# Patient Record
Sex: MALE | Race: WHITE | NOT HISPANIC OR LATINO | Employment: OTHER | URBAN - METROPOLITAN AREA
[De-identification: names, ages, dates, MRNs, and addresses within clinical notes are randomized per-mention and may not be internally consistent; named-entity substitution may affect disease eponyms.]

---

## 2017-01-01 ENCOUNTER — OFFICE VISIT (OUTPATIENT)
Dept: CARDIOLOGY | Facility: MEDICAL CENTER | Age: 77
End: 2017-01-01
Payer: MEDICARE

## 2017-01-01 VITALS
WEIGHT: 212 LBS | BODY MASS INDEX: 31.4 KG/M2 | HEART RATE: 64 BPM | HEIGHT: 69 IN | OXYGEN SATURATION: 95 % | SYSTOLIC BLOOD PRESSURE: 138 MMHG | DIASTOLIC BLOOD PRESSURE: 70 MMHG

## 2017-01-01 DIAGNOSIS — Z82.49 FAMILY HISTORY OF CORONARY ARTERY DISEASE: ICD-10-CM

## 2017-01-01 DIAGNOSIS — D69.6 THROMBOCYTOPENIA (HCC): ICD-10-CM

## 2017-01-01 DIAGNOSIS — R73.01 ELEVATED FASTING BLOOD SUGAR: ICD-10-CM

## 2017-01-01 DIAGNOSIS — E78.2 MIXED HYPERLIPIDEMIA: ICD-10-CM

## 2017-01-01 PROCEDURE — 99213 OFFICE O/P EST LOW 20 MIN: CPT | Performed by: INTERNAL MEDICINE

## 2017-01-01 ASSESSMENT — ENCOUNTER SYMPTOMS
ORTHOPNEA: 0
PND: 0
CLAUDICATION: 0
WHEEZING: 0
COUGH: 0
PALPITATIONS: 0
MYALGIAS: 0

## 2017-01-01 ASSESSMENT — LIFESTYLE VARIABLES: SUBSTANCE_ABUSE: 0

## 2017-03-29 ENCOUNTER — PATIENT OUTREACH (OUTPATIENT)
Dept: HEALTH INFORMATION MANAGEMENT | Facility: OTHER | Age: 77
End: 2017-03-29

## 2017-04-03 NOTE — PROGRESS NOTES
Attempt #:2    Verify PCP: yes    Communication Preference Obtained: yes     Review Care Team: yes    Annual Wellness Visit Scheduling  1. Scheduling Status: NOT SCHEDULED- PT IS TAKING CARE OF WIFE, DOES NOT HAVE TIME FOR ANYTHING ELSE RIGHT NOW          Care Gap Scheduling (Attempt to Schedule EACH Overdue Care Gap!)- PT IS TAKING CARE OF WIFE, DOES NOT HAVE TIME FOR ANYTHING ELSE RIGHT NOW          Health Maintenance Due   Topic Date Due   • IMM ZOSTER VACCINE  04/26/2002   • IMM PNEUMOCOCCAL 65+ (ADULT) LOW/MEDIUM RISK SERIES (1 of 2 - PCV13) 04/26/2007   • IMM DTaP/Tdap/Td Vaccine (1 - Tdap) 04/21/2012   • Annual Wellness Visit  12/14/2016         STWA Activation: sent activation code  STWA [place]: no  Virtual Visits: no  Opt In to Text Messages: no

## 2017-04-21 ENCOUNTER — RX ONLY (OUTPATIENT)
Age: 77
Setting detail: RX ONLY
End: 2017-04-21

## 2017-05-19 ENCOUNTER — OFFICE VISIT (OUTPATIENT)
Dept: MEDICAL GROUP | Facility: MEDICAL CENTER | Age: 77
End: 2017-05-19
Payer: MEDICARE

## 2017-05-19 VITALS
RESPIRATION RATE: 16 BRPM | BODY MASS INDEX: 29.63 KG/M2 | DIASTOLIC BLOOD PRESSURE: 78 MMHG | SYSTOLIC BLOOD PRESSURE: 132 MMHG | HEIGHT: 70 IN | WEIGHT: 207 LBS | HEART RATE: 78 BPM | TEMPERATURE: 97.3 F | OXYGEN SATURATION: 95 %

## 2017-05-19 DIAGNOSIS — E78.2 MIXED HYPERLIPIDEMIA: ICD-10-CM

## 2017-05-19 DIAGNOSIS — E55.9 VITAMIN D DEFICIENCY DISEASE: ICD-10-CM

## 2017-05-19 DIAGNOSIS — Z12.5 PROSTATE CANCER SCREENING: ICD-10-CM

## 2017-05-19 DIAGNOSIS — D69.6 THROMBOCYTOPENIA (HCC): ICD-10-CM

## 2017-05-19 DIAGNOSIS — R73.01 ELEVATED FASTING BLOOD SUGAR: ICD-10-CM

## 2017-05-19 PROCEDURE — 4040F PNEUMOC VAC/ADMIN/RCVD: CPT | Mod: 8P | Performed by: FAMILY MEDICINE

## 2017-05-19 PROCEDURE — G8419 CALC BMI OUT NRM PARAM NOF/U: HCPCS | Performed by: FAMILY MEDICINE

## 2017-05-19 PROCEDURE — 99214 OFFICE O/P EST MOD 30 MIN: CPT | Performed by: FAMILY MEDICINE

## 2017-05-19 PROCEDURE — G8432 DEP SCR NOT DOC, RNG: HCPCS | Performed by: FAMILY MEDICINE

## 2017-05-19 PROCEDURE — 1101F PT FALLS ASSESS-DOCD LE1/YR: CPT | Mod: 8P | Performed by: FAMILY MEDICINE

## 2017-05-19 PROCEDURE — 1036F TOBACCO NON-USER: CPT | Performed by: FAMILY MEDICINE

## 2017-05-19 NOTE — MR AVS SNAPSHOT
"        Murali Aly   2017 1:00 PM   Office Visit   MRN: [id number]    Department:  [facility]   Dept Phone:  [phone]    Description:  Male : 1940   Provider:  Jh Acosta M.D.           Reason for Visit     Orders Needed           Allergies as of 2017     Allergen Noted Reactions    Nkda [No Known Drug Allergy] 2012         You were diagnosed with     Mixed hyperlipidemia   [272.2.ICD-9-CM]       Vitamin D deficiency disease   [046109]       Elevated fasting blood sugar   [384015]       Thrombocytopenia (CMS-HCC)   [522206]       Prostate cancer screening   [425846]         Vital Signs     Blood Pressure Pulse Temperature Respirations Height Weight    132/78 mmHg 78 36.3 °C (97.3 °F) 16 1.778 m (5' 10\") 93.895 kg (207 lb)    Body Mass Index Oxygen Saturation Smoking Status             29.70 kg/m2 95% Never Smoker          Basic Information     Date Of Birth Sex Race Ethnicity Preferred Language    1940 Male White Non- English      Problem List              ICD-10-CM Priority Class Noted - Resolved    Family history of coronary artery disease Z82.49   2012 - Present    Screening for prostate cancer Z12.5   2012 - Present    BPH (benign prostatic hyperplasia) N40.0   11/15/2013 - Present    Prediabetes R73.03   11/15/2013 - Present    Hip pain, bilateral M25.551, M25.552   2014 - Present    Mixed hyperlipidemia E78.2   2014 - Present    Vitamin D deficiency disease E55.9   2014 - Present    Edema R60.9   2015 - Present    Obesity (BMI 30.0-34.9) E66.9   6/10/2015 - Present    History of colon polyps Z86.010   10/31/2016 - Present    Thrombocytopenia (CMS-HCC) D69.6   2017 - Present    Elevated fasting blood sugar R73.01   2017 - Present      Health Maintenance        Date Due Completion Dates    IMM ZOSTER VACCINE 2002 ---    IMM PNEUMOCOCCAL 65+ (ADULT) LOW/MEDIUM RISK SERIES (1 of 2 - PCV13) 2007 ---    IMM " DTaP/Tdap/Td Vaccine (1 - Tdap) 4/21/2012 4/20/2012    COLONOSCOPY 5/10/2021 5/10/2016            Current Immunizations     TD Vaccine 4/20/2012      Below and/or attached are the medications your provider expects you to take. Review all of your home medications and newly ordered medications with your provider and/or pharmacist. Follow medication instructions as directed by your provider and/or pharmacist. Please keep your medication list with you and share with your provider. Update the information when medications are discontinued, doses are changed, or new medications (including over-the-counter products) are added; and carry medication information at all times in the event of emergency situations     Allergies:  NKDA - (reactions not documented)               Medications  Valid as of: May 19, 2017 -  1:30 PM    Generic Name Brand Name Tablet Size Instructions for use    .                 Medicines prescribed today were sent to:     [facility]  [place], [place] - [address]    [address] [place] [address]    Phone: [phone] Fax: [phone]    Open 24 Hours?: No      Medication refill instructions:       If your prescription bottle indicates you have medication refills left, it is not necessary to call your provider’s office. Please contact your pharmacy and they will refill your medication.    If your prescription bottle indicates you do not have any refills left, you may request refills at any time through one of the following ways: The online [employer] system (except Urgent Care), by calling your provider’s office, or by asking your pharmacy to contact your provider’s office with a refill request. Medication refills are processed only during regular business hours and may not be available until the next business day. Your provider may request additional information or to have a follow-up visit with you prior to refilling your medication.   *Please Note: Medication refills are assigned a new Rx  number when refilled electronically. Your pharmacy may indicate that no refills were authorized even though a new prescription for the same medication is available at the pharmacy. Please request the medicine by name with the pharmacy before contacting your provider for a refill.        Your To Do List     Future Labs/Procedures Complete By Expires    CBC WITH DIFFERENTIAL  As directed 5/20/2018    COMP METABOLIC PANEL  As directed 5/20/2018    HEMOGLOBIN A1C  As directed 5/20/2018    LIPID PROFILE  As directed 5/20/2018    PROSTATE SPECIFIC AG SCREENING  As directed 5/20/2018    TSH WITH REFLEX TO FT4  As directed 5/19/2018    VITAMIN D,25 HYDROXY  As directed 5/20/2018      Other Notes About Your Plan     Murali Lu is enrolled in [facility] Team with Dr. Mary Koo           [employer] - It's what's in store Access Code: [email or web address]  Expires: 6/18/2017  1:30 PM    Toushay - It's what's in store  A secure, online tool to manage your health information     [employer]’s Toushay - It's what's in store® is a secure, online tool that connects you to your personalized health information from the privacy of your home -- day or night - making it very easy for you to manage your healthcare. Once the activation process is completed, you can even access your medical information using the T[employer]hay - It's what's in store tasha, which is available for free in the [employer] Tasha store or Google Play store.     T[employer] - It's what's in store provides the following levels of access (as shown below):   My Chart Features   [facility] Doctor [facility]  Urgent  Care Non-[facility]  Primary Care  Doctor   Email your healthcare team securely and privately 24/7 X X X    Manage appointments: schedule your next appointment; view details of past/upcoming appointments X      Request prescription refills. X      View recent personal medical records, including lab and immunizations X X X X   View health record, including health history, allergies, medications X X X X   Read reports about your outpatient visits, procedures,  consult and ER notes X X X X   See your discharge summary, which is a recap of your hospital and/or ER visit that includes your diagnosis, lab results, and care plan. X X       How to register for tagWALLET:  1. Go to  [email or web address].  2. Click on the Sign Up Now box, which takes you to the New Member Sign Up page. You will need to provide the following information:  a. Enter your tagWALLET Access Code exactly as it appears at the top of this page. (You will not need to use this code after you’ve completed the sign-up process. If you do not sign up before the expiration date, you must request a new code.)   b. Enter your date of birth.   c. Enter your home email address.   d. Click Submit, and follow the next screen’s instructions.  3. Create a Planboxt ID. This will be your Planboxt login ID and cannot be changed, so think of one that is secure and easy to remember.  4. Create a [employer] password. You can change your password at any time.  5. Enter your Password Reset Question and Answer. This can be used at a later time if you forget your password.   6. Enter your e-mail address. This allows you to receive e-mail notifications when new information is available in tagWALLET.  7. Click Sign Up. You can now view your health information.    For assistance activating your tagWALLET account, call [phone]

## 2017-05-19 NOTE — PROGRESS NOTES
"Subjective:   Murali Moore is a 77 y.o. male here today for hyperlipidemia    Elevated fasting blood sugar  Patient has had elevated blood sugar in the past with an A1c of 6.2 on last year's labs.    Mixed hyperlipidemia  Patient has had good cholesterol in the past. He has lost weight and is using the elliptical machine every morning.  He is interesting getting cholesterol particle size done.  However, patient is opposed to having cholesterol medication prescribed.  Results for MURALI MOORE (MRN [id number]) as of 5/19/2017 13:09   Ref. Range 6/30/2016 09:58   Cholesterol,Tot Latest Ref Range: 100-199 mg/dL 187   Triglycerides Latest Ref Range: 0-149 mg/dL 134   HDL Latest Ref Range: >=40 mg/dL 50   LDL Latest Ref Range: <100 mg/dL 110 (H)       Thrombocytopenia (CMS-HCC)  Patient has had a mild thrombocytopenia in the past.  He denies any bleeding issues.    Vitamin D deficiency disease  Patient is on multiple vitamins. His vitamin D level has been normal.           Current medicines (including changes today)  No current outpatient prescriptions on file.     No current facility-administered medications for this visit.     He  has a past medical history of MEDICAL HOME and Colon polyps.    ROS   No chest pain, no shortness of breath       Objective:     Blood pressure 132/78, pulse 78, temperature 36.3 °C (97.3 °F), resp. rate 16, height 1.778 m (5' 10\"), weight 93.895 kg (207 lb), SpO2 95 %. Body mass index is 29.7 kg/(m^2).   Physical Exam:  Constitutional: Alert, no distress.  Skin: Warm, dry, good turgor, no rashes in visible areas.  Eye: Equal, round and reactive, conjunctiva clear, lids normal.  Respiratory: Unlabored respiratory effort, lungs clear to auscultation, no wheezes, no ronchi.  Cardiovascular: Normal S1, S2, no murmur, no edema.  Psych: Alert and oriented x3, normal affect and mood.        Assessment and Plan:   The following treatment plan was discussed    1. Mixed hyperlipidemia  Controlled. " Advised healthy diet.  Discussed the rationale behind ordering cholesterol particle size with almost a normal cholesterol. Patient declines wanting to be prescribed medication even if particle size is abnormal. He hopes that this will be motivational for him to continue with healthy diet and regular exercise.  - NMR LIPOPROFILE  - COMP METABOLIC PANEL; Future  - TSH WITH REFLEX TO FT4; Future  - LIPID PROFILE; Future    2. Vitamin D deficiency disease  Controlled. Continue vitamin D supplementation.  - VITAMIN D,25 HYDROXY; Future    3. Elevated fasting blood sugar  Advised diet and exercise. Check labs and call with results.  - HEMOGLOBIN A1C; Future    4. Thrombocytopenia (CMS-HCC)  Check labs and call with results.  - CBC WITH DIFFERENTIAL; Future    5. Prostate cancer screening  Advised patient have PSA done after July 30  - PROSTATE SPECIFIC AG SCREENING; Future      Followup: Return in about 1 year (around 5/19/2018) for Annual, Long.         Total 20 minutes face-to-face time spent with patient, with greater than 50% of the total time discussing patient's issues and symptoms as listed above in assessment and plan, as well as managing coordination of care for future evaluation and treatment.

## 2017-09-29 ENCOUNTER — HOSPITAL ENCOUNTER (OUTPATIENT)
Dept: LAB | Facility: MEDICAL CENTER | Age: 77
End: 2017-09-29
Attending: FAMILY MEDICINE
Payer: MEDICARE

## 2017-09-29 DIAGNOSIS — E78.2 MIXED HYPERLIPIDEMIA: ICD-10-CM

## 2017-09-29 DIAGNOSIS — E55.9 VITAMIN D DEFICIENCY DISEASE: ICD-10-CM

## 2017-09-29 DIAGNOSIS — Z12.5 PROSTATE CANCER SCREENING: ICD-10-CM

## 2017-09-29 DIAGNOSIS — R73.01 ELEVATED FASTING BLOOD SUGAR: ICD-10-CM

## 2017-09-29 DIAGNOSIS — D69.6 THROMBOCYTOPENIA (HCC): ICD-10-CM

## 2017-09-29 LAB
25(OH)D3 SERPL-MCNC: 43 NG/ML (ref 30–100)
ALBUMIN SERPL BCP-MCNC: 4.2 G/DL (ref 3.2–4.9)
ALBUMIN/GLOB SERPL: 2.2 G/DL
ALP SERPL-CCNC: 58 U/L (ref 30–99)
ALT SERPL-CCNC: 13 U/L (ref 2–50)
ANION GAP SERPL CALC-SCNC: 8 MMOL/L (ref 0–11.9)
AST SERPL-CCNC: 17 U/L (ref 12–45)
BASOPHILS # BLD AUTO: 0.3 % (ref 0–1.8)
BASOPHILS # BLD: 0.02 K/UL (ref 0–0.12)
BILIRUB SERPL-MCNC: 0.9 MG/DL (ref 0.1–1.5)
BUN SERPL-MCNC: 14 MG/DL (ref 8–22)
CALCIUM SERPL-MCNC: 9.2 MG/DL (ref 8.5–10.5)
CHLORIDE SERPL-SCNC: 107 MMOL/L (ref 96–112)
CHOLEST SERPL-MCNC: 170 MG/DL (ref 100–199)
CO2 SERPL-SCNC: 27 MMOL/L (ref 20–33)
CREAT SERPL-MCNC: 0.88 MG/DL (ref 0.5–1.4)
EOSINOPHIL # BLD AUTO: 0.09 K/UL (ref 0–0.51)
EOSINOPHIL NFR BLD: 1.4 % (ref 0–6.9)
ERYTHROCYTE [DISTWIDTH] IN BLOOD BY AUTOMATED COUNT: 39.8 FL (ref 35.9–50)
EST. AVERAGE GLUCOSE BLD GHB EST-MCNC: 137 MG/DL
GFR SERPL CREATININE-BSD FRML MDRD: >60 ML/MIN/1.73 M 2
GLOBULIN SER CALC-MCNC: 1.9 G/DL (ref 1.9–3.5)
GLUCOSE SERPL-MCNC: 126 MG/DL (ref 65–99)
HBA1C MFR BLD: 6.4 % (ref 0–5.6)
HCT VFR BLD AUTO: 44.8 % (ref 42–52)
HDLC SERPL-MCNC: 50 MG/DL
HGB BLD-MCNC: 15.5 G/DL (ref 14–18)
IMM GRANULOCYTES # BLD AUTO: 0.01 K/UL (ref 0–0.11)
IMM GRANULOCYTES NFR BLD AUTO: 0.2 % (ref 0–0.9)
LDLC SERPL CALC-MCNC: 99 MG/DL
LYMPHOCYTES # BLD AUTO: 1.17 K/UL (ref 1–4.8)
LYMPHOCYTES NFR BLD: 18.3 % (ref 22–41)
MCH RBC QN AUTO: 31.3 PG (ref 27–33)
MCHC RBC AUTO-ENTMCNC: 34.6 G/DL (ref 33.7–35.3)
MCV RBC AUTO: 90.5 FL (ref 81.4–97.8)
MONOCYTES # BLD AUTO: 0.49 K/UL (ref 0–0.85)
MONOCYTES NFR BLD AUTO: 7.7 % (ref 0–13.4)
NEUTROPHILS # BLD AUTO: 4.6 K/UL (ref 1.82–7.42)
NEUTROPHILS NFR BLD: 72.1 % (ref 44–72)
NRBC # BLD AUTO: 0 K/UL
NRBC BLD AUTO-RTO: 0 /100 WBC
PLATELET # BLD AUTO: 162 K/UL (ref 164–446)
PMV BLD AUTO: 9.2 FL (ref 9–12.9)
POTASSIUM SERPL-SCNC: 4 MMOL/L (ref 3.6–5.5)
PROT SERPL-MCNC: 6.1 G/DL (ref 6–8.2)
PSA SERPL-MCNC: 1.42 NG/ML (ref 0–4)
RBC # BLD AUTO: 4.95 M/UL (ref 4.7–6.1)
SODIUM SERPL-SCNC: 142 MMOL/L (ref 135–145)
TRIGL SERPL-MCNC: 105 MG/DL (ref 0–149)
TSH SERPL DL<=0.005 MIU/L-ACNC: 1.38 UIU/ML (ref 0.3–3.7)
WBC # BLD AUTO: 6.4 K/UL (ref 4.8–10.8)

## 2017-09-29 PROCEDURE — 82306 VITAMIN D 25 HYDROXY: CPT

## 2017-09-29 PROCEDURE — 80053 COMPREHEN METABOLIC PANEL: CPT

## 2017-09-29 PROCEDURE — 84443 ASSAY THYROID STIM HORMONE: CPT

## 2017-09-29 PROCEDURE — 36415 COLL VENOUS BLD VENIPUNCTURE: CPT

## 2017-09-29 PROCEDURE — 80061 LIPID PANEL: CPT

## 2017-09-29 PROCEDURE — 84153 ASSAY OF PSA TOTAL: CPT

## 2017-09-29 PROCEDURE — 83704 LIPOPROTEIN BLD QUAN PART: CPT

## 2017-09-29 PROCEDURE — 83036 HEMOGLOBIN GLYCOSYLATED A1C: CPT

## 2017-09-29 PROCEDURE — 85025 COMPLETE CBC W/AUTO DIFF WBC: CPT

## 2017-10-02 ENCOUNTER — TELEPHONE (OUTPATIENT)
Dept: MEDICAL GROUP | Facility: MEDICAL CENTER | Age: 77
End: 2017-10-02

## 2017-10-02 NOTE — TELEPHONE ENCOUNTER
----- Message from Jh Acosta M.D. sent at 10/2/2017  7:38 AM PDT -----  Please notify patient of kidney function, liver function, vitamin D, prostate, thyroid function, cholesterol are normal.  Average blood sugar is in the prediabetic range and very close to diabetes. We advise to reduce sugar/carbohydrate/alcohol, eat more vegetables and lean meats such as fish/chicken/turkey. We also recommend 30 minutes of cardiovascular exercise most days of the week.  Labs should be repeated in 6 months  Jh Acosta M.D.

## 2017-10-09 LAB
CHOLEST SERPL-MCNC: 185 MG/DL
HDL PARTICAL NO Q4363: ABNORMAL
HDL SIZE Q4361: 8.5 NM
HDLC SERPL-MCNC: 54 MG/DL (ref 40–59)
HLD.LARGE SERPL-SCNC: 3 UMOL/L
L VLDL PART NO Q4357: <1.5 NMOL/L
LDL SERPL QN: 20.6 NM
LDL SERPL-SCNC: 1349 NMOL/L
LDL SMALL SERPL-SCNC: 582 NMOL/L
LDLC SERPL CALC-MCNC: 108 MG/DL
TRIGL SERPL-MCNC: 114 MG/DL (ref 30–149)
VLDL SIZE Q4362: ABNORMAL

## 2017-10-17 NOTE — PROGRESS NOTES
"Subjective:   Murali Lu is a 77 y.o. male who presents today for follow-up of his risk factors for coronary disease.  Because of his borderline LDL for treatment and his family history he underwent appropriate assessment of LDL particle size and his LDL particle size is mildly reduced and particle number increased after considering this he decided he did not want to pursue statin therapy. He does understand the relative risks very clearly.  Past Medical History:   Diagnosis Date   • Colon polyps    • MEDICAL HOME      Past Surgical History:   Procedure Laterality Date   • HYDROCELECTOMY ADULT     • UMBILICAL HERNIA REPAIR       Family History   Problem Relation Age of Onset   • Heart Disease Mother    • Cancer Sister    • Diabetes Brother    • Cancer Sister      History   Smoking Status   • Never Smoker   Smokeless Tobacco   • Never Used     Allergies   Allergen Reactions   • Nkda [No Known Drug Allergy]      No outpatient encounter prescriptions on file as of 10/17/2017.     No facility-administered encounter medications on file as of 10/17/2017.      Review of Systems   Respiratory: Negative for cough and wheezing.    Cardiovascular: Negative for chest pain, palpitations, orthopnea, claudication, leg swelling and PND.   Musculoskeletal: Negative for myalgias.   Psychiatric/Behavioral: Negative for substance abuse.        Objective:   /70   Pulse 64   Ht 1.753 m (5' 9\")   Wt 96.2 kg (212 lb)   SpO2 95%   BMI 31.31 kg/m²     Physical Exam   Constitutional: He is oriented to person, place, and time. He appears well-developed and well-nourished.   Eyes: Conjunctivae are normal.   Neck: No JVD present.   Cardiovascular: Normal rate, regular rhythm, normal heart sounds and intact distal pulses.  Exam reveals no gallop.    No murmur heard.  Pulmonary/Chest: Effort normal and breath sounds normal.   Musculoskeletal: He exhibits edema (trace at most).   Neurological: He is alert and oriented to person, place, " and time.   Skin: Skin is warm and dry.   Psychiatric: He has a normal mood and affect. Thought content normal.       Assessment:     1. Mixed hyperlipidemia     2. Family history of coronary artery disease     3. Thrombocytopenia (CMS-HCC)     4. Elevated fasting blood sugar       Platelet count has been minimally reduced if at all and has been stable.  We discussed his lipids today.  The risk equation would definitely recommend therapy but mostly because of risk associated with age and therefore the LDL fractionation was definitely indicated and it is borderline or slightly abnormal depending on the value and therefore I did recommend statin therapy.    Medical Decision Making:  Today's Assessment / Status / Plan:     He has reviewed all the data himself and has decided that he really does not want to consider statin therapy now.  We know his profile now and therefore can follow LDL as a marker should he change his mind and decided to pursue statin therapy. Otherwise I made no changes today and he will continue annual follow-up but primary follow up with Dr. Acosta.  He will continue working on diet for his glucose.

## 2017-10-25 ENCOUNTER — APPOINTMENT (RX ONLY)
Dept: URBAN - METROPOLITAN AREA CLINIC 4 | Facility: CLINIC | Age: 77
Setting detail: DERMATOLOGY
End: 2017-10-25

## 2017-10-25 DIAGNOSIS — L11.1 TRANSIENT ACANTHOLYTIC DERMATOSIS [GROVER]: ICD-10-CM

## 2017-10-25 DIAGNOSIS — Z85.820 PERSONAL HISTORY OF MALIGNANT MELANOMA OF SKIN: ICD-10-CM

## 2017-10-25 DIAGNOSIS — L82.0 INFLAMED SEBORRHEIC KERATOSIS: ICD-10-CM

## 2017-10-25 DIAGNOSIS — L81.4 OTHER MELANIN HYPERPIGMENTATION: ICD-10-CM

## 2017-10-25 DIAGNOSIS — L82.1 OTHER SEBORRHEIC KERATOSIS: ICD-10-CM

## 2017-10-25 PROBLEM — Z85.828 PERSONAL HISTORY OF OTHER MALIGNANT NEOPLASM OF SKIN: Status: ACTIVE | Noted: 2017-10-25

## 2017-10-25 PROBLEM — L57.0 ACTINIC KERATOSIS: Status: ACTIVE | Noted: 2017-10-25

## 2017-10-25 PROCEDURE — ? COUNSELING

## 2017-10-25 PROCEDURE — ? LIQUID NITROGEN

## 2017-10-25 PROCEDURE — 99213 OFFICE O/P EST LOW 20 MIN: CPT | Mod: 25

## 2017-10-25 ASSESSMENT — LOCATION SIMPLE DESCRIPTION DERM
LOCATION SIMPLE: LEFT UPPER ARM
LOCATION SIMPLE: RIGHT CHEEK
LOCATION SIMPLE: UPPER BACK
LOCATION SIMPLE: SCALP
LOCATION SIMPLE: LEFT UPPER BACK
LOCATION SIMPLE: RIGHT FOREHEAD
LOCATION SIMPLE: RIGHT UPPER BACK
LOCATION SIMPLE: LEFT FOREHEAD
LOCATION SIMPLE: RIGHT TEMPLE
LOCATION SIMPLE: RIGHT FOREARM
LOCATION SIMPLE: LEFT CHEEK
LOCATION SIMPLE: CHEST
LOCATION SIMPLE: RIGHT SCALP
LOCATION SIMPLE: LEFT FOREARM
LOCATION SIMPLE: RIGHT ELBOW

## 2017-10-25 ASSESSMENT — LOCATION ZONE DERM
LOCATION ZONE: ARM
LOCATION ZONE: SCALP
LOCATION ZONE: FACE
LOCATION ZONE: TRUNK

## 2017-10-25 ASSESSMENT — LOCATION DETAILED DESCRIPTION DERM
LOCATION DETAILED: RIGHT SUPERIOR FOREHEAD
LOCATION DETAILED: RIGHT INFERIOR MEDIAL UPPER BACK
LOCATION DETAILED: LEFT SUPERIOR PARIETAL SCALP
LOCATION DETAILED: RIGHT MEDIAL SUPERIOR CHEST
LOCATION DETAILED: LEFT SUPERIOR FOREHEAD
LOCATION DETAILED: RIGHT MID TEMPLE
LOCATION DETAILED: RIGHT ELBOW
LOCATION DETAILED: INFERIOR THORACIC SPINE
LOCATION DETAILED: RIGHT CENTRAL FRONTAL SCALP
LOCATION DETAILED: LEFT INFERIOR CENTRAL MALAR CHEEK
LOCATION DETAILED: RIGHT PROXIMAL DORSAL FOREARM
LOCATION DETAILED: LEFT DISTAL POSTERIOR UPPER ARM
LOCATION DETAILED: RIGHT MID-UPPER BACK
LOCATION DETAILED: LEFT INFERIOR MEDIAL BUCCAL CHEEK
LOCATION DETAILED: LEFT MEDIAL UPPER BACK
LOCATION DETAILED: LEFT DISTAL DORSAL FOREARM
LOCATION DETAILED: RIGHT SUPERIOR LATERAL MALAR CHEEK

## 2017-10-25 NOTE — PROCEDURE: LIQUID NITROGEN
Medical Necessity Information: It is in your best interest to select a reason for this procedure from the list below. All of these items fulfill various CMS LCD requirements except the new and changing color options.
Detail Level: Detailed
Medical Necessity Clause: This procedure was medically necessary because the lesions that were treated were:
Add 52 Modifier (Optional): no
Consent: The patient's consent was obtained including but not limited to risks of crusting, scabbing, blistering, scarring, darker or lighter pigmentary change, recurrence, incomplete removal and infection.
Post-Care Instructions: I reviewed with the patient in detail post-care instructions. Patient is to wear sunprotection, and avoid picking at any of the treated lesions. Pt may apply Vaseline to crusted or scabbing areas.

## 2018-01-01 ENCOUNTER — APPOINTMENT (OUTPATIENT)
Dept: RADIOLOGY | Facility: MEDICAL CENTER | Age: 78
DRG: 870 | End: 2018-01-01
Attending: INTERNAL MEDICINE
Payer: MEDICARE

## 2018-01-01 ENCOUNTER — OFFICE VISIT (OUTPATIENT)
Dept: MEDICAL GROUP | Facility: MEDICAL CENTER | Age: 78
End: 2018-01-01
Payer: MEDICARE

## 2018-01-01 ENCOUNTER — APPOINTMENT (OUTPATIENT)
Dept: RADIOLOGY | Facility: MEDICAL CENTER | Age: 78
DRG: 870 | End: 2018-01-01
Attending: EMERGENCY MEDICINE
Payer: MEDICARE

## 2018-01-01 ENCOUNTER — HOSPITAL ENCOUNTER (OUTPATIENT)
Dept: LAB | Facility: MEDICAL CENTER | Age: 78
End: 2018-07-27
Attending: FAMILY MEDICINE
Payer: MEDICARE

## 2018-01-01 ENCOUNTER — OFFICE VISIT (OUTPATIENT)
Dept: URGENT CARE | Facility: PHYSICIAN GROUP | Age: 78
End: 2018-01-01
Payer: MEDICARE

## 2018-01-01 ENCOUNTER — OFFICE VISIT (OUTPATIENT)
Dept: CARDIOLOGY | Facility: MEDICAL CENTER | Age: 78
End: 2018-01-01
Payer: MEDICARE

## 2018-01-01 ENCOUNTER — HOSPITAL ENCOUNTER (OUTPATIENT)
Dept: RADIOLOGY | Facility: MEDICAL CENTER | Age: 78
DRG: 870 | End: 2018-09-29
Attending: PHYSICIAN ASSISTANT
Payer: MEDICARE

## 2018-01-01 ENCOUNTER — APPOINTMENT (OUTPATIENT)
Dept: CARDIOLOGY | Facility: MEDICAL CENTER | Age: 78
DRG: 870 | End: 2018-01-01
Attending: INTERNAL MEDICINE
Payer: MEDICARE

## 2018-01-01 ENCOUNTER — HOSPITAL ENCOUNTER (INPATIENT)
Facility: MEDICAL CENTER | Age: 78
LOS: 16 days | DRG: 870 | End: 2018-10-15
Attending: EMERGENCY MEDICINE | Admitting: HOSPITALIST
Payer: MEDICARE

## 2018-01-01 ENCOUNTER — TELEPHONE (OUTPATIENT)
Dept: MEDICAL GROUP | Facility: MEDICAL CENTER | Age: 78
End: 2018-01-01

## 2018-01-01 ENCOUNTER — APPOINTMENT (OUTPATIENT)
Dept: RADIOLOGY | Facility: MEDICAL CENTER | Age: 78
DRG: 870 | End: 2018-01-01
Payer: MEDICARE

## 2018-01-01 ENCOUNTER — PATIENT OUTREACH (OUTPATIENT)
Dept: HEALTH INFORMATION MANAGEMENT | Facility: OTHER | Age: 78
End: 2018-01-01

## 2018-01-01 VITALS
SYSTOLIC BLOOD PRESSURE: 149 MMHG | HEIGHT: 69 IN | DIASTOLIC BLOOD PRESSURE: 71 MMHG | BODY MASS INDEX: 29.62 KG/M2 | WEIGHT: 200 LBS | HEART RATE: 70 BPM | OXYGEN SATURATION: 96 %

## 2018-01-01 VITALS
TEMPERATURE: 98.4 F | SYSTOLIC BLOOD PRESSURE: 110 MMHG | WEIGHT: 199 LBS | HEIGHT: 70 IN | RESPIRATION RATE: 13 BRPM | OXYGEN SATURATION: 89 % | DIASTOLIC BLOOD PRESSURE: 62 MMHG | HEART RATE: 106 BPM | BODY MASS INDEX: 28.49 KG/M2

## 2018-01-01 VITALS
TEMPERATURE: 99.3 F | DIASTOLIC BLOOD PRESSURE: 59 MMHG | OXYGEN SATURATION: 80 % | SYSTOLIC BLOOD PRESSURE: 118 MMHG | WEIGHT: 230.6 LBS | HEIGHT: 70 IN | HEART RATE: 54 BPM | BODY MASS INDEX: 33.01 KG/M2

## 2018-01-01 VITALS
HEART RATE: 66 BPM | SYSTOLIC BLOOD PRESSURE: 126 MMHG | BODY MASS INDEX: 30.24 KG/M2 | HEIGHT: 69 IN | WEIGHT: 204.2 LBS | TEMPERATURE: 97.7 F | OXYGEN SATURATION: 98 % | DIASTOLIC BLOOD PRESSURE: 72 MMHG

## 2018-01-01 DIAGNOSIS — Z85.828 H/O NONMELANOMA SKIN CANCER: ICD-10-CM

## 2018-01-01 DIAGNOSIS — A41.9 SEPSIS, DUE TO UNSPECIFIED ORGANISM: ICD-10-CM

## 2018-01-01 DIAGNOSIS — R60.0 LOCALIZED EDEMA: ICD-10-CM

## 2018-01-01 DIAGNOSIS — E78.2 MIXED HYPERLIPIDEMIA: ICD-10-CM

## 2018-01-01 DIAGNOSIS — E55.9 VITAMIN D DEFICIENCY DISEASE: ICD-10-CM

## 2018-01-01 DIAGNOSIS — R40.4 SPELL OF ALTERED CONSCIOUSNESS: ICD-10-CM

## 2018-01-01 DIAGNOSIS — J18.9 PNEUMONIA OF BOTH LUNGS DUE TO INFECTIOUS ORGANISM, UNSPECIFIED PART OF LUNG: ICD-10-CM

## 2018-01-01 DIAGNOSIS — D69.6 THROMBOCYTOPENIA (HCC): ICD-10-CM

## 2018-01-01 DIAGNOSIS — R05.9 COUGH: ICD-10-CM

## 2018-01-01 DIAGNOSIS — R06.01 ORTHOPNEA: ICD-10-CM

## 2018-01-01 DIAGNOSIS — R73.01 ELEVATED FASTING BLOOD SUGAR: ICD-10-CM

## 2018-01-01 DIAGNOSIS — Z82.49 FAMILY HISTORY OF CORONARY ARTERY DISEASE: ICD-10-CM

## 2018-01-01 DIAGNOSIS — J81.0 ACUTE PULMONARY EDEMA (HCC): ICD-10-CM

## 2018-01-01 DIAGNOSIS — E66.9 OBESITY (BMI 30-39.9): ICD-10-CM

## 2018-01-01 DIAGNOSIS — I45.10 RBBB: ICD-10-CM

## 2018-01-01 DIAGNOSIS — R73.03 PREDIABETES: ICD-10-CM

## 2018-01-01 DIAGNOSIS — R09.02 HYPOXEMIA: ICD-10-CM

## 2018-01-01 LAB
25(OH)D3 SERPL-MCNC: 37 NG/ML (ref 30–100)
ACTION RANGE TRIGGERED IACRT: NO
ACTION RANGE TRIGGERED IACRT: YES
ACTION RANGE TRIGGERED IACRT: YES
ALBUMIN SERPL BCP-MCNC: 2.6 G/DL (ref 3.2–4.9)
ALBUMIN SERPL BCP-MCNC: 2.7 G/DL (ref 3.2–4.9)
ALBUMIN SERPL BCP-MCNC: 2.8 G/DL (ref 3.2–4.9)
ALBUMIN SERPL BCP-MCNC: 3 G/DL (ref 3.2–4.9)
ALBUMIN SERPL BCP-MCNC: 3.1 G/DL (ref 3.2–4.9)
ALBUMIN SERPL BCP-MCNC: 3.1 G/DL (ref 3.2–4.9)
ALBUMIN SERPL BCP-MCNC: 3.2 G/DL (ref 3.2–4.9)
ALBUMIN SERPL BCP-MCNC: 3.4 G/DL (ref 3.2–4.9)
ALBUMIN SERPL BCP-MCNC: 3.6 G/DL (ref 3.2–4.9)
ALBUMIN SERPL BCP-MCNC: 3.6 G/DL (ref 3.2–4.9)
ALBUMIN SERPL BCP-MCNC: 3.7 G/DL (ref 3.2–4.9)
ALBUMIN SERPL BCP-MCNC: 4.1 G/DL (ref 3.2–4.9)
ALBUMIN SERPL BCP-MCNC: 4.5 G/DL (ref 3.2–4.9)
ALBUMIN/GLOB SERPL: 1.4 G/DL
ALBUMIN/GLOB SERPL: 1.4 G/DL
ALBUMIN/GLOB SERPL: 1.6 G/DL
ALBUMIN/GLOB SERPL: 1.8 G/DL
ALBUMIN/GLOB SERPL: 1.9 G/DL
ALBUMIN/GLOB SERPL: 1.9 G/DL
ALBUMIN/GLOB SERPL: 2.1 G/DL
ALBUMIN/GLOB SERPL: 2.4 G/DL
ALBUMIN/GLOB SERPL: 2.5 G/DL
ALBUMIN/GLOB SERPL: 2.8 G/DL
ALP SERPL-CCNC: 114 U/L (ref 30–99)
ALP SERPL-CCNC: 60 U/L (ref 30–99)
ALP SERPL-CCNC: 68 U/L (ref 30–99)
ALP SERPL-CCNC: 74 U/L (ref 30–99)
ALP SERPL-CCNC: 76 U/L (ref 30–99)
ALP SERPL-CCNC: 77 U/L (ref 30–99)
ALP SERPL-CCNC: 79 U/L (ref 30–99)
ALP SERPL-CCNC: 86 U/L (ref 30–99)
ALP SERPL-CCNC: 87 U/L (ref 30–99)
ALP SERPL-CCNC: 87 U/L (ref 30–99)
ALP SERPL-CCNC: 89 U/L (ref 30–99)
ALP SERPL-CCNC: 90 U/L (ref 30–99)
ALP SERPL-CCNC: 92 U/L (ref 30–99)
ALP SERPL-CCNC: 97 U/L (ref 30–99)
ALP SERPL-CCNC: 99 U/L (ref 30–99)
ALT SERPL-CCNC: 13 U/L (ref 2–50)
ALT SERPL-CCNC: 14 U/L (ref 2–50)
ALT SERPL-CCNC: 143 U/L (ref 2–50)
ALT SERPL-CCNC: 15 U/L (ref 2–50)
ALT SERPL-CCNC: 19 U/L (ref 2–50)
ALT SERPL-CCNC: 20 U/L (ref 2–50)
ALT SERPL-CCNC: 22 U/L (ref 2–50)
ALT SERPL-CCNC: 22 U/L (ref 2–50)
ALT SERPL-CCNC: 251 U/L (ref 2–50)
ALT SERPL-CCNC: 27 U/L (ref 2–50)
ALT SERPL-CCNC: 32 U/L (ref 2–50)
ALT SERPL-CCNC: 33 U/L (ref 2–50)
ALT SERPL-CCNC: 39 U/L (ref 2–50)
ANION GAP SERPL CALC-SCNC: 10 MMOL/L (ref 0–11.9)
ANION GAP SERPL CALC-SCNC: 11 MMOL/L (ref 0–11.9)
ANION GAP SERPL CALC-SCNC: 11 MMOL/L (ref 0–11.9)
ANION GAP SERPL CALC-SCNC: 14 MMOL/L (ref 0–11.9)
ANION GAP SERPL CALC-SCNC: 3 MMOL/L (ref 0–11.9)
ANION GAP SERPL CALC-SCNC: 4 MMOL/L (ref 0–11.9)
ANION GAP SERPL CALC-SCNC: 5 MMOL/L (ref 0–11.9)
ANION GAP SERPL CALC-SCNC: 5 MMOL/L (ref 0–11.9)
ANION GAP SERPL CALC-SCNC: 6 MMOL/L (ref 0–11.9)
ANION GAP SERPL CALC-SCNC: 7 MMOL/L (ref 0–11.9)
ANION GAP SERPL CALC-SCNC: 8 MMOL/L (ref 0–11.9)
ANION GAP SERPL CALC-SCNC: 9 MMOL/L (ref 0–11.9)
APPEARANCE UR: CLEAR
APPEARANCE UR: CLEAR
AST SERPL-CCNC: 126 U/L (ref 12–45)
AST SERPL-CCNC: 15 U/L (ref 12–45)
AST SERPL-CCNC: 15 U/L (ref 12–45)
AST SERPL-CCNC: 16 U/L (ref 12–45)
AST SERPL-CCNC: 17 U/L (ref 12–45)
AST SERPL-CCNC: 17 U/L (ref 12–45)
AST SERPL-CCNC: 18 U/L (ref 12–45)
AST SERPL-CCNC: 18 U/L (ref 12–45)
AST SERPL-CCNC: 23 U/L (ref 12–45)
AST SERPL-CCNC: 24 U/L (ref 12–45)
AST SERPL-CCNC: 27 U/L (ref 12–45)
AST SERPL-CCNC: 35 U/L (ref 12–45)
BACTERIA #/AREA URNS HPF: NEGATIVE /HPF
BACTERIA BLD CULT: NORMAL
BACTERIA BRONCH AEROBE CULT: NORMAL
BACTERIA SPEC RESP CULT: NORMAL
BACTERIA UR CULT: NORMAL
BASE EXCESS BLDA CALC-SCNC: -4 MMOL/L (ref -4–3)
BASE EXCESS BLDA CALC-SCNC: -5 MMOL/L (ref -4–3)
BASE EXCESS BLDA CALC-SCNC: -6 MMOL/L (ref -4–3)
BASE EXCESS BLDA CALC-SCNC: -6 MMOL/L (ref -4–3)
BASE EXCESS BLDA CALC-SCNC: -7 MMOL/L (ref -4–3)
BASE EXCESS BLDA CALC-SCNC: -7 MMOL/L (ref -4–3)
BASE EXCESS BLDA CALC-SCNC: 1 MMOL/L (ref -4–3)
BASE EXCESS BLDA CALC-SCNC: 1 MMOL/L (ref -4–3)
BASE EXCESS BLDA CALC-SCNC: 2 MMOL/L (ref -4–3)
BASE EXCESS BLDA CALC-SCNC: 2 MMOL/L (ref -4–3)
BASE EXCESS BLDA CALC-SCNC: 4 MMOL/L (ref -4–3)
BASE EXCESS BLDA CALC-SCNC: 4 MMOL/L (ref -4–3)
BASE EXCESS BLDA CALC-SCNC: 5 MMOL/L (ref -4–3)
BASE EXCESS BLDA CALC-SCNC: 6 MMOL/L (ref -4–3)
BASE EXCESS BLDA CALC-SCNC: 7 MMOL/L (ref -4–3)
BASE EXCESS BLDA CALC-SCNC: 8 MMOL/L (ref -4–3)
BASOPHILS # BLD AUTO: 0 % (ref 0–1.8)
BASOPHILS # BLD AUTO: 0.1 % (ref 0–1.8)
BASOPHILS # BLD AUTO: 0.2 % (ref 0–1.8)
BASOPHILS # BLD AUTO: 0.3 % (ref 0–1.8)
BASOPHILS # BLD AUTO: 0.4 % (ref 0–1.8)
BASOPHILS # BLD AUTO: 0.4 % (ref 0–1.8)
BASOPHILS # BLD: 0 K/UL (ref 0–0.12)
BASOPHILS # BLD: 0.02 K/UL (ref 0–0.12)
BASOPHILS # BLD: 0.03 K/UL (ref 0–0.12)
BASOPHILS # BLD: 0.04 K/UL (ref 0–0.12)
BASOPHILS # BLD: 0.05 K/UL (ref 0–0.12)
BILIRUB CONJ SERPL-MCNC: 0.1 MG/DL (ref 0.1–0.5)
BILIRUB INDIRECT SERPL-MCNC: 0.4 MG/DL (ref 0–1)
BILIRUB SERPL-MCNC: 0.5 MG/DL (ref 0.1–1.5)
BILIRUB SERPL-MCNC: 0.6 MG/DL (ref 0.1–1.5)
BILIRUB SERPL-MCNC: 0.7 MG/DL (ref 0.1–1.5)
BILIRUB SERPL-MCNC: 0.7 MG/DL (ref 0.1–1.5)
BILIRUB SERPL-MCNC: 0.9 MG/DL (ref 0.1–1.5)
BILIRUB SERPL-MCNC: 1 MG/DL (ref 0.1–1.5)
BILIRUB SERPL-MCNC: 1 MG/DL (ref 0.1–1.5)
BILIRUB SERPL-MCNC: 1.1 MG/DL (ref 0.1–1.5)
BILIRUB SERPL-MCNC: 1.2 MG/DL (ref 0.1–1.5)
BILIRUB SERPL-MCNC: 1.3 MG/DL (ref 0.1–1.5)
BILIRUB SERPL-MCNC: 1.3 MG/DL (ref 0.1–1.5)
BILIRUB SERPL-MCNC: 1.4 MG/DL (ref 0.1–1.5)
BILIRUB SERPL-MCNC: 1.6 MG/DL (ref 0.1–1.5)
BILIRUB UR QL STRIP.AUTO: NEGATIVE
BILIRUB UR QL STRIP.AUTO: NEGATIVE
BNP SERPL-MCNC: 360 PG/ML (ref 0–100)
BNP SERPL-MCNC: 448 PG/ML (ref 0–100)
BODY TEMPERATURE: ABNORMAL DEGREES
BODY TEMPERATURE: NORMAL DEGREES
BODY TEMPERATURE: NORMAL DEGREES
BUN SERPL-MCNC: 13 MG/DL (ref 8–22)
BUN SERPL-MCNC: 22 MG/DL (ref 8–22)
BUN SERPL-MCNC: 22 MG/DL (ref 8–22)
BUN SERPL-MCNC: 28 MG/DL (ref 8–22)
BUN SERPL-MCNC: 42 MG/DL (ref 8–22)
BUN SERPL-MCNC: 50 MG/DL (ref 8–22)
BUN SERPL-MCNC: 59 MG/DL (ref 8–22)
BUN SERPL-MCNC: 67 MG/DL (ref 8–22)
BUN SERPL-MCNC: 68 MG/DL (ref 8–22)
BUN SERPL-MCNC: 68 MG/DL (ref 8–22)
BUN SERPL-MCNC: 69 MG/DL (ref 8–22)
BUN SERPL-MCNC: 70 MG/DL (ref 8–22)
BUN SERPL-MCNC: 71 MG/DL (ref 8–22)
BUN SERPL-MCNC: 72 MG/DL (ref 8–22)
BUN SERPL-MCNC: 76 MG/DL (ref 8–22)
BUN SERPL-MCNC: 77 MG/DL (ref 8–22)
BUN SERPL-MCNC: 79 MG/DL (ref 8–22)
BUN SERPL-MCNC: 80 MG/DL (ref 8–22)
CALCIUM SERPL-MCNC: 7.8 MG/DL (ref 8.5–10.5)
CALCIUM SERPL-MCNC: 8 MG/DL (ref 8.5–10.5)
CALCIUM SERPL-MCNC: 8 MG/DL (ref 8.5–10.5)
CALCIUM SERPL-MCNC: 8.1 MG/DL (ref 8.4–10.2)
CALCIUM SERPL-MCNC: 8.1 MG/DL (ref 8.5–10.5)
CALCIUM SERPL-MCNC: 8.2 MG/DL (ref 8.5–10.5)
CALCIUM SERPL-MCNC: 8.2 MG/DL (ref 8.5–10.5)
CALCIUM SERPL-MCNC: 8.3 MG/DL (ref 8.5–10.5)
CALCIUM SERPL-MCNC: 8.3 MG/DL (ref 8.5–10.5)
CALCIUM SERPL-MCNC: 8.4 MG/DL (ref 8.5–10.5)
CALCIUM SERPL-MCNC: 8.6 MG/DL (ref 8.5–10.5)
CALCIUM SERPL-MCNC: 8.7 MG/DL (ref 8.5–10.5)
CALCIUM SERPL-MCNC: 8.8 MG/DL (ref 8.5–10.5)
CALCIUM SERPL-MCNC: 8.9 MG/DL (ref 8.5–10.5)
CALCIUM SERPL-MCNC: 8.9 MG/DL (ref 8.5–10.5)
CALCIUM SERPL-MCNC: 9 MG/DL (ref 8.5–10.5)
CALCIUM SERPL-MCNC: 9 MG/DL (ref 8.5–10.5)
CALCIUM SERPL-MCNC: 9.3 MG/DL (ref 8.5–10.5)
CHLORIDE SERPL-SCNC: 101 MMOL/L (ref 96–112)
CHLORIDE SERPL-SCNC: 101 MMOL/L (ref 96–112)
CHLORIDE SERPL-SCNC: 105 MMOL/L (ref 96–112)
CHLORIDE SERPL-SCNC: 108 MMOL/L (ref 96–112)
CHLORIDE SERPL-SCNC: 109 MMOL/L (ref 96–112)
CHLORIDE SERPL-SCNC: 110 MMOL/L (ref 96–112)
CHLORIDE SERPL-SCNC: 111 MMOL/L (ref 96–112)
CHLORIDE SERPL-SCNC: 112 MMOL/L (ref 96–112)
CHLORIDE SERPL-SCNC: 113 MMOL/L (ref 96–112)
CHLORIDE SERPL-SCNC: 113 MMOL/L (ref 96–112)
CHLORIDE SERPL-SCNC: 114 MMOL/L (ref 96–112)
CHLORIDE SERPL-SCNC: 116 MMOL/L (ref 96–112)
CHOLEST SERPL-MCNC: 171 MG/DL (ref 100–199)
CO2 BLDA-SCNC: 19 MMOL/L (ref 20–33)
CO2 BLDA-SCNC: 19 MMOL/L (ref 20–33)
CO2 BLDA-SCNC: 21 MMOL/L (ref 20–33)
CO2 BLDA-SCNC: 21 MMOL/L (ref 20–33)
CO2 BLDA-SCNC: 22 MMOL/L (ref 20–33)
CO2 BLDA-SCNC: 23 MMOL/L (ref 20–33)
CO2 BLDA-SCNC: 25 MMOL/L (ref 20–33)
CO2 BLDA-SCNC: 26 MMOL/L (ref 20–33)
CO2 BLDA-SCNC: 27 MMOL/L (ref 20–33)
CO2 BLDA-SCNC: 27 MMOL/L (ref 20–33)
CO2 BLDA-SCNC: 29 MMOL/L (ref 20–33)
CO2 BLDA-SCNC: 29 MMOL/L (ref 20–33)
CO2 BLDA-SCNC: 31 MMOL/L (ref 20–33)
CO2 BLDA-SCNC: 32 MMOL/L (ref 20–33)
CO2 BLDA-SCNC: 32 MMOL/L (ref 20–33)
CO2 BLDA-SCNC: 33 MMOL/L (ref 20–33)
CO2 BLDA-SCNC: 33 MMOL/L (ref 20–33)
CO2 SERPL-SCNC: 20 MMOL/L (ref 20–33)
CO2 SERPL-SCNC: 21 MMOL/L (ref 20–33)
CO2 SERPL-SCNC: 22 MMOL/L (ref 20–33)
CO2 SERPL-SCNC: 25 MMOL/L (ref 20–33)
CO2 SERPL-SCNC: 25 MMOL/L (ref 20–33)
CO2 SERPL-SCNC: 26 MMOL/L (ref 20–33)
CO2 SERPL-SCNC: 27 MMOL/L (ref 20–33)
CO2 SERPL-SCNC: 28 MMOL/L (ref 20–33)
CO2 SERPL-SCNC: 28 MMOL/L (ref 20–33)
CO2 SERPL-SCNC: 29 MMOL/L (ref 20–33)
CO2 SERPL-SCNC: 30 MMOL/L (ref 20–33)
CO2 SERPL-SCNC: 30 MMOL/L (ref 20–33)
CO2 SERPL-SCNC: 31 MMOL/L (ref 20–33)
CO2 SERPL-SCNC: 31 MMOL/L (ref 20–33)
CO2 SERPL-SCNC: 33 MMOL/L (ref 20–33)
COLOR UR: ABNORMAL
COLOR UR: YELLOW
CREAT SERPL-MCNC: 0.86 MG/DL (ref 0.5–1.4)
CREAT SERPL-MCNC: 0.9 MG/DL (ref 0.5–1.4)
CREAT SERPL-MCNC: 0.91 MG/DL (ref 0.5–1.4)
CREAT SERPL-MCNC: 0.92 MG/DL (ref 0.5–1.4)
CREAT SERPL-MCNC: 0.93 MG/DL (ref 0.5–1.4)
CREAT SERPL-MCNC: 0.94 MG/DL (ref 0.5–1.4)
CREAT SERPL-MCNC: 0.97 MG/DL (ref 0.5–1.4)
CREAT SERPL-MCNC: 0.98 MG/DL (ref 0.5–1.4)
CREAT SERPL-MCNC: 1.01 MG/DL (ref 0.5–1.4)
CREAT SERPL-MCNC: 1.02 MG/DL (ref 0.5–1.4)
CREAT SERPL-MCNC: 1.06 MG/DL (ref 0.5–1.4)
CREAT SERPL-MCNC: 1.09 MG/DL (ref 0.5–1.4)
CREAT SERPL-MCNC: 1.11 MG/DL (ref 0.5–1.4)
CREAT SERPL-MCNC: 1.14 MG/DL (ref 0.5–1.4)
CREAT SERPL-MCNC: 1.23 MG/DL (ref 0.5–1.4)
CREAT SERPL-MCNC: 1.24 MG/DL (ref 0.5–1.4)
CREAT SERPL-MCNC: 1.37 MG/DL (ref 0.5–1.4)
CREAT SERPL-MCNC: 1.41 MG/DL (ref 0.5–1.4)
D DIMER PPP IA.FEU-MCNC: 7.58 UG/ML (FEU) (ref 0–0.5)
EKG IMPRESSION: NORMAL
EOSINOPHIL # BLD AUTO: 0 K/UL (ref 0–0.51)
EOSINOPHIL # BLD AUTO: 0 K/UL (ref 0–0.51)
EOSINOPHIL # BLD AUTO: 0.01 K/UL (ref 0–0.51)
EOSINOPHIL # BLD AUTO: 0.02 K/UL (ref 0–0.51)
EOSINOPHIL # BLD AUTO: 0.06 K/UL (ref 0–0.51)
EOSINOPHIL # BLD AUTO: 0.08 K/UL (ref 0–0.51)
EOSINOPHIL # BLD AUTO: 0.13 K/UL (ref 0–0.51)
EOSINOPHIL # BLD AUTO: 0.14 K/UL (ref 0–0.51)
EOSINOPHIL # BLD AUTO: 0.14 K/UL (ref 0–0.51)
EOSINOPHIL # BLD AUTO: 0.15 K/UL (ref 0–0.51)
EOSINOPHIL # BLD AUTO: 0.17 K/UL (ref 0–0.51)
EOSINOPHIL # BLD AUTO: 0.22 K/UL (ref 0–0.51)
EOSINOPHIL # BLD AUTO: 0.22 K/UL (ref 0–0.51)
EOSINOPHIL # BLD AUTO: 0.23 K/UL (ref 0–0.51)
EOSINOPHIL # BLD AUTO: 0.28 K/UL (ref 0–0.51)
EOSINOPHIL # BLD AUTO: 0.28 K/UL (ref 0–0.51)
EOSINOPHIL NFR BLD: 0 % (ref 0–6.9)
EOSINOPHIL NFR BLD: 0 % (ref 0–6.9)
EOSINOPHIL NFR BLD: 0.1 % (ref 0–6.9)
EOSINOPHIL NFR BLD: 0.1 % (ref 0–6.9)
EOSINOPHIL NFR BLD: 0.4 % (ref 0–6.9)
EOSINOPHIL NFR BLD: 0.7 % (ref 0–6.9)
EOSINOPHIL NFR BLD: 0.8 % (ref 0–6.9)
EOSINOPHIL NFR BLD: 0.9 % (ref 0–6.9)
EOSINOPHIL NFR BLD: 1 % (ref 0–6.9)
EOSINOPHIL NFR BLD: 1 % (ref 0–6.9)
EOSINOPHIL NFR BLD: 1.1 % (ref 0–6.9)
EOSINOPHIL NFR BLD: 1.4 % (ref 0–6.9)
EOSINOPHIL NFR BLD: 1.5 % (ref 0–6.9)
EOSINOPHIL NFR BLD: 1.6 % (ref 0–6.9)
EOSINOPHIL NFR BLD: 1.8 % (ref 0–6.9)
EOSINOPHIL NFR BLD: 1.8 % (ref 0–6.9)
EOSINOPHIL NFR BLD: 2.2 % (ref 0–6.9)
EOSINOPHIL NFR BLD: 2.4 % (ref 0–6.9)
EPI CELLS #/AREA URNS HPF: NEGATIVE /HPF
ERYTHROCYTE [DISTWIDTH] IN BLOOD BY AUTOMATED COUNT: 42.8 FL (ref 35.9–50)
ERYTHROCYTE [DISTWIDTH] IN BLOOD BY AUTOMATED COUNT: 43.8 FL (ref 35.9–50)
ERYTHROCYTE [DISTWIDTH] IN BLOOD BY AUTOMATED COUNT: 44.2 FL (ref 35.9–50)
ERYTHROCYTE [DISTWIDTH] IN BLOOD BY AUTOMATED COUNT: 44.7 FL (ref 35.9–50)
ERYTHROCYTE [DISTWIDTH] IN BLOOD BY AUTOMATED COUNT: 45.6 FL (ref 35.9–50)
ERYTHROCYTE [DISTWIDTH] IN BLOOD BY AUTOMATED COUNT: 47 FL (ref 35.9–50)
ERYTHROCYTE [DISTWIDTH] IN BLOOD BY AUTOMATED COUNT: 47.6 FL (ref 35.9–50)
ERYTHROCYTE [DISTWIDTH] IN BLOOD BY AUTOMATED COUNT: 47.8 FL (ref 35.9–50)
ERYTHROCYTE [DISTWIDTH] IN BLOOD BY AUTOMATED COUNT: 47.9 FL (ref 35.9–50)
ERYTHROCYTE [DISTWIDTH] IN BLOOD BY AUTOMATED COUNT: 48.5 FL (ref 35.9–50)
ERYTHROCYTE [DISTWIDTH] IN BLOOD BY AUTOMATED COUNT: 50.1 FL (ref 35.9–50)
ERYTHROCYTE [DISTWIDTH] IN BLOOD BY AUTOMATED COUNT: 50.2 FL (ref 35.9–50)
ERYTHROCYTE [DISTWIDTH] IN BLOOD BY AUTOMATED COUNT: 51 FL (ref 35.9–50)
ERYTHROCYTE [DISTWIDTH] IN BLOOD BY AUTOMATED COUNT: 51.5 FL (ref 35.9–50)
ERYTHROCYTE [DISTWIDTH] IN BLOOD BY AUTOMATED COUNT: 51.8 FL (ref 35.9–50)
ERYTHROCYTE [DISTWIDTH] IN BLOOD BY AUTOMATED COUNT: 51.9 FL (ref 35.9–50)
ERYTHROCYTE [DISTWIDTH] IN BLOOD BY AUTOMATED COUNT: 52.9 FL (ref 35.9–50)
ERYTHROCYTE [DISTWIDTH] IN BLOOD BY AUTOMATED COUNT: 55.3 FL (ref 35.9–50)
ERYTHROCYTE [DISTWIDTH] IN BLOOD BY AUTOMATED COUNT: 55.8 FL (ref 35.9–50)
EST. AVERAGE GLUCOSE BLD GHB EST-MCNC: 140 MG/DL
FLUAV RNA SPEC QL NAA+PROBE: NEGATIVE
FLUBV RNA SPEC QL NAA+PROBE: NEGATIVE
GLOBULIN SER CALC-MCNC: 1.3 G/DL (ref 1.9–3.5)
GLOBULIN SER CALC-MCNC: 1.5 G/DL (ref 1.9–3.5)
GLOBULIN SER CALC-MCNC: 1.5 G/DL (ref 1.9–3.5)
GLOBULIN SER CALC-MCNC: 1.6 G/DL (ref 1.9–3.5)
GLOBULIN SER CALC-MCNC: 1.7 G/DL (ref 1.9–3.5)
GLOBULIN SER CALC-MCNC: 1.8 G/DL (ref 1.9–3.5)
GLOBULIN SER CALC-MCNC: 1.9 G/DL (ref 1.9–3.5)
GLOBULIN SER CALC-MCNC: 1.9 G/DL (ref 1.9–3.5)
GLOBULIN SER CALC-MCNC: 2 G/DL (ref 1.9–3.5)
GLOBULIN SER CALC-MCNC: 2.1 G/DL (ref 1.9–3.5)
GLOBULIN SER CALC-MCNC: 2.5 G/DL (ref 1.9–3.5)
GLOBULIN SER CALC-MCNC: 2.7 G/DL (ref 1.9–3.5)
GLUCOSE BLD-MCNC: 114 MG/DL (ref 65–99)
GLUCOSE BLD-MCNC: 118 MG/DL (ref 65–99)
GLUCOSE BLD-MCNC: 120 MG/DL (ref 65–99)
GLUCOSE BLD-MCNC: 121 MG/DL (ref 65–99)
GLUCOSE BLD-MCNC: 123 MG/DL (ref 65–99)
GLUCOSE BLD-MCNC: 123 MG/DL (ref 65–99)
GLUCOSE BLD-MCNC: 124 MG/DL (ref 65–99)
GLUCOSE BLD-MCNC: 125 MG/DL (ref 65–99)
GLUCOSE BLD-MCNC: 126 MG/DL (ref 65–99)
GLUCOSE BLD-MCNC: 127 MG/DL (ref 65–99)
GLUCOSE BLD-MCNC: 130 MG/DL (ref 65–99)
GLUCOSE BLD-MCNC: 131 MG/DL (ref 65–99)
GLUCOSE BLD-MCNC: 132 MG/DL (ref 65–99)
GLUCOSE BLD-MCNC: 135 MG/DL (ref 65–99)
GLUCOSE BLD-MCNC: 136 MG/DL (ref 65–99)
GLUCOSE BLD-MCNC: 136 MG/DL (ref 65–99)
GLUCOSE BLD-MCNC: 137 MG/DL (ref 65–99)
GLUCOSE BLD-MCNC: 138 MG/DL (ref 65–99)
GLUCOSE BLD-MCNC: 139 MG/DL (ref 65–99)
GLUCOSE BLD-MCNC: 140 MG/DL (ref 65–99)
GLUCOSE BLD-MCNC: 140 MG/DL (ref 65–99)
GLUCOSE BLD-MCNC: 141 MG/DL (ref 65–99)
GLUCOSE BLD-MCNC: 142 MG/DL (ref 65–99)
GLUCOSE BLD-MCNC: 144 MG/DL (ref 65–99)
GLUCOSE BLD-MCNC: 145 MG/DL (ref 65–99)
GLUCOSE BLD-MCNC: 146 MG/DL (ref 65–99)
GLUCOSE BLD-MCNC: 147 MG/DL (ref 65–99)
GLUCOSE BLD-MCNC: 147 MG/DL (ref 65–99)
GLUCOSE BLD-MCNC: 148 MG/DL (ref 65–99)
GLUCOSE BLD-MCNC: 149 MG/DL (ref 65–99)
GLUCOSE BLD-MCNC: 150 MG/DL (ref 65–99)
GLUCOSE BLD-MCNC: 151 MG/DL (ref 65–99)
GLUCOSE BLD-MCNC: 151 MG/DL (ref 65–99)
GLUCOSE BLD-MCNC: 152 MG/DL (ref 65–99)
GLUCOSE BLD-MCNC: 153 MG/DL (ref 65–99)
GLUCOSE BLD-MCNC: 154 MG/DL (ref 65–99)
GLUCOSE BLD-MCNC: 155 MG/DL (ref 65–99)
GLUCOSE BLD-MCNC: 156 MG/DL (ref 65–99)
GLUCOSE BLD-MCNC: 157 MG/DL (ref 65–99)
GLUCOSE BLD-MCNC: 158 MG/DL (ref 65–99)
GLUCOSE BLD-MCNC: 159 MG/DL (ref 65–99)
GLUCOSE BLD-MCNC: 160 MG/DL (ref 65–99)
GLUCOSE BLD-MCNC: 161 MG/DL (ref 65–99)
GLUCOSE BLD-MCNC: 162 MG/DL (ref 65–99)
GLUCOSE BLD-MCNC: 163 MG/DL (ref 65–99)
GLUCOSE BLD-MCNC: 164 MG/DL (ref 65–99)
GLUCOSE BLD-MCNC: 165 MG/DL (ref 65–99)
GLUCOSE BLD-MCNC: 166 MG/DL (ref 65–99)
GLUCOSE BLD-MCNC: 167 MG/DL (ref 65–99)
GLUCOSE BLD-MCNC: 168 MG/DL (ref 65–99)
GLUCOSE BLD-MCNC: 169 MG/DL (ref 65–99)
GLUCOSE BLD-MCNC: 170 MG/DL (ref 65–99)
GLUCOSE BLD-MCNC: 171 MG/DL (ref 65–99)
GLUCOSE BLD-MCNC: 171 MG/DL (ref 65–99)
GLUCOSE BLD-MCNC: 172 MG/DL (ref 65–99)
GLUCOSE BLD-MCNC: 173 MG/DL (ref 65–99)
GLUCOSE BLD-MCNC: 174 MG/DL (ref 65–99)
GLUCOSE BLD-MCNC: 175 MG/DL (ref 65–99)
GLUCOSE BLD-MCNC: 176 MG/DL (ref 65–99)
GLUCOSE BLD-MCNC: 177 MG/DL (ref 65–99)
GLUCOSE BLD-MCNC: 178 MG/DL (ref 65–99)
GLUCOSE BLD-MCNC: 178 MG/DL (ref 65–99)
GLUCOSE BLD-MCNC: 179 MG/DL (ref 65–99)
GLUCOSE BLD-MCNC: 180 MG/DL (ref 65–99)
GLUCOSE BLD-MCNC: 181 MG/DL (ref 65–99)
GLUCOSE BLD-MCNC: 182 MG/DL (ref 65–99)
GLUCOSE BLD-MCNC: 183 MG/DL (ref 65–99)
GLUCOSE BLD-MCNC: 184 MG/DL (ref 65–99)
GLUCOSE BLD-MCNC: 185 MG/DL (ref 65–99)
GLUCOSE BLD-MCNC: 186 MG/DL (ref 65–99)
GLUCOSE BLD-MCNC: 187 MG/DL (ref 65–99)
GLUCOSE BLD-MCNC: 187 MG/DL (ref 65–99)
GLUCOSE BLD-MCNC: 188 MG/DL (ref 65–99)
GLUCOSE BLD-MCNC: 189 MG/DL (ref 65–99)
GLUCOSE BLD-MCNC: 190 MG/DL (ref 65–99)
GLUCOSE BLD-MCNC: 191 MG/DL (ref 65–99)
GLUCOSE BLD-MCNC: 192 MG/DL (ref 65–99)
GLUCOSE BLD-MCNC: 194 MG/DL (ref 65–99)
GLUCOSE BLD-MCNC: 196 MG/DL (ref 65–99)
GLUCOSE BLD-MCNC: 197 MG/DL (ref 65–99)
GLUCOSE BLD-MCNC: 197 MG/DL (ref 65–99)
GLUCOSE BLD-MCNC: 198 MG/DL (ref 65–99)
GLUCOSE BLD-MCNC: 199 MG/DL (ref 65–99)
GLUCOSE BLD-MCNC: 200 MG/DL (ref 65–99)
GLUCOSE BLD-MCNC: 213 MG/DL (ref 65–99)
GLUCOSE BLD-MCNC: 241 MG/DL (ref 65–99)
GLUCOSE BLD-MCNC: 245 MG/DL (ref 65–99)
GLUCOSE BLD-MCNC: 287 MG/DL (ref 65–99)
GLUCOSE SERPL-MCNC: 136 MG/DL (ref 65–99)
GLUCOSE SERPL-MCNC: 157 MG/DL (ref 65–99)
GLUCOSE SERPL-MCNC: 160 MG/DL (ref 65–99)
GLUCOSE SERPL-MCNC: 166 MG/DL (ref 65–99)
GLUCOSE SERPL-MCNC: 171 MG/DL (ref 65–99)
GLUCOSE SERPL-MCNC: 171 MG/DL (ref 65–99)
GLUCOSE SERPL-MCNC: 172 MG/DL (ref 65–99)
GLUCOSE SERPL-MCNC: 175 MG/DL (ref 65–99)
GLUCOSE SERPL-MCNC: 177 MG/DL (ref 65–99)
GLUCOSE SERPL-MCNC: 178 MG/DL (ref 65–99)
GLUCOSE SERPL-MCNC: 179 MG/DL (ref 65–99)
GLUCOSE SERPL-MCNC: 184 MG/DL (ref 65–99)
GLUCOSE SERPL-MCNC: 185 MG/DL (ref 65–99)
GLUCOSE SERPL-MCNC: 189 MG/DL (ref 65–99)
GLUCOSE SERPL-MCNC: 192 MG/DL (ref 65–99)
GLUCOSE SERPL-MCNC: 206 MG/DL (ref 65–99)
GLUCOSE SERPL-MCNC: 240 MG/DL (ref 65–99)
GLUCOSE SERPL-MCNC: 242 MG/DL (ref 65–99)
GLUCOSE SERPL-MCNC: 299 MG/DL (ref 65–99)
GLUCOSE SERPL-MCNC: 303 MG/DL (ref 65–99)
GLUCOSE UR STRIP.AUTO-MCNC: 500 MG/DL
GLUCOSE UR STRIP.AUTO-MCNC: NEGATIVE MG/DL
GRAM STN SPEC: NORMAL
HBA1C MFR BLD: 6.5 % (ref 0–5.6)
HCO3 BLDA-SCNC: 18.3 MMOL/L (ref 17–25)
HCO3 BLDA-SCNC: 18.3 MMOL/L (ref 17–25)
HCO3 BLDA-SCNC: 19.6 MMOL/L (ref 17–25)
HCO3 BLDA-SCNC: 19.9 MMOL/L (ref 17–25)
HCO3 BLDA-SCNC: 20.9 MMOL/L (ref 17–25)
HCO3 BLDA-SCNC: 21.3 MMOL/L (ref 17–25)
HCO3 BLDA-SCNC: 24.4 MMOL/L (ref 17–25)
HCO3 BLDA-SCNC: 24.6 MMOL/L (ref 17–25)
HCO3 BLDA-SCNC: 25.9 MMOL/L (ref 17–25)
HCO3 BLDA-SCNC: 26.2 MMOL/L (ref 17–25)
HCO3 BLDA-SCNC: 27.7 MMOL/L (ref 17–25)
HCO3 BLDA-SCNC: 28.1 MMOL/L (ref 17–25)
HCO3 BLDA-SCNC: 30 MMOL/L (ref 17–25)
HCO3 BLDA-SCNC: 30 MMOL/L (ref 17–25)
HCO3 BLDA-SCNC: 30.4 MMOL/L (ref 17–25)
HCO3 BLDA-SCNC: 30.4 MMOL/L (ref 17–25)
HCO3 BLDA-SCNC: 31.2 MMOL/L (ref 17–25)
HCO3 BLDA-SCNC: 31.5 MMOL/L (ref 17–25)
HCO3 BLDA-SCNC: 32 MMOL/L (ref 17–25)
HCT VFR BLD AUTO: 32.1 % (ref 42–52)
HCT VFR BLD AUTO: 32.3 % (ref 42–52)
HCT VFR BLD AUTO: 32.8 % (ref 42–52)
HCT VFR BLD AUTO: 33.6 % (ref 42–52)
HCT VFR BLD AUTO: 34.2 % (ref 42–52)
HCT VFR BLD AUTO: 34.6 % (ref 42–52)
HCT VFR BLD AUTO: 34.8 % (ref 42–52)
HCT VFR BLD AUTO: 35 % (ref 42–52)
HCT VFR BLD AUTO: 35.5 % (ref 42–52)
HCT VFR BLD AUTO: 35.6 % (ref 42–52)
HCT VFR BLD AUTO: 36.2 % (ref 42–52)
HCT VFR BLD AUTO: 38.3 % (ref 42–52)
HCT VFR BLD AUTO: 38.6 % (ref 42–52)
HCT VFR BLD AUTO: 38.9 % (ref 42–52)
HCT VFR BLD AUTO: 39.7 % (ref 42–52)
HCT VFR BLD AUTO: 40.3 % (ref 42–52)
HCT VFR BLD AUTO: 44.1 % (ref 42–52)
HCT VFR BLD AUTO: 44.2 % (ref 42–52)
HCT VFR BLD AUTO: 45.1 % (ref 42–52)
HDLC SERPL-MCNC: 53 MG/DL
HGB BLD-MCNC: 10.2 G/DL (ref 14–18)
HGB BLD-MCNC: 10.3 G/DL (ref 14–18)
HGB BLD-MCNC: 10.3 G/DL (ref 14–18)
HGB BLD-MCNC: 10.9 G/DL (ref 14–18)
HGB BLD-MCNC: 11 G/DL (ref 14–18)
HGB BLD-MCNC: 11 G/DL (ref 14–18)
HGB BLD-MCNC: 11.1 G/DL (ref 14–18)
HGB BLD-MCNC: 11.2 G/DL (ref 14–18)
HGB BLD-MCNC: 12.1 G/DL (ref 14–18)
HGB BLD-MCNC: 12.7 G/DL (ref 14–18)
HGB BLD-MCNC: 13.1 G/DL (ref 14–18)
HGB BLD-MCNC: 13.2 G/DL (ref 14–18)
HGB BLD-MCNC: 13.4 G/DL (ref 14–18)
HGB BLD-MCNC: 14.4 G/DL (ref 14–18)
HGB BLD-MCNC: 14.8 G/DL (ref 14–18)
HGB BLD-MCNC: 15.3 G/DL (ref 14–18)
HGB BLD-MCNC: 15.9 G/DL (ref 14–18)
HOROWITZ INDEX BLDA+IHG-RTO: 103 MM[HG]
HOROWITZ INDEX BLDA+IHG-RTO: 155 MM[HG]
HOROWITZ INDEX BLDA+IHG-RTO: 160 MM[HG]
HOROWITZ INDEX BLDA+IHG-RTO: 165 MM[HG]
HOROWITZ INDEX BLDA+IHG-RTO: 190 MM[HG]
HOROWITZ INDEX BLDA+IHG-RTO: 190 MM[HG]
HOROWITZ INDEX BLDA+IHG-RTO: 195 MM[HG]
HOROWITZ INDEX BLDA+IHG-RTO: 197 MM[HG]
HOROWITZ INDEX BLDA+IHG-RTO: 208 MM[HG]
HOROWITZ INDEX BLDA+IHG-RTO: 210 MM[HG]
HOROWITZ INDEX BLDA+IHG-RTO: 217 MM[HG]
HOROWITZ INDEX BLDA+IHG-RTO: 221 MM[HG]
HOROWITZ INDEX BLDA+IHG-RTO: 227 MM[HG]
HOROWITZ INDEX BLDA+IHG-RTO: 228 MM[HG]
HOROWITZ INDEX BLDA+IHG-RTO: 230 MM[HG]
HOROWITZ INDEX BLDA+IHG-RTO: 257 MM[HG]
HOROWITZ INDEX BLDA+IHG-RTO: 268 MM[HG]
HOROWITZ INDEX BLDA+IHG-RTO: 328 MM[HG]
HOROWITZ INDEX BLDA+IHG-RTO: 90 MM[HG]
HYALINE CASTS #/AREA URNS LPF: ABNORMAL /LPF
IMM GRANULOCYTES # BLD AUTO: 0.02 K/UL (ref 0–0.11)
IMM GRANULOCYTES # BLD AUTO: 0.06 K/UL (ref 0–0.11)
IMM GRANULOCYTES # BLD AUTO: 0.08 K/UL (ref 0–0.11)
IMM GRANULOCYTES # BLD AUTO: 0.1 K/UL (ref 0–0.11)
IMM GRANULOCYTES # BLD AUTO: 0.11 K/UL (ref 0–0.11)
IMM GRANULOCYTES # BLD AUTO: 0.11 K/UL (ref 0–0.11)
IMM GRANULOCYTES # BLD AUTO: 0.12 K/UL (ref 0–0.11)
IMM GRANULOCYTES # BLD AUTO: 0.13 K/UL (ref 0–0.11)
IMM GRANULOCYTES # BLD AUTO: 0.15 K/UL (ref 0–0.11)
IMM GRANULOCYTES # BLD AUTO: 0.15 K/UL (ref 0–0.11)
IMM GRANULOCYTES # BLD AUTO: 0.22 K/UL (ref 0–0.11)
IMM GRANULOCYTES NFR BLD AUTO: 0.4 % (ref 0–0.9)
IMM GRANULOCYTES NFR BLD AUTO: 0.4 % (ref 0–0.9)
IMM GRANULOCYTES NFR BLD AUTO: 0.6 % (ref 0–0.9)
IMM GRANULOCYTES NFR BLD AUTO: 0.6 % (ref 0–0.9)
IMM GRANULOCYTES NFR BLD AUTO: 0.7 % (ref 0–0.9)
IMM GRANULOCYTES NFR BLD AUTO: 0.8 % (ref 0–0.9)
IMM GRANULOCYTES NFR BLD AUTO: 0.9 % (ref 0–0.9)
IMM GRANULOCYTES NFR BLD AUTO: 1 % (ref 0–0.9)
IMM GRANULOCYTES NFR BLD AUTO: 1.1 % (ref 0–0.9)
IMM GRANULOCYTES NFR BLD AUTO: 1.2 % (ref 0–0.9)
IMM GRANULOCYTES NFR BLD AUTO: 1.3 % (ref 0–0.9)
IMM GRANULOCYTES NFR BLD AUTO: 1.4 % (ref 0–0.9)
INR PPP: 1.17 (ref 0.87–1.13)
INST. QUALIFIED PATIENT IIQPT: YES
KETONES UR STRIP.AUTO-MCNC: NEGATIVE MG/DL
KETONES UR STRIP.AUTO-MCNC: NEGATIVE MG/DL
LACTATE BLD-SCNC: 2.1 MMOL/L (ref 0.5–2)
LACTATE BLD-SCNC: 2.5 MMOL/L (ref 0.5–2)
LACTATE BLD-SCNC: 2.7 MMOL/L (ref 0.5–2)
LACTATE BLD-SCNC: 2.7 MMOL/L (ref 0.5–2)
LACTATE BLD-SCNC: 3.8 MMOL/L (ref 0.5–2)
LDLC SERPL CALC-MCNC: 92 MG/DL
LEUKOCYTE ESTERASE UR QL STRIP.AUTO: ABNORMAL
LEUKOCYTE ESTERASE UR QL STRIP.AUTO: NEGATIVE
LV EJECT FRACT  99904: 70
LYMPHOCYTES # BLD AUTO: 0.91 K/UL (ref 1–4.8)
LYMPHOCYTES # BLD AUTO: 0.93 K/UL (ref 1–4.8)
LYMPHOCYTES # BLD AUTO: 0.98 K/UL (ref 1–4.8)
LYMPHOCYTES # BLD AUTO: 1 K/UL (ref 1–4.8)
LYMPHOCYTES # BLD AUTO: 1.02 K/UL (ref 1–4.8)
LYMPHOCYTES # BLD AUTO: 1.07 K/UL (ref 1–4.8)
LYMPHOCYTES # BLD AUTO: 1.14 K/UL (ref 1–4.8)
LYMPHOCYTES # BLD AUTO: 1.14 K/UL (ref 1–4.8)
LYMPHOCYTES # BLD AUTO: 1.15 K/UL (ref 1–4.8)
LYMPHOCYTES # BLD AUTO: 1.17 K/UL (ref 1–4.8)
LYMPHOCYTES # BLD AUTO: 1.24 K/UL (ref 1–4.8)
LYMPHOCYTES # BLD AUTO: 1.28 K/UL (ref 1–4.8)
LYMPHOCYTES # BLD AUTO: 1.28 K/UL (ref 1–4.8)
LYMPHOCYTES # BLD AUTO: 1.34 K/UL (ref 1–4.8)
LYMPHOCYTES # BLD AUTO: 1.37 K/UL (ref 1–4.8)
LYMPHOCYTES # BLD AUTO: 1.38 K/UL (ref 1–4.8)
LYMPHOCYTES # BLD AUTO: 1.46 K/UL (ref 1–4.8)
LYMPHOCYTES # BLD AUTO: 2.16 K/UL (ref 1–4.8)
LYMPHOCYTES NFR BLD: 10.4 % (ref 22–41)
LYMPHOCYTES NFR BLD: 11 % (ref 22–41)
LYMPHOCYTES NFR BLD: 11.1 % (ref 22–41)
LYMPHOCYTES NFR BLD: 12.6 % (ref 22–41)
LYMPHOCYTES NFR BLD: 13.1 % (ref 22–41)
LYMPHOCYTES NFR BLD: 24.1 % (ref 22–41)
LYMPHOCYTES NFR BLD: 5 % (ref 22–41)
LYMPHOCYTES NFR BLD: 5.4 % (ref 22–41)
LYMPHOCYTES NFR BLD: 5.8 % (ref 22–41)
LYMPHOCYTES NFR BLD: 6.1 % (ref 22–41)
LYMPHOCYTES NFR BLD: 6.5 % (ref 22–41)
LYMPHOCYTES NFR BLD: 7.7 % (ref 22–41)
LYMPHOCYTES NFR BLD: 8.1 % (ref 22–41)
LYMPHOCYTES NFR BLD: 8.2 % (ref 22–41)
LYMPHOCYTES NFR BLD: 8.8 % (ref 22–41)
LYMPHOCYTES NFR BLD: 9 % (ref 22–41)
LYMPHOCYTES NFR BLD: 9.7 % (ref 22–41)
LYMPHOCYTES NFR BLD: 9.8 % (ref 22–41)
MAGNESIUM SERPL-MCNC: 2.1 MG/DL (ref 1.5–2.5)
MAGNESIUM SERPL-MCNC: 2.3 MG/DL (ref 1.5–2.5)
MAGNESIUM SERPL-MCNC: 2.4 MG/DL (ref 1.5–2.5)
MAGNESIUM SERPL-MCNC: 2.4 MG/DL (ref 1.5–2.5)
MAGNESIUM SERPL-MCNC: 2.5 MG/DL (ref 1.5–2.5)
MAGNESIUM SERPL-MCNC: 2.5 MG/DL (ref 1.5–2.5)
MAGNESIUM SERPL-MCNC: 2.6 MG/DL (ref 1.5–2.5)
MAGNESIUM SERPL-MCNC: 2.7 MG/DL (ref 1.5–2.5)
MAGNESIUM SERPL-MCNC: 2.8 MG/DL (ref 1.5–2.5)
MAGNESIUM SERPL-MCNC: 2.9 MG/DL (ref 1.5–2.5)
MANUAL DIFF BLD: NORMAL
MCH RBC QN AUTO: 30.3 PG (ref 27–33)
MCH RBC QN AUTO: 30.3 PG (ref 27–33)
MCH RBC QN AUTO: 30.4 PG (ref 27–33)
MCH RBC QN AUTO: 30.5 PG (ref 27–33)
MCH RBC QN AUTO: 30.7 PG (ref 27–33)
MCH RBC QN AUTO: 30.7 PG (ref 27–33)
MCH RBC QN AUTO: 31.1 PG (ref 27–33)
MCH RBC QN AUTO: 31.1 PG (ref 27–33)
MCH RBC QN AUTO: 31.2 PG (ref 27–33)
MCH RBC QN AUTO: 31.2 PG (ref 27–33)
MCH RBC QN AUTO: 31.4 PG (ref 27–33)
MCH RBC QN AUTO: 31.6 PG (ref 27–33)
MCH RBC QN AUTO: 31.8 PG (ref 27–33)
MCH RBC QN AUTO: 31.9 PG (ref 27–33)
MCH RBC QN AUTO: 32.2 PG (ref 27–33)
MCHC RBC AUTO-ENTMCNC: 31.2 G/DL (ref 33.7–35.3)
MCHC RBC AUTO-ENTMCNC: 31.3 G/DL (ref 33.7–35.3)
MCHC RBC AUTO-ENTMCNC: 31.4 G/DL (ref 33.7–35.3)
MCHC RBC AUTO-ENTMCNC: 31.4 G/DL (ref 33.7–35.3)
MCHC RBC AUTO-ENTMCNC: 31.5 G/DL (ref 33.7–35.3)
MCHC RBC AUTO-ENTMCNC: 31.5 G/DL (ref 33.7–35.3)
MCHC RBC AUTO-ENTMCNC: 31.6 G/DL (ref 33.7–35.3)
MCHC RBC AUTO-ENTMCNC: 32.1 G/DL (ref 33.7–35.3)
MCHC RBC AUTO-ENTMCNC: 32.2 G/DL (ref 33.7–35.3)
MCHC RBC AUTO-ENTMCNC: 32.4 G/DL (ref 33.7–35.3)
MCHC RBC AUTO-ENTMCNC: 32.9 G/DL (ref 33.7–35.3)
MCHC RBC AUTO-ENTMCNC: 33 G/DL (ref 33.7–35.3)
MCHC RBC AUTO-ENTMCNC: 33.4 G/DL (ref 33.7–35.3)
MCHC RBC AUTO-ENTMCNC: 33.6 G/DL (ref 33.7–35.3)
MCHC RBC AUTO-ENTMCNC: 34.4 G/DL (ref 33.7–35.3)
MCHC RBC AUTO-ENTMCNC: 34.5 G/DL (ref 33.7–35.3)
MCHC RBC AUTO-ENTMCNC: 34.6 G/DL (ref 33.7–35.3)
MCHC RBC AUTO-ENTMCNC: 35.3 G/DL (ref 33.7–35.3)
MCHC RBC AUTO-ENTMCNC: 35.7 G/DL (ref 33.7–35.3)
MCV RBC AUTO: 100 FL (ref 81.4–97.8)
MCV RBC AUTO: 100.6 FL (ref 81.4–97.8)
MCV RBC AUTO: 89 FL (ref 81.4–97.8)
MCV RBC AUTO: 90.5 FL (ref 81.4–97.8)
MCV RBC AUTO: 90.6 FL (ref 81.4–97.8)
MCV RBC AUTO: 90.6 FL (ref 81.4–97.8)
MCV RBC AUTO: 91 FL (ref 81.4–97.8)
MCV RBC AUTO: 91.5 FL (ref 81.4–97.8)
MCV RBC AUTO: 91.7 FL (ref 81.4–97.8)
MCV RBC AUTO: 92.3 FL (ref 81.4–97.8)
MCV RBC AUTO: 94 FL (ref 81.4–97.8)
MCV RBC AUTO: 95.9 FL (ref 81.4–97.8)
MCV RBC AUTO: 97 FL (ref 81.4–97.8)
MCV RBC AUTO: 97.5 FL (ref 81.4–97.8)
MCV RBC AUTO: 98.3 FL (ref 81.4–97.8)
MCV RBC AUTO: 98.3 FL (ref 81.4–97.8)
MCV RBC AUTO: 98.6 FL (ref 81.4–97.8)
MCV RBC AUTO: 99.1 FL (ref 81.4–97.8)
MCV RBC AUTO: 99.2 FL (ref 81.4–97.8)
MICRO URNS: ABNORMAL
MICRO URNS: ABNORMAL
MONOCYTES # BLD AUTO: 0.4 K/UL (ref 0–0.85)
MONOCYTES # BLD AUTO: 0.9 K/UL (ref 0–0.85)
MONOCYTES # BLD AUTO: 0.96 K/UL (ref 0–0.85)
MONOCYTES # BLD AUTO: 0.98 K/UL (ref 0–0.85)
MONOCYTES # BLD AUTO: 1.02 K/UL (ref 0–0.85)
MONOCYTES # BLD AUTO: 1.06 K/UL (ref 0–0.85)
MONOCYTES # BLD AUTO: 1.1 K/UL (ref 0–0.85)
MONOCYTES # BLD AUTO: 1.1 K/UL (ref 0–0.85)
MONOCYTES # BLD AUTO: 1.12 K/UL (ref 0–0.85)
MONOCYTES # BLD AUTO: 1.17 K/UL (ref 0–0.85)
MONOCYTES # BLD AUTO: 1.18 K/UL (ref 0–0.85)
MONOCYTES # BLD AUTO: 1.35 K/UL (ref 0–0.85)
MONOCYTES # BLD AUTO: 1.38 K/UL (ref 0–0.85)
MONOCYTES # BLD AUTO: 1.41 K/UL (ref 0–0.85)
MONOCYTES # BLD AUTO: 1.46 K/UL (ref 0–0.85)
MONOCYTES # BLD AUTO: 1.47 K/UL (ref 0–0.85)
MONOCYTES # BLD AUTO: 1.49 K/UL (ref 0–0.85)
MONOCYTES # BLD AUTO: 1.62 K/UL (ref 0–0.85)
MONOCYTES NFR BLD AUTO: 10.4 % (ref 0–13.4)
MONOCYTES NFR BLD AUTO: 10.4 % (ref 0–13.4)
MONOCYTES NFR BLD AUTO: 10.6 % (ref 0–13.4)
MONOCYTES NFR BLD AUTO: 10.7 % (ref 0–13.4)
MONOCYTES NFR BLD AUTO: 11.5 % (ref 0–13.4)
MONOCYTES NFR BLD AUTO: 5.8 % (ref 0–13.4)
MONOCYTES NFR BLD AUTO: 6 % (ref 0–13.4)
MONOCYTES NFR BLD AUTO: 6.8 % (ref 0–13.4)
MONOCYTES NFR BLD AUTO: 6.8 % (ref 0–13.4)
MONOCYTES NFR BLD AUTO: 7.2 % (ref 0–13.4)
MONOCYTES NFR BLD AUTO: 7.8 % (ref 0–13.4)
MONOCYTES NFR BLD AUTO: 7.8 % (ref 0–13.4)
MONOCYTES NFR BLD AUTO: 7.9 % (ref 0–13.4)
MONOCYTES NFR BLD AUTO: 7.9 % (ref 0–13.4)
MONOCYTES NFR BLD AUTO: 8.4 % (ref 0–13.4)
MONOCYTES NFR BLD AUTO: 9.2 % (ref 0–13.4)
MONOCYTES NFR BLD AUTO: 9.4 % (ref 0–13.4)
MONOCYTES NFR BLD AUTO: 9.6 % (ref 0–13.4)
MORPHOLOGY BLD-IMP: NORMAL
NEUTROPHILS # BLD AUTO: 10.11 K/UL (ref 1.82–7.42)
NEUTROPHILS # BLD AUTO: 10.48 K/UL (ref 1.82–7.42)
NEUTROPHILS # BLD AUTO: 10.89 K/UL (ref 1.82–7.42)
NEUTROPHILS # BLD AUTO: 12.27 K/UL (ref 1.82–7.42)
NEUTROPHILS # BLD AUTO: 12.29 K/UL (ref 1.82–7.42)
NEUTROPHILS # BLD AUTO: 12.68 K/UL (ref 1.82–7.42)
NEUTROPHILS # BLD AUTO: 12.92 K/UL (ref 1.82–7.42)
NEUTROPHILS # BLD AUTO: 12.96 K/UL (ref 1.82–7.42)
NEUTROPHILS # BLD AUTO: 13.66 K/UL (ref 1.82–7.42)
NEUTROPHILS # BLD AUTO: 15.11 K/UL (ref 1.82–7.42)
NEUTROPHILS # BLD AUTO: 16.16 K/UL (ref 1.82–7.42)
NEUTROPHILS # BLD AUTO: 16.82 K/UL (ref 1.82–7.42)
NEUTROPHILS # BLD AUTO: 17.01 K/UL (ref 1.82–7.42)
NEUTROPHILS # BLD AUTO: 3.38 K/UL (ref 1.82–7.42)
NEUTROPHILS # BLD AUTO: 6.27 K/UL (ref 1.82–7.42)
NEUTROPHILS # BLD AUTO: 6.89 K/UL (ref 1.82–7.42)
NEUTROPHILS # BLD AUTO: 7.55 K/UL (ref 1.82–7.42)
NEUTROPHILS # BLD AUTO: 9.07 K/UL (ref 1.82–7.42)
NEUTROPHILS NFR BLD: 65.7 % (ref 44–72)
NEUTROPHILS NFR BLD: 73.9 % (ref 44–72)
NEUTROPHILS NFR BLD: 74 % (ref 44–72)
NEUTROPHILS NFR BLD: 74.8 % (ref 44–72)
NEUTROPHILS NFR BLD: 76.2 % (ref 44–72)
NEUTROPHILS NFR BLD: 77.1 % (ref 44–72)
NEUTROPHILS NFR BLD: 77.9 % (ref 44–72)
NEUTROPHILS NFR BLD: 78.3 % (ref 44–72)
NEUTROPHILS NFR BLD: 80 % (ref 44–72)
NEUTROPHILS NFR BLD: 80.4 % (ref 44–72)
NEUTROPHILS NFR BLD: 81.6 % (ref 44–72)
NEUTROPHILS NFR BLD: 82.4 % (ref 44–72)
NEUTROPHILS NFR BLD: 82.5 % (ref 44–72)
NEUTROPHILS NFR BLD: 85.1 % (ref 44–72)
NEUTROPHILS NFR BLD: 85.4 % (ref 44–72)
NEUTROPHILS NFR BLD: 85.8 % (ref 44–72)
NEUTROPHILS NFR BLD: 86.3 % (ref 44–72)
NEUTROPHILS NFR BLD: 86.9 % (ref 44–72)
NEUTS BAND NFR BLD MANUAL: 1.8 % (ref 0–10)
NITRITE UR QL STRIP.AUTO: NEGATIVE
NITRITE UR QL STRIP.AUTO: NEGATIVE
NRBC # BLD AUTO: 0 K/UL
NRBC # BLD AUTO: 0.02 K/UL
NRBC # BLD AUTO: 0.03 K/UL
NRBC BLD-RTO: 0 /100 WBC
NRBC BLD-RTO: 0.1 /100 WBC
NRBC BLD-RTO: 0.2 /100 WBC
NRBC BLD-RTO: 0.2 /100 WBC
NRBC BLD-RTO: 0.3 /100 WBC
O2/TOTAL GAS SETTING VFR VENT: 100 %
O2/TOTAL GAS SETTING VFR VENT: 100 %
O2/TOTAL GAS SETTING VFR VENT: 30 %
O2/TOTAL GAS SETTING VFR VENT: 40 %
O2/TOTAL GAS SETTING VFR VENT: 50 %
O2/TOTAL GAS SETTING VFR VENT: 60 %
O2/TOTAL GAS SETTING VFR VENT: 65 %
OVALOCYTES BLD QL SMEAR: NORMAL
PCO2 BLDA: 32 MMHG (ref 26–37)
PCO2 BLDA: 32.6 MMHG (ref 26–37)
PCO2 BLDA: 32.9 MMHG (ref 26–37)
PCO2 BLDA: 34.4 MMHG (ref 26–37)
PCO2 BLDA: 34.9 MMHG (ref 26–37)
PCO2 BLDA: 35.4 MMHG (ref 26–37)
PCO2 BLDA: 35.4 MMHG (ref 26–37)
PCO2 BLDA: 36.1 MMHG (ref 26–37)
PCO2 BLDA: 37.5 MMHG (ref 26–37)
PCO2 BLDA: 37.6 MMHG (ref 26–37)
PCO2 BLDA: 37.7 MMHG (ref 26–37)
PCO2 BLDA: 38.3 MMHG (ref 26–37)
PCO2 BLDA: 40 MMHG (ref 26–37)
PCO2 BLDA: 40.3 MMHG (ref 26–37)
PCO2 BLDA: 40.6 MMHG (ref 26–37)
PCO2 BLDA: 41.6 MMHG (ref 26–37)
PCO2 BLDA: 43.2 MMHG (ref 26–37)
PCO2 BLDA: 45.3 MMHG (ref 26–37)
PCO2 BLDA: 52.6 MMHG (ref 26–37)
PCO2 TEMP ADJ BLDA: 33.9 MMHG (ref 26–37)
PCO2 TEMP ADJ BLDA: 34 MMHG (ref 26–37)
PCO2 TEMP ADJ BLDA: 34.8 MMHG (ref 26–37)
PCO2 TEMP ADJ BLDA: 35.8 MMHG (ref 26–37)
PCO2 TEMP ADJ BLDA: 36.4 MMHG (ref 26–37)
PCO2 TEMP ADJ BLDA: 36.4 MMHG (ref 26–37)
PCO2 TEMP ADJ BLDA: 37.4 MMHG (ref 26–37)
PCO2 TEMP ADJ BLDA: 37.4 MMHG (ref 26–37)
PCO2 TEMP ADJ BLDA: 38.2 MMHG (ref 26–37)
PCO2 TEMP ADJ BLDA: 39.3 MMHG (ref 26–37)
PCO2 TEMP ADJ BLDA: 39.4 MMHG (ref 26–37)
PCO2 TEMP ADJ BLDA: 39.5 MMHG (ref 26–37)
PCO2 TEMP ADJ BLDA: 39.7 MMHG (ref 26–37)
PCO2 TEMP ADJ BLDA: 40.7 MMHG (ref 26–37)
PCO2 TEMP ADJ BLDA: 41.2 MMHG (ref 26–37)
PCO2 TEMP ADJ BLDA: 42 MMHG (ref 26–37)
PCO2 TEMP ADJ BLDA: 43.6 MMHG (ref 26–37)
PCO2 TEMP ADJ BLDA: 45.7 MMHG (ref 26–37)
PCO2 TEMP ADJ BLDA: 50.1 MMHG (ref 26–37)
PH BLDA: 7.22 [PH] (ref 7.4–7.5)
PH BLDA: 7.3 [PH] (ref 7.4–7.5)
PH BLDA: 7.32 [PH] (ref 7.4–7.5)
PH BLDA: 7.35 [PH] (ref 7.4–7.5)
PH BLDA: 7.36 [PH] (ref 7.4–7.5)
PH BLDA: 7.37 [PH] (ref 7.4–7.5)
PH BLDA: 7.43 [PH] (ref 7.4–7.5)
PH BLDA: 7.44 [PH] (ref 7.4–7.5)
PH BLDA: 7.45 [PH] (ref 7.4–7.5)
PH BLDA: 7.45 [PH] (ref 7.4–7.5)
PH BLDA: 7.47 [PH] (ref 7.4–7.5)
PH BLDA: 7.48 [PH] (ref 7.4–7.5)
PH BLDA: 7.48 [PH] (ref 7.4–7.5)
PH BLDA: 7.49 [PH] (ref 7.4–7.5)
PH BLDA: 7.53 [PH] (ref 7.4–7.5)
PH BLDA: 7.56 [PH] (ref 7.4–7.5)
PH BLDA: 7.58 [PH] (ref 7.4–7.5)
PH TEMP ADJ BLDA: 7.23 [PH] (ref 7.4–7.5)
PH TEMP ADJ BLDA: 7.3 [PH] (ref 7.4–7.5)
PH TEMP ADJ BLDA: 7.3 [PH] (ref 7.4–7.5)
PH TEMP ADJ BLDA: 7.34 [PH] (ref 7.4–7.5)
PH TEMP ADJ BLDA: 7.34 [PH] (ref 7.4–7.5)
PH TEMP ADJ BLDA: 7.35 [PH] (ref 7.4–7.5)
PH TEMP ADJ BLDA: 7.43 [PH] (ref 7.4–7.5)
PH TEMP ADJ BLDA: 7.44 [PH] (ref 7.4–7.5)
PH TEMP ADJ BLDA: 7.45 [PH] (ref 7.4–7.5)
PH TEMP ADJ BLDA: 7.46 [PH] (ref 7.4–7.5)
PH TEMP ADJ BLDA: 7.47 [PH] (ref 7.4–7.5)
PH TEMP ADJ BLDA: 7.48 [PH] (ref 7.4–7.5)
PH TEMP ADJ BLDA: 7.53 [PH] (ref 7.4–7.5)
PH TEMP ADJ BLDA: 7.55 [PH] (ref 7.4–7.5)
PH TEMP ADJ BLDA: 7.56 [PH] (ref 7.4–7.5)
PH UR STRIP.AUTO: 5 [PH]
PH UR STRIP.AUTO: 5.5 [PH]
PHOSPHATE SERPL-MCNC: 2.1 MG/DL (ref 2.5–4.5)
PHOSPHATE SERPL-MCNC: 2.2 MG/DL (ref 2.5–4.5)
PHOSPHATE SERPL-MCNC: 2.5 MG/DL (ref 2.5–4.5)
PHOSPHATE SERPL-MCNC: 3.6 MG/DL (ref 2.5–4.5)
PHOSPHATE SERPL-MCNC: 3.7 MG/DL (ref 2.5–4.5)
PHOSPHATE SERPL-MCNC: 3.9 MG/DL (ref 2.5–4.5)
PHOSPHATE SERPL-MCNC: 4 MG/DL (ref 2.5–4.5)
PHOSPHATE SERPL-MCNC: 4 MG/DL (ref 2.5–4.5)
PHOSPHATE SERPL-MCNC: 4.1 MG/DL (ref 2.5–4.5)
PHOSPHATE SERPL-MCNC: 4.1 MG/DL (ref 2.5–4.5)
PHOSPHATE SERPL-MCNC: 4.3 MG/DL (ref 2.5–4.5)
PHOSPHATE SERPL-MCNC: 4.4 MG/DL (ref 2.5–4.5)
PHOSPHATE SERPL-MCNC: 4.4 MG/DL (ref 2.5–4.5)
PHOSPHATE SERPL-MCNC: 4.5 MG/DL (ref 2.5–4.5)
PHOSPHATE SERPL-MCNC: 4.7 MG/DL (ref 2.5–4.5)
PHOSPHATE SERPL-MCNC: 5 MG/DL (ref 2.5–4.5)
PLATELET # BLD AUTO: 117 K/UL (ref 164–446)
PLATELET # BLD AUTO: 120 K/UL (ref 164–446)
PLATELET # BLD AUTO: 132 K/UL (ref 164–446)
PLATELET # BLD AUTO: 142 K/UL (ref 164–446)
PLATELET # BLD AUTO: 156 K/UL (ref 164–446)
PLATELET # BLD AUTO: 161 K/UL (ref 164–446)
PLATELET # BLD AUTO: 172 K/UL (ref 164–446)
PLATELET # BLD AUTO: 176 K/UL (ref 164–446)
PLATELET # BLD AUTO: 184 K/UL (ref 164–446)
PLATELET # BLD AUTO: 193 K/UL (ref 164–446)
PLATELET # BLD AUTO: 197 K/UL (ref 164–446)
PLATELET # BLD AUTO: 199 K/UL (ref 164–446)
PLATELET # BLD AUTO: 200 K/UL (ref 164–446)
PLATELET # BLD AUTO: 207 K/UL (ref 164–446)
PLATELET # BLD AUTO: 208 K/UL (ref 164–446)
PLATELET # BLD AUTO: 211 K/UL (ref 164–446)
PLATELET # BLD AUTO: 216 K/UL (ref 164–446)
PLATELET # BLD AUTO: 237 K/UL (ref 164–446)
PLATELET # BLD AUTO: 98 K/UL (ref 164–446)
PLATELET BLD QL SMEAR: NORMAL
PMV BLD AUTO: 10 FL (ref 9–12.9)
PMV BLD AUTO: 10.1 FL (ref 9–12.9)
PMV BLD AUTO: 10.1 FL (ref 9–12.9)
PMV BLD AUTO: 10.2 FL (ref 9–12.9)
PMV BLD AUTO: 10.2 FL (ref 9–12.9)
PMV BLD AUTO: 10.3 FL (ref 9–12.9)
PMV BLD AUTO: 10.3 FL (ref 9–12.9)
PMV BLD AUTO: 10.4 FL (ref 9–12.9)
PMV BLD AUTO: 10.5 FL (ref 9–12.9)
PMV BLD AUTO: 10.6 FL (ref 9–12.9)
PMV BLD AUTO: 10.6 FL (ref 9–12.9)
PMV BLD AUTO: 10.7 FL (ref 9–12.9)
PMV BLD AUTO: 9.3 FL (ref 9–12.9)
PMV BLD AUTO: 9.4 FL (ref 9–12.9)
PMV BLD AUTO: 9.5 FL (ref 9–12.9)
PMV BLD AUTO: 9.5 FL (ref 9–12.9)
PMV BLD AUTO: 9.9 FL (ref 9–12.9)
PO2 BLDA: 131 MMHG (ref 64–87)
PO2 BLDA: 161 MMHG (ref 64–87)
PO2 BLDA: 221 MMHG (ref 64–87)
PO2 BLDA: 59 MMHG (ref 64–87)
PO2 BLDA: 62 MMHG (ref 64–87)
PO2 BLDA: 63 MMHG (ref 64–87)
PO2 BLDA: 64 MMHG (ref 64–87)
PO2 BLDA: 65 MMHG (ref 64–87)
PO2 BLDA: 66 MMHG (ref 64–87)
PO2 BLDA: 67 MMHG (ref 64–87)
PO2 BLDA: 68 MMHG (ref 64–87)
PO2 BLDA: 69 MMHG (ref 64–87)
PO2 BLDA: 76 MMHG (ref 64–87)
PO2 BLDA: 77 MMHG (ref 64–87)
PO2 BLDA: 78 MMHG (ref 64–87)
PO2 BLDA: 83 MMHG (ref 64–87)
PO2 BLDA: 90 MMHG (ref 64–87)
PO2 BLDA: 91 MMHG (ref 64–87)
PO2 BLDA: 95 MMHG (ref 64–87)
PO2 TEMP ADJ BLDA: 102 MMHG (ref 64–87)
PO2 TEMP ADJ BLDA: 139 MMHG (ref 64–87)
PO2 TEMP ADJ BLDA: 167 MMHG (ref 64–87)
PO2 TEMP ADJ BLDA: 218 MMHG (ref 64–87)
PO2 TEMP ADJ BLDA: 63 MMHG (ref 64–87)
PO2 TEMP ADJ BLDA: 65 MMHG (ref 64–87)
PO2 TEMP ADJ BLDA: 66 MMHG (ref 64–87)
PO2 TEMP ADJ BLDA: 68 MMHG (ref 64–87)
PO2 TEMP ADJ BLDA: 71 MMHG (ref 64–87)
PO2 TEMP ADJ BLDA: 71 MMHG (ref 64–87)
PO2 TEMP ADJ BLDA: 75 MMHG (ref 64–87)
PO2 TEMP ADJ BLDA: 79 MMHG (ref 64–87)
PO2 TEMP ADJ BLDA: 79 MMHG (ref 64–87)
PO2 TEMP ADJ BLDA: 80 MMHG (ref 64–87)
PO2 TEMP ADJ BLDA: 84 MMHG (ref 64–87)
PO2 TEMP ADJ BLDA: 84 MMHG (ref 64–87)
PO2 TEMP ADJ BLDA: 98 MMHG (ref 64–87)
POIKILOCYTOSIS BLD QL SMEAR: NORMAL
POTASSIUM SERPL-SCNC: 3.7 MMOL/L (ref 3.6–5.5)
POTASSIUM SERPL-SCNC: 3.9 MMOL/L (ref 3.6–5.5)
POTASSIUM SERPL-SCNC: 4 MMOL/L (ref 3.6–5.5)
POTASSIUM SERPL-SCNC: 4.1 MMOL/L (ref 3.6–5.5)
POTASSIUM SERPL-SCNC: 4.2 MMOL/L (ref 3.6–5.5)
POTASSIUM SERPL-SCNC: 4.3 MMOL/L (ref 3.6–5.5)
POTASSIUM SERPL-SCNC: 4.5 MMOL/L (ref 3.6–5.5)
POTASSIUM SERPL-SCNC: 4.5 MMOL/L (ref 3.6–5.5)
POTASSIUM SERPL-SCNC: 4.7 MMOL/L (ref 3.6–5.5)
POTASSIUM SERPL-SCNC: 4.8 MMOL/L (ref 3.6–5.5)
PROCALCITONIN SERPL-MCNC: 0.08 NG/ML
PROT SERPL-MCNC: 4.5 G/DL (ref 6–8.2)
PROT SERPL-MCNC: 4.5 G/DL (ref 6–8.2)
PROT SERPL-MCNC: 4.7 G/DL (ref 6–8.2)
PROT SERPL-MCNC: 4.7 G/DL (ref 6–8.2)
PROT SERPL-MCNC: 4.8 G/DL (ref 6–8.2)
PROT SERPL-MCNC: 4.9 G/DL (ref 6–8.2)
PROT SERPL-MCNC: 5.1 G/DL (ref 6–8.2)
PROT SERPL-MCNC: 5.1 G/DL (ref 6–8.2)
PROT SERPL-MCNC: 5.2 G/DL (ref 6–8.2)
PROT SERPL-MCNC: 5.5 G/DL (ref 6–8.2)
PROT SERPL-MCNC: 6.3 G/DL (ref 6–8.2)
PROT SERPL-MCNC: 6.4 G/DL (ref 6–8.2)
PROT SERPL-MCNC: 6.6 G/DL (ref 6–8.2)
PROT UR QL STRIP: NEGATIVE MG/DL
PROT UR QL STRIP: NEGATIVE MG/DL
PROTHROMBIN TIME: 15 SEC (ref 12–14.6)
RBC # BLD AUTO: 3.23 M/UL (ref 4.7–6.1)
RBC # BLD AUTO: 3.26 M/UL (ref 4.7–6.1)
RBC # BLD AUTO: 3.31 M/UL (ref 4.7–6.1)
RBC # BLD AUTO: 3.39 M/UL (ref 4.7–6.1)
RBC # BLD AUTO: 3.48 M/UL (ref 4.7–6.1)
RBC # BLD AUTO: 3.51 M/UL (ref 4.7–6.1)
RBC # BLD AUTO: 3.53 M/UL (ref 4.7–6.1)
RBC # BLD AUTO: 3.57 M/UL (ref 4.7–6.1)
RBC # BLD AUTO: 3.62 M/UL (ref 4.7–6.1)
RBC # BLD AUTO: 3.7 M/UL (ref 4.7–6.1)
RBC # BLD AUTO: 3.85 M/UL (ref 4.7–6.1)
RBC # BLD AUTO: 4.18 M/UL (ref 4.7–6.1)
RBC # BLD AUTO: 4.21 M/UL (ref 4.7–6.1)
RBC # BLD AUTO: 4.3 M/UL (ref 4.7–6.1)
RBC # BLD AUTO: 4.33 M/UL (ref 4.7–6.1)
RBC # BLD AUTO: 4.53 M/UL (ref 4.7–6.1)
RBC # BLD AUTO: 4.82 M/UL (ref 4.7–6.1)
RBC # BLD AUTO: 4.88 M/UL (ref 4.7–6.1)
RBC # BLD AUTO: 4.98 M/UL (ref 4.7–6.1)
RBC # URNS HPF: ABNORMAL /HPF
RBC BLD AUTO: PRESENT
RBC UR QL AUTO: ABNORMAL
RBC UR QL AUTO: NEGATIVE
RHODAMINE-AURAMINE STN SPEC: NORMAL
SAO2 % BLDA: 100 % (ref 93–99)
SAO2 % BLDA: 93 % (ref 93–99)
SAO2 % BLDA: 93 % (ref 93–99)
SAO2 % BLDA: 94 % (ref 93–99)
SAO2 % BLDA: 95 % (ref 93–99)
SAO2 % BLDA: 95 % (ref 93–99)
SAO2 % BLDA: 96 % (ref 93–99)
SAO2 % BLDA: 97 % (ref 93–99)
SAO2 % BLDA: 99 % (ref 93–99)
SAO2 % BLDA: 99 % (ref 93–99)
SCHISTOCYTES BLD QL SMEAR: NORMAL
SIGNIFICANT IND 70042: NORMAL
SITE SITE: NORMAL
SODIUM SERPL-SCNC: 133 MMOL/L (ref 135–145)
SODIUM SERPL-SCNC: 135 MMOL/L (ref 135–145)
SODIUM SERPL-SCNC: 135 MMOL/L (ref 135–145)
SODIUM SERPL-SCNC: 137 MMOL/L (ref 135–145)
SODIUM SERPL-SCNC: 140 MMOL/L (ref 135–145)
SODIUM SERPL-SCNC: 142 MMOL/L (ref 135–145)
SODIUM SERPL-SCNC: 142 MMOL/L (ref 135–145)
SODIUM SERPL-SCNC: 143 MMOL/L (ref 135–145)
SODIUM SERPL-SCNC: 144 MMOL/L (ref 135–145)
SODIUM SERPL-SCNC: 145 MMOL/L (ref 135–145)
SODIUM SERPL-SCNC: 147 MMOL/L (ref 135–145)
SODIUM SERPL-SCNC: 148 MMOL/L (ref 135–145)
SODIUM SERPL-SCNC: 148 MMOL/L (ref 135–145)
SODIUM SERPL-SCNC: 149 MMOL/L (ref 135–145)
SODIUM SERPL-SCNC: 150 MMOL/L (ref 135–145)
SODIUM SERPL-SCNC: 150 MMOL/L (ref 135–145)
SODIUM SERPL-SCNC: 151 MMOL/L (ref 135–145)
SODIUM SERPL-SCNC: 153 MMOL/L (ref 135–145)
SOURCE SOURCE: NORMAL
SP GR UR STRIP.AUTO: 1.02
SP GR UR STRIP.AUTO: 1.02
SPECIMEN DRAWN FROM PATIENT: ABNORMAL
SPECIMEN DRAWN FROM PATIENT: NORMAL
SPECIMEN DRAWN FROM PATIENT: NORMAL
TRIGL SERPL-MCNC: 110 MG/DL (ref 0–149)
TRIGL SERPL-MCNC: 131 MG/DL (ref 0–149)
TRIGL SERPL-MCNC: 140 MG/DL (ref 0–149)
TRIGL SERPL-MCNC: 41 MG/DL (ref 0–149)
TRIGL SERPL-MCNC: 51 MG/DL (ref 0–149)
TRIGL SERPL-MCNC: 75 MG/DL (ref 0–149)
TRIGL SERPL-MCNC: 82 MG/DL (ref 0–149)
TRIGL SERPL-MCNC: 98 MG/DL (ref 0–149)
TROPONIN I SERPL-MCNC: 0.43 NG/ML (ref 0–0.04)
TROPONIN I SERPL-MCNC: 0.81 NG/ML (ref 0–0.04)
TROPONIN I SERPL-MCNC: 0.97 NG/ML (ref 0–0.04)
TSH SERPL DL<=0.005 MIU/L-ACNC: 0.93 UIU/ML (ref 0.38–5.33)
UROBILINOGEN UR STRIP.AUTO-MCNC: 0.2 MG/DL
UROBILINOGEN UR STRIP.AUTO-MCNC: 0.2 MG/DL
WBC # BLD AUTO: 10.2 K/UL (ref 4.8–10.8)
WBC # BLD AUTO: 11.8 K/UL (ref 4.8–10.8)
WBC # BLD AUTO: 13 K/UL (ref 4.8–10.8)
WBC # BLD AUTO: 13.3 K/UL (ref 4.8–10.8)
WBC # BLD AUTO: 14 K/UL (ref 4.8–10.8)
WBC # BLD AUTO: 14.9 K/UL (ref 4.8–10.8)
WBC # BLD AUTO: 14.9 K/UL (ref 4.8–10.8)
WBC # BLD AUTO: 15.7 K/UL (ref 4.8–10.8)
WBC # BLD AUTO: 15.7 K/UL (ref 4.8–10.8)
WBC # BLD AUTO: 15.9 K/UL (ref 4.8–10.8)
WBC # BLD AUTO: 16.1 K/UL (ref 4.8–10.8)
WBC # BLD AUTO: 17.6 K/UL (ref 4.8–10.8)
WBC # BLD AUTO: 18.6 K/UL (ref 4.8–10.8)
WBC # BLD AUTO: 19.5 K/UL (ref 4.8–10.8)
WBC # BLD AUTO: 20.8 K/UL (ref 4.8–10.8)
WBC # BLD AUTO: 22.4 K/UL (ref 4.8–10.8)
WBC # BLD AUTO: 5.1 K/UL (ref 4.8–10.8)
WBC # BLD AUTO: 8.5 K/UL (ref 4.8–10.8)
WBC # BLD AUTO: 9.2 K/UL (ref 4.8–10.8)
WBC #/AREA URNS HPF: ABNORMAL /HPF

## 2018-01-01 PROCEDURE — 83880 ASSAY OF NATRIURETIC PEPTIDE: CPT

## 2018-01-01 PROCEDURE — 93971 EXTREMITY STUDY: CPT | Mod: RT

## 2018-01-01 PROCEDURE — 770022 HCHG ROOM/CARE - ICU (200)

## 2018-01-01 PROCEDURE — 31624 DX BRONCHOSCOPE/LAVAGE: CPT | Performed by: INTERNAL MEDICINE

## 2018-01-01 PROCEDURE — 82962 GLUCOSE BLOOD TEST: CPT | Mod: 91

## 2018-01-01 PROCEDURE — 85025 COMPLETE CBC W/AUTO DIFF WBC: CPT

## 2018-01-01 PROCEDURE — 700102 HCHG RX REV CODE 250 W/ 637 OVERRIDE(OP): Performed by: INTERNAL MEDICINE

## 2018-01-01 PROCEDURE — 87070 CULTURE OTHR SPECIMN AEROBIC: CPT

## 2018-01-01 PROCEDURE — 700117 HCHG RX CONTRAST REV CODE 255: Performed by: EMERGENCY MEDICINE

## 2018-01-01 PROCEDURE — 700111 HCHG RX REV CODE 636 W/ 250 OVERRIDE (IP): Performed by: HOSPITALIST

## 2018-01-01 PROCEDURE — 99292 CRITICAL CARE ADDL 30 MIN: CPT | Performed by: INTERNAL MEDICINE

## 2018-01-01 PROCEDURE — 700101 HCHG RX REV CODE 250: Performed by: INTERNAL MEDICINE

## 2018-01-01 PROCEDURE — 99233 SBSQ HOSP IP/OBS HIGH 50: CPT | Performed by: HOSPITALIST

## 2018-01-01 PROCEDURE — 31645 BRNCHSC W/THER ASPIR 1ST: CPT | Mod: 59 | Performed by: INTERNAL MEDICINE

## 2018-01-01 PROCEDURE — 700111 HCHG RX REV CODE 636 W/ 250 OVERRIDE (IP): Performed by: INTERNAL MEDICINE

## 2018-01-01 PROCEDURE — 84100 ASSAY OF PHOSPHORUS: CPT

## 2018-01-01 PROCEDURE — 94640 AIRWAY INHALATION TREATMENT: CPT

## 2018-01-01 PROCEDURE — 82803 BLOOD GASES ANY COMBINATION: CPT

## 2018-01-01 PROCEDURE — 4A00X4Z MEASUREMENT OF CENTRAL NERVOUS ELECTRICAL ACTIVITY, EXTERNAL APPROACH: ICD-10-PCS | Performed by: PSYCHIATRY & NEUROLOGY

## 2018-01-01 PROCEDURE — 94003 VENT MGMT INPAT SUBQ DAY: CPT

## 2018-01-01 PROCEDURE — 700105 HCHG RX REV CODE 258

## 2018-01-01 PROCEDURE — 700105 HCHG RX REV CODE 258: Performed by: HOSPITALIST

## 2018-01-01 PROCEDURE — 83735 ASSAY OF MAGNESIUM: CPT

## 2018-01-01 PROCEDURE — 700105 HCHG RX REV CODE 258: Performed by: INTERNAL MEDICINE

## 2018-01-01 PROCEDURE — 87102 FUNGUS ISOLATION CULTURE: CPT

## 2018-01-01 PROCEDURE — 93005 ELECTROCARDIOGRAM TRACING: CPT | Performed by: INTERNAL MEDICINE

## 2018-01-01 PROCEDURE — 97162 PT EVAL MOD COMPLEX 30 MIN: CPT

## 2018-01-01 PROCEDURE — 700102 HCHG RX REV CODE 250 W/ 637 OVERRIDE(OP): Performed by: HOSPITALIST

## 2018-01-01 PROCEDURE — A9270 NON-COVERED ITEM OR SERVICE: HCPCS | Performed by: HOSPITALIST

## 2018-01-01 PROCEDURE — 36600 WITHDRAWAL OF ARTERIAL BLOOD: CPT

## 2018-01-01 PROCEDURE — 84478 ASSAY OF TRIGLYCERIDES: CPT

## 2018-01-01 PROCEDURE — 85379 FIBRIN DEGRADATION QUANT: CPT

## 2018-01-01 PROCEDURE — 306595 SYSTEM,FEED TUBE CLOG ZAPPER: Performed by: INTERNAL MEDICINE

## 2018-01-01 PROCEDURE — 82962 GLUCOSE BLOOD TEST: CPT

## 2018-01-01 PROCEDURE — 99291 CRITICAL CARE FIRST HOUR: CPT | Performed by: INTERNAL MEDICINE

## 2018-01-01 PROCEDURE — 80053 COMPREHEN METABOLIC PANEL: CPT

## 2018-01-01 PROCEDURE — 71045 X-RAY EXAM CHEST 1 VIEW: CPT

## 2018-01-01 PROCEDURE — 93010 ELECTROCARDIOGRAM REPORT: CPT | Performed by: INTERNAL MEDICINE

## 2018-01-01 PROCEDURE — 88112 CYTOPATH CELL ENHANCE TECH: CPT

## 2018-01-01 PROCEDURE — 83605 ASSAY OF LACTIC ACID: CPT | Mod: 91

## 2018-01-01 PROCEDURE — 71046 X-RAY EXAM CHEST 2 VIEWS: CPT

## 2018-01-01 PROCEDURE — A9270 NON-COVERED ITEM OR SERVICE: HCPCS | Performed by: INTERNAL MEDICINE

## 2018-01-01 PROCEDURE — 99223 1ST HOSP IP/OBS HIGH 75: CPT | Performed by: INTERNAL MEDICINE

## 2018-01-01 PROCEDURE — 87015 SPECIMEN INFECT AGNT CONCNTJ: CPT

## 2018-01-01 PROCEDURE — 85610 PROTHROMBIN TIME: CPT

## 2018-01-01 PROCEDURE — 87206 SMEAR FLUORESCENT/ACID STAI: CPT

## 2018-01-01 PROCEDURE — 700111 HCHG RX REV CODE 636 W/ 250 OVERRIDE (IP): Mod: JG | Performed by: INTERNAL MEDICINE

## 2018-01-01 PROCEDURE — 84484 ASSAY OF TROPONIN QUANT: CPT

## 2018-01-01 PROCEDURE — 304561 HCHG STAT O2

## 2018-01-01 PROCEDURE — B548ZZA ULTRASONOGRAPHY OF SUPERIOR VENA CAVA, GUIDANCE: ICD-10-PCS | Performed by: INTERNAL MEDICINE

## 2018-01-01 PROCEDURE — 84145 PROCALCITONIN (PCT): CPT

## 2018-01-01 PROCEDURE — 700105 HCHG RX REV CODE 258: Performed by: EMERGENCY MEDICINE

## 2018-01-01 PROCEDURE — 99223 1ST HOSP IP/OBS HIGH 75: CPT | Mod: 25 | Performed by: HOSPITALIST

## 2018-01-01 PROCEDURE — 82306 VITAMIN D 25 HYDROXY: CPT

## 2018-01-01 PROCEDURE — 99291 CRITICAL CARE FIRST HOUR: CPT | Mod: 25 | Performed by: INTERNAL MEDICINE

## 2018-01-01 PROCEDURE — 87205 SMEAR GRAM STAIN: CPT

## 2018-01-01 PROCEDURE — 5A1955Z RESPIRATORY VENTILATION, GREATER THAN 96 CONSECUTIVE HOURS: ICD-10-PCS | Performed by: HOSPITALIST

## 2018-01-01 PROCEDURE — 94150 VITAL CAPACITY TEST: CPT

## 2018-01-01 PROCEDURE — 99233 SBSQ HOSP IP/OBS HIGH 50: CPT | Performed by: INTERNAL MEDICINE

## 2018-01-01 PROCEDURE — 88305 TISSUE EXAM BY PATHOLOGIST: CPT

## 2018-01-01 PROCEDURE — 99285 EMERGENCY DEPT VISIT HI MDM: CPT

## 2018-01-01 PROCEDURE — 93010 ELECTROCARDIOGRAM REPORT: CPT | Mod: 76 | Performed by: INTERNAL MEDICINE

## 2018-01-01 PROCEDURE — 87116 MYCOBACTERIA CULTURE: CPT

## 2018-01-01 PROCEDURE — 36415 COLL VENOUS BLD VENIPUNCTURE: CPT

## 2018-01-01 PROCEDURE — 80048 BASIC METABOLIC PNL TOTAL CA: CPT

## 2018-01-01 PROCEDURE — 83036 HEMOGLOBIN GLYCOSYLATED A1C: CPT

## 2018-01-01 PROCEDURE — 700111 HCHG RX REV CODE 636 W/ 250 OVERRIDE (IP)

## 2018-01-01 PROCEDURE — C1751 CATH, INF, PER/CENT/MIDLINE: HCPCS

## 2018-01-01 PROCEDURE — P9047 ALBUMIN (HUMAN), 25%, 50ML: HCPCS | Mod: JG | Performed by: INTERNAL MEDICINE

## 2018-01-01 PROCEDURE — 302154 K THERMIA BLANKET 24X60: Performed by: INTERNAL MEDICINE

## 2018-01-01 PROCEDURE — 92950 HEART/LUNG RESUSCITATION CPR: CPT

## 2018-01-01 PROCEDURE — 99292 CRITICAL CARE ADDL 30 MIN: CPT | Mod: 25 | Performed by: INTERNAL MEDICINE

## 2018-01-01 PROCEDURE — 96375 TX/PRO/DX INJ NEW DRUG ADDON: CPT

## 2018-01-01 PROCEDURE — 700111 HCHG RX REV CODE 636 W/ 250 OVERRIDE (IP): Performed by: EMERGENCY MEDICINE

## 2018-01-01 PROCEDURE — 0BB78ZX EXCISION OF LEFT MAIN BRONCHUS, VIA NATURAL OR ARTIFICIAL OPENING ENDOSCOPIC, DIAGNOSTIC: ICD-10-PCS | Performed by: INTERNAL MEDICINE

## 2018-01-01 PROCEDURE — 36556 INSERT NON-TUNNEL CV CATH: CPT

## 2018-01-01 PROCEDURE — 71275 CT ANGIOGRAPHY CHEST: CPT

## 2018-01-01 PROCEDURE — 95951 EEG: CPT | Mod: 52 | Performed by: PSYCHIATRY & NEUROLOGY

## 2018-01-01 PROCEDURE — 96365 THER/PROPH/DIAG IV INF INIT: CPT

## 2018-01-01 PROCEDURE — 3E1F88Z IRRIGATION OF RESPIRATORY TRACT USING IRRIGATING SUBSTANCE, VIA NATURAL OR ARTIFICIAL OPENING ENDOSCOPIC: ICD-10-PCS | Performed by: INTERNAL MEDICINE

## 2018-01-01 PROCEDURE — 302154 K THERMIA BLANKET 24X60: Performed by: HOSPITALIST

## 2018-01-01 PROCEDURE — 81001 URINALYSIS AUTO W/SCOPE: CPT

## 2018-01-01 PROCEDURE — 81003 URINALYSIS AUTO W/O SCOPE: CPT

## 2018-01-01 PROCEDURE — 302136 NUTRITION PUMP: Performed by: HOSPITALIST

## 2018-01-01 PROCEDURE — 36556 INSERT NON-TUNNEL CV CATH: CPT | Mod: RT | Performed by: INTERNAL MEDICINE

## 2018-01-01 PROCEDURE — 85027 COMPLETE CBC AUTOMATED: CPT

## 2018-01-01 PROCEDURE — 99214 OFFICE O/P EST MOD 30 MIN: CPT | Performed by: INTERNAL MEDICINE

## 2018-01-01 PROCEDURE — 87502 INFLUENZA DNA AMP PROBE: CPT

## 2018-01-01 PROCEDURE — 302978 HCHG BRONCHOSCOPY-DIAGNOSTIC

## 2018-01-01 PROCEDURE — 80076 HEPATIC FUNCTION PANEL: CPT

## 2018-01-01 PROCEDURE — 93306 TTE W/DOPPLER COMPLETE: CPT

## 2018-01-01 PROCEDURE — 31500 INSERT EMERGENCY AIRWAY: CPT | Performed by: HOSPITALIST

## 2018-01-01 PROCEDURE — 87040 BLOOD CULTURE FOR BACTERIA: CPT

## 2018-01-01 PROCEDURE — 83605 ASSAY OF LACTIC ACID: CPT

## 2018-01-01 PROCEDURE — 93005 ELECTROCARDIOGRAM TRACING: CPT | Performed by: EMERGENCY MEDICINE

## 2018-01-01 PROCEDURE — 51798 US URINE CAPACITY MEASURE: CPT

## 2018-01-01 PROCEDURE — 31500 INSERT EMERGENCY AIRWAY: CPT

## 2018-01-01 PROCEDURE — 99215 OFFICE O/P EST HI 40 MIN: CPT | Mod: 25 | Performed by: PHYSICIAN ASSISTANT

## 2018-01-01 PROCEDURE — 80061 LIPID PANEL: CPT

## 2018-01-01 PROCEDURE — 02HV33Z INSERTION OF INFUSION DEVICE INTO SUPERIOR VENA CAVA, PERCUTANEOUS APPROACH: ICD-10-PCS | Performed by: INTERNAL MEDICINE

## 2018-01-01 PROCEDURE — 0B9F8ZX DRAINAGE OF RIGHT LOWER LUNG LOBE, VIA NATURAL OR ARTIFICIAL OPENING ENDOSCOPIC, DIAGNOSTIC: ICD-10-PCS | Performed by: INTERNAL MEDICINE

## 2018-01-01 PROCEDURE — 82803 BLOOD GASES ANY COMBINATION: CPT | Mod: 91

## 2018-01-01 PROCEDURE — 99214 OFFICE O/P EST MOD 30 MIN: CPT | Performed by: FAMILY MEDICINE

## 2018-01-01 PROCEDURE — 93005 ELECTROCARDIOGRAM TRACING: CPT | Performed by: HOSPITALIST

## 2018-01-01 PROCEDURE — 99239 HOSP IP/OBS DSCHRG MGMT >30: CPT | Performed by: HOSPITALIST

## 2018-01-01 PROCEDURE — 95951 EEG: CPT | Mod: 26,52 | Performed by: PSYCHIATRY & NEUROLOGY

## 2018-01-01 PROCEDURE — 0BH18EZ INSERTION OF ENDOTRACHEAL AIRWAY INTO TRACHEA, VIA NATURAL OR ARTIFICIAL OPENING ENDOSCOPIC: ICD-10-PCS | Performed by: HOSPITALIST

## 2018-01-01 PROCEDURE — 0B9G8ZX DRAINAGE OF LEFT UPPER LUNG LOBE, VIA NATURAL OR ARTIFICIAL OPENING ENDOSCOPIC, DIAGNOSTIC: ICD-10-PCS | Performed by: INTERNAL MEDICINE

## 2018-01-01 PROCEDURE — 94002 VENT MGMT INPAT INIT DAY: CPT

## 2018-01-01 PROCEDURE — 87086 URINE CULTURE/COLONY COUNT: CPT

## 2018-01-01 PROCEDURE — 74018 RADEX ABDOMEN 1 VIEW: CPT

## 2018-01-01 PROCEDURE — 85007 BL SMEAR W/DIFF WBC COUNT: CPT

## 2018-01-01 PROCEDURE — G8979 MOBILITY GOAL STATUS: HCPCS | Mod: CL

## 2018-01-01 PROCEDURE — 94640 AIRWAY INHALATION TREATMENT: CPT | Performed by: PHYSICIAN ASSISTANT

## 2018-01-01 PROCEDURE — 0B9J8ZX DRAINAGE OF LEFT LOWER LUNG LOBE, VIA NATURAL OR ARTIFICIAL OPENING ENDOSCOPIC, DIAGNOSTIC: ICD-10-PCS | Performed by: INTERNAL MEDICINE

## 2018-01-01 PROCEDURE — 88104 CYTOPATH FL NONGYN SMEARS: CPT

## 2018-01-01 PROCEDURE — 31623 DX BRONCHOSCOPE/BRUSH: CPT | Performed by: INTERNAL MEDICINE

## 2018-01-01 PROCEDURE — G8978 MOBILITY CURRENT STATUS: HCPCS | Mod: CN

## 2018-01-01 PROCEDURE — 84443 ASSAY THYROID STIM HORMONE: CPT

## 2018-01-01 PROCEDURE — 87040 BLOOD CULTURE FOR BACTERIA: CPT | Mod: 91

## 2018-01-01 RX ORDER — DEXTROSE MONOHYDRATE 25 G/50ML
25 INJECTION, SOLUTION INTRAVENOUS
Status: DISCONTINUED | OUTPATIENT
Start: 2018-01-01 | End: 2018-01-01

## 2018-01-01 RX ORDER — POLYETHYLENE GLYCOL 3350 17 G/17G
1 POWDER, FOR SOLUTION ORAL
Status: DISCONTINUED | OUTPATIENT
Start: 2018-01-01 | End: 2018-01-01

## 2018-01-01 RX ORDER — LANOLIN ALCOHOL/MO/W.PET/CERES
1000 CREAM (GRAM) TOPICAL
COMMUNITY

## 2018-01-01 RX ORDER — FUROSEMIDE 10 MG/ML
40 INJECTION INTRAMUSCULAR; INTRAVENOUS EVERY 12 HOURS
Status: DISCONTINUED | OUTPATIENT
Start: 2018-01-01 | End: 2018-01-01

## 2018-01-01 RX ORDER — SODIUM CHLORIDE 9 MG/ML
INJECTION, SOLUTION INTRAVENOUS
Status: ACTIVE
Start: 2018-01-01 | End: 2018-01-01

## 2018-01-01 RX ORDER — DEXTROSE MONOHYDRATE 25 G/50ML
12.5-25 INJECTION, SOLUTION INTRAVENOUS PRN
Status: DISCONTINUED | OUTPATIENT
Start: 2018-01-01 | End: 2018-01-01

## 2018-01-01 RX ORDER — AZITHROMYCIN 250 MG/1
250 TABLET, FILM COATED ORAL DAILY
Status: COMPLETED | OUTPATIENT
Start: 2018-01-01 | End: 2018-01-01

## 2018-01-01 RX ORDER — MORPHINE SULFATE 100 MG/5ML
10 SOLUTION ORAL
Status: DISCONTINUED | OUTPATIENT
Start: 2018-01-01 | End: 2018-01-01 | Stop reason: HOSPADM

## 2018-01-01 RX ORDER — LORAZEPAM 2 MG/ML
2 INJECTION INTRAMUSCULAR
Status: DISCONTINUED | OUTPATIENT
Start: 2018-01-01 | End: 2018-01-01 | Stop reason: HOSPADM

## 2018-01-01 RX ORDER — ONDANSETRON 2 MG/ML
4 INJECTION INTRAMUSCULAR; INTRAVENOUS EVERY 4 HOURS PRN
Status: DISCONTINUED | OUTPATIENT
Start: 2018-01-01 | End: 2018-01-01

## 2018-01-01 RX ORDER — SODIUM CHLORIDE 9 MG/ML
30 INJECTION, SOLUTION INTRAVENOUS ONCE
Status: COMPLETED | OUTPATIENT
Start: 2018-01-01 | End: 2018-01-01

## 2018-01-01 RX ORDER — ALBUMIN (HUMAN) 12.5 G/50ML
25 SOLUTION INTRAVENOUS EVERY 6 HOURS
Status: COMPLETED | OUTPATIENT
Start: 2018-01-01 | End: 2018-01-01

## 2018-01-01 RX ORDER — SODIUM CHLORIDE 9 MG/ML
30 INJECTION, SOLUTION INTRAVENOUS
Status: COMPLETED | OUTPATIENT
Start: 2018-01-01 | End: 2018-01-01

## 2018-01-01 RX ORDER — ATORVASTATIN CALCIUM 40 MG/1
40 TABLET, FILM COATED ORAL EVERY EVENING
Status: DISCONTINUED | OUTPATIENT
Start: 2018-01-01 | End: 2018-01-01

## 2018-01-01 RX ORDER — NITROGLYCERIN 20 MG/100ML
0-200 INJECTION INTRAVENOUS CONTINUOUS
Status: DISCONTINUED | OUTPATIENT
Start: 2018-01-01 | End: 2018-01-01

## 2018-01-01 RX ORDER — FUROSEMIDE 10 MG/ML
40 INJECTION INTRAMUSCULAR; INTRAVENOUS
Status: DISCONTINUED | OUTPATIENT
Start: 2018-01-01 | End: 2018-01-01

## 2018-01-01 RX ORDER — AZITHROMYCIN 250 MG/1
500 TABLET, FILM COATED ORAL ONCE
Status: COMPLETED | OUTPATIENT
Start: 2018-01-01 | End: 2018-01-01

## 2018-01-01 RX ORDER — SODIUM CHLORIDE 9 MG/ML
500 INJECTION, SOLUTION INTRAVENOUS
Status: COMPLETED | OUTPATIENT
Start: 2018-01-01 | End: 2018-01-01

## 2018-01-01 RX ORDER — BISACODYL 10 MG
10 SUPPOSITORY, RECTAL RECTAL
Status: DISCONTINUED | OUTPATIENT
Start: 2018-01-01 | End: 2018-01-01

## 2018-01-01 RX ORDER — LORAZEPAM 2 MG/ML
1 CONCENTRATE ORAL
Status: DISCONTINUED | OUTPATIENT
Start: 2018-01-01 | End: 2018-01-01

## 2018-01-01 RX ORDER — ASCORBIC ACID 500 MG
500 TABLET ORAL
COMMUNITY

## 2018-01-01 RX ORDER — FAMOTIDINE 20 MG/1
20 TABLET, FILM COATED ORAL EVERY 12 HOURS
Status: DISCONTINUED | OUTPATIENT
Start: 2018-01-01 | End: 2018-01-01

## 2018-01-01 RX ORDER — PYRIDOXINE HCL (VITAMIN B6) 50 MG
50 TABLET ORAL
COMMUNITY

## 2018-01-01 RX ORDER — AMIODARONE HYDROCHLORIDE 200 MG/1
400 TABLET ORAL TWICE DAILY
Status: DISCONTINUED | OUTPATIENT
Start: 2018-01-01 | End: 2018-01-01

## 2018-01-01 RX ORDER — ONDANSETRON 4 MG/1
4 TABLET, ORALLY DISINTEGRATING ORAL EVERY 4 HOURS PRN
Status: DISCONTINUED | OUTPATIENT
Start: 2018-01-01 | End: 2018-01-01

## 2018-01-01 RX ORDER — ASPIRIN 81 MG/1
81 TABLET, CHEWABLE ORAL DAILY
Status: DISCONTINUED | OUTPATIENT
Start: 2018-01-01 | End: 2018-01-01

## 2018-01-01 RX ORDER — AMOXICILLIN 250 MG
2 CAPSULE ORAL 2 TIMES DAILY
Status: DISCONTINUED | OUTPATIENT
Start: 2018-01-01 | End: 2018-01-01

## 2018-01-01 RX ORDER — MORPHINE SULFATE 10 MG/ML
5 INJECTION, SOLUTION INTRAMUSCULAR; INTRAVENOUS
Status: DISCONTINUED | OUTPATIENT
Start: 2018-01-01 | End: 2018-01-01 | Stop reason: HOSPADM

## 2018-01-01 RX ORDER — AZITHROMYCIN 500 MG/1
500 INJECTION, POWDER, LYOPHILIZED, FOR SOLUTION INTRAVENOUS ONCE
Status: COMPLETED | OUTPATIENT
Start: 2018-01-01 | End: 2018-01-01

## 2018-01-01 RX ORDER — FUROSEMIDE 10 MG/ML
40 INJECTION INTRAMUSCULAR; INTRAVENOUS ONCE
Status: COMPLETED | OUTPATIENT
Start: 2018-01-01 | End: 2018-01-01

## 2018-01-01 RX ORDER — MORPHINE SULFATE 10 MG/ML
10 INJECTION, SOLUTION INTRAMUSCULAR; INTRAVENOUS
Status: DISCONTINUED | OUTPATIENT
Start: 2018-01-01 | End: 2018-01-01 | Stop reason: HOSPADM

## 2018-01-01 RX ORDER — SODIUM CHLORIDE 9 MG/ML
INJECTION, SOLUTION INTRAVENOUS
Status: COMPLETED
Start: 2018-01-01 | End: 2018-01-01

## 2018-01-01 RX ORDER — AZITHROMYCIN 250 MG/1
250 TABLET, FILM COATED ORAL DAILY
Status: DISCONTINUED | OUTPATIENT
Start: 2018-01-01 | End: 2018-01-01

## 2018-01-01 RX ORDER — ENEMA 19; 7 G/133ML; G/133ML
1 ENEMA RECTAL ONCE
Status: COMPLETED | OUTPATIENT
Start: 2018-01-01 | End: 2018-01-01

## 2018-01-01 RX ORDER — DEXMEDETOMIDINE HYDROCHLORIDE 4 UG/ML
.1-1.5 INJECTION, SOLUTION INTRAVENOUS CONTINUOUS
Status: DISCONTINUED | OUTPATIENT
Start: 2018-01-01 | End: 2018-01-01

## 2018-01-01 RX ORDER — SODIUM CHLORIDE 9 MG/ML
INJECTION, SOLUTION INTRAVENOUS CONTINUOUS
Status: DISCONTINUED | OUTPATIENT
Start: 2018-01-01 | End: 2018-01-01

## 2018-01-01 RX ORDER — IPRATROPIUM BROMIDE AND ALBUTEROL SULFATE 2.5; .5 MG/3ML; MG/3ML
3 SOLUTION RESPIRATORY (INHALATION)
Status: DISCONTINUED | OUTPATIENT
Start: 2018-01-01 | End: 2018-01-01

## 2018-01-01 RX ORDER — METOPROLOL TARTRATE 1 MG/ML
5 INJECTION, SOLUTION INTRAVENOUS ONCE
Status: COMPLETED | OUTPATIENT
Start: 2018-01-01 | End: 2018-01-01

## 2018-01-01 RX ORDER — METOPROLOL TARTRATE 1 MG/ML
5 INJECTION, SOLUTION INTRAVENOUS
Status: COMPLETED | OUTPATIENT
Start: 2018-01-01 | End: 2018-01-01

## 2018-01-01 RX ORDER — ROCURONIUM BROMIDE 10 MG/ML
50 INJECTION, SOLUTION INTRAVENOUS ONCE
Status: COMPLETED | OUTPATIENT
Start: 2018-01-01 | End: 2018-01-01

## 2018-01-01 RX ORDER — METOCLOPRAMIDE HYDROCHLORIDE 5 MG/ML
10 INJECTION INTRAMUSCULAR; INTRAVENOUS EVERY 6 HOURS
Status: DISCONTINUED | OUTPATIENT
Start: 2018-01-01 | End: 2018-01-01

## 2018-01-01 RX ORDER — IPRATROPIUM BROMIDE AND ALBUTEROL SULFATE 2.5; .5 MG/3ML; MG/3ML
3 SOLUTION RESPIRATORY (INHALATION) ONCE
Status: COMPLETED | OUTPATIENT
Start: 2018-01-01 | End: 2018-01-01

## 2018-01-01 RX ORDER — LORAZEPAM 2 MG/ML
2 INJECTION INTRAMUSCULAR
Status: DISCONTINUED | OUTPATIENT
Start: 2018-01-01 | End: 2018-01-01

## 2018-01-01 RX ORDER — ACETAMINOPHEN 325 MG/1
650 TABLET ORAL EVERY 6 HOURS PRN
Status: DISCONTINUED | OUTPATIENT
Start: 2018-01-01 | End: 2018-01-01

## 2018-01-01 RX ORDER — SODIUM CHLORIDE 9 MG/ML
1000 INJECTION, SOLUTION INTRAVENOUS ONCE
Status: COMPLETED | OUTPATIENT
Start: 2018-01-01 | End: 2018-01-01

## 2018-01-01 RX ORDER — VITAMIN E 268 MG
400 CAPSULE ORAL
COMMUNITY

## 2018-01-01 RX ORDER — ATROPINE SULFATE 10 MG/ML
2 SOLUTION/ DROPS OPHTHALMIC EVERY 4 HOURS PRN
Status: DISCONTINUED | OUTPATIENT
Start: 2018-01-01 | End: 2018-01-01 | Stop reason: HOSPADM

## 2018-01-01 RX ORDER — LORAZEPAM 2 MG/ML
1 CONCENTRATE ORAL
Status: DISCONTINUED | OUTPATIENT
Start: 2018-01-01 | End: 2018-01-01 | Stop reason: HOSPADM

## 2018-01-01 RX ADMIN — FAMOTIDINE 20 MG: 20 TABLET ORAL at 05:06

## 2018-01-01 RX ADMIN — FAMOTIDINE 20 MG: 20 TABLET ORAL at 17:29

## 2018-01-01 RX ADMIN — METOCLOPRAMIDE 10 MG: 5 INJECTION, SOLUTION INTRAMUSCULAR; INTRAVENOUS at 16:56

## 2018-01-01 RX ADMIN — FAMOTIDINE 20 MG: 20 TABLET ORAL at 17:59

## 2018-01-01 RX ADMIN — METOPROLOL TARTRATE 12.5 MG: 25 TABLET, FILM COATED ORAL at 05:24

## 2018-01-01 RX ADMIN — STANDARDIZED SENNA CONCENTRATE AND DOCUSATE SODIUM 2 TABLET: 8.6; 5 TABLET ORAL at 06:00

## 2018-01-01 RX ADMIN — AMIODARONE HYDROCHLORIDE 400 MG: 200 TABLET ORAL at 05:31

## 2018-01-01 RX ADMIN — SODIUM CHLORIDE 1000 ML: 9 INJECTION, SOLUTION INTRAVENOUS at 10:00

## 2018-01-01 RX ADMIN — FAMOTIDINE 20 MG: 10 INJECTION, SOLUTION INTRAVENOUS at 05:43

## 2018-01-01 RX ADMIN — ALBUMIN (HUMAN) 25 G: 0.25 INJECTION, SOLUTION INTRAVENOUS at 18:04

## 2018-01-01 RX ADMIN — ALBUMIN (HUMAN) 25 G: 0.25 INJECTION, SOLUTION INTRAVENOUS at 00:46

## 2018-01-01 RX ADMIN — METOCLOPRAMIDE 10 MG: 5 INJECTION, SOLUTION INTRAMUSCULAR; INTRAVENOUS at 05:35

## 2018-01-01 RX ADMIN — FAMOTIDINE 20 MG: 20 TABLET ORAL at 16:56

## 2018-01-01 RX ADMIN — PROPOFOL 40 MCG/KG/MIN: 10 INJECTION, EMULSION INTRAVENOUS at 01:31

## 2018-01-01 RX ADMIN — ATORVASTATIN CALCIUM 40 MG: 40 TABLET, FILM COATED ORAL at 17:29

## 2018-01-01 RX ADMIN — EPOPROSTENOL SODIUM 0.05 MCG/KG/MIN: 1.5 INJECTION, POWDER, LYOPHILIZED, FOR SOLUTION INTRAVENOUS at 00:41

## 2018-01-01 RX ADMIN — SODIUM CHLORIDE 3 UNITS/HR: 9 INJECTION, SOLUTION INTRAVENOUS at 06:30

## 2018-01-01 RX ADMIN — ENOXAPARIN SODIUM 40 MG: 100 INJECTION SUBCUTANEOUS at 06:00

## 2018-01-01 RX ADMIN — METOPROLOL TARTRATE 12.5 MG: 25 TABLET, FILM COATED ORAL at 05:22

## 2018-01-01 RX ADMIN — PROPOFOL 15 MCG/KG/MIN: 10 INJECTION, EMULSION INTRAVENOUS at 21:21

## 2018-01-01 RX ADMIN — STANDARDIZED SENNA CONCENTRATE AND DOCUSATE SODIUM 2 TABLET: 8.6; 5 TABLET ORAL at 18:01

## 2018-01-01 RX ADMIN — METOPROLOL TARTRATE 12.5 MG: 25 TABLET, FILM COATED ORAL at 17:28

## 2018-01-01 RX ADMIN — ASPIRIN 81 MG: 81 TABLET, CHEWABLE ORAL at 05:44

## 2018-01-01 RX ADMIN — ATORVASTATIN CALCIUM 40 MG: 40 TABLET, FILM COATED ORAL at 17:28

## 2018-01-01 RX ADMIN — FUROSEMIDE 40 MG: 10 INJECTION, SOLUTION INTRAMUSCULAR; INTRAVENOUS at 16:52

## 2018-01-01 RX ADMIN — AMIODARONE HYDROCHLORIDE 400 MG: 200 TABLET ORAL at 17:39

## 2018-01-01 RX ADMIN — FAMOTIDINE 20 MG: 20 TABLET ORAL at 05:20

## 2018-01-01 RX ADMIN — STANDARDIZED SENNA CONCENTRATE AND DOCUSATE SODIUM 2 TABLET: 8.6; 5 TABLET ORAL at 17:28

## 2018-01-01 RX ADMIN — PROPOFOL 20 MCG/KG/MIN: 10 INJECTION, EMULSION INTRAVENOUS at 06:26

## 2018-01-01 RX ADMIN — FENTANYL CITRATE 50 MCG: 50 INJECTION, SOLUTION INTRAMUSCULAR; INTRAVENOUS at 22:40

## 2018-01-01 RX ADMIN — STANDARDIZED SENNA CONCENTRATE AND DOCUSATE SODIUM 2 TABLET: 8.6; 5 TABLET ORAL at 05:07

## 2018-01-01 RX ADMIN — PROPOFOL 30 MCG/KG/MIN: 10 INJECTION, EMULSION INTRAVENOUS at 23:30

## 2018-01-01 RX ADMIN — PROPOFOL 15 MCG/KG/MIN: 10 INJECTION, EMULSION INTRAVENOUS at 20:51

## 2018-01-01 RX ADMIN — METOPROLOL TARTRATE 12.5 MG: 25 TABLET, FILM COATED ORAL at 05:27

## 2018-01-01 RX ADMIN — METOPROLOL TARTRATE 12.5 MG: 25 TABLET, FILM COATED ORAL at 06:11

## 2018-01-01 RX ADMIN — FUROSEMIDE 40 MG: 10 INJECTION, SOLUTION INTRAMUSCULAR; INTRAVENOUS at 15:08

## 2018-01-01 RX ADMIN — LORAZEPAM 2 MG: 2 INJECTION INTRAMUSCULAR; INTRAVENOUS at 11:31

## 2018-01-01 RX ADMIN — SODIUM CHLORIDE 1000 ML: 9 INJECTION, SOLUTION INTRAVENOUS at 23:43

## 2018-01-01 RX ADMIN — STANDARDIZED SENNA CONCENTRATE AND DOCUSATE SODIUM 2 TABLET: 8.6; 5 TABLET ORAL at 05:11

## 2018-01-01 RX ADMIN — MORPHINE SULFATE 10 MG: 10 INJECTION INTRAVENOUS at 11:19

## 2018-01-01 RX ADMIN — DEXMEDETOMIDINE HYDROCHLORIDE 0.7 MCG/KG/HR: 100 INJECTION, SOLUTION INTRAVENOUS at 15:20

## 2018-01-01 RX ADMIN — METOCLOPRAMIDE 10 MG: 5 INJECTION, SOLUTION INTRAMUSCULAR; INTRAVENOUS at 05:19

## 2018-01-01 RX ADMIN — AZITHROMYCIN 500 MG: 250 TABLET, FILM COATED ORAL at 15:45

## 2018-01-01 RX ADMIN — FUROSEMIDE 40 MG: 10 INJECTION, SOLUTION INTRAMUSCULAR; INTRAVENOUS at 05:35

## 2018-01-01 RX ADMIN — ACETAMINOPHEN 650 MG: 325 TABLET, FILM COATED ORAL at 14:49

## 2018-01-01 RX ADMIN — SODIUM CHLORIDE 500 ML: 9 INJECTION, SOLUTION INTRAVENOUS at 19:45

## 2018-01-01 RX ADMIN — AZITHROMYCIN FOR INJECTION INJECTION, POWDER, LYOPHILIZED, FOR SOLUTION 500 MG: 500 INJECTION INTRAVENOUS at 14:30

## 2018-01-01 RX ADMIN — PHENYLEPHRINE HYDROCHLORIDE 100 MCG/MIN: 10 INJECTION INTRAVENOUS at 13:47

## 2018-01-01 RX ADMIN — EPOPROSTENOL SODIUM 0.05 MCG/KG/MIN: 1.5 INJECTION, POWDER, LYOPHILIZED, FOR SOLUTION INTRAVENOUS at 17:36

## 2018-01-01 RX ADMIN — BISACODYL 10 MG: 10 SUPPOSITORY RECTAL at 12:22

## 2018-01-01 RX ADMIN — PROPOFOL 25 MCG/KG/MIN: 10 INJECTION, EMULSION INTRAVENOUS at 07:30

## 2018-01-01 RX ADMIN — METOPROLOL TARTRATE 12.5 MG: 25 TABLET, FILM COATED ORAL at 18:04

## 2018-01-01 RX ADMIN — POTASSIUM BICARBONATE 25 MEQ: 25 TABLET, EFFERVESCENT ORAL at 18:01

## 2018-01-01 RX ADMIN — AMIODARONE HYDROCHLORIDE 400 MG: 200 TABLET ORAL at 17:28

## 2018-01-01 RX ADMIN — Medication 75 MCG/HR: at 17:00

## 2018-01-01 RX ADMIN — SODIUM CHLORIDE 4 UNITS/HR: 9 INJECTION, SOLUTION INTRAVENOUS at 05:52

## 2018-01-01 RX ADMIN — POTASSIUM BICARBONATE 25 MEQ: 25 TABLET, EFFERVESCENT ORAL at 17:37

## 2018-01-01 RX ADMIN — METOCLOPRAMIDE 10 MG: 5 INJECTION, SOLUTION INTRAMUSCULAR; INTRAVENOUS at 01:06

## 2018-01-01 RX ADMIN — PROPOFOL 25 MCG/KG/MIN: 10 INJECTION, EMULSION INTRAVENOUS at 15:09

## 2018-01-01 RX ADMIN — METOPROLOL TARTRATE 5 MG: 5 INJECTION INTRAVENOUS at 21:55

## 2018-01-01 RX ADMIN — PROPOFOL 10 MCG/KG/MIN: 10 INJECTION, EMULSION INTRAVENOUS at 21:17

## 2018-01-01 RX ADMIN — FAMOTIDINE 20 MG: 20 TABLET ORAL at 17:30

## 2018-01-01 RX ADMIN — ACETAMINOPHEN 650 MG: 325 TABLET, FILM COATED ORAL at 17:39

## 2018-01-01 RX ADMIN — FAMOTIDINE 20 MG: 20 TABLET ORAL at 05:25

## 2018-01-01 RX ADMIN — AMPICILLIN SODIUM AND SULBACTAM SODIUM 3 G: 2; 1 INJECTION, POWDER, FOR SOLUTION INTRAMUSCULAR; INTRAVENOUS at 06:14

## 2018-01-01 RX ADMIN — AMPICILLIN AND SULBACTAM 3 G: 2; 1 INJECTION, POWDER, FOR SOLUTION INTRAVENOUS at 14:32

## 2018-01-01 RX ADMIN — ATORVASTATIN CALCIUM 40 MG: 40 TABLET, FILM COATED ORAL at 16:30

## 2018-01-01 RX ADMIN — STANDARDIZED SENNA CONCENTRATE AND DOCUSATE SODIUM 2 TABLET: 8.6; 5 TABLET ORAL at 06:14

## 2018-01-01 RX ADMIN — STANDARDIZED SENNA CONCENTRATE AND DOCUSATE SODIUM 2 TABLET: 8.6; 5 TABLET ORAL at 05:25

## 2018-01-01 RX ADMIN — POTASSIUM BICARBONATE 25 MEQ: 25 TABLET, EFFERVESCENT ORAL at 06:14

## 2018-01-01 RX ADMIN — AMIODARONE HYDROCHLORIDE 400 MG: 200 TABLET ORAL at 11:30

## 2018-01-01 RX ADMIN — PROPOFOL 25 MCG/KG/MIN: 10 INJECTION, EMULSION INTRAVENOUS at 22:47

## 2018-01-01 RX ADMIN — AMIODARONE HYDROCHLORIDE 400 MG: 200 TABLET ORAL at 05:55

## 2018-01-01 RX ADMIN — AMIODARONE HYDROCHLORIDE 1 MG/MIN: 50 INJECTION, SOLUTION INTRAVENOUS at 09:00

## 2018-01-01 RX ADMIN — PROPOFOL 25 MCG/KG/MIN: 10 INJECTION, EMULSION INTRAVENOUS at 16:44

## 2018-01-01 RX ADMIN — PROPOFOL 17 MCG/KG/MIN: 10 INJECTION, EMULSION INTRAVENOUS at 05:23

## 2018-01-01 RX ADMIN — STANDARDIZED SENNA CONCENTRATE AND DOCUSATE SODIUM 2 TABLET: 8.6; 5 TABLET ORAL at 05:43

## 2018-01-01 RX ADMIN — AZITHROMYCIN MONOHYDRATE 250 MG: 250 TABLET ORAL at 06:03

## 2018-01-01 RX ADMIN — FAMOTIDINE 20 MG: 20 TABLET ORAL at 17:54

## 2018-01-01 RX ADMIN — FAMOTIDINE 20 MG: 20 TABLET ORAL at 05:26

## 2018-01-01 RX ADMIN — FENTANYL CITRATE 25 MCG: 50 INJECTION, SOLUTION INTRAMUSCULAR; INTRAVENOUS at 20:59

## 2018-01-01 RX ADMIN — AMIODARONE HYDROCHLORIDE 400 MG: 200 TABLET ORAL at 05:10

## 2018-01-01 RX ADMIN — ASPIRIN 81 MG: 81 TABLET, CHEWABLE ORAL at 05:31

## 2018-01-01 RX ADMIN — ENOXAPARIN SODIUM 60 MG: 100 INJECTION SUBCUTANEOUS at 09:22

## 2018-01-01 RX ADMIN — POTASSIUM BICARBONATE 25 MEQ: 25 TABLET, EFFERVESCENT ORAL at 05:06

## 2018-01-01 RX ADMIN — ALBUMIN (HUMAN) 25 G: 0.25 INJECTION, SOLUTION INTRAVENOUS at 10:34

## 2018-01-01 RX ADMIN — ASPIRIN 81 MG: 81 TABLET, CHEWABLE ORAL at 05:11

## 2018-01-01 RX ADMIN — FAMOTIDINE 20 MG: 20 TABLET ORAL at 05:31

## 2018-01-01 RX ADMIN — PROPOFOL 20 MCG/KG/MIN: 10 INJECTION, EMULSION INTRAVENOUS at 17:22

## 2018-01-01 RX ADMIN — PROPOFOL 40 MCG/KG/MIN: 10 INJECTION, EMULSION INTRAVENOUS at 06:15

## 2018-01-01 RX ADMIN — ENOXAPARIN SODIUM 100 MG: 100 INJECTION SUBCUTANEOUS at 17:37

## 2018-01-01 RX ADMIN — PHENYLEPHRINE HYDROCHLORIDE 50 MCG/MIN: 10 INJECTION INTRAVENOUS at 20:12

## 2018-01-01 RX ADMIN — FUROSEMIDE 40 MG: 10 INJECTION, SOLUTION INTRAMUSCULAR; INTRAVENOUS at 17:34

## 2018-01-01 RX ADMIN — EPOPROSTENOL SODIUM 0.05 MCG/KG/MIN: 1.5 INJECTION, POWDER, LYOPHILIZED, FOR SOLUTION INTRAVENOUS at 09:03

## 2018-01-01 RX ADMIN — POLYETHYLENE GLYCOL 3350 1 PACKET: 17 POWDER, FOR SOLUTION ORAL at 17:38

## 2018-01-01 RX ADMIN — FUROSEMIDE 40 MG: 10 INJECTION, SOLUTION INTRAMUSCULAR; INTRAVENOUS at 05:27

## 2018-01-01 RX ADMIN — PROPOFOL 20 MCG/KG/MIN: 10 INJECTION, EMULSION INTRAVENOUS at 06:12

## 2018-01-01 RX ADMIN — PROPOFOL 30 MCG/KG/MIN: 10 INJECTION, EMULSION INTRAVENOUS at 17:30

## 2018-01-01 RX ADMIN — PROPOFOL 30 MCG/KG/MIN: 10 INJECTION, EMULSION INTRAVENOUS at 19:19

## 2018-01-01 RX ADMIN — AMIODARONE HYDROCHLORIDE 400 MG: 200 TABLET ORAL at 18:02

## 2018-01-01 RX ADMIN — METOPROLOL TARTRATE 12.5 MG: 25 TABLET, FILM COATED ORAL at 22:20

## 2018-01-01 RX ADMIN — AMIODARONE HYDROCHLORIDE 400 MG: 200 TABLET ORAL at 05:26

## 2018-01-01 RX ADMIN — AMIODARONE HYDROCHLORIDE 0.5 MG/MIN: 50 INJECTION, SOLUTION INTRAVENOUS at 18:26

## 2018-01-01 RX ADMIN — SODIUM CHLORIDE 1.5 UNITS/HR: 9 INJECTION, SOLUTION INTRAVENOUS at 16:50

## 2018-01-01 RX ADMIN — ASPIRIN 81 MG: 81 TABLET, CHEWABLE ORAL at 05:10

## 2018-01-01 RX ADMIN — ACETAMINOPHEN 650 MG: 325 TABLET, FILM COATED ORAL at 20:44

## 2018-01-01 RX ADMIN — ROCURONIUM BROMIDE 50 MG: 10 INJECTION, SOLUTION INTRAVENOUS at 16:12

## 2018-01-01 RX ADMIN — AMPICILLIN SODIUM AND SULBACTAM SODIUM 3 G: 2; 1 INJECTION, POWDER, FOR SOLUTION INTRAMUSCULAR; INTRAVENOUS at 23:42

## 2018-01-01 RX ADMIN — ACETAMINOPHEN 650 MG: 325 TABLET, FILM COATED ORAL at 21:49

## 2018-01-01 RX ADMIN — IPRATROPIUM BROMIDE AND ALBUTEROL SULFATE 3 ML: 2.5; .5 SOLUTION RESPIRATORY (INHALATION) at 14:27

## 2018-01-01 RX ADMIN — ASPIRIN 81 MG: 81 TABLET, CHEWABLE ORAL at 06:14

## 2018-01-01 RX ADMIN — METOCLOPRAMIDE 10 MG: 5 INJECTION, SOLUTION INTRAMUSCULAR; INTRAVENOUS at 00:35

## 2018-01-01 RX ADMIN — STANDARDIZED SENNA CONCENTRATE AND DOCUSATE SODIUM 2 TABLET: 8.6; 5 TABLET ORAL at 17:53

## 2018-01-01 RX ADMIN — FUROSEMIDE 40 MG: 10 INJECTION, SOLUTION INTRAMUSCULAR; INTRAVENOUS at 05:55

## 2018-01-01 RX ADMIN — FAMOTIDINE 20 MG: 20 TABLET ORAL at 05:56

## 2018-01-01 RX ADMIN — FAMOTIDINE 20 MG: 20 TABLET ORAL at 06:14

## 2018-01-01 RX ADMIN — ENOXAPARIN SODIUM 100 MG: 100 INJECTION SUBCUTANEOUS at 05:09

## 2018-01-01 RX ADMIN — METOCLOPRAMIDE 10 MG: 5 INJECTION, SOLUTION INTRAMUSCULAR; INTRAVENOUS at 01:28

## 2018-01-01 RX ADMIN — ATORVASTATIN CALCIUM 40 MG: 40 TABLET, FILM COATED ORAL at 18:01

## 2018-01-01 RX ADMIN — AMIODARONE HYDROCHLORIDE 400 MG: 200 TABLET ORAL at 17:59

## 2018-01-01 RX ADMIN — ASPIRIN 81 MG: 81 TABLET, CHEWABLE ORAL at 05:19

## 2018-01-01 RX ADMIN — ACETAMINOPHEN 650 MG: 325 TABLET, FILM COATED ORAL at 05:10

## 2018-01-01 RX ADMIN — SODIUM CHLORIDE 1.5 UNITS/HR: 9 INJECTION, SOLUTION INTRAVENOUS at 04:19

## 2018-01-01 RX ADMIN — DEXMEDETOMIDINE HYDROCHLORIDE 1.5 MCG/KG/HR: 100 INJECTION, SOLUTION INTRAVENOUS at 22:24

## 2018-01-01 RX ADMIN — STANDARDIZED SENNA CONCENTRATE AND DOCUSATE SODIUM 2 TABLET: 8.6; 5 TABLET ORAL at 05:31

## 2018-01-01 RX ADMIN — METOPROLOL TARTRATE 5 MG: 5 INJECTION INTRAVENOUS at 17:26

## 2018-01-01 RX ADMIN — AMIODARONE HYDROCHLORIDE 400 MG: 200 TABLET ORAL at 05:25

## 2018-01-01 RX ADMIN — AMIODARONE HYDROCHLORIDE 400 MG: 200 TABLET ORAL at 17:30

## 2018-01-01 RX ADMIN — AMPICILLIN SODIUM AND SULBACTAM SODIUM 3 G: 2; 1 INJECTION, POWDER, FOR SOLUTION INTRAMUSCULAR; INTRAVENOUS at 00:00

## 2018-01-01 RX ADMIN — FENTANYL CITRATE 100 MCG: 50 INJECTION, SOLUTION INTRAMUSCULAR; INTRAVENOUS at 01:08

## 2018-01-01 RX ADMIN — SODIUM CHLORIDE 2.5 UNITS/HR: 9 INJECTION, SOLUTION INTRAVENOUS at 15:53

## 2018-01-01 RX ADMIN — ASPIRIN 81 MG: 81 TABLET, CHEWABLE ORAL at 05:35

## 2018-01-01 RX ADMIN — STANDARDIZED SENNA CONCENTRATE AND DOCUSATE SODIUM 2 TABLET: 8.6; 5 TABLET ORAL at 17:01

## 2018-01-01 RX ADMIN — METOPROLOL TARTRATE 12.5 MG: 25 TABLET, FILM COATED ORAL at 17:40

## 2018-01-01 RX ADMIN — AMIODARONE HYDROCHLORIDE 400 MG: 200 TABLET ORAL at 17:25

## 2018-01-01 RX ADMIN — LORAZEPAM 2 MG: 2 INJECTION INTRAMUSCULAR; INTRAVENOUS at 10:53

## 2018-01-01 RX ADMIN — AMPICILLIN SODIUM AND SULBACTAM SODIUM 3 G: 2; 1 INJECTION, POWDER, FOR SOLUTION INTRAMUSCULAR; INTRAVENOUS at 05:07

## 2018-01-01 RX ADMIN — AMIODARONE HYDROCHLORIDE 150 MG: 1.5 INJECTION, SOLUTION INTRAVENOUS at 12:06

## 2018-01-01 RX ADMIN — DEXMEDETOMIDINE HYDROCHLORIDE 1.5 MCG/KG/HR: 100 INJECTION, SOLUTION INTRAVENOUS at 04:13

## 2018-01-01 RX ADMIN — PROPOFOL 30 MCG/KG/MIN: 10 INJECTION, EMULSION INTRAVENOUS at 21:49

## 2018-01-01 RX ADMIN — AMIODARONE HYDROCHLORIDE 400 MG: 200 TABLET ORAL at 17:01

## 2018-01-01 RX ADMIN — FENTANYL CITRATE 100 MCG: 50 INJECTION, SOLUTION INTRAMUSCULAR; INTRAVENOUS at 12:07

## 2018-01-01 RX ADMIN — SODIUM PHOSPHATE 133 ML: 7; 19 ENEMA RECTAL at 01:31

## 2018-01-01 RX ADMIN — FENTANYL CITRATE 100 MCG: 50 INJECTION, SOLUTION INTRAMUSCULAR; INTRAVENOUS at 23:53

## 2018-01-01 RX ADMIN — POTASSIUM BICARBONATE 25 MEQ: 25 TABLET, EFFERVESCENT ORAL at 17:39

## 2018-01-01 RX ADMIN — POTASSIUM BICARBONATE 25 MEQ: 25 TABLET, EFFERVESCENT ORAL at 05:09

## 2018-01-01 RX ADMIN — ALTEPLASE 2 MG: 2.2 INJECTION, POWDER, LYOPHILIZED, FOR SOLUTION INTRAVENOUS at 08:31

## 2018-01-01 RX ADMIN — PROPOFOL 15 MCG/KG/MIN: 10 INJECTION, EMULSION INTRAVENOUS at 22:43

## 2018-01-01 RX ADMIN — METOPROLOL TARTRATE 12.5 MG: 25 TABLET, FILM COATED ORAL at 17:57

## 2018-01-01 RX ADMIN — SODIUM CHLORIDE 5.5 UNITS/HR: 9 INJECTION, SOLUTION INTRAVENOUS at 15:44

## 2018-01-01 RX ADMIN — EPOPROSTENOL SODIUM 0.05 MCG/KG/MIN: 1.5 INJECTION, POWDER, LYOPHILIZED, FOR SOLUTION INTRAVENOUS at 22:21

## 2018-01-01 RX ADMIN — DIBASIC SODIUM PHOSPHATE, MONOBASIC POTASSIUM PHOSPHATE AND MONOBASIC SODIUM PHOSPHATE 1 TABLET: 852; 155; 130 TABLET ORAL at 17:24

## 2018-01-01 RX ADMIN — FUROSEMIDE 40 MG: 10 INJECTION, SOLUTION INTRAMUSCULAR; INTRAVENOUS at 05:49

## 2018-01-01 RX ADMIN — ASPIRIN 81 MG: 81 TABLET, CHEWABLE ORAL at 06:16

## 2018-01-01 RX ADMIN — PROPOFOL 20 MCG/KG/MIN: 10 INJECTION, EMULSION INTRAVENOUS at 08:46

## 2018-01-01 RX ADMIN — AMIODARONE HYDROCHLORIDE 400 MG: 200 TABLET ORAL at 05:22

## 2018-01-01 RX ADMIN — METOCLOPRAMIDE 10 MG: 5 INJECTION, SOLUTION INTRAMUSCULAR; INTRAVENOUS at 17:25

## 2018-01-01 RX ADMIN — ATROPINE SULFATE 2 DROP: 10 SOLUTION/ DROPS OPHTHALMIC at 10:53

## 2018-01-01 RX ADMIN — PROPOFOL 5 MCG/KG/MIN: 10 INJECTION, EMULSION INTRAVENOUS at 17:56

## 2018-01-01 RX ADMIN — SODIUM CHLORIDE 2.5 UNITS/HR: 9 INJECTION, SOLUTION INTRAVENOUS at 11:10

## 2018-01-01 RX ADMIN — POTASSIUM BICARBONATE 25 MEQ: 25 TABLET, EFFERVESCENT ORAL at 05:56

## 2018-01-01 RX ADMIN — PROPOFOL 10 MCG/KG/MIN: 10 INJECTION, EMULSION INTRAVENOUS at 10:02

## 2018-01-01 RX ADMIN — FENTANYL CITRATE 50 MCG: 50 INJECTION, SOLUTION INTRAMUSCULAR; INTRAVENOUS at 08:56

## 2018-01-01 RX ADMIN — SODIUM CHLORIDE 1.5 UNITS/HR: 9 INJECTION, SOLUTION INTRAVENOUS at 18:51

## 2018-01-01 RX ADMIN — PHENYLEPHRINE HYDROCHLORIDE 100 MCG/MIN: 10 INJECTION INTRAVENOUS at 21:36

## 2018-01-01 RX ADMIN — ATORVASTATIN CALCIUM 40 MG: 40 TABLET, FILM COATED ORAL at 18:00

## 2018-01-01 RX ADMIN — FAMOTIDINE 20 MG: 20 TABLET ORAL at 18:01

## 2018-01-01 RX ADMIN — FENTANYL CITRATE 50 MCG: 50 INJECTION, SOLUTION INTRAMUSCULAR; INTRAVENOUS at 21:17

## 2018-01-01 RX ADMIN — ENOXAPARIN SODIUM 40 MG: 100 INJECTION SUBCUTANEOUS at 06:16

## 2018-01-01 RX ADMIN — ENOXAPARIN SODIUM 40 MG: 100 INJECTION SUBCUTANEOUS at 05:43

## 2018-01-01 RX ADMIN — ASPIRIN 81 MG: 81 TABLET, CHEWABLE ORAL at 05:25

## 2018-01-01 RX ADMIN — POTASSIUM BICARBONATE 25 MEQ: 25 TABLET, EFFERVESCENT ORAL at 05:23

## 2018-01-01 RX ADMIN — IOHEXOL 100 ML: 350 INJECTION, SOLUTION INTRAVENOUS at 15:44

## 2018-01-01 RX ADMIN — STANDARDIZED SENNA CONCENTRATE AND DOCUSATE SODIUM 2 TABLET: 8.6; 5 TABLET ORAL at 17:29

## 2018-01-01 RX ADMIN — EPOPROSTENOL SODIUM 0.05 MCG/KG/MIN: 1.5 INJECTION, POWDER, LYOPHILIZED, FOR SOLUTION INTRAVENOUS at 18:55

## 2018-01-01 RX ADMIN — AZITHROMYCIN MONOHYDRATE 250 MG: 250 TABLET ORAL at 05:44

## 2018-01-01 RX ADMIN — FAMOTIDINE 20 MG: 20 TABLET ORAL at 05:35

## 2018-01-01 RX ADMIN — FAMOTIDINE 20 MG: 20 TABLET ORAL at 16:30

## 2018-01-01 RX ADMIN — FAMOTIDINE 20 MG: 20 TABLET ORAL at 06:00

## 2018-01-01 RX ADMIN — STANDARDIZED SENNA CONCENTRATE AND DOCUSATE SODIUM 2 TABLET: 8.6; 5 TABLET ORAL at 17:25

## 2018-01-01 RX ADMIN — POTASSIUM BICARBONATE 25 MEQ: 25 TABLET, EFFERVESCENT ORAL at 17:01

## 2018-01-01 RX ADMIN — SODIUM CHLORIDE: 9 INJECTION, SOLUTION INTRAVENOUS at 13:30

## 2018-01-01 RX ADMIN — PROPOFOL 25 MCG/KG/MIN: 10 INJECTION, EMULSION INTRAVENOUS at 22:56

## 2018-01-01 RX ADMIN — METOPROLOL TARTRATE 12.5 MG: 25 TABLET, FILM COATED ORAL at 05:30

## 2018-01-01 RX ADMIN — ASPIRIN 81 MG: 81 TABLET, CHEWABLE ORAL at 05:56

## 2018-01-01 RX ADMIN — NITROGLYCERIN 50 MCG/MIN: 20 INJECTION INTRAVENOUS at 17:30

## 2018-01-01 RX ADMIN — AMPICILLIN SODIUM AND SULBACTAM SODIUM 3 G: 2; 1 INJECTION, POWDER, FOR SOLUTION INTRAMUSCULAR; INTRAVENOUS at 11:41

## 2018-01-01 RX ADMIN — POTASSIUM BICARBONATE 25 MEQ: 25 TABLET, EFFERVESCENT ORAL at 17:29

## 2018-01-01 RX ADMIN — AMPICILLIN SODIUM AND SULBACTAM SODIUM 3 G: 2; 1 INJECTION, POWDER, FOR SOLUTION INTRAMUSCULAR; INTRAVENOUS at 11:36

## 2018-01-01 RX ADMIN — DEXMEDETOMIDINE HYDROCHLORIDE 1.5 MCG/KG/HR: 100 INJECTION, SOLUTION INTRAVENOUS at 19:53

## 2018-01-01 RX ADMIN — DIBASIC SODIUM PHOSPHATE, MONOBASIC POTASSIUM PHOSPHATE AND MONOBASIC SODIUM PHOSPHATE 1 TABLET: 852; 155; 130 TABLET ORAL at 05:06

## 2018-01-01 RX ADMIN — AMIODARONE HYDROCHLORIDE 400 MG: 200 TABLET ORAL at 05:19

## 2018-01-01 RX ADMIN — AMIODARONE HYDROCHLORIDE 400 MG: 200 TABLET ORAL at 17:53

## 2018-01-01 RX ADMIN — ENOXAPARIN SODIUM 40 MG: 100 INJECTION SUBCUTANEOUS at 06:03

## 2018-01-01 RX ADMIN — POTASSIUM BICARBONATE 25 MEQ: 25 TABLET, EFFERVESCENT ORAL at 05:27

## 2018-01-01 RX ADMIN — POLYETHYLENE GLYCOL 3350 1 PACKET: 17 POWDER, FOR SOLUTION ORAL at 12:22

## 2018-01-01 RX ADMIN — IPRATROPIUM BROMIDE AND ALBUTEROL SULFATE 3 ML: .5; 3 SOLUTION RESPIRATORY (INHALATION) at 07:00

## 2018-01-01 RX ADMIN — METOCLOPRAMIDE 10 MG: 5 INJECTION, SOLUTION INTRAMUSCULAR; INTRAVENOUS at 12:06

## 2018-01-01 RX ADMIN — PHENYLEPHRINE HYDROCHLORIDE 100 MCG/MIN: 10 INJECTION INTRAVENOUS at 02:52

## 2018-01-01 RX ADMIN — STANDARDIZED SENNA CONCENTRATE AND DOCUSATE SODIUM 2 TABLET: 8.6; 5 TABLET ORAL at 17:39

## 2018-01-01 RX ADMIN — FAMOTIDINE 20 MG: 10 INJECTION, SOLUTION INTRAVENOUS at 17:53

## 2018-01-01 RX ADMIN — FUROSEMIDE 40 MG: 10 INJECTION, SOLUTION INTRAMUSCULAR; INTRAVENOUS at 11:36

## 2018-01-01 RX ADMIN — PROPOFOL 20 MCG/KG/MIN: 10 INJECTION, EMULSION INTRAVENOUS at 05:03

## 2018-01-01 RX ADMIN — STANDARDIZED SENNA CONCENTRATE AND DOCUSATE SODIUM 2 TABLET: 8.6; 5 TABLET ORAL at 05:27

## 2018-01-01 RX ADMIN — MORPHINE SULFATE 1 MG/HR: 50 INJECTION, SOLUTION, CONCENTRATE INTRAVENOUS at 09:58

## 2018-01-01 RX ADMIN — FAMOTIDINE 20 MG: 20 TABLET ORAL at 17:25

## 2018-01-01 RX ADMIN — POLYETHYLENE GLYCOL 3350 1 PACKET: 17 POWDER, FOR SOLUTION ORAL at 12:28

## 2018-01-01 RX ADMIN — PROPOFOL 25 MCG/KG/MIN: 10 INJECTION, EMULSION INTRAVENOUS at 15:33

## 2018-01-01 RX ADMIN — ACETAMINOPHEN 650 MG: 325 TABLET, FILM COATED ORAL at 15:12

## 2018-01-01 RX ADMIN — AMPICILLIN SODIUM AND SULBACTAM SODIUM 3 G: 2; 1 INJECTION, POWDER, FOR SOLUTION INTRAMUSCULAR; INTRAVENOUS at 00:06

## 2018-01-01 RX ADMIN — AMPICILLIN SODIUM AND SULBACTAM SODIUM 3 G: 2; 1 INJECTION, POWDER, FOR SOLUTION INTRAMUSCULAR; INTRAVENOUS at 17:24

## 2018-01-01 RX ADMIN — AMIODARONE HYDROCHLORIDE 400 MG: 200 TABLET ORAL at 16:56

## 2018-01-01 RX ADMIN — ENOXAPARIN SODIUM 100 MG: 100 INJECTION SUBCUTANEOUS at 05:07

## 2018-01-01 RX ADMIN — ACETAMINOPHEN 650 MG: 325 TABLET, FILM COATED ORAL at 08:39

## 2018-01-01 RX ADMIN — ASPIRIN 81 MG: 81 TABLET, CHEWABLE ORAL at 05:26

## 2018-01-01 RX ADMIN — SODIUM CHLORIDE 2.5 UNITS/HR: 9 INJECTION, SOLUTION INTRAVENOUS at 15:22

## 2018-01-01 RX ADMIN — STANDARDIZED SENNA CONCENTRATE AND DOCUSATE SODIUM 2 TABLET: 8.6; 5 TABLET ORAL at 17:33

## 2018-01-01 RX ADMIN — AMPICILLIN SODIUM AND SULBACTAM SODIUM 3 G: 2; 1 INJECTION, POWDER, FOR SOLUTION INTRAMUSCULAR; INTRAVENOUS at 17:08

## 2018-01-01 RX ADMIN — SODIUM CHLORIDE 2709 ML: 9 INJECTION, SOLUTION INTRAVENOUS at 14:30

## 2018-01-01 RX ADMIN — METOCLOPRAMIDE 10 MG: 5 INJECTION, SOLUTION INTRAMUSCULAR; INTRAVENOUS at 17:01

## 2018-01-01 RX ADMIN — AMPICILLIN SODIUM AND SULBACTAM SODIUM 3 G: 2; 1 INJECTION, POWDER, FOR SOLUTION INTRAMUSCULAR; INTRAVENOUS at 12:00

## 2018-01-01 RX ADMIN — ACETAMINOPHEN 650 MG: 325 TABLET, FILM COATED ORAL at 21:21

## 2018-01-01 RX ADMIN — AMPICILLIN SODIUM AND SULBACTAM SODIUM 3 G: 2; 1 INJECTION, POWDER, FOR SOLUTION INTRAMUSCULAR; INTRAVENOUS at 18:00

## 2018-01-01 RX ADMIN — ATORVASTATIN CALCIUM 40 MG: 40 TABLET, FILM COATED ORAL at 17:54

## 2018-01-01 RX ADMIN — POTASSIUM BICARBONATE 25 MEQ: 25 TABLET, EFFERVESCENT ORAL at 05:29

## 2018-01-01 RX ADMIN — FAMOTIDINE 20 MG: 20 TABLET ORAL at 05:10

## 2018-01-01 RX ADMIN — DEXMEDETOMIDINE HYDROCHLORIDE 1.5 MCG/KG/HR: 100 INJECTION, SOLUTION INTRAVENOUS at 06:40

## 2018-01-01 RX ADMIN — POTASSIUM BICARBONATE 25 MEQ: 25 TABLET, EFFERVESCENT ORAL at 08:00

## 2018-01-01 RX ADMIN — AMIODARONE HYDROCHLORIDE 400 MG: 200 TABLET ORAL at 06:14

## 2018-01-01 RX ADMIN — PROPOFOL 40 MCG/KG/MIN: 10 INJECTION, EMULSION INTRAVENOUS at 21:23

## 2018-01-01 RX ADMIN — STANDARDIZED SENNA CONCENTRATE AND DOCUSATE SODIUM 2 TABLET: 8.6; 5 TABLET ORAL at 06:03

## 2018-01-01 RX ADMIN — ENOXAPARIN SODIUM 100 MG: 100 INJECTION SUBCUTANEOUS at 05:11

## 2018-01-01 RX ADMIN — FENTANYL CITRATE 50 MCG: 50 INJECTION, SOLUTION INTRAMUSCULAR; INTRAVENOUS at 12:26

## 2018-01-01 RX ADMIN — FENTANYL CITRATE 25 MCG: 50 INJECTION, SOLUTION INTRAMUSCULAR; INTRAVENOUS at 16:25

## 2018-01-01 RX ADMIN — AMIODARONE HYDROCHLORIDE 400 MG: 200 TABLET ORAL at 05:35

## 2018-01-01 RX ADMIN — POTASSIUM BICARBONATE 25 MEQ: 25 TABLET, EFFERVESCENT ORAL at 17:59

## 2018-01-01 RX ADMIN — EPOPROSTENOL SODIUM 0.05 MCG/KG/MIN: 1.5 INJECTION, POWDER, LYOPHILIZED, FOR SOLUTION INTRAVENOUS at 12:18

## 2018-01-01 RX ADMIN — POTASSIUM BICARBONATE 25 MEQ: 25 TABLET, EFFERVESCENT ORAL at 17:25

## 2018-01-01 RX ADMIN — PHENYLEPHRINE HYDROCHLORIDE 80 MCG/MIN: 10 INJECTION INTRAVENOUS at 11:48

## 2018-01-01 RX ADMIN — STANDARDIZED SENNA CONCENTRATE AND DOCUSATE SODIUM 2 TABLET: 8.6; 5 TABLET ORAL at 05:22

## 2018-01-01 RX ADMIN — ACETAMINOPHEN 650 MG: 325 TABLET, FILM COATED ORAL at 11:53

## 2018-01-01 RX ADMIN — PROPOFOL 5 MCG/KG/MIN: 10 INJECTION, EMULSION INTRAVENOUS at 07:45

## 2018-01-01 RX ADMIN — PROPOFOL 25 MCG/KG/MIN: 10 INJECTION, EMULSION INTRAVENOUS at 03:55

## 2018-01-01 RX ADMIN — PHENYLEPHRINE HYDROCHLORIDE 90 MCG/MIN: 10 INJECTION INTRAVENOUS at 13:04

## 2018-01-01 RX ADMIN — AMPICILLIN SODIUM AND SULBACTAM SODIUM 3 G: 2; 1 INJECTION, POWDER, FOR SOLUTION INTRAMUSCULAR; INTRAVENOUS at 17:53

## 2018-01-01 RX ADMIN — PHENYLEPHRINE HYDROCHLORIDE 75 MCG/MIN: 10 INJECTION INTRAVENOUS at 21:40

## 2018-01-01 RX ADMIN — PROPOFOL 15 MCG/KG/MIN: 10 INJECTION, EMULSION INTRAVENOUS at 20:01

## 2018-01-01 RX ADMIN — ACETAMINOPHEN 650 MG: 325 TABLET, FILM COATED ORAL at 00:33

## 2018-01-01 RX ADMIN — SODIUM CHLORIDE 250 ML: 9 INJECTION, SOLUTION INTRAVENOUS at 10:09

## 2018-01-01 RX ADMIN — ATORVASTATIN CALCIUM 40 MG: 40 TABLET, FILM COATED ORAL at 16:56

## 2018-01-01 RX ADMIN — FUROSEMIDE 40 MG: 10 INJECTION, SOLUTION INTRAMUSCULAR; INTRAVENOUS at 05:26

## 2018-01-01 RX ADMIN — METOPROLOL TARTRATE 12.5 MG: 25 TABLET, FILM COATED ORAL at 17:30

## 2018-01-01 RX ADMIN — AZITHROMYCIN MONOHYDRATE 250 MG: 250 TABLET ORAL at 06:16

## 2018-01-01 RX ADMIN — PROPOFOL 25 MCG/KG/MIN: 10 INJECTION, EMULSION INTRAVENOUS at 00:50

## 2018-01-01 RX ADMIN — ATORVASTATIN CALCIUM 40 MG: 40 TABLET, FILM COATED ORAL at 17:00

## 2018-01-01 RX ADMIN — AMPICILLIN SODIUM AND SULBACTAM SODIUM 3 G: 2; 1 INJECTION, POWDER, FOR SOLUTION INTRAMUSCULAR; INTRAVENOUS at 23:40

## 2018-01-01 RX ADMIN — STANDARDIZED SENNA CONCENTRATE AND DOCUSATE SODIUM 2 TABLET: 8.6; 5 TABLET ORAL at 05:09

## 2018-01-01 RX ADMIN — PHENYLEPHRINE HYDROCHLORIDE 50 MCG/MIN: 10 INJECTION INTRAVENOUS at 19:20

## 2018-01-01 RX ADMIN — METOPROLOL TARTRATE 12.5 MG: 25 TABLET, FILM COATED ORAL at 17:00

## 2018-01-01 RX ADMIN — PROPOFOL 10 MCG/KG/MIN: 10 INJECTION, EMULSION INTRAVENOUS at 18:21

## 2018-01-01 RX ADMIN — PHENYLEPHRINE HYDROCHLORIDE 30 MCG/MIN: 10 INJECTION INTRAVENOUS at 02:02

## 2018-01-01 RX ADMIN — AZITHROMYCIN MONOHYDRATE 250 MG: 250 TABLET ORAL at 05:06

## 2018-01-01 RX ADMIN — AMPICILLIN SODIUM AND SULBACTAM SODIUM 3 G: 2; 1 INJECTION, POWDER, FOR SOLUTION INTRAMUSCULAR; INTRAVENOUS at 05:43

## 2018-01-01 RX ADMIN — EPOPROSTENOL SODIUM 0.05 MCG/KG/MIN: 1.5 INJECTION, POWDER, LYOPHILIZED, FOR SOLUTION INTRAVENOUS at 03:21

## 2018-01-01 RX ADMIN — FUROSEMIDE 40 MG: 10 INJECTION, SOLUTION INTRAMUSCULAR; INTRAVENOUS at 05:07

## 2018-01-01 RX ADMIN — PROPOFOL 5 MCG/KG/MIN: 10 INJECTION, EMULSION INTRAVENOUS at 18:14

## 2018-01-01 RX ADMIN — ASPIRIN 81 MG: 81 TABLET, CHEWABLE ORAL at 05:22

## 2018-01-01 RX ADMIN — ROCURONIUM BROMIDE 50 MG: 10 INJECTION, SOLUTION INTRAVENOUS at 13:46

## 2018-01-01 RX ADMIN — STANDARDIZED SENNA CONCENTRATE AND DOCUSATE SODIUM 2 TABLET: 8.6; 5 TABLET ORAL at 17:08

## 2018-01-01 RX ADMIN — METOPROLOL TARTRATE 5 MG: 5 INJECTION INTRAVENOUS at 03:49

## 2018-01-01 RX ADMIN — FAMOTIDINE 20 MG: 20 TABLET ORAL at 17:28

## 2018-01-01 RX ADMIN — AMPICILLIN SODIUM AND SULBACTAM SODIUM 3 G: 2; 1 INJECTION, POWDER, FOR SOLUTION INTRAMUSCULAR; INTRAVENOUS at 11:30

## 2018-01-01 RX ADMIN — BISACODYL 10 MG: 10 SUPPOSITORY RECTAL at 16:51

## 2018-01-01 RX ADMIN — PROPOFOL 10 MCG/KG/MIN: 10 INJECTION, EMULSION INTRAVENOUS at 07:45

## 2018-01-01 RX ADMIN — DEXMEDETOMIDINE HYDROCHLORIDE 0.8 MCG/KG/HR: 100 INJECTION, SOLUTION INTRAVENOUS at 10:39

## 2018-01-01 RX ADMIN — FAMOTIDINE 20 MG: 20 TABLET ORAL at 05:22

## 2018-01-01 RX ADMIN — ACETAMINOPHEN 650 MG: 325 TABLET, FILM COATED ORAL at 16:30

## 2018-01-01 RX ADMIN — METOCLOPRAMIDE 10 MG: 5 INJECTION, SOLUTION INTRAMUSCULAR; INTRAVENOUS at 12:16

## 2018-01-01 RX ADMIN — STANDARDIZED SENNA CONCENTRATE AND DOCUSATE SODIUM 2 TABLET: 8.6; 5 TABLET ORAL at 16:30

## 2018-01-01 RX ADMIN — POTASSIUM BICARBONATE 25 MEQ: 25 TABLET, EFFERVESCENT ORAL at 17:53

## 2018-01-01 RX ADMIN — FUROSEMIDE 40 MG: 10 INJECTION, SOLUTION INTRAMUSCULAR; INTRAVENOUS at 06:14

## 2018-01-01 RX ADMIN — FUROSEMIDE 40 MG: 10 INJECTION, SOLUTION INTRAMUSCULAR; INTRAVENOUS at 05:10

## 2018-01-01 RX ADMIN — PROPOFOL 35 MCG/KG/MIN: 10 INJECTION, EMULSION INTRAVENOUS at 02:02

## 2018-01-01 RX ADMIN — SODIUM CHLORIDE 3.5 UNITS/HR: 9 INJECTION, SOLUTION INTRAVENOUS at 09:28

## 2018-01-01 RX ADMIN — FAMOTIDINE 20 MG: 20 TABLET ORAL at 05:11

## 2018-01-01 RX ADMIN — ENOXAPARIN SODIUM 100 MG: 100 INJECTION SUBCUTANEOUS at 17:08

## 2018-01-01 RX ADMIN — ACETAMINOPHEN 650 MG: 325 TABLET, FILM COATED ORAL at 03:50

## 2018-01-01 RX ADMIN — ATORVASTATIN CALCIUM 40 MG: 40 TABLET, FILM COATED ORAL at 17:37

## 2018-01-01 RX ADMIN — POTASSIUM BICARBONATE 25 MEQ: 25 TABLET, EFFERVESCENT ORAL at 13:22

## 2018-01-01 RX ADMIN — ATORVASTATIN CALCIUM 40 MG: 40 TABLET, FILM COATED ORAL at 17:39

## 2018-01-01 RX ADMIN — STANDARDIZED SENNA CONCENTRATE AND DOCUSATE SODIUM 2 TABLET: 8.6; 5 TABLET ORAL at 05:56

## 2018-01-01 RX ADMIN — POTASSIUM BICARBONATE 25 MEQ: 25 TABLET, EFFERVESCENT ORAL at 16:56

## 2018-01-01 RX ADMIN — IPRATROPIUM BROMIDE AND ALBUTEROL SULFATE 3 ML: .5; 3 SOLUTION RESPIRATORY (INHALATION) at 19:56

## 2018-01-01 RX ADMIN — POTASSIUM BICARBONATE 25 MEQ: 25 TABLET, EFFERVESCENT ORAL at 05:22

## 2018-01-01 RX ADMIN — ATORVASTATIN CALCIUM 40 MG: 40 TABLET, FILM COATED ORAL at 17:59

## 2018-01-01 RX ADMIN — SODIUM CHLORIDE 1000 ML: 9 INJECTION, SOLUTION INTRAVENOUS at 17:54

## 2018-01-01 RX ADMIN — DIBASIC SODIUM PHOSPHATE, MONOBASIC POTASSIUM PHOSPHATE AND MONOBASIC SODIUM PHOSPHATE 1 TABLET: 852; 155; 130 TABLET ORAL at 13:22

## 2018-01-01 RX ADMIN — ACETAMINOPHEN 650 MG: 325 TABLET, FILM COATED ORAL at 14:34

## 2018-01-01 RX ADMIN — AMPICILLIN SODIUM AND SULBACTAM SODIUM 3 G: 2; 1 INJECTION, POWDER, FOR SOLUTION INTRAMUSCULAR; INTRAVENOUS at 06:02

## 2018-01-01 RX ADMIN — SODIUM CHLORIDE: 9 INJECTION, SOLUTION INTRAVENOUS at 13:45

## 2018-01-01 RX ADMIN — FUROSEMIDE 40 MG: 10 INJECTION, SOLUTION INTRAMUSCULAR; INTRAVENOUS at 05:23

## 2018-01-01 RX ADMIN — DEXMEDETOMIDINE HYDROCHLORIDE 1.5 MCG/KG/HR: 100 INJECTION, SOLUTION INTRAVENOUS at 01:03

## 2018-01-01 RX ADMIN — POTASSIUM PHOSPHATE, MONOBASIC AND POTASSIUM PHOSPHATE, DIBASIC 15 MMOL: 224; 236 INJECTION, SOLUTION INTRAVENOUS at 08:05

## 2018-01-01 RX ADMIN — METOPROLOL TARTRATE 5 MG: 1 INJECTION, SOLUTION INTRAVENOUS at 05:49

## 2018-01-01 RX ADMIN — FUROSEMIDE 40 MG: 10 INJECTION, SOLUTION INTRAMUSCULAR; INTRAVENOUS at 10:39

## 2018-01-01 RX ADMIN — SODIUM CHLORIDE 1 UNITS/HR: 9 INJECTION, SOLUTION INTRAVENOUS at 18:31

## 2018-01-01 RX ADMIN — FAMOTIDINE 20 MG: 20 TABLET ORAL at 06:03

## 2018-01-01 RX ADMIN — PROPOFOL 20 MCG/KG/MIN: 10 INJECTION, EMULSION INTRAVENOUS at 06:31

## 2018-01-01 RX ADMIN — METOCLOPRAMIDE 10 MG: 5 INJECTION, SOLUTION INTRAMUSCULAR; INTRAVENOUS at 17:30

## 2018-01-01 RX ADMIN — ALBUMIN (HUMAN) 25 G: 0.25 INJECTION, SOLUTION INTRAVENOUS at 11:53

## 2018-01-01 RX ADMIN — ALBUMIN (HUMAN) 25 G: 0.25 INJECTION, SOLUTION INTRAVENOUS at 17:38

## 2018-01-01 RX ADMIN — METOCLOPRAMIDE 10 MG: 5 INJECTION, SOLUTION INTRAMUSCULAR; INTRAVENOUS at 12:04

## 2018-01-01 RX ADMIN — SODIUM CHLORIDE 4 UNITS/HR: 9 INJECTION, SOLUTION INTRAVENOUS at 04:11

## 2018-01-01 RX ADMIN — AMPICILLIN SODIUM AND SULBACTAM SODIUM 3 G: 2; 1 INJECTION, POWDER, FOR SOLUTION INTRAMUSCULAR; INTRAVENOUS at 11:15

## 2018-01-01 RX ADMIN — FAMOTIDINE 20 MG: 20 TABLET ORAL at 17:00

## 2018-01-01 RX ADMIN — METOPROLOL TARTRATE 12.5 MG: 25 TABLET, FILM COATED ORAL at 17:08

## 2018-01-01 RX ADMIN — ENOXAPARIN SODIUM 100 MG: 100 INJECTION SUBCUTANEOUS at 17:29

## 2018-01-01 RX ADMIN — PROPOFOL 25 MCG/KG/MIN: 10 INJECTION, EMULSION INTRAVENOUS at 12:23

## 2018-01-01 RX ADMIN — SODIUM CHLORIDE 2 UNITS/HR: 9 INJECTION, SOLUTION INTRAVENOUS at 05:29

## 2018-01-01 RX ADMIN — FENTANYL CITRATE 50 MCG: 50 INJECTION, SOLUTION INTRAMUSCULAR; INTRAVENOUS at 06:04

## 2018-01-01 RX ADMIN — POTASSIUM BICARBONATE 25 MEQ: 25 TABLET, EFFERVESCENT ORAL at 05:35

## 2018-01-01 RX ADMIN — ALBUMIN (HUMAN) 25 G: 0.25 INJECTION, SOLUTION INTRAVENOUS at 05:09

## 2018-01-01 RX ADMIN — FAMOTIDINE 20 MG: 20 TABLET ORAL at 05:07

## 2018-01-01 RX ADMIN — INSULIN HUMAN 3 UNITS: 100 INJECTION, SOLUTION PARENTERAL at 21:46

## 2018-01-01 RX ADMIN — FAMOTIDINE 20 MG: 20 TABLET ORAL at 17:40

## 2018-01-01 RX ADMIN — FUROSEMIDE 40 MG: 10 INJECTION, SOLUTION INTRAMUSCULAR; INTRAVENOUS at 16:45

## 2018-01-01 RX ADMIN — AMIODARONE HYDROCHLORIDE 400 MG: 200 TABLET ORAL at 05:11

## 2018-01-01 RX ADMIN — METOCLOPRAMIDE 10 MG: 5 INJECTION, SOLUTION INTRAMUSCULAR; INTRAVENOUS at 05:55

## 2018-01-01 RX ADMIN — PHENYLEPHRINE HYDROCHLORIDE 50 MCG/MIN: 10 INJECTION INTRAVENOUS at 09:58

## 2018-01-01 RX ADMIN — SODIUM CHLORIDE 2.5 UNITS/HR: 9 INJECTION, SOLUTION INTRAVENOUS at 23:07

## 2018-01-01 RX ADMIN — STANDARDIZED SENNA CONCENTRATE AND DOCUSATE SODIUM 2 TABLET: 8.6; 5 TABLET ORAL at 05:19

## 2018-01-01 RX ADMIN — METOPROLOL TARTRATE 5 MG: 5 INJECTION INTRAVENOUS at 14:03

## 2018-01-01 RX ADMIN — MAGNESIUM HYDROXIDE 30 ML: 400 SUSPENSION ORAL at 13:22

## 2018-01-01 RX ADMIN — PROPOFOL 15 MCG/KG/MIN: 10 INJECTION, EMULSION INTRAVENOUS at 05:29

## 2018-01-01 RX ADMIN — STANDARDIZED SENNA CONCENTRATE AND DOCUSATE SODIUM 2 TABLET: 8.6; 5 TABLET ORAL at 18:00

## 2018-01-01 RX ADMIN — ASPIRIN 81 MG: 81 TABLET, CHEWABLE ORAL at 05:06

## 2018-01-01 RX ADMIN — SODIUM CHLORIDE 2 UNITS/HR: 9 INJECTION, SOLUTION INTRAVENOUS at 11:49

## 2018-01-01 RX ADMIN — FAMOTIDINE 20 MG: 20 TABLET ORAL at 17:38

## 2018-01-01 RX ADMIN — METOCLOPRAMIDE 10 MG: 5 INJECTION, SOLUTION INTRAMUSCULAR; INTRAVENOUS at 23:46

## 2018-01-01 RX ADMIN — Medication 25 MCG/HR: at 10:17

## 2018-01-01 RX ADMIN — METOCLOPRAMIDE 10 MG: 5 INJECTION, SOLUTION INTRAMUSCULAR; INTRAVENOUS at 12:15

## 2018-01-01 RX ADMIN — PROPOFOL 10 MCG/KG/MIN: 10 INJECTION, EMULSION INTRAVENOUS at 23:13

## 2018-01-01 RX ADMIN — FAMOTIDINE 20 MG: 20 TABLET ORAL at 17:23

## 2018-01-01 RX ADMIN — FENTANYL CITRATE 50 MCG: 50 INJECTION, SOLUTION INTRAMUSCULAR; INTRAVENOUS at 02:36

## 2018-01-01 RX ADMIN — FAMOTIDINE 20 MG: 20 TABLET ORAL at 17:09

## 2018-01-01 RX ADMIN — PHENYLEPHRINE HYDROCHLORIDE 40000 MCG: 10 INJECTION INTRAVENOUS at 13:46

## 2018-01-01 RX ADMIN — POTASSIUM BICARBONATE 25 MEQ: 25 TABLET, EFFERVESCENT ORAL at 17:28

## 2018-01-01 RX ADMIN — ATORVASTATIN CALCIUM 40 MG: 40 TABLET, FILM COATED ORAL at 17:08

## 2018-01-01 RX ADMIN — ASPIRIN 81 MG: 81 TABLET, CHEWABLE ORAL at 06:03

## 2018-01-01 RX ADMIN — PROPOFOL 30 MCG/KG/MIN: 10 INJECTION, EMULSION INTRAVENOUS at 13:14

## 2018-01-01 RX ADMIN — ASPIRIN 81 MG: 81 TABLET, CHEWABLE ORAL at 05:07

## 2018-01-01 RX ADMIN — ATORVASTATIN CALCIUM 40 MG: 40 TABLET, FILM COATED ORAL at 17:25

## 2018-01-01 RX ADMIN — PHENYLEPHRINE HYDROCHLORIDE 50 MCG/MIN: 10 INJECTION INTRAVENOUS at 16:15

## 2018-01-01 RX ADMIN — AMIODARONE HYDROCHLORIDE 400 MG: 200 TABLET ORAL at 17:37

## 2018-01-01 RX ADMIN — STANDARDIZED SENNA CONCENTRATE AND DOCUSATE SODIUM 2 TABLET: 8.6; 5 TABLET ORAL at 17:37

## 2018-01-01 RX ADMIN — ACETAMINOPHEN 650 MG: 325 TABLET, FILM COATED ORAL at 08:05

## 2018-01-01 RX ADMIN — Medication 50 MCG/HR: at 17:14

## 2018-01-01 RX ADMIN — POTASSIUM BICARBONATE 25 MEQ: 25 TABLET, EFFERVESCENT ORAL at 10:39

## 2018-01-01 RX ADMIN — EPOPROSTENOL SODIUM 0.05 MCG/KG/MIN: 1.5 INJECTION, POWDER, LYOPHILIZED, FOR SOLUTION INTRAVENOUS at 06:27

## 2018-01-01 ASSESSMENT — ENCOUNTER SYMPTOMS
COUGH: 0
PALPITATIONS: 0
FEVER: 0
CHILLS: 0
WHEEZING: 0
COUGH: 1
DEPRESSION: 0
SPUTUM PRODUCTION: 1
HEARTBURN: 0
INSOMNIA: 0
SORE THROAT: 0
NAUSEA: 0
SHORTNESS OF BREATH: 1
HEMOPTYSIS: 0
WHEEZING: 0
MUSCULOSKELETAL NEGATIVE: 1
SHORTNESS OF BREATH: 1
BRUISES/BLEEDS EASILY: 0
PALPITATIONS: 0
DIARRHEA: 0
NERVOUS/ANXIOUS: 0
WEIGHT LOSS: 0
DIZZINESS: 0
EYES NEGATIVE: 1
SHORTNESS OF BREATH: 0
SPUTUM PRODUCTION: 1
COUGH: 1
HEMOPTYSIS: 0
FEVER: 0
CHILLS: 0

## 2018-01-01 ASSESSMENT — COGNITIVE AND FUNCTIONAL STATUS - GENERAL
DAILY ACTIVITIY SCORE: 6
TURNING FROM BACK TO SIDE WHILE IN FLAT BAD: UNABLE
WALKING IN HOSPITAL ROOM: TOTAL
DRESSING REGULAR UPPER BODY CLOTHING: TOTAL
STANDING UP FROM CHAIR USING ARMS: TOTAL
PERSONAL GROOMING: TOTAL
TOILETING: TOTAL
DRESSING REGULAR LOWER BODY CLOTHING: TOTAL
SUGGESTED CMS G CODE MODIFIER MOBILITY: CN
MOBILITY SCORE: 6
WALKING IN HOSPITAL ROOM: TOTAL
TURNING FROM BACK TO SIDE WHILE IN FLAT BAD: UNABLE
SUGGESTED CMS G CODE MODIFIER DAILY ACTIVITY: CN
MOBILITY SCORE: 6
STANDING UP FROM CHAIR USING ARMS: TOTAL
MOVING TO AND FROM BED TO CHAIR: UNABLE
CLIMB 3 TO 5 STEPS WITH RAILING: TOTAL
HELP NEEDED FOR BATHING: TOTAL
SUGGESTED CMS G CODE MODIFIER MOBILITY: CN
EATING MEALS: TOTAL
MOVING FROM LYING ON BACK TO SITTING ON SIDE OF FLAT BED: UNABLE
CLIMB 3 TO 5 STEPS WITH RAILING: TOTAL
MOVING TO AND FROM BED TO CHAIR: UNABLE
MOVING FROM LYING ON BACK TO SITTING ON SIDE OF FLAT BED: UNABLE

## 2018-01-01 ASSESSMENT — PAIN SCALES - GENERAL
PAINLEVEL_OUTOF10: 0
PAINLEVEL_OUTOF10: 6
PAINLEVEL_OUTOF10: 0

## 2018-01-01 ASSESSMENT — PATIENT HEALTH QUESTIONNAIRE - PHQ9
SUM OF ALL RESPONSES TO PHQ9 QUESTIONS 1 AND 2: 0
CLINICAL INTERPRETATION OF PHQ2 SCORE: 0
1. LITTLE INTEREST OR PLEASURE IN DOING THINGS: NOT AT ALL
2. FEELING DOWN, DEPRESSED, IRRITABLE, OR HOPELESS: NOT AT ALL

## 2018-01-01 ASSESSMENT — GAIT ASSESSMENTS: GAIT LEVEL OF ASSIST: UNABLE TO PARTICIPATE

## 2018-01-01 ASSESSMENT — PULMONARY FUNCTION TESTS: FVC: .5

## 2018-01-01 ASSESSMENT — COPD QUESTIONNAIRES: COPD: 0

## 2018-01-01 ASSESSMENT — LIFESTYLE VARIABLES: ALCOHOL_USE: NO

## 2018-04-30 ENCOUNTER — APPOINTMENT (RX ONLY)
Dept: URBAN - METROPOLITAN AREA CLINIC 4 | Facility: CLINIC | Age: 78
Setting detail: DERMATOLOGY
End: 2018-04-30

## 2018-04-30 DIAGNOSIS — Z85.820 PERSONAL HISTORY OF MALIGNANT MELANOMA OF SKIN: ICD-10-CM

## 2018-04-30 DIAGNOSIS — D22 MELANOCYTIC NEVI: ICD-10-CM

## 2018-04-30 DIAGNOSIS — L82.1 OTHER SEBORRHEIC KERATOSIS: ICD-10-CM

## 2018-04-30 DIAGNOSIS — L81.4 OTHER MELANIN HYPERPIGMENTATION: ICD-10-CM

## 2018-04-30 PROBLEM — D22.9 MELANOCYTIC NEVI, UNSPECIFIED: Status: ACTIVE | Noted: 2018-04-30

## 2018-04-30 PROBLEM — D48.5 NEOPLASM OF UNCERTAIN BEHAVIOR OF SKIN: Status: ACTIVE | Noted: 2018-04-30

## 2018-04-30 PROCEDURE — 99213 OFFICE O/P EST LOW 20 MIN: CPT | Mod: 25

## 2018-04-30 PROCEDURE — ? COUNSELING

## 2018-04-30 PROCEDURE — ? BIOPSY BY SHAVE METHOD

## 2018-04-30 PROCEDURE — 11100: CPT

## 2018-04-30 ASSESSMENT — LOCATION DETAILED DESCRIPTION DERM
LOCATION DETAILED: LEFT DISTAL POSTERIOR UPPER ARM
LOCATION DETAILED: RIGHT DISTAL POSTERIOR UPPER ARM
LOCATION DETAILED: LEFT CENTRAL MALAR CHEEK
LOCATION DETAILED: INFERIOR THORACIC SPINE
LOCATION DETAILED: RIGHT PROXIMAL POSTERIOR UPPER ARM
LOCATION DETAILED: RIGHT SUPERIOR MEDIAL UPPER BACK
LOCATION DETAILED: LEFT PROXIMAL POSTERIOR UPPER ARM
LOCATION DETAILED: LEFT SUPERIOR MEDIAL BUCCAL CHEEK

## 2018-04-30 ASSESSMENT — LOCATION ZONE DERM
LOCATION ZONE: FACE
LOCATION ZONE: TRUNK
LOCATION ZONE: ARM

## 2018-04-30 ASSESSMENT — LOCATION SIMPLE DESCRIPTION DERM
LOCATION SIMPLE: LEFT UPPER ARM
LOCATION SIMPLE: LEFT CHEEK
LOCATION SIMPLE: RIGHT UPPER BACK
LOCATION SIMPLE: UPPER BACK
LOCATION SIMPLE: RIGHT UPPER ARM

## 2018-04-30 NOTE — PROCEDURE: BIOPSY BY SHAVE METHOD
Render Post-Care Instructions In Note?: yes
Anticipated Plan (Based On Presumed Biopsy Results): c&d if +
Anesthesia Volume In Cc: 0.5
Anesthesia Type: 1% lidocaine with epinephrine and a 1:10 solution of 8.4% sodium bicarbonate
X Size Of Lesion In Cm: 0
Biopsy Method: Personna blade
Dressing: Band-Aid
Curettage Text: The wound bed was treated with curettage after the biopsy was performed.
Notification Instructions: Patient will be notified of biopsy results. However, patient instructed to call the office if not contacted within 2 weeks.
Biopsy Type: H and E
Consent: Written consent was obtained and risks were reviewed including but not limited to scarring, infection, bleeding, scabbing, incomplete removal, nerve damage and allergy to anesthesia.
Wound Care: Vaseline
Detail Level: Detailed
Destruction After The Procedure: No
Type Of Destruction Used: Curettage
Billing Type: Third-Party Bill
Electrodesiccation And Curettage Text: The wound bed was treated with electrodesiccation and curettage after the biopsy was performed.
Lab Facility: 
Hemostasis: Drysol and Electrocautery
Electrodesiccation Text: The wound bed was treated with electrodesiccation after the biopsy was performed.
Post-Care Instructions: I reviewed with the patient in detail post-care instructions. Patient is to keep the biopsy site dry overnight, and then apply vasaline twice daily until healed.
Lab: [id number]

## 2018-05-10 ENCOUNTER — APPOINTMENT (RX ONLY)
Dept: URBAN - METROPOLITAN AREA CLINIC 4 | Facility: CLINIC | Age: 78
Setting detail: DERMATOLOGY
End: 2018-05-10

## 2018-05-10 PROBLEM — C44.612 BASAL CELL CARCINOMA OF SKIN OF RIGHT UPPER LIMB, INCLUDING SHOULDER: Status: ACTIVE | Noted: 2018-05-10

## 2018-05-10 PROCEDURE — ? CURETTAGE AND DESTRUCTION

## 2018-05-10 PROCEDURE — 17261 DSTRJ MAL LES T/A/L .6-1.0CM: CPT

## 2018-05-10 NOTE — PROCEDURE: CURETTAGE AND DESTRUCTION
Render Post-Care Instructions In Note?: no
Size Of Lesion In Cm: 0.7
Additional Information: (Optional): The wound was cleaned, and a bandage was applied.  The patient received detailed post-op instructions.
Total Volume (Ccs): 1
Post-Care Instructions: I reviewed with the patient in detail post-care instructions. Patient is to keep the area dry for 48 hours, and not to engage in any swimming until the area is healed. Should the patient develop any fevers, chills, bleeding, severe pain patient will contact the office immediately.
Detail Level: Detailed
Cautery Type: electrodesiccation
What Was Performed First?: Curettage
Number Of Curettages: 3
Size Of Lesion After Curettage: 0.9
Concentration (Mg/Ml Or Millions Of Plaque Forming Units/Cc): 0.01
Bill As A Line Item Or As Units: Line Item
Anesthesia Type: 1% lidocaine with epinephrine and a 1:10 solution of 8.4% sodium bicarbonate
Consent was obtained from the patient. The risks, benefits and alternatives to therapy were discussed in detail. Specifically, the risks of infection, scarring, bleeding, prolonged wound healing, nerve injury, incomplete removal, allergy to anesthesia and recurrence were addressed. Alternatives to ED&C, such as: surgical removal and XRT were also discussed.  Prior to the procedure, the treatment site was clearly identified and confirmed by the patient. All components of Universal Protocol/PAUSE Rule completed.

## 2018-05-29 NOTE — PROGRESS NOTES
Outcome: Left Message TO SCHEDULE AWV    Please transfer to Patient Outreach Team at [phone] when patient returns call.    WebIZ Checked & Epic Updated:  yes    [facility] Verified: yes    Attempt # 1  SCP OUTREACH PROJECT

## 2018-06-29 PROBLEM — Z85.828 H/O NONMELANOMA SKIN CANCER: Status: ACTIVE | Noted: 2018-01-01

## 2018-06-29 PROBLEM — E66.9 OBESITY (BMI 30-39.9): Status: ACTIVE | Noted: 2018-01-01

## 2018-06-29 NOTE — PROGRESS NOTES
"Subjective:   Murali Lu is a 78 y.o. male here today for prediabetes    Patient has no complaints but on last blood test 9 months ago he had an A1c that was 6.4.  Patient eats a healthy diet and swims regularly.    Current medicines (including changes today)  No current outpatient prescriptions on file.     No current facility-administered medications for this visit.      He  has a past medical history of Colon polyps and MEDICAL HOME.    ROS   No chest pain, no shortness of breath, no abdominal pain       Objective:     Blood pressure 126/72, pulse 66, temperature 36.5 °C (97.7 °F), height 1.753 m (5' 9\"), weight 92.6 kg (204 lb 3.2 oz), SpO2 98 %. Body mass index is 30.16 kg/m².   Physical Exam:  Constitutional: Alert, no distress.  Skin: Warm, dry, good turgor, no rashes in visible areas.  Eye: Equal, round and reactive, conjunctiva clear, lids normal.  ENMT: Lips without lesions, good dentition, oropharynx clear. TM pearly gray bilaterally.  Neck: Trachea midline, no masses, no thyromegaly. No cervical or supraclavicular lymphadenopathy  Respiratory: Unlabored respiratory effort, lungs clear to auscultation, no wheezes, no ronchi.  Cardiovascular: Normal S1, S2, no murmur, no edema.  Psych: Alert and oriented x3, normal affect and mood.        Assessment and Plan:   The following treatment plan was discussed    1. Prediabetes  Check labs and call with results.  Encouraged patient to continue healthy lifestyle.    2. Elevated fasting blood sugar  Check labs and call with results.  Encouraged patient to continue healthy lifestyle.  - COMP METABOLIC PANEL; Future  - HEMOGLOBIN A1C; Future    3. Thrombocytopenia (HCC)  Check labs and call with results.   - CBC WITH DIFFERENTIAL; Future    4. Vitamin D deficiency disease  Check labs and call with results.  Continue vitamin D supplementation.  - VITAMIN D,25 HYDROXY; Future    5. Mixed hyperlipidemia  Check labs and call with results.   - LIPID PROFILE; Future  - " TSH WITH REFLEX TO FT4; Future    6. H/O nonmelanoma skin cancer  Continue following with dermatology.    7. Obesity (BMI 30-39.9)  - Patient identified as having weight management issue.  Appropriate orders and counseling given.      Followup: Return in about 1 year (around 6/29/2019) for Annual.

## 2018-08-14 NOTE — PROGRESS NOTES
Chief Complaint   Patient presents with   • Hyperlipidemia     follow up/ pp of PARRISH/ henny with Dr Mary Barkley       Subjective:   Murali Lu is a 78 y.o. male who presents today in follow-up in regards to his family history of coronary disease in his mother as well as hyperlipidemia    In with his wife who is my patient, switching care from Dr. Valdovinos who retired    No setbacks, discussed at length the fact that he is from [place], loves living here.  Retired  Still swims every day no setbacks no medications    Past Medical History:   Diagnosis Date   • Colon polyps    • MEDICAL HOME      Past Surgical History:   Procedure Laterality Date   • HYDROCELECTOMY ADULT     • UMBILICAL HERNIA REPAIR       Family History   Problem Relation Age of Onset   • Heart Disease Mother    • Cancer Sister    • Diabetes Brother    • Cancer Sister      Social History     Social History   • Marital status:      Spouse name: N/A   • Number of children: N/A   • Years of education: N/A     Occupational History   • Not on file.     Social History Main Topics   • Smoking status: Never Smoker   • Smokeless tobacco: Never Used   • Alcohol use No   • Drug use: No      Comment: on occasion    • Sexual activity: Not on file     Other Topics Concern   • Not on file     Social History Narrative   • No narrative on file     Allergies   Allergen Reactions   • Nkda [No Known Drug Allergy]      No outpatient encounter prescriptions on file as of 8/14/2018.     No facility-administered encounter medications on file as of 8/14/2018.      Review of Systems   Constitutional: Negative for malaise/fatigue and weight loss.   Eyes: Negative.    Respiratory: Negative for cough, shortness of breath and wheezing.    Cardiovascular: Positive for leg swelling (Chronic and mild). Negative for chest pain and palpitations.   Gastrointestinal: Negative for heartburn and nausea.   Musculoskeletal: Negative.    Neurological: Negative for dizziness.  "  Endo/Heme/Allergies: Does not bruise/bleed easily.   Psychiatric/Behavioral: Negative for depression. The patient is not nervous/anxious and does not have insomnia.    All other systems reviewed and are negative.       Objective:   /71   Pulse 70   Ht 1.753 m (5' 9\")   Wt 90.7 kg (200 lb)   SpO2 96%   BMI 29.53 kg/m²     Physical Exam   Constitutional: He is oriented to person, place, and time. He appears well-developed and well-nourished.   HENT:   Head: Normocephalic and atraumatic.   Eyes: Pupils are equal, round, and reactive to light. EOM are normal. No scleral icterus.   Neck: No JVD present. No thyromegaly present.   Cardiovascular: Normal rate, regular rhythm and intact distal pulses.    Pulmonary/Chest: Breath sounds normal. No respiratory distress. He exhibits no tenderness.   Abdominal: Bowel sounds are normal. He exhibits no distension.   Musculoskeletal: He exhibits edema (Mild nonpitting with varicosities bilaterally, left more than right). He exhibits no tenderness.   Neurological: He is alert and oriented to person, place, and time. He exhibits normal muscle tone.   Skin: Skin is warm and dry. No rash noted.   Psychiatric: He has a normal mood and affect. His behavior is normal.       Assessment:     1. Family history of coronary artery disease     2. Localized edema         Medical Decision Making:  Today's Assessment / Status / Plan:   We spent more than 35 minutes going over his extensive chart  We reviewed his lipids for the last 3 or 4 years    Hyperlipidemia  At goal on no medications  Talked at 10 year risk, much better with weight loss  Discussed diet and exercise at length    Concern for heart disease  Reviewed echo from 2016, normal and reassuring.  Highly functional without limitations  Discussed strategies and exercise and diet again    RTC 1 year or as needed  "

## 2018-09-29 PROBLEM — A41.9 SEPSIS (HCC): Status: ACTIVE | Noted: 2018-01-01

## 2018-09-29 PROBLEM — G93.41 ACUTE METABOLIC ENCEPHALOPATHY: Status: ACTIVE | Noted: 2018-01-01

## 2018-09-29 PROBLEM — J18.9 PNEUMONIA: Status: ACTIVE | Noted: 2018-01-01

## 2018-09-29 PROBLEM — R73.9 HYPERGLYCEMIA: Status: ACTIVE | Noted: 2018-01-01

## 2018-09-29 PROBLEM — J96.01 ACUTE RESPIRATORY FAILURE WITH HYPOXIA (HCC): Status: ACTIVE | Noted: 2018-01-01

## 2018-09-29 PROBLEM — I45.10 RBBB: Status: ACTIVE | Noted: 2018-01-01

## 2018-09-29 PROBLEM — R79.89 ELEVATED TROPONIN: Status: ACTIVE | Noted: 2018-01-01

## 2018-09-29 NOTE — ED NOTES
Rosio from Lab called with critical result of Troponin 0.81 at 1417. Critical lab result read back to Rosio.   Dr. Jean notified of critical lab result at 1420.  Critical lab result read back by Dr. Jean.

## 2018-09-29 NOTE — ED PROVIDER NOTES
ED Provider Note    CHIEF COMPLAINT  Chief Complaint   Patient presents with   • Sent from Urgent Care     r/o pneumonia   • Leg Swelling     R>L   • Rib Pain     bilateral below ribs       HPI  Murali Lu is a 78 y.o. male who presents to the Emergency Department with a family hisotry of CAD and dyspilidemia.  Patient very active swimming daily, patient hasnormal ECHO in 2016.  Patient seen today for 7 days of cough with productive sputum, shortness of breath.  Worse if he is lying down, no hisotyr of COPD, fever, hemophtysis, chest pain or coagulopathy.  X-ray with diffuse pneumonia vs edema.   Concern for new onset CHF.   Patient a week ago cough forcefully until he vomited and coughed up phelgm.  He was fine until the last few days.  He did his usual swimming, during nigh he was short of breath and small cough, felt better sitting up, he took blood pressure and it was low, he denies any fever.  His sputum was white, not colored.  No chest heaviness, pressure or pain.          REVIEW OF SYSTEMS  Positive for cough and shortness of breat, Negative for fever, chills.  As above all other systems are negative.    PAST MEDICAL HISTORY   has a past medical history of Colon polyps and MEDICAL HOME.    FAMILY HISTORY  Family History   Problem Relation Age of Onset   • Heart Disease Mother    • Cancer Sister    • Diabetes Brother    • Cancer Sister         SOCIAL HISTORY  Social History     Social History Main Topics   • Smoking status: Never Smoker   • Smokeless tobacco: Never Used   • Alcohol use No   • Drug use: No      Comment: on occasion    • Sexual activity: Not on file       SURGICAL HISTORY   has a past surgical history that includes hydrocelectomy adult and umbilical hernia repair.    CURRENT MEDICATIONS  Reviewed.  See Encounter Summary.  Include   Current Facility-Administered Medications:   •  ipratropium-albuterol (DUONEB) nebulizer solution, 3 mL, Nebulization, Once, Levi Rivera, P.A.-C.  •   "[COMPLETED] NS infusion 2,709 mL, 30 mL/kg, Intravenous, Once, Laura Palacio M.D., Last Rate: 1,000 mL/hr at 09/29/18 1430, 2,709 mL at 09/29/18 1430  •  ampicillin/sulbactam (UNASYN) 3 g in  mL IVPB, 3 g, Intravenous, Once, Laura Palacio M.D.  •  azithromycin (ZITHROMAX) injection 500 mg, 500 mg, Intravenous, Once, Laura Palacio M.D.  No current outpatient prescriptions on file.      ALLERGIES  Allergies   Allergen Reactions   • Nkda [No Known Drug Allergy]        PHYSICAL EXAM  VITAL SIGNS: /70   Pulse (!) 106   Temp 36.8 °C (98.2 °F)   Resp (!) 29   Ht 1.778 m (5' 10\")   Wt 90.3 kg (199 lb 1.2 oz)   SpO2 93%   BMI 28.56 kg/m²   Constitutional:  Alert , able to answer questions  HENT: Nose is normal in appearance, external ears are normal,  moist mucous membranes  Eyes: Anicteric,  pupils are equal round and reactive, there is no conjunctival drainage or pallor   Neck: The trachea is midline, there is no obvious mass or meningeal signs  Cardiovascular: Good perfusion, systolic murmur,  regular rate and rhythm without murmurs gallops or rubs  Thorax & Lungs: Respiratory rate and effort are normal. There is normal chest excursion with respiration.  No wheezes rhonchi or rales noted.  Abdomen: Abdomen is normal in appearance, no gross peritoneal signs  normal bowel sounds, no pain with cough  :   No CVA tenderness to palpation  Musculoskeletal: No deformities noted in all 4 extremities.  One plus pitting edema  Skin: Visualized skin is warm without rash.  Neurologic:  Cranial nerves II through XII are intact there is no focal abnormality noted.  Psychiatric: Normal mood and mentation    RADIOLOGY/PROCEDURES  Imaging Studies:    DX-CHEST-PORTABLE (1 VIEW)    (Results Pending)   US-EXTREMITY VENOUS LOWER UNILAT RIGHT    (Results Pending)   CT-CTA CHEST PULMONARY ARTERY W/ RECONS    (Results Pending)         Pertinent Labs   Results for orders placed or performed during the " hospital encounter of 09/29/18   Lactic acid (lactate)   Result Value Ref Range    Lactic Acid 3.8 (H) 0.5 - 2.0 mmol/L   CBC WITH DIFFERENTIAL   Result Value Ref Range    WBC 14.9 (H) 4.8 - 10.8 K/uL    RBC 4.53 (L) 4.70 - 6.10 M/uL    Hemoglobin 14.4 14.0 - 18.0 g/dL    Hematocrit 40.3 (L) 42.0 - 52.0 %    MCV 89.0 81.4 - 97.8 fL    MCH 31.8 27.0 - 33.0 pg    MCHC 35.7 (H) 33.7 - 35.3 g/dL    RDW 42.8 35.9 - 50.0 fL    Platelet Count 117 (L) 164 - 446 K/uL    MPV 9.5 9.0 - 12.9 fL    Neutrophils-Polys 85.40 (H) 44.00 - 72.00 %    Lymphocytes 6.10 (L) 22.00 - 41.00 %    Monocytes 7.90 0.00 - 13.40 %    Eosinophils 0.00 0.00 - 6.90 %    Basophils 0.20 0.00 - 1.80 %    Immature Granulocytes 0.40 0.00 - 0.90 %    Nucleated RBC 0.00 /100 WBC    Neutrophils (Absolute) 12.68 (H) 1.82 - 7.42 K/uL    Lymphs (Absolute) 0.91 (L) 1.00 - 4.80 K/uL    Monos (Absolute) 1.18 (H) 0.00 - 0.85 K/uL    Eos (Absolute) 0.00 0.00 - 0.51 K/uL    Baso (Absolute) 0.03 0.00 - 0.12 K/uL    Immature Granulocytes (abs) 0.06 0.00 - 0.11 K/uL    NRBC (Absolute) 0.00 K/uL   COMP METABOLIC PANEL   Result Value Ref Range    Sodium 135 135 - 145 mmol/L    Potassium 4.1 3.6 - 5.5 mmol/L    Chloride 101 96 - 112 mmol/L    Co2 20 20 - 33 mmol/L    Anion Gap 14.0 (H) 0.0 - 11.9    Glucose 303 (H) 65 - 99 mg/dL    Bun 22 8 - 22 mg/dL    Creatinine 1.11 0.50 - 1.40 mg/dL    Calcium 8.7 8.5 - 10.5 mg/dL    AST(SGOT) 15 12 - 45 U/L    ALT(SGPT) 15 2 - 50 U/L    Alkaline Phosphatase 87 30 - 99 U/L    Total Bilirubin 1.6 (H) 0.1 - 1.5 mg/dL    Albumin 4.1 3.2 - 4.9 g/dL    Total Protein 6.6 6.0 - 8.2 g/dL    Globulin 2.5 1.9 - 3.5 g/dL    A-G Ratio 1.6 g/dL   D-DIMER   Result Value Ref Range    D-Dimer Screen 7.58 (H) 0.00 - 0.50 ug/mL (FEU)   ESTIMATED GFR   Result Value Ref Range    GFR If African American >60 >60 mL/min/1.73 m 2    GFR If Non African American >60 >60 mL/min/1.73 m 2   EKG   Result Value Ref Range    Report       [facility]    Test Date:  2018  Pt Name:    TIMOTHY MOORE                 Department: ER  MRN:        [id number]                      Room:       BL 19  Gender:     Male                         Technician: [id number]  :        [id number]                   Requested By:ER TRIAGE PROTOCOL  Order #:    [id number]                    Reading MD:    Measurements  Intervals                                Axis  Rate:       112                          P:          50  CO:         140                          QRS:        44  QRSD:       136                          T:          -9  QT:         348  QTc:        475    Interpretive Statements  SINUS TACHYCARDIA  RIGHT BUNDLE BRANCH BLOCK  No previous ECG available for comparison           EKG:   I interpreted this EKG myself.  This is a 12-lead study.  The rhythm is sinus tachycaria with a rate of 112.  There are no ST segment nor T wave abnormalities.  Interpretation: No ST segment elevation myocardial infarction.    COURSE & MEDICAL DECISION MAKING  Nursing notes and vital signs were reviewed. (See chart for details)  The patients  records were reviewed, history was obtained from the patient and wife;     The patient presents with hypoxemia, and the differential diagnosis includes but is not limited to CHF< PE, PNA.       Initial orders in the Emergency Department included cbc, cmp pcxr, trop bnp and initial treatment in the Emergency Department included oxygen and the patient received IV  Saline lock    ED testing reveals lactic acid 3.8 and elevated WBC.  Dr Rain,cardiology did bedside evaluation with ultrasound machine, heart was hyperdynamic with collapsable IVC, more likely sepsis than CHF, generous RV, can't exclude PE.  CT Pulmonary Artery ordered.    Dr. Carrion Made aware, IV fluids 30cc/kg ordered and IV antibiotics      FINAL IMPRESSION  1. Hypoxemia  2. Sepsis  3.  RBBB rule out PE       DISPOSITION  Admit  Electronically signed by: Laura Palacio,  9/29/2018 1:14 PM

## 2018-09-29 NOTE — H&P
Hospital Medicine History & Physical Note    Date of Service  9/29/2018    Primary Care Physician  Jh Acosta M.D.    Consultants  Critical care, discussed with Dr. Gonda for consultation  Cardiology, I discussed with Dr. Tinajero for consultation    Code Status  full    Chief Complaint  Cough and shortness of breath.     History of Presenting Illness  78 y.o. male who presented 9/29/2018 with shortness of breath. Mr. Lu has history of dyslipidemia though no history of diabetes nor coronary disease developed cough about a week ago it is worse when he lays down with some phlegm production.  He vomited once a week ago as well but none since.  The past 2 days he has had progressive shortness of breath therefore presented to the urgent care and had an oxygen saturation 89% on room air and clinically his condition was suspicious for pneumonia therefore he was transferred to the emergency room.  In the emergency room, patient was found to have a glucose of 303 and an significant leukocytosis insistent with pneumonia therefore been initiated on fluids and IV antibiotics.  He developed severe shortness of breath though a negative CAT scan for pulmonary embolism and due to hypoxia, he has required endotracheal intubation.    Notably, patient states that 2 nights ago he woke up with some chest pain that radiated to the right shoulder though this resolved he has not had chest pain since.  In the emergency room, his troponin is 0.8.    Review of Systems  Review of Systems   Constitutional: Negative for chills and fever.   Respiratory: Positive for cough, sputum production and shortness of breath. Negative for hemoptysis.    Cardiovascular: Positive for chest pain.   Gastrointestinal: Negative for diarrhea.        He vomited 1 week ago none since   All other systems reviewed and are negative.      Past Medical History   has a past medical history of Colon polyps and MEDICAL HOME.  He does not have a history of diabetes nor  "artery disease and is been followed by cardiology only because of family history of coronary disease    Surgical History   has a past surgical history that includes hydrocelectomy adult and umbilical hernia repair.     Family History  family history includes Cancer in his sister and sister; Diabetes in his brother; Heart Disease in his mother.  No family history of blood clots to his knowledge    Social History   reports that he has never smoked. He has never used smokeless tobacco. He reports that he does not drink alcohol or use drugs.  He lives with his wife    Allergies  Allergies   Allergen Reactions   • Nkda [No Known Drug Allergy]        Medications  Prior to Admission Medications   Prescriptions Last Dose Informant Patient Reported? Taking?   Bilberry, Vaccinium myrtillus, (BILBERRY PO) >2 days at unknown Significant Other Yes Yes   Sig: Take 1 Cap by mouth every 3 days.   Bioflavonoid Products (GRAPE SEED PO) >3 days at unknown Significant Other Yes Yes   Sig: Take 1 Cap by mouth every 3 days.   CINNAMON PO >2 days at unknown Significant Other Yes Yes   Sig: Take 1 Cap by mouth every 3 days.   Cyanocobalamin (VITAMIN B-12) 1000 MCG Tab >2 days at unknown Significant Other Yes Yes   Sig: Take 1,000 mcg by mouth every 48 hours.   ECHINACEA PO >2 days at unknown Significant Other Yes Yes   Sig: Take 1 Tab by mouth every 3 days.   Ginkgo Biloba (GNP GINGKO BILOBA EXTRACT PO) >2 days at unknown Significant Other Yes Yes   Sig: Take 1 Cap by mouth every 3 days.   HORSE CHESTNUT PO >2 days at unknown Significant Other Yes Yes   Sig: Take 1 Cap by mouth every 3 days.   LUTEIN-ZEAXANTHIN PO >2 days at unknown Significant Other Yes Yes   Sig: Take 1 Tab by mouth every 3 days.   NON SPECIFIED >2 days at unknown Significant Other Yes Yes   Sig: Take 1 Tab by mouth every 3 days. Beet Root   NON SPECIFIED >2 days at unknown Significant Other Yes Yes   Sig: Take 1 Tab by mouth every 3 days. \"Blueberry\"   Niacin (VITAMIN " B-3 PO) >2 days at unknowb Significant Other Yes Yes   Sig: Take 1 Tab by mouth every 48 hours.   SELENIUM PO >2 days at unknown Significant Other Yes Yes   Sig: Take 1 Tab by mouth every 3 days.   ascorbic acid (ASCORBIC ACID) 500 MG Tab >2 days at unknown Significant Other Yes Yes   Sig: Take 500 mg by mouth every 3 days.   multivitamin (THERAGRAN) Tab >2 days at unknown Significant Other Yes Yes   Sig: Take 1 Tab by mouth every 3 days.   pyridoxine (VITAMIN B-6) 50 MG Tab >2 days at unknown Significant Other Yes Yes   Sig: Take 50 mg by mouth every 48 hours.   vitamin D (CHOLECALCIFEROL) 1000 UNIT Tab >2 days at unknown Significant Other Yes Yes   Sig: Take 1,000 Units by mouth every 3 days.   vitamin e (VITAMIN E) 400 UNIT Cap >2 days at unknown Significant Other Yes Yes   Sig: Take 400 Units by mouth every 3 days.      Facility-Administered Medications Last Administration Doses Remaining   ipratropium-albuterol (DUONEB) nebulizer solution 9/29/2018  2:27 PM 0          Physical Exam  Temp:  [36.8 °C (98.2 °F)-37 °C (98.6 °F)] 37 °C (98.6 °F)  Pulse:  [] 123  Resp:  [18-29] 22  BP: (113-144)/(70-86) 144/86    Physical Exam   Constitutional: He is oriented to person, place, and time. No distress.   HENT:   Head: Normocephalic and atraumatic.   Neck: Neck supple.   Cardiovascular:   Sinus tachycardia  II/VI OWEN left sternal border   Pulmonary/Chest: He has rales.   Tachypnea though reasonably good air movement without rhonchi and no wheezing   Abdominal: Soft. He exhibits no distension. There is no tenderness.   Musculoskeletal: He exhibits no edema or tenderness.   Neurological: He is alert and oriented to person, place, and time.   Skin: Skin is warm and dry. He is not diaphoretic.   Psychiatric: He has a normal mood and affect. His behavior is normal.   Nursing note and vitals reviewed.      Laboratory:  Recent Labs      09/29/18   1317   WBC  14.9*   RBC  4.53*   HEMOGLOBIN  14.4   HEMATOCRIT  40.3*   MCV   89.0   MCH  31.8   MCHC  35.7*   RDW  42.8   PLATELETCT  117*   MPV  9.5     Recent Labs      09/29/18   1317   SODIUM  135   POTASSIUM  4.1   CHLORIDE  101   CO2  20   GLUCOSE  303*   BUN  22   CREATININE  1.11   CALCIUM  8.7     Recent Labs      09/29/18   1317   ALTSGPT  15   ASTSGOT  15   ALKPHOSPHAT  87   TBILIRUBIN  1.6*   GLUCOSE  303*         Recent Labs      09/29/18   1317   BNPBTYPENAT  360*         Recent Labs      09/29/18   1317   TROPONINI  0.81*       Urinalysis:    Recent Labs      09/29/18   1456   SPECGRAVITY  1.025   GLUCOSEUR  500*   KETONES  Negative   NITRITE  Negative   LEUKESTERAS  Negative        Imaging:  CT-CTA CHEST PULMONARY ARTERY W/ RECONS   Final Result      1.  No evidence of pulmonary embolism.      2.  Patchy perihilar alveolar opacities bilaterally which may represent alveolar edema or pneumonia. No lobar consolidation.      3.  Small bilateral pleural effusions.            US-EXTREMITY VENOUS LOWER UNILAT RIGHT   Final Result      DX-CHEST-PORTABLE (1 VIEW)   Final Result      1.  Highly suspicious for pulmonary edema and less likely fibrosis      2.  chronic obstructive pulmonary disease      3.  Enlarged cardiac silhouette      4.  No significant change      DX-CHEST-PORTABLE (1 VIEW)    (Results Pending)   EC-ECHOCARDIOGRAM COMPLETE W/O CONT    (Results Pending)   EC-ECHOCARDIOGRAM COMPLETE W/O CONT    (Results Pending)         Assessment/Plan:  I anticipate this patient will require at least two midnights for appropriate medical management, necessitating inpatient admission.    * Sepsis (HCC)- (present on admission)   Assessment & Plan    Severe sepsis secondary to pneumonia  WBC 14.9 in the ER  IV fluids via sepsis protocol and IV antibiotics  The organ system failure associated with this diagnosis is the respiratory system as is evidenced by the need for a intubation          Pneumonia- (present on admission)   Assessment & Plan    Clinical diagnosis  IV unasyn and  azithromycin        Acute respiratory failure with hypoxia (HCC)- (present on admission)   Assessment & Plan    Requiring supplemental oxygen via a non-rebreather followed by intubation.  Pulmonology will be consulted.   A stat echo has been ordered        RBBB- (present on admission)   Assessment & Plan    Noted on EKG        Hyperglycemia- (present on admission)   Assessment & Plan    Glucose is 303 in the ER  HbA1c 6.5 on 7/18  Consistent with a stress-response though will utilize SQ insulin with a sliding scale.        Elevated troponin- (present on admission)   Assessment & Plan    Troponin 0.8  Aspirin given.   EKG is not consistent with ischemia.  Serial troponins and follow on telemetry.   Cardiology has been consulted        Mixed hyperlipidemia- (present on admission)   Assessment & Plan    Hx of  Statin ordered in the setting of elevated troponin        Thrombocytopenia (HCC)- (present on admission)   Assessment & Plan    Platelet count is 117 and has been low on previous labs            VTE prophylaxis: lovenox

## 2018-09-29 NOTE — ASSESSMENT & PLAN NOTE
The organ system failure associated with this diagnosis is the respiratory system as is evidenced by the need for a intubation    Shock resolved

## 2018-09-29 NOTE — PROGRESS NOTES
"Subjective:      Murali Lu is a 78 y.o. male who presents with Cough (vomiting x 2 days.)            Cough   This is a new problem. The current episode started in the past 7 days. The cough is productive of sputum. Associated symptoms include shortness of breath. Pertinent negatives include no chest pain, chills, ear pain, fever, hemoptysis, sore throat or wheezing. The symptoms are aggravated by lying down. He has tried OTC cough suppressant for the symptoms. There is no history of COPD or environmental allergies.       Review of Systems   Constitutional: Positive for malaise/fatigue. Negative for chills and fever.   HENT: Negative for congestion, ear pain and sore throat.    Respiratory: Positive for cough, sputum production and shortness of breath. Negative for hemoptysis and wheezing.    Cardiovascular: Negative for chest pain and palpitations.   Endo/Heme/Allergies: Negative for environmental allergies.   All other systems reviewed and are negative.    PMH:  has a past medical history of Colon polyps and MED[facility] HOME.  MEDS: No current outpatient prescriptions on file.    Current Facility-Administered Medications:   •  ipratropium-albuterol (DUONEB) nebulizer solution, 3 mL, Nebulization, Once, Levi Rivera, P.A.-C.  ALLERGIES:   Allergies   Allergen Reactions   • Nkda [No Known Drug Allergy]      SURGHX:   Past Surgical History:   Procedure Laterality Date   • HYDROCELECTOMY ADULT     • UMBILICAL HERNIA REPAIR       SOCHX:  reports that he has never smoked. He has never used smokeless tobacco. He reports that he does not drink alcohol or use drugs.  FH: Family history was reviewed, no pertinent findings to report  Medications, Allergies, and current problem list reviewed today in Epic       Objective:     /62   Pulse (!) 106   Temp 36.9 °C (98.4 °F)   Resp 13   Ht 1.778 m (5' 10\")   Wt 90.3 kg (199 lb)   SpO2 89%   BMI 28.55 kg/m²      Physical Exam   Constitutional: He is oriented to " person, place, and time. He appears well-developed and well-nourished. He is active.  Non-toxic appearance. He does not have a sickly appearance. He does not appear ill. No distress. He is not intubated.   HENT:   Head: Normocephalic and atraumatic.   Right Ear: Hearing, tympanic membrane, external ear and ear canal normal.   Left Ear: Hearing, tympanic membrane, external ear and ear canal normal.   Nose: Nose normal.   Mouth/Throat: Uvula is midline, oropharynx is clear and moist and mucous membranes are normal.   Eyes: Conjunctivae, EOM and lids are normal.   Neck: Normal range of motion and full passive range of motion without pain. Neck supple.   Cardiovascular: Regular rhythm, S1 normal and S2 normal.  Exam reveals no gallop and no friction rub.    Murmur heard.  Pulmonary/Chest: Effort normal. No accessory muscle usage. No apnea, no tachypnea and no bradypnea. He is not intubated. No respiratory distress. He has no decreased breath sounds. He has no wheezes. He has no rhonchi. He has rales. He exhibits no tenderness.   Abdominal: Soft. Bowel sounds are normal. He exhibits no distension and no mass. There is tenderness. There is no rebound and no guarding. No hernia.   Musculoskeletal: Normal range of motion. He exhibits edema.   Neurological: He is alert and oriented to person, place, and time.   Skin: Skin is warm and dry.   Psychiatric: He has a normal mood and affect. His speech is normal and behavior is normal. Judgment and thought content normal.   Vitals reviewed.              Assessment/Plan:   Pt is a 78 yr old male who presents with SOB and cough while laying down for the last 2 days. He also complains of pain on the right and left upper quadrants of the abdomen.  Denies fevers, fatigue. No history of CHF, kidney disease or pulmonary disease.  Vitals show O2 at 89% with rales heard bilaterally. Murmur heard in the heart. 1+ Edema in legs.  Chest X ray shows diffuse pneumonia vs edema.  I think more  likely this may be a new onset of CHF.  Recommend transfer to ED for further evaluation and treatment.  Patient declines transfer by ambulance and will go by private vehicle.  Message left at transfer center line.  1. Pneumonia of both lungs due to infectious organism, unspecified part of lung  DX-CHEST-2 VIEWS    ipratropium-albuterol (DUONEB) nebulizer solution   2. Acute pulmonary edema (HCC)     3. Orthopnea           Differential diagnosis, natural history, supportive care discussed. Follow-up with primary care provider within 7-10 days, emergency room precautions discussed.  Patient and/or family appears understanding of information.  Handout and review of patients diagnosis and treatment was discussed extensively.

## 2018-09-29 NOTE — ED TRIAGE NOTES
Chief Complaint   Patient presents with   • Sent from Urgent Care     r/o pneumonia   • Leg Swelling     R>L   • Rib Pain     bilateral below ribs     Pt ambulated to triage,nad, sent here from urgent care to r/o pneumonia vs chf. Per wife, last night pt having problem laying flat while in bed and cough. He has chronic ankle swelling on/off past 2years. Pt's spo2 88%, he is refusing to wear oxygen. He is a/ox4.  He denies sob or chest pain but having bilateral below the ribs pain.   His wife also mentioned patient vomited a week ago with mixture of phlegm and food.   Sepsis score=3, informed charge rn  Called for ekg  Pt and wife to senior lounge, Educated pt on triage process.  Asked to return to triage RN for any new or worsening of symptoms.

## 2018-09-29 NOTE — ED NOTES
Med rec complete per pt and pt's wife at bedside  Allergies reviewed - NKDA  No ABX in last month  Pt does not take any prescriptions at home, but takes multiple vitamins/supplements

## 2018-09-29 NOTE — ASSESSMENT & PLAN NOTE
CTA on 9-29 neg  for PE  Echo with normal EF and pulmonary hypertension  Neg fluid balance on lasix  Vent management per critical care discussed with Dr. Sierra

## 2018-09-29 NOTE — ASSESSMENT & PLAN NOTE
Completed course of antibiotics   Cultures neg  ?VAE as CXR worsening, F, inceased WBC's  After discussion with wife will move to comfort care tomorrow  Will not initiate Abx's at this time

## 2018-09-30 PROBLEM — I27.20 PULMONARY HYPERTENSION (HCC): Status: ACTIVE | Noted: 2018-01-01

## 2018-09-30 PROBLEM — R65.21 SEPTIC SHOCK (HCC): Status: ACTIVE | Noted: 2018-01-01

## 2018-09-30 PROBLEM — N17.9 ACUTE KIDNEY INJURY (HCC): Status: ACTIVE | Noted: 2018-01-01

## 2018-09-30 NOTE — PROGRESS NOTES
Pt pressure dropping. Bolus started. Dr. Carrion notified. Orders received. Will continue to monitor.

## 2018-09-30 NOTE — CARE PLAN
Problem: Safety  Goal: Will remain free from injury  No falls this shift. Hourly rounds maintained.    Problem: Venous Thromboembolism (VTW)/Deep Vein Thrombosis (DVT) Prevention:  Goal: Patient will participate in Venous Thrombosis (VTE)/Deep Vein Thrombosis (DVT)Prevention Measures  SCD's on, lovenox will be given as ordered.    Problem: Safety - Medical Restraint  Goal: Remains free of injury from restraints (Restraint for Interference with Medical Device)  INTERVENTIONS:  1. Determine that other, less restrictive measures have been tried or would not be effective before applying the restraint  2. Evaluate the patient's condition at the time of restraint application  3. Inform patient/family regarding the reason for restraint  4. Q2H: Monitor safety, psychosocial status, comfort, nutrition and hydration     Pt attempts to pull on lines and tubes during turns. No signs of injury from restraints.

## 2018-09-30 NOTE — PROCEDURES
Procedure Note    Date: 9/29/2018  Time: 1730    Procedure: Bronchoscopy with bronchoalveolar lavage and therapeutic aspiration of secretions    Indication: Worsening atelectasis and pneumonia  Consent: Informed consent obtained from patient or designated decision maker after explaining the benefits/risks of the procedure including but not limited to bleeding, infection, airway trauma or loss therof, pneumothorax/hemothorax, arrythmia, or death. Patient or surrogate expressed understanding and agreement and signed consent which can be found in the patient's chart.    Procedure: After obtaining consent, a time-out was performed. Respiratory therapy and nursing at bedside throughout procedure. Patient provided sedation and analgesia throughout the procedure. Placed on full ventilator support with an FiO2 of 100% throughout the procedure. Using a fiberoptic bronchoscope, trachea entered via 8.0 endotracheal tube.  0 mL of local anesthetic sprayed at the yvrose (2% lidocaine) achieving appropriate comfort level for patient. Airways visualized directly and the following intervention was performed: diagnostic and therapeutic lavage with all areas of lungs suctioned to clear. Findings as below. Patient tolerated procedure well without any difficulties and left in care of bedside nurse/RT.     Medications: Propofol drip  Findings: Upper airway - Not visualized as bronchoscope passed through ETT.        Trachea to yvrose -normal appearing mucosa without lesions or mass, ETT tip measured 6 cm from the yvrose -->   Advanced by 2 cm        R proximal and distal airways -friable appearing mucosa without mass/lesion/anatomic variance, secretions: Thin, frothy, bloody, moderate        L proximal and distal airways -friable appearing mucosa without mass/lesion/anatomic variance, secretions: Thin, frothy, bloody, moderate        Samples -right lower lobe and left lower lobe bronchoalveolar lavage    Complications: None  CXR (if  applicable): Pending    Jeremy Gonda, MD  Critical Care Medicine

## 2018-09-30 NOTE — RESPIRATORY CARE
Respiratory Rapid Response Note    Symptoms SOB and Sp02 80 - 84 on 15LPM non-rebreather     Breath Sounds  Pre/Post Intervention: Pre Intervention Assessment (09/29/18 1630)  RUL Breath Sounds: Coarse Crackles (09/29/18 1630)  RML Breath Sounds: Coarse Crackles (09/29/18 1630)  RLL Breath Sounds: Coarse Crackles (09/29/18 1630)  BRITTANY Breath Sounds: Coarse Crackles (09/29/18 1630)  LLL Breath Sounds: Coarse Crackles (09/29/18 1630)  Cough: Strong;Moist (09/29/18 1630)           ABG Results - pending  FiO2%: 100 % (09/29/18 1630)  O2 (LPM): 15 (09/29/18 1630)       Events/Summary/Plan: Rapid Response (09/29/18 1630)  Transferred to Cardiac ICU and intubated upon arrival.  Glidescope used, positive color change on Etco2 detector, Bilateral breath sounds auscultated and fogging in tube present.

## 2018-09-30 NOTE — CONSULTS
Critical Care Consultation    Date of consult: 9/29/2018    Referring Physician  Walter Carrion M.D.    Reason for Consultation  Shortness of breath, altered mental status    History of Presenting Illness  78 y.o. male who presented 9/29/2018 with a past medical history significant for hyperlipidemia who presented to the urgent care complaining of a cough for 1 week with associated phlegm and shortness of breath.  He was found to have an oxygen saturation of 89% on room air at the urgent care and was transferred to the emergency department.  In the emergency department patient underwent limited echo by cardiology and found to have a dilated right heart and underwent CT imaging which was negative for pulmonary embolism but positive for pneumonia and possible edema.  He was admitted to telemetry however upon arrival started to have increasing work of breathing that per wife was felt to be due to the chemicals in the room.  He was moved to another room but his work of breathing continued to worsen and a rapid response was called.  He was transferred to the intensive care unit where I was consulted for assistance in his critical care management.  Patient was unable to provide any additional history at the time of my evaluation given his current clinical condition.    Code Status  Full Code    Review of Systems  Review of Systems   Unable to perform ROS: Acuity of condition       Past Medical History   has a past medical history of Colon polyps and MEDICAL HOME.    Surgical History   has a past surgical history that includes hydrocelectomy adult and umbilical hernia repair.    Family History  family history includes Cancer in his sister and sister; Diabetes in his brother; Heart Disease in his mother.    Social History   reports that he has never smoked. He has never used smokeless tobacco. He reports that he does not drink alcohol or use drugs.    Medications  Home Medications     Reviewed by Carl Ybarra  (Pharmacy Tech) on 09/29/18 at 1442  Med List Status: Complete   Medication Last Dose Status   ascorbic acid (ASCORBIC ACID) 500 MG Tab >2 days Active   Bilberry, Vaccinium myrtillus, (BILBERRY PO) >2 days Active   Bioflavonoid Products (GRAPE SEED PO) >3 days Active   CINNAMON PO >2 days Active   Cyanocobalamin (VITAMIN B-12) 1000 MCG Tab >2 days Active   ECHINACEA PO >2 days Active   Ginkgo Biloba (GNP GINGKO BILOBA EXTRACT PO) >2 days Active   HORSE CHESTNUT PO >2 days Active   LUTEIN-ZEAXANTHIN PO >2 days Active   multivitamin (THERAGRAN) Tab >2 days Active   Niacin (VITAMIN B-3 PO) >2 days Active   NON SPECIFIED >2 days Active   NON SPECIFIED >2 days Active   pyridoxine (VITAMIN B-6) 50 MG Tab >2 days Active   SELENIUM PO >2 days Active   vitamin D (CHOLECALCIFEROL) 1000 UNIT Tab >2 days Active   vitamin e (VITAMIN E) 400 UNIT Cap >2 days Active              Current Facility-Administered Medications   Medication Dose Route Frequency Provider Last Rate Last Dose   • NS infusion 2,709 mL  30 mL/kg Intravenous Once PRN Walter Carrion M.D.       • senna-docusate (PERICOLACE or SENOKOT S) 8.6-50 MG per tablet 2 Tab  2 Tab Oral BID Walter Carrion M.D.        And   • polyethylene glycol/lytes (MIRALAX) PACKET 1 Packet  1 Packet Oral QDAY PRN Walter Carrion M.D.        And   • magnesium hydroxide (MILK OF MAGNESIA) suspension 30 mL  30 mL Oral QDAY PRN Walter Carrion M.D.        And   • bisacodyl (DULCOLAX) suppository 10 mg  10 mg Rectal QDAY PRN Walter Carrion M.D.       • Respiratory Care per Protocol   Nebulization Continuous RT Walter Carrion M.D.       • NS (BOLUS) infusion 500 mL  500 mL Intravenous Once PRN Walter Carrion M.D.       • acetaminophen (TYLENOL) tablet 650 mg  650 mg Oral Q6HRS PRN Walter Carrion M.D.       • [START ON 9/30/2018] azithromycin (ZITHROMAX) tablet 250 mg  250 mg Oral DAILY Walter Carrion M.D.       • ondansetron (ZOFRAN) syringe/vial injection 4 mg  4 mg Intravenous Q4HRS PRN Walter M  LIDYA Carrion       • ondansetron (ZOFRAN ODT) dispertab 4 mg  4 mg Oral Q4HRS PRN Walter Carrion M.D.       • insulin regular (HUMULIN R) injection 3-14 Units  3-14 Units Subcutaneous 4X/DAY ACHS Walter Carrion M.D.        And   • glucose 4 g chewable tablet 16 g  16 g Oral Q15 MIN PRN Walter Carrion M.D.        And   • dextrose 50% (D50W) injection 25 mL  25 mL Intravenous Q15 MIN PRN Walter Carrion M.D.       • aspirin (ASA) chewable tab 81 mg  81 mg Oral DAILY Walter Carrion M.D.   Stopped at 09/29/18 1545   • atorvastatin (LIPITOR) tablet 40 mg  40 mg Oral Q EVENING Walter Carrion M.D.       • [START ON 9/30/2018] ampicillin/sulbactam (UNASYN) 3 g in  mL IVPB  3 g Intravenous Q6HRS Walter Carrion M.D.       • propofol (DIPRIVAN) injection  0-80 mcg/kg/min Intravenous Continuous Jeremy M Gonda, M.D. 16.3 mL/hr at 09/29/18 1730 30 mcg/kg/min at 09/29/18 1730   • famotidine (PEPCID) tablet 20 mg  20 mg Oral Q12HRS Jeremy M Gonda, M.D.        Or   • famotidine (PEPCID) injection 20 mg  20 mg Intravenous Q12HRS Jeremy M Gonda, M.D.       • MD Alert...ICU Electrolyte Replacement per Pharmacy   Other pharmacy to dose Jeremy M Gonda, M.D.       • Pharmacy Consult: Enteral tube feeding - review meds/change route/product selection   Other PRN Jeremy M Gonda, M.D.       • enoxaparin (LOVENOX) inj 40 mg  40 mg Subcutaneous DAILY Jeremy M Gonda, M.D.       • MD Alert...PROPOFOL CRITICAL CARE PROTOCOL 1 Each  1 Each Other PRN Jeremy M Gonda, M.D.       • fentaNYL (SUBLIMAZE) injection 25 mcg  25 mcg Intravenous Q HOUR PRN Jeremy M Gonda, M.D.        Or   • fentaNYL (SUBLIMAZE) injection 50 mcg  50 mcg Intravenous Q HOUR PRN Jeremy M Gonda, M.D.        Or   • fentaNYL (SUBLIMAZE) injection 100 mcg  100 mcg Intravenous Q HOUR PRN Jeremy M Gonda, M.D.       • ipratropium-albuterol (DUONEB) nebulizer solution  3 mL Nebulization Q2HRS PRN (RT) Jeremy M Gonda, M.D.       • nitroglycerin 50 mg in D5W 250 ml infusion  0-200  mcg/min Intravenous Continuous Jeremy M Gonda, M.D. 15 mL/hr at 09/29/18 1730 50 mcg/min at 09/29/18 1730   • epoprostenol (FLOLAN) 1.5 mg in glycine diluent for flolan 50 mL for Inhalation  0.01-0.05 mcg/kg/min Inhalation Continuous Jeremy M Gonda, M.D.           Allergies  Allergies   Allergen Reactions   • Nkda [No Known Drug Allergy]        Vital Signs last 24 hours  Temp:  [36.8 °C (98.2 °F)-37 °C (98.6 °F)] 37 °C (98.6 °F)  Pulse:  [] 135  Resp:  [18-40] 35  BP: (113-144)/(70-86) 144/86    Physical Exam  Physical Exam   Constitutional: He appears well-developed. He has a sickly appearance. He appears ill. He appears distressed.   HENT:   Head: Normocephalic and atraumatic.   Nose: Nose normal.   Mouth/Throat: Oropharynx is clear and moist. No oropharyngeal exudate.   Eyes: Pupils are equal, round, and reactive to light. Conjunctivae are normal. No scleral icterus.   Neck: Neck supple. JVD present. No tracheal deviation present.   Cardiovascular: Regular rhythm and normal pulses.   Occasional extrasystoles are present. Tachycardia present.  PMI is displaced.  Exam reveals distant heart sounds.    No murmur heard.  Bounding pulses   Pulmonary/Chest: Accessory muscle usage present. No stridor. Tachypnea noted. He is in respiratory distress. He has no decreased breath sounds. He has no wheezes. He has rhonchi in the right upper field, the right middle field, the right lower field, the left upper field, the left middle field and the left lower field. He has rales in the right upper field, the right middle field, the right lower field, the left upper field, the left middle field and the left lower field.   Attempting to sit up and pull oxygen off his face, unable to speak, peripheral cyanosis   Abdominal: Soft. Bowel sounds are normal. He exhibits no distension. There is no tenderness.   Genitourinary: Penis normal.   Musculoskeletal: He exhibits edema. He exhibits no tenderness or deformity.   Neurological:    Confused, agitated, pulling at devices, scared, no focal deficits   Skin: Skin is warm. No rash noted. He is diaphoretic. No pallor.   Psychiatric:   Unable to assess given current clinical condition   Nursing note and vitals reviewed.      Fluids  No intake or output data in the 24 hours ending 09/29/18 1756    Laboratory  Recent Results (from the past 48 hour(s))   Lactic acid (lactate)    Collection Time: 09/29/18  1:17 PM   Result Value Ref Range    Lactic Acid 3.8 (H) 0.5 - 2.0 mmol/L   CBC WITH DIFFERENTIAL    Collection Time: 09/29/18  1:17 PM   Result Value Ref Range    WBC 14.9 (H) 4.8 - 10.8 K/uL    RBC 4.53 (L) 4.70 - 6.10 M/uL    Hemoglobin 14.4 14.0 - 18.0 g/dL    Hematocrit 40.3 (L) 42.0 - 52.0 %    MCV 89.0 81.4 - 97.8 fL    MCH 31.8 27.0 - 33.0 pg    MCHC 35.7 (H) 33.7 - 35.3 g/dL    RDW 42.8 35.9 - 50.0 fL    Platelet Count 117 (L) 164 - 446 K/uL    MPV 9.5 9.0 - 12.9 fL    Neutrophils-Polys 85.40 (H) 44.00 - 72.00 %    Lymphocytes 6.10 (L) 22.00 - 41.00 %    Monocytes 7.90 0.00 - 13.40 %    Eosinophils 0.00 0.00 - 6.90 %    Basophils 0.20 0.00 - 1.80 %    Immature Granulocytes 0.40 0.00 - 0.90 %    Nucleated RBC 0.00 /100 WBC    Neutrophils (Absolute) 12.68 (H) 1.82 - 7.42 K/uL    Lymphs (Absolute) 0.91 (L) 1.00 - 4.80 K/uL    Monos (Absolute) 1.18 (H) 0.00 - 0.85 K/uL    Eos (Absolute) 0.00 0.00 - 0.51 K/uL    Baso (Absolute) 0.03 0.00 - 0.12 K/uL    Immature Granulocytes (abs) 0.06 0.00 - 0.11 K/uL    NRBC (Absolute) 0.00 K/uL   COMP METABOLIC PANEL    Collection Time: 09/29/18  1:17 PM   Result Value Ref Range    Sodium 135 135 - 145 mmol/L    Potassium 4.1 3.6 - 5.5 mmol/L    Chloride 101 96 - 112 mmol/L    Co2 20 20 - 33 mmol/L    Anion Gap 14.0 (H) 0.0 - 11.9    Glucose 303 (H) 65 - 99 mg/dL    Bun 22 8 - 22 mg/dL    Creatinine 1.11 0.50 - 1.40 mg/dL    Calcium 8.7 8.5 - 10.5 mg/dL    AST(SGOT) 15 12 - 45 U/L    ALT(SGPT) 15 2 - 50 U/L    Alkaline Phosphatase 87 30 - 99 U/L    Total  Bilirubin 1.6 (H) 0.1 - 1.5 mg/dL    Albumin 4.1 3.2 - 4.9 g/dL    Total Protein 6.6 6.0 - 8.2 g/dL    Globulin 2.5 1.9 - 3.5 g/dL    A-G Ratio 1.6 g/dL   BNP    Collection Time: 18  1:17 PM   Result Value Ref Range    B Natriuretic Peptide 360 (H) 0 - 100 pg/mL   TROPONIN    Collection Time: 18  1:17 PM   Result Value Ref Range    Troponin I 0.81 (H) 0.00 - 0.04 ng/mL   D-DIMER    Collection Time: 18  1:17 PM   Result Value Ref Range    D-Dimer Screen 7.58 (H) 0.00 - 0.50 ug/mL (FEU)   ESTIMATED GFR    Collection Time: 18  1:17 PM   Result Value Ref Range    GFR If African American >60 >60 mL/min/1.73 m 2    GFR If Non African American >60 >60 mL/min/1.73 m 2   EKG    Collection Time: 18  1:23 PM   Result Value Ref Range    Report       [facility]    Test Date:  2018  Pt Name:    TIMOTHY MOORE                 Department: ER  MRN:        [id number]                      Room:        19  Gender:     Male                         Technician: [id number]  :        [id number]                   Requested By:ER TRIAGE PROTOCOL  Order #:    [id number]                    Reading MD:    Measurements  Intervals                                Axis  Rate:       112                          P:          50  WA:         140                          QRS:        44  QRSD:       136                          T:          -9  QT:         348  QTc:        475    Interpretive Statements  SINUS TACHYCARDIA  RIGHT BUNDLE BRANCH BLOCK  No previous ECG available for comparison     URINALYSIS    Collection Time: 18  2:56 PM   Result Value Ref Range    Color DK Yellow     Character Clear     Specific Gravity 1.025 <1.035    Ph 5.0 5.0 - 8.0    Glucose 500 (A) Negative mg/dL    Ketones Negative Negative mg/dL    Protein Negative Negative mg/dL    Bilirubin Negative Negative    Urobilinogen, Urine 0.2 Negative    Nitrite Negative Negative    Leukocyte Esterase Negative  Negative    Occult Blood Negative Negative    Micro Urine Req see below    EKG    Collection Time: 18  4:28 PM   Result Value Ref Range    Report       [facility]    Test Date:  2018  Pt Name:    TIMOTHY MOORE                 Department: 171  MRN:        [id number]                      Room:       T614  Gender:     Male                         Technician: LUCY  :        1940                   Requested By:ALOK AGUIRRE  Order #:    [id number]                    Reading MD:    Measurements  Intervals                                Axis  Rate:       125                          P:          60  OR:         134                          QRS:        70  QRSD:       143                          T:          -4  QT:         329  QTc:        475    Interpretive Statements  SINUS TACHYCARDIA  RIGHT BUNDLE BRANCH BLOCK  Compared to ECG 2018 13:23:31  No significant changes         Imaging  EC-ECHOCARDIOGRAM COMPLETE W/O CONT         CT-CTA CHEST PULMONARY ARTERY W/ RECONS   Final Result      1.  No evidence of pulmonary embolism.      2.  Patchy perihilar alveolar opacities bilaterally which may represent alveolar edema or pneumonia. No lobar consolidation.      3.  Small bilateral pleural effusions.            US-EXTREMITY VENOUS LOWER UNILAT RIGHT   Final Result      DX-CHEST-PORTABLE (1 VIEW)   Final Result      1.  Highly suspicious for pulmonary edema and less likely fibrosis      2.  chronic obstructive pulmonary disease      3.  Enlarged cardiac silhouette      4.  No significant change      DX-CHEST-PORTABLE (1 VIEW)    (Results Pending)   EC-ECHOCARDIOGRAM COMPLETE W/O CONT    (Results Pending)   DX-CHEST-LIMITED (1 VIEW)    (Results Pending)    *personally reviewed chest x-ray and compared to prior film showing developing diffuse bilateral infiltrates, enlarged cardiac silhouette    * Personally reviewed CT chest as well and agree with radiologist report     Assessment/Plan  * Sepsis (HCC)-  (present on admission)   Assessment & Plan    This is severe sepsis with the following associated acute organ dysfunction(s): acute respiratory failure, metabolic/septic encephalopathy.  Pulmonary source     Sepsis protocol  Antibiotics  Trend lactic acid level, urine output, vital signs for adequacy of resuscitation        Acute metabolic encephalopathy   Assessment & Plan    Secondary to sepsis and respiratory failure  Correct underlying conditions  Limit sedatives once pulmonary status stabilized and reassess        Acute respiratory failure with hypoxia (HCC)- (present on admission)   Assessment & Plan    Intubate patient and start on full mechanical ventilatory support  Increase PEEP to 14, wean FiO2 to keep SaO2 greater than 92%  Start inhaled Flolan  Bronchoscopy both therapeutic and diagnostic  Propofol drip for sedation  Nitroglycerin drip for afterload reduction, Lasix  RT/O2 protocols  Stat chest x-ray        Pneumonia- (present on admission)   Assessment & Plan    Continue Unasyn and azithromycin for now  BAL, follow-up on cultures to better guide antibiotic therapy  Trend pro calcitonin        Elevated troponin- (present on admission)   Assessment & Plan    Likely secondary to demand ischemia  Trend  Aspirin, statin  Cardiology consultation  Stat echocardiography  Repeat EKG        RBBB- (present on admission)   Assessment & Plan    Noted  Repeat EKG        Hyperglycemia- (present on admission)   Assessment & Plan    Insulin sliding scale, n.p.o.        Thrombocytopenia (HCC)- (present on admission)   Assessment & Plan    Trend, monitor for bleeding        Mixed hyperlipidemia- (present on admission)   Assessment & Plan    Statin            Discussed patient condition and risk of morbidity and/or mortality with Hospitalist, Family, RN, RT, Pharmacy, Charge nurse / hot rounds, Patient and cardiology.    The patient remains critically ill.  Critical care time = 120  minutes in directly providing and  coordinating critical care and extensive data review.  No time overlap and excludes procedures.

## 2018-09-30 NOTE — PROGRESS NOTES
[facility] Cardiology Follow-up Consult Note  Date of note:    9/30/2018      Consulting Physician: Brandon Ramirez D.O.    Patient ID:  Name:   Murali Lu     YOB: 1940  Age:   78 y.o.  male   MRN:   [id number]    Chief Complaint   Patient presents with   • Sent from Urgent Care     r/o pneumonia   • Leg Swelling     R>L   • Rib Pain     bilateral below ribs       Interim Events:  Remains on vent flolan and pressors      ROS  Unable to obtain on vent    Past medical, surgical, social, and family history reviewed and unchanged from admission except as noted in assessment and plan.    Medications: Reviewed in MAR  Current Facility-Administered Medications   Medication Dose Frequency Provider Last Rate Last Dose   • insulin regular (HUMULIN R) injection 0-14 Units  0-14 Units Once Brandon Paulino M.D.   Stopped at 09/30/18 1000   • insulin regular human (HUMULIN/NOVOLIN R) 62.5 Units in  mL infusion per protocol  0-29 Units/hr Continuous Brandon Paulino M.D. 8 mL/hr at 09/30/18 1209 2 Units/hr at 09/30/18 1209   • dextrose 50% (D50W) injection 25-50 mL  12.5-25 g PRN Brandon Paulino M.D.       • Respiratory Care per Protocol   Continuous RT Walter Carrion M.D.       • ondansetron (ZOFRAN) syringe/vial injection 4 mg  4 mg Q4HRS PRN Walter Carrion M.D.       • ampicillin/sulbactam (UNASYN) 3 g in  mL IVPB  3 g Q6HRS Walter Carrion M.D. 200 mL/hr at 09/30/18 1115 3 g at 09/30/18 1115   • propofol (DIPRIVAN) injection  0-80 mcg/kg/min Continuous Jeremy M Gonda, M.D. 13.5 mL/hr at 09/30/18 0900 25 mcg/kg/min at 09/30/18 0900   • MD Alert...ICU Electrolyte Replacement per Pharmacy   pharmacy to dose Jeremy M Gonda, M.D.       • enoxaparin (LOVENOX) inj 40 mg  40 mg DAILY Jeremy M Gonda, M.D.   40 mg at 09/30/18 0543   • fentaNYL (SUBLIMAZE) injection 25 mcg  25 mcg Q HOUR PRN Jeremy M Gonda, M.D.        Or   • fentaNYL (SUBLIMAZE) injection 50 mcg  50 mcg Q HOUR PRN Daniele MCKINLEY  Gonda, M.D.        Or   • fentaNYL (SUBLIMAZE) injection 100 mcg  100 mcg Q HOUR PRN Jeremy M Gonda, M.D.       • ipratropium-albuterol (DUONEB) nebulizer solution  3 mL Q2HRS PRN (RT) Jeremy M Gonda, M.D.       • nitroglycerin 50 mg in D5W 250 ml infusion  0-200 mcg/min Continuous Jeremy M Gonda, M.D.   Stopped at 09/29/18 1900   • epoprostenol (FLOLAN) 1.5 mg in glycine diluent for flolan 50 mL for Inhalation  0.01-0.05 mcg/kg/min Continuous Jeremy M Gonda, M.D. 9.03 mL/hr at 09/30/18 1218 0.05 mcg/kg/min at 09/30/18 1218   • Pharmacy Consult: Enteral tube feeding - review meds/change route/product selection   PRN Jeremy M Gonda, M.D.       • senna-docusate (PERICOLACE or SENOKOT S) 8.6-50 MG per tablet 2 Tab  2 Tab BID Jeremy M Gonda, M.D.   2 Tab at 09/30/18 0543    And   • polyethylene glycol/lytes (MIRALAX) PACKET 1 Packet  1 Packet QDAY PRN Jeremy M Gonda, M.D.        And   • magnesium hydroxide (MILK OF MAGNESIA) suspension 30 mL  30 mL QDAY PRN Jeremy M Gonda, M.D.        And   • bisacodyl (DULCOLAX) suppository 10 mg  10 mg QDAY PRN Jeremy M Gonda, M.D.       • ondansetron (ZOFRAN ODT) dispertab 4 mg  4 mg Q4HRS PRN Jeremy M Gonda, M.D.       • acetaminophen (TYLENOL) tablet 650 mg  650 mg Q6HRS PRN Jeremy M Gonda, M.D.   650 mg at 09/30/18 0839   • azithromycin (ZITHROMAX) tablet 250 mg  250 mg DAILY Jeremy M Gonda, M.D.   250 mg at 09/30/18 0544   • famotidine (PEPCID) tablet 20 mg  20 mg Q12HRS Jeremy M Gonda, M.D.        Or   • famotidine (PEPCID) injection 20 mg  20 mg Q12HRS Jeremy M Gonda, M.D.   20 mg at 09/30/18 0543   • atorvastatin (LIPITOR) tablet 40 mg  40 mg Q EVENING Jeremy M Gonda, M.D.       • aspirin (ASA) chewable tab 81 mg  81 mg DAILY Jeremy M Gonda, M.D.   81 mg at 09/30/18 0544   • phenylephrine (MARYSOL-SYNEPHRINE) 40,000 mcg in  mL Infusion  1-300 mcg/min Continuous Walter Carrion M.D. 18.8 mL/hr at 09/30/18 0644 50 mcg/min at 09/30/18 0644     Last reviewed on 9/29/2018  2:42 PM  "by Tasia Hidalgo, T  Allergies   Allergen Reactions   • Nkda [No Known Drug Allergy]        Physical Exam  Body mass index is 29.04 kg/m². Blood pressure 144/86, pulse 94, temperature (!) 38.6 °C (101.5 °F), resp. rate (!) 29, height 1.778 m (5' 10\"), weight 91.8 kg (202 lb 6.1 oz), SpO2 99 %. Vitals:    09/30/18 0845 09/30/18 0900 09/30/18 1030 09/30/18 1219   BP:       Pulse: 95 94     Resp: (!) 28 (!) 29     Temp:  (!) 38.6 °C (101.5 °F)     SpO2:   99% 99%   Weight:       Height:        Oxygen Therapy:  Pulse Oximetry: 99 %, O2 (LPM): 15, FiO2%: 50 %, O2 Delivery: Ventilator    General: RASS -3  Eyes: nl conjunctiva  ENT: OP clear  Neck: no JVD   Lungs: course vent sounds  Heart: RRR, no murmurs, no rubs or gallops,   EXT no edema bilateral lower extremities. + pedal pulses. no cyanosis  Abdomen: soft, non tender, non distended,  Neurological: No focal sensory deficits  Psychiatric: Appropriate affect, A/O x 3  Skin: Warm extremities    Labs (personally reviewed and notable for):   Recent Results (from the past 24 hour(s))   Lactic acid (lactate)    Collection Time: 09/29/18  1:17 PM   Result Value Ref Range    Lactic Acid 3.8 (H) 0.5 - 2.0 mmol/L   CBC WITH DIFFERENTIAL    Collection Time: 09/29/18  1:17 PM   Result Value Ref Range    WBC 14.9 (H) 4.8 - 10.8 K/uL    RBC 4.53 (L) 4.70 - 6.10 M/uL    Hemoglobin 14.4 14.0 - 18.0 g/dL    Hematocrit 40.3 (L) 42.0 - 52.0 %    MCV 89.0 81.4 - 97.8 fL    MCH 31.8 27.0 - 33.0 pg    MCHC 35.7 (H) 33.7 - 35.3 g/dL    RDW 42.8 35.9 - 50.0 fL    Platelet Count 117 (L) 164 - 446 K/uL    MPV 9.5 9.0 - 12.9 fL    Neutrophils-Polys 85.40 (H) 44.00 - 72.00 %    Lymphocytes 6.10 (L) 22.00 - 41.00 %    Monocytes 7.90 0.00 - 13.40 %    Eosinophils 0.00 0.00 - 6.90 %    Basophils 0.20 0.00 - 1.80 %    Immature Granulocytes 0.40 0.00 - 0.90 %    Nucleated RBC 0.00 /100 WBC    Neutrophils (Absolute) 12.68 (H) 1.82 - 7.42 K/uL    Lymphs (Absolute) 0.91 (L) 1.00 - 4.80 K/uL    " Monos (Absolute) 1.18 (H) 0.00 - 0.85 K/uL    Eos (Absolute) 0.00 0.00 - 0.51 K/uL    Baso (Absolute) 0.03 0.00 - 0.12 K/uL    Immature Granulocytes (abs) 0.06 0.00 - 0.11 K/uL    NRBC (Absolute) 0.00 K/uL   COMP METABOLIC PANEL    Collection Time: 09/29/18  1:17 PM   Result Value Ref Range    Sodium 135 135 - 145 mmol/L    Potassium 4.1 3.6 - 5.5 mmol/L    Chloride 101 96 - 112 mmol/L    Co2 20 20 - 33 mmol/L    Anion Gap 14.0 (H) 0.0 - 11.9    Glucose 303 (H) 65 - 99 mg/dL    Bun 22 8 - 22 mg/dL    Creatinine 1.11 0.50 - 1.40 mg/dL    Calcium 8.7 8.5 - 10.5 mg/dL    AST(SGOT) 15 12 - 45 U/L    ALT(SGPT) 15 2 - 50 U/L    Alkaline Phosphatase 87 30 - 99 U/L    Total Bilirubin 1.6 (H) 0.1 - 1.5 mg/dL    Albumin 4.1 3.2 - 4.9 g/dL    Total Protein 6.6 6.0 - 8.2 g/dL    Globulin 2.5 1.9 - 3.5 g/dL    A-G Ratio 1.6 g/dL   BLOOD CULTURE    Collection Time: 09/29/18  1:17 PM   Result Value Ref Range    Significant Indicator NEG     Source BLD     Site PERIPHERAL     Blood Culture       No Growth    Note: Blood cultures are incubated for 5 days and  are monitored continuously.Positive blood cultures  are called to the RN and reported as soon as  they are identified.     BNP    Collection Time: 09/29/18  1:17 PM   Result Value Ref Range    B Natriuretic Peptide 360 (H) 0 - 100 pg/mL   TROPONIN    Collection Time: 09/29/18  1:17 PM   Result Value Ref Range    Troponin I 0.81 (H) 0.00 - 0.04 ng/mL   D-DIMER    Collection Time: 09/29/18  1:17 PM   Result Value Ref Range    D-Dimer Screen 7.58 (H) 0.00 - 0.50 ug/mL (FEU)   ESTIMATED GFR    Collection Time: 09/29/18  1:17 PM   Result Value Ref Range    GFR If African American >60 >60 mL/min/1.73 m 2    GFR If Non African American >60 >60 mL/min/1.73 m 2   EKG    Collection Time: 09/29/18  1:23 PM   Result Value Ref Range    Report       [facility] Emergency Dept.    Test Date:  2018-09-29  Pt Name:    TIMOTHY MCKNIGHTHR                 Department: ER  MRN:         [id number]                      Room:       [facility]  Gender:     Male                         Technician: [id number]  :        1940                   Requested By:ER TRIAGE PROTOCOL  Order #:    [id number]                    Reading MD:    Measurements  Intervals                                Axis  Rate:       112                          P:          50  KY:         140                          QRS:        44  QRSD:       136                          T:          -9  QT:         348  QTc:        475    Interpretive Statements  SINUS TACHYCARDIA  RIGHT BUNDLE BRANCH BLOCK  No previous ECG available for comparison     BLOOD CULTURE    Collection Time: 18  1:30 PM   Result Value Ref Range    Significant Indicator NEG     Source BLD     Site PERIPHERAL     Blood Culture       No Growth    Note: Blood cultures are incubated for 5 days and  are monitored continuously.Positive blood cultures  are called to the RN and reported as soon as  they are identified.     URINALYSIS    Collection Time: 18  2:56 PM   Result Value Ref Range    Color DK Yellow     Character Clear     Specific Gravity 1.025 <1.035    Ph 5.0 5.0 - 8.0    Glucose 500 (A) Negative mg/dL    Ketones Negative Negative mg/dL    Protein Negative Negative mg/dL    Bilirubin Negative Negative    Urobilinogen, Urine 0.2 Negative    Nitrite Negative Negative    Leukocyte Esterase Negative Negative    Occult Blood Negative Negative    Micro Urine Req see below    URINE CULTURE(NEW)    Collection Time: 18  2:56 PM   Result Value Ref Range    Significant Indicator NEG     Source UR     Site      Urine Culture No growth at 24 hours.    EKG    Collection Time: 18  4:28 PM   Result Value Ref Range    Report       [facility]    Test Date:  2018  Pt Name:    TIMOTHY MOORE                 Department: 171  MRN:        [id number]                      Room:       [facility]  Gender:     Male                         Technician: TXM  :         1940                   Requested By:ALOK AGUIRRE  Order #:    [id number]                    Reading MD: Luis Alberto Tinajero MD    Measurements  Intervals                                Axis  Rate:       125                          P:          60  NV:         134                          QRS:        70  QRSD:       143                          T:          -4  QT:         329  QTc:        475    Interpretive Statements  SINUS TACHYCARDIA  RIGHT BUNDLE BRANCH BLOCK  Compared to ECG 2018 13:23:31  No significant changes    Electronically Signed On 2018 21:11:41 PDT by Luis Alberto Tinajero MD     GRAM STAIN    Collection Time: 18  5:15 PM   Result Value Ref Range    Significant Indicator .     Source RESP     Site Bronchoalveolar Lavage     Gram Stain Result Few WBCs.  No organisms seen.      EC-ECHOCARDIOGRAM COMPLETE W/O CONT    Collection Time: 18  5:49 PM   Result Value Ref Range    Left Ventrical Ejection Fraction 70    EKG    Collection Time: 18  5:53 PM   Result Value Ref Range    Report       [facility]    Test Date:  2018  Pt Name:    TIMOTHY [facility]                 Department: [facility]  MRN:        [id number]                      Room:       T614  Gender:     Male                         Technician: TXELÍAS  :        1940                   Requested By:JEREMY M GONDA  Order #:    [id number]                    Reading MD: Luis Alberto Tinajero MD    Measurements  Intervals                                Axis  Rate:       134                          P:          76  NV:         116                          QRS:        85  QRSD:       134                          T:          -23  QT:         330  QTc:        493    Interpretive Statements  SINUS TACHYCARDIA  RIGHT BUNDLE BRANCH BLOCK  Compared to ECG 2018 16:28:50  Myocardial infarct finding now present    Electronically Signed On 2018 21:12:45 PDT by Luis Alberto Tinajero MD     CBC WITHOUT DIFFERENTIAL    Collection  Time: 09/29/18  6:00 PM   Result Value Ref Range    WBC 22.4 (H) 4.8 - 10.8 K/uL    RBC 4.98 4.70 - 6.10 M/uL    Hemoglobin 15.9 14.0 - 18.0 g/dL    Hematocrit 45.1 42.0 - 52.0 %    MCV 90.6 81.4 - 97.8 fL    MCH 31.9 27.0 - 33.0 pg    MCHC 35.3 33.7 - 35.3 g/dL    RDW 44.2 35.9 - 50.0 fL    Platelet Count 172 164 - 446 K/uL    MPV 9.5 9.0 - 12.9 fL   COMP METABOLIC PANEL    Collection Time: 09/29/18  6:00 PM   Result Value Ref Range    Sodium 133 (L) 135 - 145 mmol/L    Potassium 4.0 3.6 - 5.5 mmol/L    Chloride 101 96 - 112 mmol/L    Co2 21 20 - 33 mmol/L    Anion Gap 11.0 0.0 - 11.9    Glucose 299 (H) 65 - 99 mg/dL    Bun 22 8 - 22 mg/dL    Creatinine 0.93 0.50 - 1.40 mg/dL    Calcium 8.3 (L) 8.5 - 10.5 mg/dL    AST(SGOT) 17 12 - 45 U/L    ALT(SGPT) 14 2 - 50 U/L    Alkaline Phosphatase 92 30 - 99 U/L    Total Bilirubin 1.4 0.1 - 1.5 mg/dL    Albumin 3.7 3.2 - 4.9 g/dL    Total Protein 6.4 6.0 - 8.2 g/dL    Globulin 2.7 1.9 - 3.5 g/dL    A-G Ratio 1.4 g/dL   TROPONIN    Collection Time: 09/29/18  6:00 PM   Result Value Ref Range    Troponin I 0.97 (H) 0.00 - 0.04 ng/mL   BTYPE NATRIURETIC PEPTIDE    Collection Time: 09/29/18  6:00 PM   Result Value Ref Range    B Natriuretic Peptide 448 (H) 0 - 100 pg/mL   LACTIC ACID    Collection Time: 09/29/18  6:00 PM   Result Value Ref Range    Lactic Acid 2.1 (H) 0.5 - 2.0 mmol/L   TRIGLYCERIDE    Collection Time: 09/29/18  6:00 PM   Result Value Ref Range    Triglycerides 140 0 - 149 mg/dL   ESTIMATED GFR    Collection Time: 09/29/18  6:00 PM   Result Value Ref Range    GFR If African American >60 >60 mL/min/1.73 m 2    GFR If Non African American >60 >60 mL/min/1.73 m 2   PROCALCITONIN    Collection Time: 09/29/18  6:00 PM   Result Value Ref Range    Procalcitonin 0.08 <0.25 ng/mL   ISTAT ARTERIAL BLOOD GAS    Collection Time: 09/29/18  6:27 PM   Result Value Ref Range    Ph 7.216 (LL) 7.400 - 7.500    Pco2 52.6 (HH) 26.0 - 37.0 mmHg    Po2 90 (H) 64 - 87 mmHg    Tco2 23  20 - 33 mmol/L    S02 95 93 - 99 %    Hco3 21.3 17.0 - 25.0 mmol/L    BE -7 (L) -4 - 3 mmol/L    Body Temp 96.6 F degrees    O2 Therapy 100 %    iPF Ratio 90     Ph Temp Cesar 7.231 (LL) 7.400 - 7.500    Pco2 Temp Co 50.1 (H) 26.0 - 37.0 mmHg    Po2 Temp Cor 84 64 - 87 mmHg    Specimen Arterial     Action Range Triggered YES     Inst. Qualified Patient YES    ACCU-CHEK GLUCOSE    Collection Time: 09/29/18  6:46 PM   Result Value Ref Range    Glucose - Accu-Ck 287 (H) 65 - 99 mg/dL   ISTAT ARTERIAL BLOOD GAS    Collection Time: 09/29/18  9:11 PM   Result Value Ref Range    Ph 7.297 (LL) 7.400 - 7.500    Pco2 40.6 (H) 26.0 - 37.0 mmHg    Po2 221 (H) 64 - 87 mmHg    Tco2 21 20 - 33 mmol/L    S02 100 (H) 93 - 99 %    Hco3 19.9 17.0 - 25.0 mmol/L    BE -6 (L) -4 - 3 mmol/L    Body Temp 97.6 F degrees    O2 Therapy 100 %    iPF Ratio 221     Ph Temp Cesar 7.305 (L) 7.400 - 7.500    Pco2 Temp Co 39.7 (H) 26.0 - 37.0 mmHg    Po2 Temp Cor 218 (H) 64 - 87 mmHg    Specimen Arterial     Action Range Triggered YES     Inst. Qualified Patient YES    ACCU-CHEK GLUCOSE    Collection Time: 09/29/18  9:44 PM   Result Value Ref Range    Glucose - Accu-Ck 200 (H) 65 - 99 mg/dL   PROTHROMBIN TIME    Collection Time: 09/29/18 10:32 PM   Result Value Ref Range    PT 15.0 (H) 12.0 - 14.6 sec    INR 1.17 (H) 0.87 - 1.13   Lactic Acid Every four hours after STAT order    Collection Time: 09/29/18 10:32 PM   Result Value Ref Range    Lactic Acid 2.7 (H) 0.5 - 2.0 mmol/L   LACTIC ACID    Collection Time: 09/29/18 10:32 PM   Result Value Ref Range    Lactic Acid 2.7 (H) 0.5 - 2.0 mmol/L   INFLUENZA A/B BY PCR    Collection Time: 09/30/18 12:04 AM   Result Value Ref Range    Influenza virus A RNA Negative Negative    Influenza virus B, PCR Negative Negative   Lactic Acid Every four hours after STAT order    Collection Time: 09/30/18  4:05 AM   Result Value Ref Range    Lactic Acid 2.5 (H) 0.5 - 2.0 mmol/L   CBC with Differential    Collection  Time: 09/30/18  4:05 AM   Result Value Ref Range    WBC 20.8 (H) 4.8 - 10.8 K/uL    RBC 4.88 4.70 - 6.10 M/uL    Hemoglobin 15.3 14.0 - 18.0 g/dL    Hematocrit 44.2 42.0 - 52.0 %    MCV 90.6 81.4 - 97.8 fL    MCH 31.4 27.0 - 33.0 pg    MCHC 34.6 33.7 - 35.3 g/dL    RDW 44.7 35.9 - 50.0 fL    Platelet Count 184 164 - 446 K/uL    MPV 9.3 9.0 - 12.9 fL    Nucleated RBC 0.00 /100 WBC    NRBC (Absolute) 0.00 K/uL    Neutrophils-Polys 80.00 (H) 44.00 - 72.00 %    Lymphocytes 10.40 (L) 22.00 - 41.00 %    Monocytes 7.80 0.00 - 13.40 %    Eosinophils 0.00 0.00 - 6.90 %    Basophils 0.00 0.00 - 1.80 %    Neutrophils (Absolute) 17.01 (H) 1.82 - 7.42 K/uL    Lymphs (Absolute) 2.16 1.00 - 4.80 K/uL    Monos (Absolute) 1.62 (H) 0.00 - 0.85 K/uL    Eos (Absolute) 0.00 0.00 - 0.51 K/uL    Baso (Absolute) 0.00 0.00 - 0.12 K/uL   Basic Metabolic Panel (BMP)    Collection Time: 09/30/18  4:05 AM   Result Value Ref Range    Sodium 135 135 - 145 mmol/L    Potassium 4.8 3.6 - 5.5 mmol/L    Chloride 105 96 - 112 mmol/L    Co2 20 20 - 33 mmol/L    Glucose 242 (H) 65 - 99 mg/dL    Bun 28 (H) 8 - 22 mg/dL    Creatinine 1.41 (H) 0.50 - 1.40 mg/dL    Calcium 8.0 (L) 8.5 - 10.5 mg/dL    Anion Gap 10.0 0.0 - 11.9   Magnesium    Collection Time: 09/30/18  4:05 AM   Result Value Ref Range    Magnesium 2.1 1.5 - 2.5 mg/dL   Phosphorus    Collection Time: 09/30/18  4:05 AM   Result Value Ref Range    Phosphorus 4.0 2.5 - 4.5 mg/dL   ESTIMATED GFR    Collection Time: 09/30/18  4:05 AM   Result Value Ref Range    GFR If  59 (A) >60 mL/min/1.73 m 2    GFR If Non African American 49 (A) >60 mL/min/1.73 m 2   DIFFERENTIAL MANUAL    Collection Time: 09/30/18  4:05 AM   Result Value Ref Range    Bands-Stabs 1.80 0.00 - 10.00 %    Manual Diff Status PERFORMED    PERIPHERAL SMEAR REVIEW    Collection Time: 09/30/18  4:05 AM   Result Value Ref Range    Peripheral Smear Review see below    PLATELET ESTIMATE    Collection Time: 09/30/18  4:05 AM    Result Value Ref Range    Plt Estimation Normal    MORPHOLOGY    Collection Time: 09/30/18  4:05 AM   Result Value Ref Range    RBC Morphology Present     Poikilocytosis 2+     Ovalocytes 1+     Schistocytes 1+    ISTAT ARTERIAL BLOOD GAS    Collection Time: 09/30/18  4:37 AM   Result Value Ref Range    Ph 7.322 (L) 7.400 - 7.500    Pco2 35.4 26.0 - 37.0 mmHg    Po2 95 (H) 64 - 87 mmHg    Tco2 19 (L) 20 - 33 mmol/L    S02 97 93 - 99 %    Hco3 18.3 17.0 - 25.0 mmol/L    BE -7 (L) -4 - 3 mmol/L    Body Temp 38.3 C degrees    O2 Therapy 50 %    iPF Ratio 190     Ph Temp Cesar 7.304 (L) 7.400 - 7.500    Pco2 Temp Co 37.4 (H) 26.0 - 37.0 mmHg    Po2 Temp Cor 102 (H) 64 - 87 mmHg    Specimen Arterial     Action Range Triggered NO     Inst. Qualified Patient YES    ACCU-CHEK GLUCOSE    Collection Time: 09/30/18  6:15 AM   Result Value Ref Range    Glucose - Accu-Ck 241 (H) 65 - 99 mg/dL       Cardiac Imaging and Procedures Review:    EKG and telemetry tracings personally reviewed    Impression and Medical Decision Making:  Principal Problem:    Sepsis (HCC) POA: Yes  Active Problems:    Pneumonia POA: Yes    Acute respiratory failure with hypoxia (HCC) POA: Yes    Acute metabolic encephalopathy POA: Unknown    Elevated troponin POA: Yes    Mixed hyperlipidemia POA: Yes    Thrombocytopenia (HCC) POA: Yes    Hyperglycemia POA: Yes    RBBB POA: Yes  Resolved Problems:    * No resolved hospital problems. *    RIGHT HEART FAILURE  Hemodynamics unclear consider swan  Defer flolan management to pulm  Would get erpeat baseline echo after heals from current presentation    NSTEMI  In setting of right heart failure  Statin  Aspirin  Ef wnl  Non smoker  Add metop when able  Cardiac rehab referral    SHOCK likely septic cannot roule out right heart component  Wean pressors as tolerated    Cardiology will sign off but make follow up appointment, please call for any concerns especially arrythmia      It is my pleasure to participate in  the care of Mr. Lu.  Please do not hesitate to contact me with questions or concerns.    Luis Alberto Selby MD PhD [facility]  Cardiologist [facility]    9/30/2018

## 2018-09-30 NOTE — CONSULTS
Cardiology Consult Note:    Luis Alberto Tinajero  Date & Time note created:    9/29/2018   6:17 PM     Referring MD:  Dr. Carrion    Patient ID:   Name:             Murali Lu     YOB: 1940  Age:                 78 y.o.  male   MRN:               [id number]                                                             Reason for Consult:      SOB    History of Present Illness:    78-year-old male with a past medical history of hyperlipidemia.  He has been complaining of a cough for approximately 1 week.  He started having progressive shortness of breath.  He went to urgent care was found to have an 89% oxygen saturation on room air.  They were worried about pneumonia so was transferred to the ER.  Here in the ER basement is starting symptoms there was concern for a PE.  A stat bedside portable echo showed a normal LV systolic function with no valvular disease and possibly a mildly dilated right ventricle.  He was sent for a CT PE study.  This was negative.  At that point his troponin was 0.8 and he appeared to have a new onset right bundle branch block.  He was transferred to the floor and had a respiratory decompensation and was intubated for airway protection and persistent hypoxia.  A stat echocardiogram was obtained which continue to show hyperdynamic LV systolic function a trace pericardial effusion and no significant valvular disease.  By this point his RV by formal echo appeared normal.    Review of Systems:      Intubated            Past Medical History:   Past Medical History:   Diagnosis Date   • Colon polyps    • MEDICAL HOME      Active Hospital Problems    Diagnosis   • Sepsis (HCC) [A41.9]     Priority: High   • Pneumonia [J18.9]     Priority: High   • Acute respiratory failure with hypoxia (HCC) [J96.01]     Priority: High   • Acute metabolic encephalopathy [G93.41]     Priority: High   • Elevated troponin [R74.8]     Priority: Medium   • Hyperglycemia [R73.9]     Priority: Low   • RBBB  [I45.10]     Priority: Low   • Thrombocytopenia (HCC) [D69.6]     Priority: Low   • Mixed hyperlipidemia [E78.2]     Priority: Low       Past Surgical History:  Past Surgical History:   Procedure Laterality Date   • HYDROCELECTOMY ADULT     • UMBILICAL HERNIA REPAIR         Hospital Medications:  Current Facility-Administered Medications   Medication Dose   • NS infusion 2,709 mL  30 mL/kg   • senna-docusate (PERICOLACE or SENOKOT S) 8.6-50 MG per tablet 2 Tab  2 Tab    And   • polyethylene glycol/lytes (MIRALAX) PACKET 1 Packet  1 Packet    And   • magnesium hydroxide (MILK OF MAGNESIA) suspension 30 mL  30 mL    And   • bisacodyl (DULCOLAX) suppository 10 mg  10 mg   • Respiratory Care per Protocol     • NS (BOLUS) infusion 500 mL  500 mL   • acetaminophen (TYLENOL) tablet 650 mg  650 mg   • [START ON 9/30/2018] azithromycin (ZITHROMAX) tablet 250 mg  250 mg   • ondansetron (ZOFRAN) syringe/vial injection 4 mg  4 mg   • ondansetron (ZOFRAN ODT) dispertab 4 mg  4 mg   • insulin regular (HUMULIN R) injection 3-14 Units  3-14 Units    And   • glucose 4 g chewable tablet 16 g  16 g    And   • dextrose 50% (D50W) injection 25 mL  25 mL   • aspirin (ASA) chewable tab 81 mg  81 mg   • atorvastatin (LIPITOR) tablet 40 mg  40 mg   • [START ON 9/30/2018] ampicillin/sulbactam (UNASYN) 3 g in  mL IVPB  3 g   • propofol (DIPRIVAN) injection  0-80 mcg/kg/min   • famotidine (PEPCID) tablet 20 mg  20 mg    Or   • famotidine (PEPCID) injection 20 mg  20 mg   • MD Alert...ICU Electrolyte Replacement per Pharmacy     • Pharmacy Consult: Enteral tube feeding - review meds/change route/product selection     • enoxaparin (LOVENOX) inj 40 mg  40 mg   • MD Alert...PROPOFOL CRITICAL CARE PROTOCOL 1 Each  1 Each   • fentaNYL (SUBLIMAZE) injection 25 mcg  25 mcg    Or   • fentaNYL (SUBLIMAZE) injection 50 mcg  50 mcg    Or   • fentaNYL (SUBLIMAZE) injection 100 mcg  100 mcg   • ipratropium-albuterol (DUONEB) nebulizer solution  3 mL  "  • nitroglycerin 50 mg in D5W 250 ml infusion  0-200 mcg/min   • epoprostenol (FLOLAN) 1.5 mg in glycine diluent for flolan 50 mL for Inhalation  0.01-0.05 mcg/kg/min         Current Outpatient Medications:  Facility-Administered Medications Prior to Admission   Medication Dose Route Frequency Provider Last Rate Last Dose   • [COMPLETED] ipratropium-albuterol (DUONEB) nebulizer solution  3 mL Nebulization Once Levi Rivera P.A.-C.   3 mL at 09/29/18 1427     Prescriptions Prior to Admission   Medication Sig Dispense Refill Last Dose   • multivitamin (THERAGRAN) Tab Take 1 Tab by mouth every 3 days.   >2 days at unknown   • vitamin e (VITAMIN E) 400 UNIT Cap Take 400 Units by mouth every 3 days.   >2 days at unknown   • vitamin D (CHOLECALCIFEROL) 1000 UNIT Tab Take 1,000 Units by mouth every 3 days.   >2 days at unknown   • NON SPECIFIED Take 1 Tab by mouth every 3 days. Beet Root   >2 days at unknown   • Bioflavonoid Products (GRAPE SEED PO) Take 1 Cap by mouth every 3 days.   >3 days at unknown   • NON SPECIFIED Take 1 Tab by mouth every 3 days. \"Blueberry\"   >2 days at unknown   • Bilberry, Vaccinium myrtillus, (BILBERRY PO) Take 1 Cap by mouth every 3 days.   >2 days at unknown   • Ginkgo Biloba (GNP GINGKO BILOBA EXTRACT PO) Take 1 Cap by mouth every 3 days.   >2 days at unknown   • CINNAMON PO Take 1 Cap by mouth every 3 days.   >2 days at unknown   • SELENIUM PO Take 1 Tab by mouth every 3 days.   >2 days at unknown   • LUTEIN-ZEAXANTHIN PO Take 1 Tab by mouth every 3 days.   >2 days at unknown   • HORSE CHESTNUT PO Take 1 Cap by mouth every 3 days.   >2 days at unknown   • ascorbic acid (ASCORBIC ACID) 500 MG Tab Take 500 mg by mouth every 3 days.   >2 days at unknown   • ECHINACEA PO Take 1 Tab by mouth every 3 days.   >2 days at unknown   • Cyanocobalamin (VITAMIN B-12) 1000 MCG Tab Take 1,000 mcg by mouth every 48 hours.   >2 days at unknown   • pyridoxine (VITAMIN B-6) 50 MG Tab Take 50 mg by " "mouth every 48 hours.   >2 days at unknown   • Niacin (VITAMIN B-3 PO) Take 1 Tab by mouth every 48 hours.   >2 days at unknowb       Medication Allergy:  Allergies   Allergen Reactions   • Nkda [No Known Drug Allergy]        Family History:  Family History   Problem Relation Age of Onset   • Heart Disease Mother    • Cancer Sister    • Diabetes Brother    • Cancer Sister        Social History:  Social History     Social History   • Marital status:      Spouse name: N/A   • Number of children: N/A   • Years of education: N/A     Occupational History   • Not on file.     Social History Main Topics   • Smoking status: Never Smoker   • Smokeless tobacco: Never Used   • Alcohol use No   • Drug use: No      Comment: on occasion    • Sexual activity: Not on file     Other Topics Concern   • Not on file     Social History Narrative   • No narrative on file         Physical Exam:  Vitals/ General Appearance:   Weight/BMI: Body mass index is 28.03 kg/m².  Blood pressure 144/86, pulse (!) 135, temperature 37 °C (98.6 °F), resp. rate (!) 35, height 1.778 m (5' 10\"), weight 88.6 kg (195 lb 5.2 oz), SpO2 (!) 84 %.  Vitals:    09/29/18 1630 09/29/18 1630 09/29/18 1700 09/29/18 1714   BP:       Pulse: (!) 135      Resp: (!) 35      Temp:  37 °C (98.6 °F)     SpO2: (!) 84%   (!) 84%   Weight:   88.6 kg (195 lb 5.2 oz)    Height:         Oxygen Therapy:  Pulse Oximetry: (!) 84 %, O2 (LPM): 15, FiO2%: 100 %, O2 Delivery: High Flow Nasal Cannula    Constitutional:   Well developed, Well nourished, No acute distress  HENMT:  Normocephalic, Atraumatic, Oropharynx moist mucous membranes, No oral exudates, Nose normal.  No thyromegaly.  Eyes:  EOMI, Conjunctiva normal, No discharge.  Neck:  Normal range of motion, No cervical tenderness,  no JVD.  Cardiovascular:  Normal heart rate, Normal rhythm, No murmurs, No rubs, No gallops.   Extremitites with intact distal pulses, no cyanosis, or edema.  Lungs:  Normal breath sounds, breath " sounds clear to auscultation bilaterally,  no crackles, no wheezing.   Abdomen: Bowel sounds normal, Soft, No tenderness, No guarding, No rebound, No masses, No hepatosplenomegaly.  Skin: Warm, Dry, No erythema, No rash, no induration.  Neurologic: intubated, sedated  Psychiatric: intubated, sedated      MDM (Data Review):     Records reviewed and summarized in current documentation    Lab Data Review:  Recent Results (from the past 24 hour(s))   Lactic acid (lactate)    Collection Time: 09/29/18  1:17 PM   Result Value Ref Range    Lactic Acid 3.8 (H) 0.5 - 2.0 mmol/L   CBC WITH DIFFERENTIAL    Collection Time: 09/29/18  1:17 PM   Result Value Ref Range    WBC 14.9 (H) 4.8 - 10.8 K/uL    RBC 4.53 (L) 4.70 - 6.10 M/uL    Hemoglobin 14.4 14.0 - 18.0 g/dL    Hematocrit 40.3 (L) 42.0 - 52.0 %    MCV 89.0 81.4 - 97.8 fL    MCH 31.8 27.0 - 33.0 pg    MCHC 35.7 (H) 33.7 - 35.3 g/dL    RDW 42.8 35.9 - 50.0 fL    Platelet Count 117 (L) 164 - 446 K/uL    MPV 9.5 9.0 - 12.9 fL    Neutrophils-Polys 85.40 (H) 44.00 - 72.00 %    Lymphocytes 6.10 (L) 22.00 - 41.00 %    Monocytes 7.90 0.00 - 13.40 %    Eosinophils 0.00 0.00 - 6.90 %    Basophils 0.20 0.00 - 1.80 %    Immature Granulocytes 0.40 0.00 - 0.90 %    Nucleated RBC 0.00 /100 WBC    Neutrophils (Absolute) 12.68 (H) 1.82 - 7.42 K/uL    Lymphs (Absolute) 0.91 (L) 1.00 - 4.80 K/uL    Monos (Absolute) 1.18 (H) 0.00 - 0.85 K/uL    Eos (Absolute) 0.00 0.00 - 0.51 K/uL    Baso (Absolute) 0.03 0.00 - 0.12 K/uL    Immature Granulocytes (abs) 0.06 0.00 - 0.11 K/uL    NRBC (Absolute) 0.00 K/uL   COMP METABOLIC PANEL    Collection Time: 09/29/18  1:17 PM   Result Value Ref Range    Sodium 135 135 - 145 mmol/L    Potassium 4.1 3.6 - 5.5 mmol/L    Chloride 101 96 - 112 mmol/L    Co2 20 20 - 33 mmol/L    Anion Gap 14.0 (H) 0.0 - 11.9    Glucose 303 (H) 65 - 99 mg/dL    Bun 22 8 - 22 mg/dL    Creatinine 1.11 0.50 - 1.40 mg/dL    Calcium 8.7 8.5 - 10.5 mg/dL    AST(SGOT) 15 12 - 45 U/L     ALT(SGPT) 15 2 - 50 U/L    Alkaline Phosphatase 87 30 - 99 U/L    Total Bilirubin 1.6 (H) 0.1 - 1.5 mg/dL    Albumin 4.1 3.2 - 4.9 g/dL    Total Protein 6.6 6.0 - 8.2 g/dL    Globulin 2.5 1.9 - 3.5 g/dL    A-G Ratio 1.6 g/dL   BNP    Collection Time: 18  1:17 PM   Result Value Ref Range    B Natriuretic Peptide 360 (H) 0 - 100 pg/mL   TROPONIN    Collection Time: 18  1:17 PM   Result Value Ref Range    Troponin I 0.81 (H) 0.00 - 0.04 ng/mL   D-DIMER    Collection Time: 18  1:17 PM   Result Value Ref Range    D-Dimer Screen 7.58 (H) 0.00 - 0.50 ug/mL (FEU)   ESTIMATED GFR    Collection Time: 18  1:17 PM   Result Value Ref Range    GFR If African American >60 >60 mL/min/1.73 m 2    GFR If Non African American >60 >60 mL/min/1.73 m 2   EKG    Collection Time: 18  1:23 PM   Result Value Ref Range    Report       [facility] Emergency Dept[facility]    Test Date:  2018  Pt Name:    TIMOTHY MOORE                 Department: ER  MRN:        [id number]                      Room:       [facility]  Gender:     Male                         Technician: [id number]  :        1940                   Requested By:ER TRIAGE PROTOCOL  Order #:    [id number]                    Reading MD:    Measurements  Intervals                                Axis  Rate:       112                          P:          50  TX:         140                          QRS:        44  QRSD:       136                          T:          -9  QT:         348  QTc:        475    Interpretive Statements  SINUS TACHYCARDIA  RIGHT BUNDLE BRANCH BLOCK  No previous ECG available for comparison     URINALYSIS    Collection Time: 18  2:56 PM   Result Value Ref Range    Color DK Yellow     Character Clear     Specific Gravity 1.025 <1.035    Ph 5.0 5.0 - 8.0    Glucose 500 (A) Negative mg/dL    Ketones Negative Negative mg/dL    Protein Negative Negative mg/dL    Bilirubin Negative Negative    Urobilinogen, Urine 0.2  Negative    Nitrite Negative Negative    Leukocyte Esterase Negative Negative    Occult Blood Negative Negative    Micro Urine Req see below    EKG    Collection Time: 18  4:28 PM   Result Value Ref Range    Report       [facility]    Test Date:  2018  Pt Name:    TIMOTHY MCKNIGHTHR                 Department: 171  MRN:        [id number]                      Room:       T614  Gender:     Male                         Technician: LUCY  :        1940                   Requested By:ALOK AGUIRRE  Order #:    [id number]                    Reading MD:    Measurements  Intervals                                Axis  Rate:       125                          P:          60  SD:         134                          QRS:        70  QRSD:       143                          T:          -4  QT:         329  QTc:        475    Interpretive Statements  SINUS TACHYCARDIA  RIGHT BUNDLE BRANCH BLOCK  Compared to ECG 2018 13:23:31  No significant changes     EKG    Collection Time: 18  5:53 PM   Result Value Ref Range    Report       [facility]    Test Date:  2018  Pt Name:    TIMOTHY [facility]                 Department: 161  MRN:        [id number]                      Room:       14  Gender:     Male                         Technician: LUCY  :        1940                   Requested By:JEREMY M GONDA  Order #:    [id number]                    Reading MD:    Measurements  Intervals                                Axis  Rate:       134                          P:          76  SD:         116                          QRS:        85  QRSD:       134                          T:          -23  QT:         330  QTc:        493    Interpretive Statements  SINUS TACHYCARDIA  RIGHT BUNDLE BRANCH BLOCK  PROBABLE POSTERIOR INFARCT, ACUTE  BASELINE WANDER IN LEAD(S) III,aVF  Compared to ECG 2018 16:28:50  Myocardial infarct finding now present     CBC WITHOUT DIFFERENTIAL    Collection Time: 18   6:00 PM   Result Value Ref Range    WBC 22.4 (H) 4.8 - 10.8 K/uL    RBC 4.98 4.70 - 6.10 M/uL    Hemoglobin 15.9 14.0 - 18.0 g/dL    Hematocrit 45.1 42.0 - 52.0 %    MCV 90.6 81.4 - 97.8 fL    MCH 31.9 27.0 - 33.0 pg    MCHC 35.3 33.7 - 35.3 g/dL    RDW 44.2 35.9 - 50.0 fL    Platelet Count 172 164 - 446 K/uL    MPV 9.5 9.0 - 12.9 fL   LACTIC ACID    Collection Time: 09/29/18  6:00 PM   Result Value Ref Range    Lactic Acid 2.1 (H) 0.5 - 2.0 mmol/L       Imaging/Procedures Review:    Chest Xray:  Reviewed    EKG:   Personally reviewed by myself showing sinus tachycardia with right bundle branch block.    ECHO:  Per HPI.    MDM (Assessment and Plan):     Active Hospital Problems    Diagnosis   • Sepsis (HCC) [A41.9]     Priority: High   • Pneumonia [J18.9]     Priority: High   • Acute respiratory failure with hypoxia (HCC) [J96.01]     Priority: High   • Acute metabolic encephalopathy [G93.41]     Priority: High   • Elevated troponin [R74.8]     Priority: Medium   • Hyperglycemia [R73.9]     Priority: Low   • RBBB [I45.10]     Priority: Low   • Thrombocytopenia (HCC) [D69.6]     Priority: Low   • Mixed hyperlipidemia [E78.2]     Priority: Low     78-year-old male with what appears to be sepsis from a respiratory source.  At this point his echocardiogram appears normal.  His troponin is likely secondary to his sepsis presentation.  I do not think he requires any further cardiovascular testing.  Please feel that a consult cardiology again around or call for any questions.    Thank for you allowing me to take part in your patient's care, please call should you have any questions or would like to discuss this patient.

## 2018-09-30 NOTE — CARE PLAN
Problem: Ventilation Defect:  Goal: Ability to achieve and maintain unassisted ventilation or tolerate decreased levels of ventilator support    Intervention: Support and monitor invasive and noninvasive mechanical ventilation  Adult Ventilation Update    Total Vent Days: 2  Vent:  x 20, 40% +14    Patient Lines/Drains/Airways Status    Active Airway     Name: Placement date: Placement time: Site: Days:    Airway ETT Oral 8.0 09/29/18   1714   Oral   2              Mobility  Activity Performed: Unable to mobilize     Events/Summary/Plan: Patient stable on vent. No changes. Still on Flolan.

## 2018-09-30 NOTE — PROGRESS NOTES
Cortrak Placement    Tube Team verified patient name and medical record number prior to tube placement.  Cortrak tube (43 inches, 10 Peruvian) placed at 97 cm in left nare.  Per Cortrak picture, tube appears to be in the stomach.  Nursing Instructions: Awaiting KUB to confirm placement before use for medications or feeding. Once placement confirmed, flush tube with 30 ml of water, and then remove and save stylet, in patient medication drawer.

## 2018-09-30 NOTE — CARE PLAN
Problem: Oxygenation:  Goal: Maintain adequate oxygenation dependent on patient condition  Outcome: PROGRESSING SLOWER THAN EXPECTED  Pt on 100% oxygen.    Problem: Ventilation Defect:  Goal: Ability to achieve and maintain unassisted ventilation or tolerate decreased levels of ventilator support  Outcome: PROGRESSING SLOWER THAN EXPECTED  Cardiopulmonary Resuscitation/Intubation Assist    Intubation assist performed yes  Reason for intubation Respiratory failure  Positive Color Change on EZCap? yes  EtCO2 mmHg: N/A  Difficult Intubation/Number of attempts 1  Evidence of aspiration No  Airway ETT Oral 8.0-Secured At  (cm): 26 (09/29/18 1700)        Rate (breaths/min): 18 (09/29/18 1714)  Vt Target (mL): 440 (09/29/18 1714)  FiO2: 100 (09/29/18 1714)  PEEP/CPAP: 14 (09/29/18 1714)       Events/Summary/Plan: Pt intubated (09/29/18 1714)    Adult Ventilation Update    Total Vent Days: 1    Patient Lines/Drains/Airways Status    Active Airway     Name: Placement date: Placement time: Site: Days:    Airway ETT Oral 8.0 09/29/18 1714   Oral   less than 1            Cough: Productive (09/29/18 1714)  Sputum Amount: Copious (09/29/18 1714)  Sputum Color: Bloody (09/29/18 1714)  Sputum Consistency: Frothy;Thin (09/29/18 1714)         Events/Summary/Plan: Pt intubated (09/29/18 1714) waiting for Flolan.

## 2018-09-30 NOTE — PROGRESS NOTES
1650 Patient in CICU room T-614. Patient O2 Sats 80% on 100% Non-rebreather. Patient Given RSI  Etomidate 20mg and Rocuronium 100mg. Patient intubated at 1658. 1603 propofol started at 30.

## 2018-09-30 NOTE — ASSESSMENT & PLAN NOTE
9/29/18 echocardiogram with RVSP of 65 mmHg  CT was negative for pulmonary embolism    Continue IV Lasix

## 2018-09-30 NOTE — PROGRESS NOTES
[facility] Hospitalist Progress Note    Date of Service: 2018    Chief Complaint  78 y.o. male admitted 2018 with cough and shortness of breath.  Patient was admitted to the hospital and shortly thereafter had significant increase in shortness of breath and required intubation.    Interval Problem Update  Intubated yesterday evening with acute respiratory failure c/o pneumonia  Sedated  On Flolan  PEEP:14 FiO2:40%  CXR with R lung opacification  Initiate insulin drip    Consultants/Specialty  Pulmonary  Cardiology    Disposition  To Remain in ICU        Review of Systems   Unable to perform ROS: Intubated      Physical Exam  Laboratory/Imaging   Hemodynamics  Temp (24hrs), Av °C (100.4 °F), Min:35.8 °C (96.4 °F), Max:38.8 °C (101.8 °F)   Temperature: (!) 38.6 °C (101.5 °F)  Pulse  Av.4  Min: 79  Max: 141 Heart Rate (Monitored): 77  Blood Pressure : 144/86, NIBP: 118/74      Respiratory  Smith Vent Mode: APVCMV, Rate (breaths/min): 20, PEEP/CPAP: 14, PEEP/CPAP: 14, FiO2: 40, P Peak (PIP): 24, P MEAN: 18   Respiration: (!) 29, Pulse Oximetry: 99 %     #Flolan: Yes, Work Of Breathing / Effort: Vented  RUL Breath Sounds: Diminished, RML Breath Sounds: Diminished, RLL Breath Sounds: Diminished, BRITTANY Breath Sounds: Diminished, LLL Breath Sounds: Diminished    Fluids    Intake/Output Summary (Last 24 hours) at 18 1614  Last data filed at 18 1300   Gross per 24 hour   Intake          2096.57 ml   Output              695 ml   Net          1401.57 ml       Nutrition  Orders Placed This Encounter   Procedures   • Diet NPO     Standing Status:   Standing     Number of Occurrences:   1     Order Specific Question:   Type:     Answer:   Now [1]     Order Specific Question:   Restrict to:     Answer:   Strict [1]     Physical Exam   Constitutional: He has a sickly appearance. He is sedated, intubated and restrained.   HENT:   Head: Normocephalic and atraumatic.   Nose: Nose normal.   Mouth/Throat:  Oropharynx is clear and moist. No oropharyngeal exudate.   Eyes: Pupils are equal, round, and reactive to light. Conjunctivae are normal. Right eye exhibits no discharge. Left eye exhibits no discharge. No scleral icterus.   Neck: No tracheal deviation present.   Cardiovascular: Normal rate, regular rhythm, normal heart sounds and intact distal pulses.    No murmur heard.  Pulmonary/Chest: Effort normal. No stridor. He is intubated. No respiratory distress. He has no wheezes. He has rales.   Abdominal: Soft. He exhibits no distension. There is no tenderness.   Musculoskeletal: He exhibits no edema.   Skin: Skin is warm. He is diaphoretic.   Vitals reviewed.      Recent Labs      09/29/18   1317  09/29/18   1800  09/30/18   0405   WBC  14.9*  22.4*  20.8*   RBC  4.53*  4.98  4.88   HEMOGLOBIN  14.4  15.9  15.3   HEMATOCRIT  40.3*  45.1  44.2   MCV  89.0  90.6  90.6   MCH  31.8  31.9  31.4   MCHC  35.7*  35.3  34.6   RDW  42.8  44.2  44.7   PLATELETCT  117*  172  184   MPV  9.5  9.5  9.3     Recent Labs      09/29/18   1317  09/29/18   1800  09/30/18   0405   SODIUM  135  133*  135   POTASSIUM  4.1  4.0  4.8   CHLORIDE  101  101  105   CO2  20  21  20   GLUCOSE  303*  299*  242*   BUN  22  22  28*   CREATININE  1.11  0.93  1.41*   CALCIUM  8.7  8.3*  8.0*     Recent Labs      09/29/18   2232   INR  1.17*     Recent Labs      09/29/18   1317  09/29/18   1800   BNPBTYPENAT  360*  448*     Recent Labs      09/29/18   1800   TRIGLYCERIDE  140          Assessment/Plan     * Sepsis (HCC)- (present on admission)   Assessment & Plan    The organ system failure associated with this diagnosis is the respiratory system as is evidenced by the need for a intubation  White count of 20.8 on 9/30 this is a decreased from 929 at 1800 of 22.4  Initial WBC in 929 was 14.9  Monitor vitals, strict I's and O's, cultures  Influenza A & B negative by PCR.  Normal procalcitonin level  Empiric antibiotics  Concern of aspiration, atypical is  not ruled out        Acute metabolic encephalopathy   Assessment & Plan    Neuro checks  Monitor with sedation vacations        Acute respiratory failure with hypoxia (HCC)- (present on admission)   Assessment & Plan    Requiring supplemental oxygen via a non-rebreather followed by intubation.  Pulmonology consulting  Monitoring daily ABG, chest x-ray, labs and strict I's and O's  Concern of possible aspiration  CTA chest 9/29 negative for pulmonary embolism.  But did show patchy bilateral hilar opacification with concern of edema, pneumonitis.  Treating for possible pneumonia  Echocardiogram showed ejection fraction of 70% normal valves, RVSP of 65 mmHg  Patient is on Flolan        Pneumonia- (present on admission)   Assessment & Plan    Clinical diagnosis  Probable aspiration but atypicals have not been ruled out I  Initial BAL cultures with no organisms  IV unasyn and azithromycin        Elevated troponin- (present on admission)   Assessment & Plan    Elevated d-dimer  Follow serial troponins  Monitor on telemetry        Pulmonary hypertension (HCC)   Assessment & Plan    Patient with acute respiratory failure  9/29/18 echocardiogram with RVSP of 65 mmHg  On ventilator he is currently on Flolan  Pulmonologist consulting        RBBB- (present on admission)   Assessment & Plan    Noted on EKG  Monitor on telemetry        Hyperglycemia- (present on admission)   Assessment & Plan    Monitor Accu-Cheks and cover with sliding scale insulin  HbA1c 6.5 on 7/18        Thrombocytopenia (HCC)- (present on admission)   Assessment & Plan    Initial platelet count 117 on 9/29  Improved on subsequent CBC        Mixed hyperlipidemia- (present on admission)   Assessment & Plan    Hx of  Atorvastatin 40 mg          Quality-Core Measures   Reviewed items::  Labs reviewed, Medications reviewed and Radiology images reviewed  Garces catheter::  Critically Ill - Requiring Accurate Measurement of Urinary Output, Unconscious / Sedated  Patient on a Ventilator and Strict Intake and Output During Sepisis or Shock  Central line in place:  Sepsis, Shock and Vasopressors  DVT prophylaxis pharmacological::  Enoxaparin (Lovenox)  Antibiotics:  Treating active infection/contamination beyond 24 hours perioperative coverage

## 2018-09-30 NOTE — ASSESSMENT & PLAN NOTE
Severe sepsis with associated acute respiratory failure, improved  Pulmonary source  Titrating phenylephrine to keep MAP > 65, weaning  Antibiotics completed 10/4  Monitor for nosocomial infection, may need ABX again  Afebrile but white count slightly increased but no new positive cultures  Continue current regimen

## 2018-09-30 NOTE — PROCEDURES
Procedure: endotracheal intubation    Indication: respiratory failure    Description: wife gave emergency consent for intubation. 20 mg Etomidate was given followed by 100 mg rocuronium.   Using a 4 blade on the Glidescope, the vocal cords were visualized. An 8.0 endotracheal tube was placed through the cords on first attempt with + color change on end-tidal CO2 and equal breath sounds bilaterally.

## 2018-09-30 NOTE — DIETARY
"Nutrition Support Assessment     Nutrition services:   Day 1 of admit.  Murali Lu is a 78 y.o. male with admitting DX of Pneumonia     Current problem list:  1. Sepsis  2. Pneumonia  3. Acute Respiratory Failure with Hypoxia  4. Acute Metabolic Encephalopathy     Assessment:  Estimated Nutritional Needs based on:   Height: 177.8 cm (5' 10\")  Weight: 91.8 kg (202 lb) - BMI 29.0 via bed scale today, note pt +1.5L fluids per I/O's  Weight to Use in Calculations: 90.3 kg (199 lb) - BMI 28.6 via admit stand up scale weight    Calculation/Equation: REE per MSJ x 1.1 = 1800  Total Calories / day: 1800 - 2000  (Calories / k - 22)  Total Grams Protein / day: 108 - 154  (Grams Protein / k.2 - 1.7)     Evaluation:   1. Pt is currently intubated, on the vent, and in need of nutrition support  2. Pt has Gastric Cortrak for Enteral access  3. Last BM PTA  4. Glucose: 242, BUN: 28, Cr: 1.41. HgbA1C from : 6.5  5. Pertinent Meds: SSI. Chris-Synephrine @ 50 mcg/min  6. Propofol @ 8.1 mL/hr, providing 214 kcals from Lipids per day. Will not adjust for propofol at current rate, will monitor and adjust as indicated  7. Pt to benefit from specialized fiber-free formula that is high in protein and glucose controlled 2' to pressors and hyperglycemia.         Recommendations/Plan:  1. Initiated Nutrition Support:   2. On and Off Propofol: Start Impact Peptide 1.5 @ 25 mL/hr and increase, per protocol, to goal rate of 50 mL/hr  3. Goal rate to provide 1800 kcals, 113 gm protein, and 924 mL free water per day  4. Fluids per MD    RD following               "

## 2018-09-30 NOTE — PROGRESS NOTES
Change in patient condition due to: Respiratory  Rapid Response called at: 1625  Physician Dr. Carrion notified at 1625    See Code Blue timeline for rapid response events. Patient Transferred to Higher Level of Care     1605 Pt taken from ER into room 727-1. Pt complaint of foul smell from cleaning solution. Pt wanted to leave against medical advice. Issue was resolved when we were able to place pt in a clean room without the smell of cleaning solution.   1615 pt moved into room 720. Pt ambulated from [facility] to hospital bed without difficulty. Pt started coughing uncontrollably.  1625 pt still coughing uncontrollably and O2 sats 74%. Rapid response called. Pt placed on non-rebreather. pt's O2 sats 87% on non-rebreather. ICU nurses arrived.  1632 Dr. Carrion arrived at bedside.   1635 ICU was choosen for pt to be transferred to.

## 2018-09-30 NOTE — PROCEDURES
Procedure Note    Date: 9/29/2018  Time: 1745    Procedure: Central Venous Line placement  Site: Right IJ  vein    Indication: critically ill needing access  Consent: Informed consent obtained from patient or designated decision maker after explaining the benefits/risks of the procedure including but not limited to bleeding, infection, nerve or other deep structure injury, pneumothorax/hemothorax, arrythmia, or death. Patient or surrogate expressed understanding and agreement and signed consent which can be found in the patient's chart.    Procedure: After obtaining consent, a time-out was performed. Appropriate site confirmed with ultrasound and patient positioned, prepped, and draped in sterile fashion. All those present wearing cap and mask and those physically participating remained adhering to sterile fashion with cap, mask, gloves, and gown.  0 mL of local anesthetic injected (1% lidocaine without epinephrine) achieving appropriate comfort level for patient. Vein localized and accessed using continuous ultrasound guidance and a 7 Fr triple-lumen catheter placed using Seldinger technique. Able to aspirate dark, non-pulsatile blood through each lumen and sterile saline flushed easily before capping. Line secured and dressed in sterile fashion. Patient tolerated procedure well without any difficulties and remains in care of bedside nurse. CXR will be performed to confirm appropriate placement for internal jugular or subclavian CVLs.    EBL: minimal  Complications: None  CXR: Pending    Jeremy Gonda, MD  Critical Care Medicine

## 2018-09-30 NOTE — ASSESSMENT & PLAN NOTE
Titrating insulin drip for glucose control  Daily reviewed insulin dosing with clinical pharmacist and at bedtime SSI/long-acting insulin as clinically appropriate  Hypoglycemia protocols ongoing  Monitor

## 2018-09-30 NOTE — DIETARY
Nutrition Services: Consult Received for Cardiac diet education    Pt is currently intubated, on the vent, and on enteral nutrition support. Diet education is not appropriate at this time.     RD to provide diet education when appropriate prior to discharge,

## 2018-09-30 NOTE — ASSESSMENT & PLAN NOTE
Influenza negative  Completed 5 days of Unasyn and azithromycin on 10/4  Observe off antibiotics  Update vaccines prior to discharge  Monitor for HCAP, patient may need diagnostic and therapeutic bronchoscopy  No new signs of pneumonia, continue to monitor  No change

## 2018-09-30 NOTE — ASSESSMENT & PLAN NOTE
Etiology is metabolic as well as due to sepsis  Limit sedatives, they are contributing  Improved but persisting, following with very prolonged sedation vacations

## 2018-09-30 NOTE — PROGRESS NOTES
Critical Care Progress Note    Date of admission  9/29/2018    Chief Complaint  78 y.o. male admitted 9/29/2018 with cough and shortness of breath.    Hospital Course  This gentleman was admitted with severe sepsis, respiratory failure and pneumonia.    Interval Problem Update  Reviewed last 24 hour events:       -Chris 50   -prop 25   -ST   -TF at 50   -low UOP   -Flolan   -PEEP 14   -40%   -Unasyn and Zithro for 5   -NTG gtt off   -start insulin gtt      Review of Systems  Review of Systems   Unable to perform ROS: Acuity of condition        Vital Signs for last 24 hours   Temp:  [35.8 °C (96.4 °F)-38.8 °C (101.8 °F)] 38.6 °C (101.5 °F)  Pulse:  [] 94  Resp:  [12-40] 29  BP: (113-144)/(70-86) 144/86    Hemodynamic parameters for last 24 hours       Vent Settings for last 24 hours  Smith Vent Mode: APVCMV  Rate (breaths/min):  [18-20] 20  PEEP/CPAP:  [14-16] 14  FiO2:  [] 40  P Peak (PIP):  [23-27] 27  P MEAN:  [18-22] 20    Physical Exam   Physical Exam   Constitutional:   Sedated on ventilator   HENT:   Head: Normocephalic and atraumatic.   Right Ear: External ear normal.   Left Ear: External ear normal.   Nose: Nose normal.   Mouth/Throat: Oropharynx is clear and moist.   Eyes: Pupils are equal, round, and reactive to light. Conjunctivae are normal. Right eye exhibits no discharge. Left eye exhibits no discharge. No scleral icterus.   Neck: Normal range of motion. Neck supple. No JVD present. No tracheal deviation present.   Cardiovascular: Intact distal pulses.  Exam reveals no gallop and no friction rub.    Sinus rhythm   Pulmonary/Chest: He has no wheezes. He has rales (Scattered crackles bilaterally).   Abdominal: Soft. Bowel sounds are normal. He exhibits no distension. There is no tenderness. There is no rebound.   Tolerating enteral tube feedings   Musculoskeletal: He exhibits no tenderness or deformity.   No clubbing or cyanosis   Neurological:   Sedated.  Arouses.  Moves all 4 extremities.    Skin: Skin is warm and dry. No rash noted. He is not diaphoretic. No erythema.       Medications  Current Facility-Administered Medications   Medication Dose Route Frequency Provider Last Rate Last Dose   • insulin regular (HUMULIN R) injection 3-14 Units  3-14 Units Subcutaneous Q6HRS Walter Carrion M.D.   4 Units at 09/30/18 0641    And   • glucose 4 g chewable tablet 16 g  16 g Oral Q15 MIN PRN Walter Carrion M.D.        And   • dextrose 50% (D50W) injection 25 mL  25 mL Intravenous Q15 MIN PRN Walter Carrion M.D.       • Respiratory Care per Protocol   Nebulization Continuous RT Walter Carrion M.D.       • ondansetron (ZOFRAN) syringe/vial injection 4 mg  4 mg Intravenous Q4HRS PRN Walter Carrion M.D.       • ampicillin/sulbactam (UNASYN) 3 g in  mL IVPB  3 g Intravenous Q6HRS Walter Carrion M.D.   Stopped at 09/30/18 0613   • propofol (DIPRIVAN) injection  0-80 mcg/kg/min Intravenous Continuous Jeremy M Gonda, M.D. 13.5 mL/hr at 09/30/18 0900 25 mcg/kg/min at 09/30/18 0900   • MD Alert...ICU Electrolyte Replacement per Pharmacy   Other pharmacy to dose Jeremy M Gonda, M.D.       • enoxaparin (LOVENOX) inj 40 mg  40 mg Subcutaneous DAILY Jeremy M Gonda, M.D.   40 mg at 09/30/18 0543   • fentaNYL (SUBLIMAZE) injection 25 mcg  25 mcg Intravenous Q HOUR PRN Jeremy M Gonda, M.D.        Or   • fentaNYL (SUBLIMAZE) injection 50 mcg  50 mcg Intravenous Q HOUR PRN Jeremy M Gonda, M.D.        Or   • fentaNYL (SUBLIMAZE) injection 100 mcg  100 mcg Intravenous Q HOUR PRN Jeremy M Gonda, M.D.       • ipratropium-albuterol (DUONEB) nebulizer solution  3 mL Nebulization Q2HRS PRN (RT) Jeremy M Gonda, M.D.       • nitroglycerin 50 mg in D5W 250 ml infusion  0-200 mcg/min Intravenous Continuous Jeremy M Gonda, M.D.   Stopped at 09/29/18 1900   • epoprostenol (FLOLAN) 1.5 mg in glycine diluent for flolan 50 mL for Inhalation  0.01-0.05 mcg/kg/min Inhalation Continuous Jeremy M Gonda, M.D. 9.03 mL/hr at 09/30/18 0627 0.05  mcg/kg/min at 09/30/18 0627   • Pharmacy Consult: Enteral tube feeding - review meds/change route/product selection   Other PRN Jeremy M Gonda, M.D.       • senna-docusate (PERICOLACE or SENOKOT S) 8.6-50 MG per tablet 2 Tab  2 Tab Feeding Tube BID Jeremy M Gonda, M.D.   2 Tab at 09/30/18 0543    And   • polyethylene glycol/lytes (MIRALAX) PACKET 1 Packet  1 Packet Feeding Tube QDAY PRN Jeremy M Gonda, M.D.        And   • magnesium hydroxide (MILK OF MAGNESIA) suspension 30 mL  30 mL Feeding Tube QDAY PRN Jeremy M Gonda, M.D.        And   • bisacodyl (DULCOLAX) suppository 10 mg  10 mg Rectal QDAY PRN Jeremy M Gonda, M.D.       • ondansetron (ZOFRAN ODT) dispertab 4 mg  4 mg Feeding Tube Q4HRS PRN Jeremy M Gonda, M.D.       • acetaminophen (TYLENOL) tablet 650 mg  650 mg Feeding Tube Q6HRS PRN Jeremy M Gonda, M.D.   650 mg at 09/30/18 0839   • azithromycin (ZITHROMAX) tablet 250 mg  250 mg Per NG Tube DAILY Jeremy M Gonda, M.D.   250 mg at 09/30/18 0544   • famotidine (PEPCID) tablet 20 mg  20 mg Feeding Tube Q12HRS Jeremy M Gonda, M.D.        Or   • famotidine (PEPCID) injection 20 mg  20 mg Intravenous Q12HRS Jeremy M Gonda, M.D.   20 mg at 09/30/18 0543   • atorvastatin (LIPITOR) tablet 40 mg  40 mg Per NG Tube Q EVENING Jeremy M Gonda, M.D.       • aspirin (ASA) chewable tab 81 mg  81 mg Per NG Tube DAILY Jeremy M Gonda, M.D.   81 mg at 09/30/18 0544   • phenylephrine (MARYSOL-SYNEPHRINE) 40,000 mcg in  mL Infusion  1-300 mcg/min Intravenous Continuous Walter Carrion M.D. 18.8 mL/hr at 09/30/18 0644 50 mcg/min at 09/30/18 0644       Fluids    Intake/Output Summary (Last 24 hours) at 09/30/18 0934  Last data filed at 09/30/18 0900   Gross per 24 hour   Intake          2096.57 ml   Output              595 ml   Net          1501.57 ml       Laboratory  Recent Results (from the past 48 hour(s))   Lactic acid (lactate)    Collection Time: 09/29/18  1:17 PM   Result Value Ref Range    Lactic Acid 3.8 (H) 0.5 - 2.0  mmol/L   CBC WITH DIFFERENTIAL    Collection Time: 09/29/18  1:17 PM   Result Value Ref Range    WBC 14.9 (H) 4.8 - 10.8 K/uL    RBC 4.53 (L) 4.70 - 6.10 M/uL    Hemoglobin 14.4 14.0 - 18.0 g/dL    Hematocrit 40.3 (L) 42.0 - 52.0 %    MCV 89.0 81.4 - 97.8 fL    MCH 31.8 27.0 - 33.0 pg    MCHC 35.7 (H) 33.7 - 35.3 g/dL    RDW 42.8 35.9 - 50.0 fL    Platelet Count 117 (L) 164 - 446 K/uL    MPV 9.5 9.0 - 12.9 fL    Neutrophils-Polys 85.40 (H) 44.00 - 72.00 %    Lymphocytes 6.10 (L) 22.00 - 41.00 %    Monocytes 7.90 0.00 - 13.40 %    Eosinophils 0.00 0.00 - 6.90 %    Basophils 0.20 0.00 - 1.80 %    Immature Granulocytes 0.40 0.00 - 0.90 %    Nucleated RBC 0.00 /100 WBC    Neutrophils (Absolute) 12.68 (H) 1.82 - 7.42 K/uL    Lymphs (Absolute) 0.91 (L) 1.00 - 4.80 K/uL    Monos (Absolute) 1.18 (H) 0.00 - 0.85 K/uL    Eos (Absolute) 0.00 0.00 - 0.51 K/uL    Baso (Absolute) 0.03 0.00 - 0.12 K/uL    Immature Granulocytes (abs) 0.06 0.00 - 0.11 K/uL    NRBC (Absolute) 0.00 K/uL   COMP METABOLIC PANEL    Collection Time: 09/29/18  1:17 PM   Result Value Ref Range    Sodium 135 135 - 145 mmol/L    Potassium 4.1 3.6 - 5.5 mmol/L    Chloride 101 96 - 112 mmol/L    Co2 20 20 - 33 mmol/L    Anion Gap 14.0 (H) 0.0 - 11.9    Glucose 303 (H) 65 - 99 mg/dL    Bun 22 8 - 22 mg/dL    Creatinine 1.11 0.50 - 1.40 mg/dL    Calcium 8.7 8.5 - 10.5 mg/dL    AST(SGOT) 15 12 - 45 U/L    ALT(SGPT) 15 2 - 50 U/L    Alkaline Phosphatase 87 30 - 99 U/L    Total Bilirubin 1.6 (H) 0.1 - 1.5 mg/dL    Albumin 4.1 3.2 - 4.9 g/dL    Total Protein 6.6 6.0 - 8.2 g/dL    Globulin 2.5 1.9 - 3.5 g/dL    A-G Ratio 1.6 g/dL   BLOOD CULTURE    Collection Time: 09/29/18  1:17 PM   Result Value Ref Range    Significant Indicator NEG     Source BLD     Site PERIPHERAL     Blood Culture       No Growth    Note: Blood cultures are incubated for 5 days and  are monitored continuously.Positive blood cultures  are called to the RN and reported as soon as  they are  identified.     BNP    Collection Time: 18  1:17 PM   Result Value Ref Range    B Natriuretic Peptide 360 (H) 0 - 100 pg/mL   TROPONIN    Collection Time: 18  1:17 PM   Result Value Ref Range    Troponin I 0.81 (H) 0.00 - 0.04 ng/mL   D-DIMER    Collection Time: 18  1:17 PM   Result Value Ref Range    D-Dimer Screen 7.58 (H) 0.00 - 0.50 ug/mL (FEU)   ESTIMATED GFR    Collection Time: 18  1:17 PM   Result Value Ref Range    GFR If African American >60 >60 mL/min/1.73 m 2    GFR If Non African American >60 >60 mL/min/1.73 m 2   EKG    Collection Time: 18  1:23 PM   Result Value Ref Range    Report       [facility] Emergency Dept.    Test Date:  2018  Pt Name:    TIMOTHY MOORE                 Department: ER  MRN:        [id number]                      Room:       [facility]  Gender:     Male                         Technician: [id number]  :        [id number]                   Requested By:ER TRIAGE PROTOCOL  Order #:    [id number]                    Reading MD:    Measurements  Intervals                                Axis  Rate:       112                          P:          50  DC:         140                          QRS:        44  QRSD:       136                          T:          -9  QT:         348  QTc:        475    Interpretive Statements  SINUS TACHYCARDIA  RIGHT BUNDLE BRANCH BLOCK  No previous ECG available for comparison     BLOOD CULTURE    Collection Time: 18  1:30 PM   Result Value Ref Range    Significant Indicator NEG     Source BLD     Site PERIPHERAL     Blood Culture       No Growth    Note: Blood cultures are incubated for 5 days and  are monitored continuously.Positive blood cultures  are called to the RN and reported as soon as  they are identified.     URINALYSIS    Collection Time: 18  2:56 PM   Result Value Ref Range    Color DK Yellow     Character Clear     Specific Gravity 1.025 <1.035    Ph 5.0 5.0 - 8.0    Glucose 500 (A)  Negative mg/dL    Ketones Negative Negative mg/dL    Protein Negative Negative mg/dL    Bilirubin Negative Negative    Urobilinogen, Urine 0.2 Negative    Nitrite Negative Negative    Leukocyte Esterase Negative Negative    Occult Blood Negative Negative    Micro Urine Req see below    EKG    Collection Time: 18  4:28 PM   Result Value Ref Range    Report       [facility]    Test Date:  2018  Pt Name:    [facility]                 Department: 171  MRN:        [id number]                      Room:       14  Gender:     Male                         Technician: TXM  :        1940                   Requested By:ALOK AGUIRRE  Order #:    [id number]                    Reading MD: Luis Alberto Tinajero MD    Measurements  Intervals                                Axis  Rate:       125                          P:          60  ID:         134                          QRS:        70  QRSD:       143                          T:          -4  QT:         329  QTc:        475    Interpretive Statements  SINUS TACHYCARDIA  RIGHT BUNDLE BRANCH BLOCK  Compared to ECG 2018 13:23:31  No significant changes    Electronically Signed On 2018 21:11:41 PDT by Luis Alberto Tinajero MD     GRAM STAIN    Collection Time: 18  5:15 PM   Result Value Ref Range    Significant Indicator .     Source RESP     Site Bronchoalveolar Lavage     Gram Stain Result Few WBCs.  No organisms seen.      EC-ECHOCARDIOGRAM COMPLETE W/O CONT    Collection Time: 18  5:49 PM   Result Value Ref Range    Left Ventrical Ejection Fraction 70    EKG    Collection Time: 18  5:53 PM   Result Value Ref Range    Report       [facility]    Test Date:  2018  Pt Name:    [facility]                 Department: 161  MRN:        [id number]                      Room:       14  Gender:     Male                         Technician: TXM  :        1940                   Requested By:JEREMY M GONDA  Order #:     [id number]                    Reading MD: Luis Alberto Tinajero MD    Measurements  Intervals                                Axis  Rate:       134                          P:          76  IL:         116                          QRS:        85  QRSD:       134                          T:          -23  QT:         330  QTc:        493    Interpretive Statements  SINUS TACHYCARDIA  RIGHT BUNDLE BRANCH BLOCK  Compared to ECG 09/29/2018 16:28:50  Myocardial infarct finding now present    Electronically Signed On 9- 21:12:45 PDT by Luis Alberto Tinajero MD     CBC WITHOUT DIFFERENTIAL    Collection Time: 09/29/18  6:00 PM   Result Value Ref Range    WBC 22.4 (H) 4.8 - 10.8 K/uL    RBC 4.98 4.70 - 6.10 M/uL    Hemoglobin 15.9 14.0 - 18.0 g/dL    Hematocrit 45.1 42.0 - 52.0 %    MCV 90.6 81.4 - 97.8 fL    MCH 31.9 27.0 - 33.0 pg    MCHC 35.3 33.7 - 35.3 g/dL    RDW 44.2 35.9 - 50.0 fL    Platelet Count 172 164 - 446 K/uL    MPV 9.5 9.0 - 12.9 fL   COMP METABOLIC PANEL    Collection Time: 09/29/18  6:00 PM   Result Value Ref Range    Sodium 133 (L) 135 - 145 mmol/L    Potassium 4.0 3.6 - 5.5 mmol/L    Chloride 101 96 - 112 mmol/L    Co2 21 20 - 33 mmol/L    Anion Gap 11.0 0.0 - 11.9    Glucose 299 (H) 65 - 99 mg/dL    Bun 22 8 - 22 mg/dL    Creatinine 0.93 0.50 - 1.40 mg/dL    Calcium 8.3 (L) 8.5 - 10.5 mg/dL    AST(SGOT) 17 12 - 45 U/L    ALT(SGPT) 14 2 - 50 U/L    Alkaline Phosphatase 92 30 - 99 U/L    Total Bilirubin 1.4 0.1 - 1.5 mg/dL    Albumin 3.7 3.2 - 4.9 g/dL    Total Protein 6.4 6.0 - 8.2 g/dL    Globulin 2.7 1.9 - 3.5 g/dL    A-G Ratio 1.4 g/dL   TROPONIN    Collection Time: 09/29/18  6:00 PM   Result Value Ref Range    Troponin I 0.97 (H) 0.00 - 0.04 ng/mL   BTYPE NATRIURETIC PEPTIDE    Collection Time: 09/29/18  6:00 PM   Result Value Ref Range    B Natriuretic Peptide 448 (H) 0 - 100 pg/mL   LACTIC ACID    Collection Time: 09/29/18  6:00 PM   Result Value Ref Range    Lactic Acid 2.1 (H) 0.5 - 2.0 mmol/L    TRIGLYCERIDE    Collection Time: 09/29/18  6:00 PM   Result Value Ref Range    Triglycerides 140 0 - 149 mg/dL   ESTIMATED GFR    Collection Time: 09/29/18  6:00 PM   Result Value Ref Range    GFR If African American >60 >60 mL/min/1.73 m 2    GFR If Non African American >60 >60 mL/min/1.73 m 2   PROCALCITONIN    Collection Time: 09/29/18  6:00 PM   Result Value Ref Range    Procalcitonin 0.08 <0.25 ng/mL   ISTAT ARTERIAL BLOOD GAS    Collection Time: 09/29/18  6:27 PM   Result Value Ref Range    Ph 7.216 (LL) 7.400 - 7.500    Pco2 52.6 (HH) 26.0 - 37.0 mmHg    Po2 90 (H) 64 - 87 mmHg    Tco2 23 20 - 33 mmol/L    S02 95 93 - 99 %    Hco3 21.3 17.0 - 25.0 mmol/L    BE -7 (L) -4 - 3 mmol/L    Body Temp 96.6 F degrees    O2 Therapy 100 %    iPF Ratio 90     Ph Temp Cesar 7.231 (LL) 7.400 - 7.500    Pco2 Temp Co 50.1 (H) 26.0 - 37.0 mmHg    Po2 Temp Cor 84 64 - 87 mmHg    Specimen Arterial     Action Range Triggered YES     Inst. Qualified Patient YES    ACCU-CHEK GLUCOSE    Collection Time: 09/29/18  6:46 PM   Result Value Ref Range    Glucose - Accu-Ck 287 (H) 65 - 99 mg/dL   ISTAT ARTERIAL BLOOD GAS    Collection Time: 09/29/18  9:11 PM   Result Value Ref Range    Ph 7.297 (LL) 7.400 - 7.500    Pco2 40.6 (H) 26.0 - 37.0 mmHg    Po2 221 (H) 64 - 87 mmHg    Tco2 21 20 - 33 mmol/L    S02 100 (H) 93 - 99 %    Hco3 19.9 17.0 - 25.0 mmol/L    BE -6 (L) -4 - 3 mmol/L    Body Temp 97.6 F degrees    O2 Therapy 100 %    iPF Ratio 221     Ph Temp Cesar 7.305 (L) 7.400 - 7.500    Pco2 Temp Co 39.7 (H) 26.0 - 37.0 mmHg    Po2 Temp Cor 218 (H) 64 - 87 mmHg    Specimen Arterial     Action Range Triggered YES     Inst. Qualified Patient YES    ACCU-CHEK GLUCOSE    Collection Time: 09/29/18  9:44 PM   Result Value Ref Range    Glucose - Accu-Ck 200 (H) 65 - 99 mg/dL   PROTHROMBIN TIME    Collection Time: 09/29/18 10:32 PM   Result Value Ref Range    PT 15.0 (H) 12.0 - 14.6 sec    INR 1.17 (H) 0.87 - 1.13   Lactic Acid Every four  hours after STAT order    Collection Time: 09/29/18 10:32 PM   Result Value Ref Range    Lactic Acid 2.7 (H) 0.5 - 2.0 mmol/L   LACTIC ACID    Collection Time: 09/29/18 10:32 PM   Result Value Ref Range    Lactic Acid 2.7 (H) 0.5 - 2.0 mmol/L   INFLUENZA A/B BY PCR    Collection Time: 09/30/18 12:04 AM   Result Value Ref Range    Influenza virus A RNA Negative Negative    Influenza virus B, PCR Negative Negative   Lactic Acid Every four hours after STAT order    Collection Time: 09/30/18  4:05 AM   Result Value Ref Range    Lactic Acid 2.5 (H) 0.5 - 2.0 mmol/L   CBC with Differential    Collection Time: 09/30/18  4:05 AM   Result Value Ref Range    WBC 20.8 (H) 4.8 - 10.8 K/uL    RBC 4.88 4.70 - 6.10 M/uL    Hemoglobin 15.3 14.0 - 18.0 g/dL    Hematocrit 44.2 42.0 - 52.0 %    MCV 90.6 81.4 - 97.8 fL    MCH 31.4 27.0 - 33.0 pg    MCHC 34.6 33.7 - 35.3 g/dL    RDW 44.7 35.9 - 50.0 fL    Platelet Count 184 164 - 446 K/uL    MPV 9.3 9.0 - 12.9 fL    Nucleated RBC 0.00 /100 WBC    NRBC (Absolute) 0.00 K/uL    Neutrophils-Polys 80.00 (H) 44.00 - 72.00 %    Lymphocytes 10.40 (L) 22.00 - 41.00 %    Monocytes 7.80 0.00 - 13.40 %    Eosinophils 0.00 0.00 - 6.90 %    Basophils 0.00 0.00 - 1.80 %    Neutrophils (Absolute) 17.01 (H) 1.82 - 7.42 K/uL    Lymphs (Absolute) 2.16 1.00 - 4.80 K/uL    Monos (Absolute) 1.62 (H) 0.00 - 0.85 K/uL    Eos (Absolute) 0.00 0.00 - 0.51 K/uL    Baso (Absolute) 0.00 0.00 - 0.12 K/uL   Basic Metabolic Panel (BMP)    Collection Time: 09/30/18  4:05 AM   Result Value Ref Range    Sodium 135 135 - 145 mmol/L    Potassium 4.8 3.6 - 5.5 mmol/L    Chloride 105 96 - 112 mmol/L    Co2 20 20 - 33 mmol/L    Glucose 242 (H) 65 - 99 mg/dL    Bun 28 (H) 8 - 22 mg/dL    Creatinine 1.41 (H) 0.50 - 1.40 mg/dL    Calcium 8.0 (L) 8.5 - 10.5 mg/dL    Anion Gap 10.0 0.0 - 11.9   Magnesium    Collection Time: 09/30/18  4:05 AM   Result Value Ref Range    Magnesium 2.1 1.5 - 2.5 mg/dL   Phosphorus    Collection Time:  09/30/18  4:05 AM   Result Value Ref Range    Phosphorus 4.0 2.5 - 4.5 mg/dL   ESTIMATED GFR    Collection Time: 09/30/18  4:05 AM   Result Value Ref Range    GFR If  59 (A) >60 mL/min/1.73 m 2    GFR If Non African American 49 (A) >60 mL/min/1.73 m 2   DIFFERENTIAL MANUAL    Collection Time: 09/30/18  4:05 AM   Result Value Ref Range    Bands-Stabs 1.80 0.00 - 10.00 %    Manual Diff Status PERFORMED    PERIPHERAL SMEAR REVIEW    Collection Time: 09/30/18  4:05 AM   Result Value Ref Range    Peripheral Smear Review see below    PLATELET ESTIMATE    Collection Time: 09/30/18  4:05 AM   Result Value Ref Range    Plt Estimation Normal    MORPHOLOGY    Collection Time: 09/30/18  4:05 AM   Result Value Ref Range    RBC Morphology Present     Poikilocytosis 2+     Ovalocytes 1+     Schistocytes 1+    ISTAT ARTERIAL BLOOD GAS    Collection Time: 09/30/18  4:37 AM   Result Value Ref Range    Ph 7.322 (L) 7.400 - 7.500    Pco2 35.4 26.0 - 37.0 mmHg    Po2 95 (H) 64 - 87 mmHg    Tco2 19 (L) 20 - 33 mmol/L    S02 97 93 - 99 %    Hco3 18.3 17.0 - 25.0 mmol/L    BE -7 (L) -4 - 3 mmol/L    Body Temp 38.3 C degrees    O2 Therapy 50 %    iPF Ratio 190     Ph Temp Cesar 7.304 (L) 7.400 - 7.500    Pco2 Temp Co 37.4 (H) 26.0 - 37.0 mmHg    Po2 Temp Cor 102 (H) 64 - 87 mmHg    Specimen Arterial     Action Range Triggered NO     Inst. Qualified Patient YES    ACCU-CHEK GLUCOSE    Collection Time: 09/30/18  6:15 AM   Result Value Ref Range    Glucose - Accu-Ck 241 (H) 65 - 99 mg/dL       Imaging  X-Ray:  I have personally reviewed the images and compared with prior images. and My impression is: Increased right greater than left lung opacities    Assessment/Plan  * Acute respiratory failure with hypoxia (HCC)- (present on admission)   Assessment & Plan    Intubated 9/29  Continue full vent support  Continue nebulized Flolan  All appropriate ventilator bundles have been initiated        Acute kidney injury (HCC)   Assessment  & Plan    Bolus with IV fluids  Follow renal function  Renal dose medications  Avoid nephrotoxins        CAP (community acquired pneumonia)- (present on admission)   Assessment & Plan    Influenza negative  Continue Unasyn and azithromycin  Procalcitonin is normal, for what it's worth  Follow-up cultures        Septic shock (HCC)   Assessment & Plan    Severe sepsis with associated acute respiratory failure  Pulmonary source  Continue antibiotics  Continue IV fluid resuscitation        Acute metabolic encephalopathy   Assessment & Plan    Likely metabolic, due to sepsis  Follow neurologic exam  Limit sedatives        Elevated troponin- (present on admission)   Assessment & Plan    Due to demand ischemia  Continue aspirin and statin        Pulmonary hypertension (HCC)   Assessment & Plan    RVSP 65 mm Hg  CTA without evidence of pulmonary embolism        Hyperglycemia- (present on admission)   Assessment & Plan    Glucose is increased  Stop subcutaneous insulin and start insulin drip for improved glucose control        Dyslipidemia- (present on admission)   Assessment & Plan    Continue statin             VTE:  Lovenox  Ulcer: H2 Antagonist  Lines: Central Line  Ongoing indication addressed    I have performed a physical exam and reviewed and updated ROS and Plan today (9/30/2018). In review of yesterday's note (9/29/2018), there are no changes except as documented above.     I have assessed and reassessed his respiratory status with ventilator adjustments, ventilator waveforms, airway mechanics, blood pressure, hemodynamics, cardiovascular status with titration of norepinephrine and neurologic status with titration of propofol.  He is at increased risk for worsening respiratory, cardiovascular, renal system dysfunction.    Discussed patient condition and risk of morbidity and/or mortality with Hospitalist, RN, RT, Pharmacy, Charge nurse / hot rounds and QA team  The patient remains critically ill.  Critical care  time = 90 minutes in directly providing and coordinating critical care and extensive data review.  No time overlap and excludes procedures.    High risk of deterioration and worsening vital organ dysfunction and death without the above critical care interventions.    Brandon Paulino MD  Pulmonary and Critical Care Medicine

## 2018-10-01 PROBLEM — R74.8 ELEVATED LIVER ENZYMES: Status: ACTIVE | Noted: 2018-01-01

## 2018-10-01 NOTE — CARE PLAN
Problem: Ventilation Defect:  Goal: Ability to achieve and maintain unassisted ventilation or tolerate decreased levels of ventilator support  Outcome: PROGRESSING SLOWER THAN EXPECTED  Adult Ventilation Update    Total Vent Days: 3    Patient Lines/Drains/Airways Status    Active Airway     Name: Placement date: Placement time: Site: Days:    Airway ETT Oral 8.0@26 09/29/18 1714   Oral   3              CMV 20/440/10/40%  Flolan 9.03 ml/hr     Plateau Pressure (Q Shift): 20 (09/30/18 1938)  Static Compliance (ml / cm H2O): 78.2 (10/01/18 0231)    Sputum/Suction   Cough: Productive (10/01/18 0400)  Sputum Amount: Scant (10/01/18 0400)  Sputum Color: Pink Tinged (10/01/18 0400)  Sputum Consistency: Thin (10/01/18 0400)    Mobility  Level of Mobility: Level I (10/01/18 0400)  Activity Performed: Unable to mobilize (10/01/18 0400)  Reason Not Mobilized: Unstable condition (10/01/18 0400)  Mobilization Comments: PEEP 10 (10/01/18 0400)    Events/Summary/Plan: Flolan syringe changed. No ventilator changes made.

## 2018-10-01 NOTE — PROGRESS NOTES
[facility] Hospitalist Progress Note    Date of Service: 10/1/2018    Chief Complaint  78 y.o. male admitted 2018 with cough and shortness of breath.  Patient was admitted to the hospital and shortly thereafter had significant increase in shortness of breath and required intubation.    Interval Problem Update  Remains on ventilator  Flolan remains but attempt to wean off.  Afib rvr yesterday afternoon  On neosynephrine  Sedated on propofol with agitation with wean  Afebrile  Enteral feeding at goal  Mobilizing per RN  XRay with right base opacification w/ mild improvement  Five day stop date on antibiotics        Consultants/Specialty  Pulmonary  Cardiology    Disposition  To Remain in ICU        Review of Systems   Unable to perform ROS: Intubated      Physical Exam  Laboratory/Imaging   Hemodynamics  Temp (24hrs), Av.1 °C (100.5 °F), Min:37.7 °C (99.9 °F), Max:38.4 °C (101.1 °F)   Temperature: 38 °C (100.4 °F)  Pulse  Av.4  Min: 69  Max: 142 Heart Rate (Monitored): 100  NIBP: (!) 88/68 CVP (mm Hg): (!) 11 MM HG    Respiratory  Smith Vent Mode: APVCMV, Rate (breaths/min): 20, PEEP/CPAP: 10, PEEP/CPAP: 10, FiO2: 40, P Peak (PIP): 19, P MEAN: 14   Respiration: (!) 25, Pulse Oximetry: 98 %     #Flolan: Yes, Work Of Breathing / Effort: Vented  RUL Breath Sounds: Clear, RML Breath Sounds: Diminished, RLL Breath Sounds: Diminished, BRITTANY Breath Sounds: Clear, LLL Breath Sounds: Diminished    Fluids    Intake/Output Summary (Last 24 hours) at 10/01/18 1842  Last data filed at 10/01/18 1830   Gross per 24 hour   Intake          2475.31 ml   Output              480 ml   Net          1995.31 ml       Nutrition  Orders Placed This Encounter   Procedures   • Diet NPO     Standing Status:   Standing     Number of Occurrences:   1     Order Specific Question:   Type:     Answer:   Now [1]     Order Specific Question:   Restrict to:     Answer:   Strict [1]     Physical Exam   Constitutional: He appears well-developed.  He has a sickly appearance. He is sedated, intubated and restrained.   HENT:   Head: Normocephalic and atraumatic.   Nose: Nose normal.   Mouth/Throat: Oropharynx is clear and moist. No oropharyngeal exudate.   Eyes: Conjunctivae are normal. Right eye exhibits no discharge. Left eye exhibits no discharge. No scleral icterus.   Neck: No tracheal deviation present.   Cardiovascular: Normal rate, regular rhythm and intact distal pulses.    Murmur heard.  Pulmonary/Chest: Effort normal. He is intubated. No respiratory distress. He has no wheezes. He has rales.   Abdominal: Soft. He exhibits no distension. There is no tenderness.   Musculoskeletal: He exhibits no edema.   Lymphadenopathy:     He has no cervical adenopathy.   Skin: Skin is warm. He is diaphoretic.   Vitals reviewed.      Recent Labs      09/29/18   1800  09/30/18   0405  10/01/18   0400   WBC  22.4*  20.8*  15.9*   RBC  4.98  4.88  4.30*   HEMOGLOBIN  15.9  15.3  13.4*   HEMATOCRIT  45.1  44.2  38.9*   MCV  90.6  90.6  90.5   MCH  31.9  31.4  31.2   MCHC  35.3  34.6  34.4   RDW  44.2  44.7  45.6   PLATELETCT  172  184  120*   MPV  9.5  9.3  9.9     Recent Labs      09/29/18   1800  09/30/18   0405  10/01/18   0400   SODIUM  133*  135  137   POTASSIUM  4.0  4.8  3.9   CHLORIDE  101  105  109   CO2  21  20  20   GLUCOSE  299*  242*  189*   BUN  22  28*  42*   CREATININE  0.93  1.41*  1.09   CALCIUM  8.3*  8.0*  7.8*     Recent Labs      09/29/18   2232   INR  1.17*     Recent Labs      09/29/18   1317  09/29/18   1800   BNPBTYPENAT  360*  448*     Recent Labs      09/29/18   1800  10/01/18   0400   TRIGLYCERIDE  140  110          Assessment/Plan     * Acute respiratory failure with hypoxia (HCC)- (present on admission)   Assessment & Plan    Requiring supplemental oxygen via a non-rebreather followed by intubation.  Pulmonology consulting  Monitoring daily ABG, chest x-ray, labs and strict I's and O's  Concern of possible aspiration  CTA chest 9/29 negative  for pulmonary embolism.  But did show patchy bilateral hilar opacification with concern of edema, pneumonitis.  Treating for possible pneumonia  Echocardiogram showed ejection fraction of 70% normal valves, RVSP of 65 mmHg  Patient is on Flolan        CAP (community acquired pneumonia)- (present on admission)   Assessment & Plan    Clinical diagnosis  Probable aspiration but atypicals have not been ruled out  Initial BAL cultures with no organisms  IV unasyn and azithromycin        Septic shock (HCC)   Assessment & Plan    The organ system failure associated with this diagnosis is the respiratory system as is evidenced by the need for a intubation  Monitor vitals, strict I's and O's, cultures  Influenza A & B negative by PCR.  Normal procalcitonin level  Empiric antibiotics  Concern of aspiration, atypical is not ruled out        Acute metabolic encephalopathy   Assessment & Plan    Neuro checks  Monitor with sedation vacations        Elevated troponin- (present on admission)   Assessment & Plan    Elevated d-dimer  Follow serial troponins  Monitor on telemetry        Elevated liver enzymes   Assessment & Plan    Concern of shock liver  Monitor CMP  Pressor support to keep circulation with map greater than 65 and systolic blood pressure greater than 90  If worsens consideration of hepatitis panel and ultrasound of liver        Pulmonary hypertension (HCC)   Assessment & Plan    Patient with acute respiratory failure  9/29/18 echocardiogram with RVSP of 65 mmHg  On ventilator   Attempting to wean off Flolan  Pulmonologist consulting        RBBB- (present on admission)   Assessment & Plan    Noted on EKG  Monitor on telemetry        Hyperglycemia- (present on admission)   Assessment & Plan    Monitor Accu-Cheks and cover with sliding scale insulin  HbA1c 6.5 on 7/18        Thrombocytopenia (HCC)- (present on admission)   Assessment & Plan    Initial platelet count 117 on 9/29  10/1 platelets: 120   monitor CBC         Dyslipidemia- (present on admission)   Assessment & Plan    Hx of  Atorvastatin 40 mg          Quality-Core Measures   Reviewed items::  Labs reviewed, Medications reviewed and Radiology images reviewed  Garces catheter::  Critically Ill - Requiring Accurate Measurement of Urinary Output, Unconscious / Sedated Patient on a Ventilator and Strict Intake and Output During Sepisis or Shock  Central line in place:  Sepsis, Shock and Vasopressors  DVT prophylaxis pharmacological::  Enoxaparin (Lovenox)  Antibiotics:  Treating active infection/contamination beyond 24 hours perioperative coverage

## 2018-10-01 NOTE — RESPIRATORY CARE
Flolan Administration    Flolan Dose 9.03 at shift, corrected to 9.5 then  Weaned off  FiO2%: 50 % (10/01/18 0800)    Pulse Oximetry: 97 % (10/01/18 1357)  Heart Rate (Monitored): (!) 116 (10/01/18 1357)  Blood Pressure : 144/86 (09/29/18 1629)    Events/Summary/Plan: weaned off flolan

## 2018-10-01 NOTE — ASSESSMENT & PLAN NOTE
RVSP 65 mm Hg  CTA without evidence of pulmonary embolism  Force diuresis with Lasix ongoing, dose adjusted daily as needed  ROS for BEBA when extubated  Suspect chronic process given the severity of elevation  Continue current regimen  Monitor

## 2018-10-01 NOTE — ASSESSMENT & PLAN NOTE
Worsening sodium, BUN and creatinine  Continue to follow follow renal function  Avoid nephrotoxins  Monitor urine output and fluid balance  Reduce IV Lasix   Add free water to enteral feedings and monitor BUN and sodium levels

## 2018-10-01 NOTE — PROGRESS NOTES
Critical Care Progress Note    Date of admission  9/29/2018    Chief Complaint  78 y.o. male admitted 9/29/2018 with cough and shortness of breath.    Hospital Course  This gentleman was admitted with severe sepsis, respiratory failure and pneumonia.    Interval Problem Update  Reviewed last 24 hour events:       -AF last night with RVR   -arouses, gets agitated   -AF now   -prop 25   -Chris 80   -insulin 2.5   -TF at 50   -vent 3   -wean off Flolan   -Unasyn and Zithromax   -PEEP 10   -bolus with 1 L of NS      Review of Systems  Review of Systems   Unable to perform ROS: Acuity of condition        Vital Signs for last 24 hours   Temp:  [37.7 °C (99.9 °F)-38.6 °C (101.5 °F)] 37.8 °C (100 °F)  Pulse:  [] 102  Resp:  [19-31] 25    Hemodynamic parameters for last 24 hours  CVP:  [6 MM HG-12 MM HG] 12 MM HG    Vent Settings for last 24 hours  Smith Vent Mode: APVCMV  Rate (breaths/min):  [20] 20  PEEP/CPAP:  [10-14] 10  FiO2:  [40-60] 40  P Peak (PIP):  [21-27] 22  P MEAN:  [14-20] 14    Physical Exam   Physical Exam   Constitutional:   Sedated on ventilator   HENT:   Head: Normocephalic.   Right Ear: External ear normal.   Left Ear: External ear normal.   Mouth/Throat: No oropharyngeal exudate.   Eyes: Pupils are equal, round, and reactive to light. Conjunctivae are normal. Right eye exhibits no discharge. Left eye exhibits no discharge. No scleral icterus.   Neck: Normal range of motion. Neck supple. No JVD present. No tracheal deviation present.   Cardiovascular: Intact distal pulses.  Exam reveals no gallop.    Atrial fibrillation   Pulmonary/Chest: He has no wheezes. He has rales (Few crackles bilaterally).   Abdominal: Soft. Bowel sounds are normal. He exhibits no distension. There is no tenderness. There is no guarding.   Tolerating enteral tube feedings   Musculoskeletal: He exhibits no tenderness or deformity.   No clubbing or cyanosis   Neurological:   Sedated.  Becomes agitated when sedation weaned.   Moves all 4 extremities.   Skin: Skin is warm and dry. No rash noted. He is not diaphoretic. No erythema.       Medications  Current Facility-Administered Medications   Medication Dose Route Frequency Provider Last Rate Last Dose   • insulin regular (HUMULIN R) injection 0-14 Units  0-14 Units Intravenous Once Brandon Paulino M.D.   Stopped at 09/30/18 1000   • insulin regular human (HUMULIN/NOVOLIN R) 62.5 Units in  mL infusion per protocol  0-29 Units/hr Intravenous Continuous Brandon Paulino M.D. 10 mL/hr at 10/01/18 0609 2.5 Units/hr at 10/01/18 0609   • dextrose 50% (D50W) injection 25-50 mL  12.5-25 g Intravenous PRN Brandon Paulino M.D.       • Metoprolol Tartrate (LOPRESSOR) injection 5 mg  5 mg Intravenous EVERY 3 MINUTES PRN Garo Brooks M.D.   5 mg at 10/01/18 0349   • Respiratory Care per Protocol   Nebulization Continuous RT Walter Carrion M.D.       • ondansetron (ZOFRAN) syringe/vial injection 4 mg  4 mg Intravenous Q4HRS PRN Walter Carrion M.D.       • ampicillin/sulbactam (UNASYN) 3 g in  mL IVPB  3 g Intravenous Q6HRS Walter Carrion M.D. 200 mL/hr at 10/01/18 0602 3 g at 10/01/18 0602   • propofol (DIPRIVAN) injection  0-80 mcg/kg/min Intravenous Continuous Jeremy M Gonda, M.D. 10.8 mL/hr at 10/01/18 0626 20 mcg/kg/min at 10/01/18 0626   • MD Alert...ICU Electrolyte Replacement per Pharmacy   Other pharmacy to dose Jeremy M Gonda, M.D.       • enoxaparin (LOVENOX) inj 40 mg  40 mg Subcutaneous DAILY Jeremy M Gonda, M.D.   40 mg at 10/01/18 0603   • fentaNYL (SUBLIMAZE) injection 25 mcg  25 mcg Intravenous Q HOUR PRN Jeremy M Gonda, M.D.        Or   • fentaNYL (SUBLIMAZE) injection 50 mcg  50 mcg Intravenous Q HOUR PRN Jeremy M Gonda, M.D.        Or   • fentaNYL (SUBLIMAZE) injection 100 mcg  100 mcg Intravenous Q HOUR PRN Jeremy M Gonda, M.D.       • ipratropium-albuterol (DUONEB) nebulizer solution  3 mL Nebulization Q2HRS PRN (RT) Jeremy M Gonda, M.D.       •  epoprostenol (FLOLAN) 1.5 mg in glycine diluent for flolan 50 mL for Inhalation  0.01-0.05 mcg/kg/min Inhalation Continuous Jeremy M Gonda, M.D. 9.03 mL/hr at 10/01/18 0321 0.05 mcg/kg/min at 10/01/18 0321   • Pharmacy Consult: Enteral tube feeding - review meds/change route/product selection   Other PRN Jeremy M Gonda, M.D.       • senna-docusate (PERICOLACE or SENOKOT S) 8.6-50 MG per tablet 2 Tab  2 Tab Feeding Tube BID Jeremy M Gonda, M.D.   2 Tab at 10/01/18 0603    And   • polyethylene glycol/lytes (MIRALAX) PACKET 1 Packet  1 Packet Feeding Tube QDAY PRN Jeremy M Gonda, M.D.        And   • magnesium hydroxide (MILK OF MAGNESIA) suspension 30 mL  30 mL Feeding Tube QDAY PRN Jeremy M Gonda, M.D.        And   • bisacodyl (DULCOLAX) suppository 10 mg  10 mg Rectal QDAY PRN Jeremy M Gonda, M.D.       • ondansetron (ZOFRAN ODT) dispertab 4 mg  4 mg Feeding Tube Q4HRS PRN Jeremy M Gonda, M.D.       • acetaminophen (TYLENOL) tablet 650 mg  650 mg Feeding Tube Q6HRS PRN Jeremy M Gonda, M.D.   650 mg at 10/01/18 0350   • azithromycin (ZITHROMAX) tablet 250 mg  250 mg Per NG Tube DAILY Jeremy M Gonda, M.D.   250 mg at 10/01/18 0603   • famotidine (PEPCID) tablet 20 mg  20 mg Feeding Tube Q12HRS Jeremy M Gonda, M.D.   20 mg at 10/01/18 0603    Or   • famotidine (PEPCID) injection 20 mg  20 mg Intravenous Q12HRS Jeremy M Gonda, M.D.   20 mg at 09/30/18 1753   • atorvastatin (LIPITOR) tablet 40 mg  40 mg Per NG Tube Q EVENING Jeremy M Gonda, M.D.   40 mg at 09/30/18 1800   • aspirin (ASA) chewable tab 81 mg  81 mg Per NG Tube DAILY Jeremy M Gonda, M.D.   81 mg at 10/01/18 0603   • phenylephrine (MARYSOL-SYNEPHRINE) 40,000 mcg in  mL Infusion  1-300 mcg/min Intravenous Continuous Walter Carrion M.D. 26.3 mL/hr at 10/01/18 0627 70 mcg/min at 10/01/18 0627       Fluids    Intake/Output Summary (Last 24 hours) at 10/01/18 0634  Last data filed at 10/01/18 0600   Gross per 24 hour   Intake          3483.67 ml   Output               695 ml   Net          2788.67 ml       Laboratory  Recent Results (from the past 48 hour(s))   Lactic acid (lactate)    Collection Time: 09/29/18  1:17 PM   Result Value Ref Range    Lactic Acid 3.8 (H) 0.5 - 2.0 mmol/L   CBC WITH DIFFERENTIAL    Collection Time: 09/29/18  1:17 PM   Result Value Ref Range    WBC 14.9 (H) 4.8 - 10.8 K/uL    RBC 4.53 (L) 4.70 - 6.10 M/uL    Hemoglobin 14.4 14.0 - 18.0 g/dL    Hematocrit 40.3 (L) 42.0 - 52.0 %    MCV 89.0 81.4 - 97.8 fL    MCH 31.8 27.0 - 33.0 pg    MCHC 35.7 (H) 33.7 - 35.3 g/dL    RDW 42.8 35.9 - 50.0 fL    Platelet Count 117 (L) 164 - 446 K/uL    MPV 9.5 9.0 - 12.9 fL    Neutrophils-Polys 85.40 (H) 44.00 - 72.00 %    Lymphocytes 6.10 (L) 22.00 - 41.00 %    Monocytes 7.90 0.00 - 13.40 %    Eosinophils 0.00 0.00 - 6.90 %    Basophils 0.20 0.00 - 1.80 %    Immature Granulocytes 0.40 0.00 - 0.90 %    Nucleated RBC 0.00 /100 WBC    Neutrophils (Absolute) 12.68 (H) 1.82 - 7.42 K/uL    Lymphs (Absolute) 0.91 (L) 1.00 - 4.80 K/uL    Monos (Absolute) 1.18 (H) 0.00 - 0.85 K/uL    Eos (Absolute) 0.00 0.00 - 0.51 K/uL    Baso (Absolute) 0.03 0.00 - 0.12 K/uL    Immature Granulocytes (abs) 0.06 0.00 - 0.11 K/uL    NRBC (Absolute) 0.00 K/uL   COMP METABOLIC PANEL    Collection Time: 09/29/18  1:17 PM   Result Value Ref Range    Sodium 135 135 - 145 mmol/L    Potassium 4.1 3.6 - 5.5 mmol/L    Chloride 101 96 - 112 mmol/L    Co2 20 20 - 33 mmol/L    Anion Gap 14.0 (H) 0.0 - 11.9    Glucose 303 (H) 65 - 99 mg/dL    Bun 22 8 - 22 mg/dL    Creatinine 1.11 0.50 - 1.40 mg/dL    Calcium 8.7 8.5 - 10.5 mg/dL    AST(SGOT) 15 12 - 45 U/L    ALT(SGPT) 15 2 - 50 U/L    Alkaline Phosphatase 87 30 - 99 U/L    Total Bilirubin 1.6 (H) 0.1 - 1.5 mg/dL    Albumin 4.1 3.2 - 4.9 g/dL    Total Protein 6.6 6.0 - 8.2 g/dL    Globulin 2.5 1.9 - 3.5 g/dL    A-G Ratio 1.6 g/dL   BLOOD CULTURE    Collection Time: 09/29/18  1:17 PM   Result Value Ref Range    Significant Indicator NEG     Source BLD      Site PERIPHERAL     Blood Culture       No Growth    Note: Blood cultures are incubated for 5 days and  are monitored continuously.Positive blood cultures  are called to the RN and reported as soon as  they are identified.     BNP    Collection Time: 18  1:17 PM   Result Value Ref Range    B Natriuretic Peptide 360 (H) 0 - 100 pg/mL   TROPONIN    Collection Time: 18  1:17 PM   Result Value Ref Range    Troponin I 0.81 (H) 0.00 - 0.04 ng/mL   D-DIMER    Collection Time: 18  1:17 PM   Result Value Ref Range    D-Dimer Screen 7.58 (H) 0.00 - 0.50 ug/mL (FEU)   ESTIMATED GFR    Collection Time: 18  1:17 PM   Result Value Ref Range    GFR If African American >60 >60 mL/min/1.73 m 2    GFR If Non African American >60 >60 mL/min/1.73 m 2   EKG    Collection Time: 18  1:23 PM   Result Value Ref Range    Report       [facility] Emergency Dept.    Test Date:  2018  Pt Name:    TIMOTHY MOORE                 Department: ER  MRN:        [id number]                      Room:       [facility]  Gender:     Male                         Technician: [id number]  :        1940                   Requested By:ER TRIAGE PROTOCOL  Order #:    [id number]                    Reading MD:    Measurements  Intervals                                Axis  Rate:       112                          P:          50  NM:         140                          QRS:        44  QRSD:       136                          T:          -9  QT:         348  QTc:        475    Interpretive Statements  SINUS TACHYCARDIA  RIGHT BUNDLE BRANCH BLOCK  No previous ECG available for comparison     BLOOD CULTURE    Collection Time: 18  1:30 PM   Result Value Ref Range    Significant Indicator NEG     Source BLD     Site PERIPHERAL     Blood Culture       No Growth    Note: Blood cultures are incubated for 5 days and  are monitored continuously.Positive blood cultures  are called to the RN and reported as soon as  they  are identified.     URINALYSIS    Collection Time: 18  2:56 PM   Result Value Ref Range    Color DK Yellow     Character Clear     Specific Gravity 1.025 <1.035    Ph 5.0 5.0 - 8.0    Glucose 500 (A) Negative mg/dL    Ketones Negative Negative mg/dL    Protein Negative Negative mg/dL    Bilirubin Negative Negative    Urobilinogen, Urine 0.2 Negative    Nitrite Negative Negative    Leukocyte Esterase Negative Negative    Occult Blood Negative Negative    Micro Urine Req see below    URINE CULTURE(NEW)    Collection Time: 18  2:56 PM   Result Value Ref Range    Significant Indicator NEG     Source UR     Site      Urine Culture No growth at 24 hours.    EKG    Collection Time: 18  4:28 PM   Result Value Ref Range    Report       [facility]    Test Date:  2018  Pt Name:    TIMOTHY MOORE                 Department: 171  MRN:        [id number]                      Room:       [facility]  Gender:     Male                         Technician: [email or web address]  :        1940                   Requested By:ALOK AGUIRRE  Order #:    [id number]                    Reading MD: Luis Alberto Tinajero MD    Measurements  Intervals                                Axis  Rate:       125                          P:          60  AL:         134                          QRS:        70  QRSD:       143                          T:          -4  QT:         329  QTc:        475    Interpretive Statements  SINUS TACHYCARDIA  RIGHT BUNDLE BRANCH BLOCK  Compared to ECG 2018 13:23:31  No significant changes    Electronically Signed On 2018 21:11:41 PDT by Luis Alberto Tinajero MD     Culture Respiratory W/ GRM STN    Collection Time: 18  5:15 PM   Result Value Ref Range    Significant Indicator NEG     Source RESP     Site Bronchoalveolar Lavage     Respiratory Culture No growth at 24 hours.     Gram Stain Result Few WBCs.  No organisms seen.      GRAM STAIN    Collection Time: 18  5:15 PM   Result Value Ref Range     Significant Indicator .     Source RESP     Site Bronchoalveolar Lavage     Gram Stain Result Few WBCs.  No organisms seen.      EC-ECHOCARDIOGRAM COMPLETE W/O CONT    Collection Time: 18  5:49 PM   Result Value Ref Range    Left Ventrical Ejection Fraction 70    EKG    Collection Time: 18  5:53 PM   Result Value Ref Range    Report       [facility]    Test Date:  2018  Pt Name:    TIMOTHY MOORE                 Department: 161  MRN:        [id number]                      Room:       T614  Gender:     Male                         Technician: LUCY  :        1940                   Requested By:JEREMY M GONDA  Order #:    [id number]                    Reading MD: Luis Alberto Tinajero MD    Measurements  Intervals                                Axis  Rate:       134                          P:          76  OH:         116                          QRS:        85  QRSD:       134                          T:          -23  QT:         330  QTc:        493    Interpretive Statements  SINUS TACHYCARDIA  RIGHT BUNDLE BRANCH BLOCK  Compared to ECG 2018 16:28:50  Myocardial infarct finding now present    Electronically Signed On 2018 21:12:45 PDT by Luis Alberto Tinajero MD     CBC WITHOUT DIFFERENTIAL    Collection Time: 18  6:00 PM   Result Value Ref Range    WBC 22.4 (H) 4.8 - 10.8 K/uL    RBC 4.98 4.70 - 6.10 M/uL    Hemoglobin 15.9 14.0 - 18.0 g/dL    Hematocrit 45.1 42.0 - 52.0 %    MCV 90.6 81.4 - 97.8 fL    MCH 31.9 27.0 - 33.0 pg    MCHC 35.3 33.7 - 35.3 g/dL    RDW 44.2 35.9 - 50.0 fL    Platelet Count 172 164 - 446 K/uL    MPV 9.5 9.0 - 12.9 fL   COMP METABOLIC PANEL    Collection Time: 18  6:00 PM   Result Value Ref Range    Sodium 133 (L) 135 - 145 mmol/L    Potassium 4.0 3.6 - 5.5 mmol/L    Chloride 101 96 - 112 mmol/L    Co2 21 20 - 33 mmol/L    Anion Gap 11.0 0.0 - 11.9    Glucose 299 (H) 65 - 99 mg/dL    Bun 22 8 - 22 mg/dL    Creatinine 0.93 0.50 - 1.40 mg/dL     Calcium 8.3 (L) 8.5 - 10.5 mg/dL    AST(SGOT) 17 12 - 45 U/L    ALT(SGPT) 14 2 - 50 U/L    Alkaline Phosphatase 92 30 - 99 U/L    Total Bilirubin 1.4 0.1 - 1.5 mg/dL    Albumin 3.7 3.2 - 4.9 g/dL    Total Protein 6.4 6.0 - 8.2 g/dL    Globulin 2.7 1.9 - 3.5 g/dL    A-G Ratio 1.4 g/dL   TROPONIN    Collection Time: 09/29/18  6:00 PM   Result Value Ref Range    Troponin I 0.97 (H) 0.00 - 0.04 ng/mL   BTYPE NATRIURETIC PEPTIDE    Collection Time: 09/29/18  6:00 PM   Result Value Ref Range    B Natriuretic Peptide 448 (H) 0 - 100 pg/mL   LACTIC ACID    Collection Time: 09/29/18  6:00 PM   Result Value Ref Range    Lactic Acid 2.1 (H) 0.5 - 2.0 mmol/L   TRIGLYCERIDE    Collection Time: 09/29/18  6:00 PM   Result Value Ref Range    Triglycerides 140 0 - 149 mg/dL   ESTIMATED GFR    Collection Time: 09/29/18  6:00 PM   Result Value Ref Range    GFR If African American >60 >60 mL/min/1.73 m 2    GFR If Non African American >60 >60 mL/min/1.73 m 2   PROCALCITONIN    Collection Time: 09/29/18  6:00 PM   Result Value Ref Range    Procalcitonin 0.08 <0.25 ng/mL   ISTAT ARTERIAL BLOOD GAS    Collection Time: 09/29/18  6:27 PM   Result Value Ref Range    Ph 7.216 (LL) 7.400 - 7.500    Pco2 52.6 (HH) 26.0 - 37.0 mmHg    Po2 90 (H) 64 - 87 mmHg    Tco2 23 20 - 33 mmol/L    S02 95 93 - 99 %    Hco3 21.3 17.0 - 25.0 mmol/L    BE -7 (L) -4 - 3 mmol/L    Body Temp 96.6 F degrees    O2 Therapy 100 %    iPF Ratio 90     Ph Temp Cesar 7.231 (LL) 7.400 - 7.500    Pco2 Temp Co 50.1 (H) 26.0 - 37.0 mmHg    Po2 Temp Cor 84 64 - 87 mmHg    Specimen Arterial     Action Range Triggered YES     Inst. Qualified Patient YES    ACCU-CHEK GLUCOSE    Collection Time: 09/29/18  6:46 PM   Result Value Ref Range    Glucose - Accu-Ck 287 (H) 65 - 99 mg/dL   ISTAT ARTERIAL BLOOD GAS    Collection Time: 09/29/18  9:11 PM   Result Value Ref Range    Ph 7.297 (LL) 7.400 - 7.500    Pco2 40.6 (H) 26.0 - 37.0 mmHg    Po2 221 (H) 64 - 87 mmHg    Tco2 21 20 - 33  mmol/L    S02 100 (H) 93 - 99 %    Hco3 19.9 17.0 - 25.0 mmol/L    BE -6 (L) -4 - 3 mmol/L    Body Temp 97.6 F degrees    O2 Therapy 100 %    iPF Ratio 221     Ph Temp Cesar 7.305 (L) 7.400 - 7.500    Pco2 Temp Co 39.7 (H) 26.0 - 37.0 mmHg    Po2 Temp Cor 218 (H) 64 - 87 mmHg    Specimen Arterial     Action Range Triggered YES     Inst. Qualified Patient YES    ACCU-CHEK GLUCOSE    Collection Time: 09/29/18  9:44 PM   Result Value Ref Range    Glucose - Accu-Ck 200 (H) 65 - 99 mg/dL   PROTHROMBIN TIME    Collection Time: 09/29/18 10:32 PM   Result Value Ref Range    PT 15.0 (H) 12.0 - 14.6 sec    INR 1.17 (H) 0.87 - 1.13   Lactic Acid Every four hours after STAT order    Collection Time: 09/29/18 10:32 PM   Result Value Ref Range    Lactic Acid 2.7 (H) 0.5 - 2.0 mmol/L   LACTIC ACID    Collection Time: 09/29/18 10:32 PM   Result Value Ref Range    Lactic Acid 2.7 (H) 0.5 - 2.0 mmol/L   INFLUENZA A/B BY PCR    Collection Time: 09/30/18 12:04 AM   Result Value Ref Range    Influenza virus A RNA Negative Negative    Influenza virus B, PCR Negative Negative   Lactic Acid Every four hours after STAT order    Collection Time: 09/30/18  4:05 AM   Result Value Ref Range    Lactic Acid 2.5 (H) 0.5 - 2.0 mmol/L   CBC with Differential    Collection Time: 09/30/18  4:05 AM   Result Value Ref Range    WBC 20.8 (H) 4.8 - 10.8 K/uL    RBC 4.88 4.70 - 6.10 M/uL    Hemoglobin 15.3 14.0 - 18.0 g/dL    Hematocrit 44.2 42.0 - 52.0 %    MCV 90.6 81.4 - 97.8 fL    MCH 31.4 27.0 - 33.0 pg    MCHC 34.6 33.7 - 35.3 g/dL    RDW 44.7 35.9 - 50.0 fL    Platelet Count 184 164 - 446 K/uL    MPV 9.3 9.0 - 12.9 fL    Nucleated RBC 0.00 /100 WBC    NRBC (Absolute) 0.00 K/uL    Neutrophils-Polys 80.00 (H) 44.00 - 72.00 %    Lymphocytes 10.40 (L) 22.00 - 41.00 %    Monocytes 7.80 0.00 - 13.40 %    Eosinophils 0.00 0.00 - 6.90 %    Basophils 0.00 0.00 - 1.80 %    Neutrophils (Absolute) 17.01 (H) 1.82 - 7.42 K/uL    Lymphs (Absolute) 2.16 1.00 - 4.80  K/uL    Monos (Absolute) 1.62 (H) 0.00 - 0.85 K/uL    Eos (Absolute) 0.00 0.00 - 0.51 K/uL    Baso (Absolute) 0.00 0.00 - 0.12 K/uL   Basic Metabolic Panel (BMP)    Collection Time: 09/30/18  4:05 AM   Result Value Ref Range    Sodium 135 135 - 145 mmol/L    Potassium 4.8 3.6 - 5.5 mmol/L    Chloride 105 96 - 112 mmol/L    Co2 20 20 - 33 mmol/L    Glucose 242 (H) 65 - 99 mg/dL    Bun 28 (H) 8 - 22 mg/dL    Creatinine 1.41 (H) 0.50 - 1.40 mg/dL    Calcium 8.0 (L) 8.5 - 10.5 mg/dL    Anion Gap 10.0 0.0 - 11.9   Magnesium    Collection Time: 09/30/18  4:05 AM   Result Value Ref Range    Magnesium 2.1 1.5 - 2.5 mg/dL   Phosphorus    Collection Time: 09/30/18  4:05 AM   Result Value Ref Range    Phosphorus 4.0 2.5 - 4.5 mg/dL   ESTIMATED GFR    Collection Time: 09/30/18  4:05 AM   Result Value Ref Range    GFR If  59 (A) >60 mL/min/1.73 m 2    GFR If Non African American 49 (A) >60 mL/min/1.73 m 2   DIFFERENTIAL MANUAL    Collection Time: 09/30/18  4:05 AM   Result Value Ref Range    Bands-Stabs 1.80 0.00 - 10.00 %    Manual Diff Status PERFORMED    PERIPHERAL SMEAR REVIEW    Collection Time: 09/30/18  4:05 AM   Result Value Ref Range    Peripheral Smear Review see below    PLATELET ESTIMATE    Collection Time: 09/30/18  4:05 AM   Result Value Ref Range    Plt Estimation Normal    MORPHOLOGY    Collection Time: 09/30/18  4:05 AM   Result Value Ref Range    RBC Morphology Present     Poikilocytosis 2+     Ovalocytes 1+     Schistocytes 1+    ISTAT ARTERIAL BLOOD GAS    Collection Time: 09/30/18  4:37 AM   Result Value Ref Range    Ph 7.322 (L) 7.400 - 7.500    Pco2 35.4 26.0 - 37.0 mmHg    Po2 95 (H) 64 - 87 mmHg    Tco2 19 (L) 20 - 33 mmol/L    S02 97 93 - 99 %    Hco3 18.3 17.0 - 25.0 mmol/L    BE -7 (L) -4 - 3 mmol/L    Body Temp 38.3 C degrees    O2 Therapy 50 %    iPF Ratio 190     Ph Temp Cesar 7.304 (L) 7.400 - 7.500    Pco2 Temp Co 37.4 (H) 26.0 - 37.0 mmHg    Po2 Temp Cor 102 (H) 64 - 87 mmHg     Specimen Arterial     Action Range Triggered NO     Inst. Qualified Patient YES    ACCU-CHEK GLUCOSE    Collection Time: 18  6:15 AM   Result Value Ref Range    Glucose - Accu-Ck 241 (H) 65 - 99 mg/dL   ACCU-CHEK GLUCOSE    Collection Time: 18 11:08 AM   Result Value Ref Range    Glucose - Accu-Ck 245 (H) 65 - 99 mg/dL   ACCU-CHEK GLUCOSE    Collection Time: 18 12:07 PM   Result Value Ref Range    Glucose - Accu-Ck 196 (H) 65 - 99 mg/dL   ACCU-CHEK GLUCOSE    Collection Time: 18  1:08 PM   Result Value Ref Range    Glucose - Accu-Ck 213 (H) 65 - 99 mg/dL   ACCU-CHEK GLUCOSE    Collection Time: 18  2:22 PM   Result Value Ref Range    Glucose - Accu-Ck 186 (H) 65 - 99 mg/dL   ACCU-CHEK GLUCOSE    Collection Time: 18  4:02 PM   Result Value Ref Range    Glucose - Accu-Ck 157 (H) 65 - 99 mg/dL   ACCU-CHEK GLUCOSE    Collection Time: 18  5:17 PM   Result Value Ref Range    Glucose - Accu-Ck 142 (H) 65 - 99 mg/dL   ACCU-CHEK GLUCOSE    Collection Time: 18  6:32 PM   Result Value Ref Range    Glucose - Accu-Ck 147 (H) 65 - 99 mg/dL   EKG    Collection Time: 18  7:13 PM   Result Value Ref Range    Report       [facility]    Test Date:  2018  Pt Name:    TIMOTHY MOORE                 Department: 161  MRN:        [id number]                      Room:       [facility]  Gender:     Male                         Technician: JAZMINE  :        1940                   Requested By:CARMEL MATOS  Order #:    [id number]                    Reading MD: Luis Alberto Selby MD    Measurements  Intervals                                Axis  Rate:       112                          P:  NH:                                      QRS:        73  QRSD:       130                          T:          -4  QT:         380  QTc:        519    Interpretive Statements  ATRIAL FIBRILLATION  RIGHT BUNDLE BRANCH BLOCK  Compared to ECG 2018 17:53:56  Sinus tachycardia no longer  present    Electronically Signed On 9- 23:03:45 PDT by Luis Alberto Selby MD     ACCU-CHEK GLUCOSE    Collection Time: 09/30/18  8:08 PM   Result Value Ref Range    Glucose - Accu-Ck 163 (H) 65 - 99 mg/dL   ISTAT ARTERIAL BLOOD GAS    Collection Time: 09/30/18  8:11 PM   Result Value Ref Range    Ph 7.352 (L) 7.400 - 7.500    Pco2 37.6 (H) 26.0 - 37.0 mmHg    Po2 161 (H) 64 - 87 mmHg    Tco2 22 20 - 33 mmol/L    S02 99 93 - 99 %    Hco3 20.9 17.0 - 25.0 mmol/L    BE -4 -4 - 3 mmol/L    Body Temp 38.1 C degrees    O2 Therapy 60 %    iPF Ratio 268     Ph Temp Cesar 7.336 (L) 7.400 - 7.500    Pco2 Temp Co 39.5 (H) 26.0 - 37.0 mmHg    Po2 Temp Cor 167 (H) 64 - 87 mmHg    Specimen Arterial     Action Range Triggered NO     Inst. Qualified Patient YES    ACCU-CHEK GLUCOSE    Collection Time: 09/30/18 10:01 PM   Result Value Ref Range    Glucose - Accu-Ck 150 (H) 65 - 99 mg/dL   ACCU-CHEK GLUCOSE    Collection Time: 10/01/18 12:03 AM   Result Value Ref Range    Glucose - Accu-Ck 148 (H) 65 - 99 mg/dL   ACCU-CHEK GLUCOSE    Collection Time: 10/01/18  1:59 AM   Result Value Ref Range    Glucose - Accu-Ck 172 (H) 65 - 99 mg/dL   CBC with Differential    Collection Time: 10/01/18  4:00 AM   Result Value Ref Range    WBC 15.9 (H) 4.8 - 10.8 K/uL    RBC 4.30 (L) 4.70 - 6.10 M/uL    Hemoglobin 13.4 (L) 14.0 - 18.0 g/dL    Hematocrit 38.9 (L) 42.0 - 52.0 %    MCV 90.5 81.4 - 97.8 fL    MCH 31.2 27.0 - 33.0 pg    MCHC 34.4 33.7 - 35.3 g/dL    RDW 45.6 35.9 - 50.0 fL    Platelet Count 120 (L) 164 - 446 K/uL    MPV 9.9 9.0 - 12.9 fL    Neutrophils-Polys 81.60 (H) 44.00 - 72.00 %    Lymphocytes 8.10 (L) 22.00 - 41.00 %    Monocytes 9.20 0.00 - 13.40 %    Eosinophils 0.10 0.00 - 6.90 %    Basophils 0.20 0.00 - 1.80 %    Immature Granulocytes 0.80 0.00 - 0.90 %    Nucleated RBC 0.00 /100 WBC    Neutrophils (Absolute) 12.96 (H) 1.82 - 7.42 K/uL    Lymphs (Absolute) 1.28 1.00 - 4.80 K/uL    Monos (Absolute) 1.46 (H) 0.00 - 0.85  K/uL    Eos (Absolute) 0.02 0.00 - 0.51 K/uL    Baso (Absolute) 0.03 0.00 - 0.12 K/uL    Immature Granulocytes (abs) 0.12 (H) 0.00 - 0.11 K/uL    NRBC (Absolute) 0.00 K/uL   Magnesium    Collection Time: 10/01/18  4:00 AM   Result Value Ref Range    Magnesium 2.4 1.5 - 2.5 mg/dL   Phosphorus    Collection Time: 10/01/18  4:00 AM   Result Value Ref Range    Phosphorus 2.5 2.5 - 4.5 mg/dL   COMP METABOLIC PANEL    Collection Time: 10/01/18  4:00 AM   Result Value Ref Range    Sodium 137 135 - 145 mmol/L    Potassium 3.9 3.6 - 5.5 mmol/L    Chloride 109 96 - 112 mmol/L    Co2 20 20 - 33 mmol/L    Anion Gap 8.0 0.0 - 11.9    Glucose 189 (H) 65 - 99 mg/dL    Bun 42 (H) 8 - 22 mg/dL    Creatinine 1.09 0.50 - 1.40 mg/dL    Calcium 7.8 (L) 8.5 - 10.5 mg/dL    AST(SGOT) 126 (H) 12 - 45 U/L    ALT(SGPT) 251 (H) 2 - 50 U/L    Alkaline Phosphatase 87 30 - 99 U/L    Total Bilirubin 0.6 0.1 - 1.5 mg/dL    Albumin 3.2 3.2 - 4.9 g/dL    Total Protein 5.2 (L) 6.0 - 8.2 g/dL    Globulin 2.0 1.9 - 3.5 g/dL    A-G Ratio 1.6 g/dL   TRIGLYCERIDE    Collection Time: 10/01/18  4:00 AM   Result Value Ref Range    Triglycerides 110 0 - 149 mg/dL   ESTIMATED GFR    Collection Time: 10/01/18  4:00 AM   Result Value Ref Range    GFR If African American >60 >60 mL/min/1.73 m 2    GFR If Non African American >60 >60 mL/min/1.73 m 2   ISTAT ARTERIAL BLOOD GAS    Collection Time: 10/01/18  4:00 AM   Result Value Ref Range    Ph 7.364 (L) 7.400 - 7.500    Pco2 34.4 26.0 - 37.0 mmHg    Po2 131 (H) 64 - 87 mmHg    Tco2 21 20 - 33 mmol/L    S02 99 93 - 99 %    Hco3 19.6 17.0 - 25.0 mmol/L    BE -5 (L) -4 - 3 mmol/L    Body Temp 38.3 C degrees    O2 Therapy 40 %    iPF Ratio 328     Ph Temp Cesar 7.345 (L) 7.400 - 7.500    Pco2 Temp Co 36.4 26.0 - 37.0 mmHg    Po2 Temp Cor 139 (H) 64 - 87 mmHg    Specimen Arterial     Action Range Triggered NO     Inst. Qualified Patient YES    ACCU-CHEK GLUCOSE    Collection Time: 10/01/18  4:01 AM   Result Value Ref  Range    Glucose - Accu-Ck 175 (H) 65 - 99 mg/dL       Imaging  X-Ray:  I have personally reviewed the images and compared with prior images. and My impression is: Improved right greater than left lung opacities    Assessment/Plan  * Acute respiratory failure with hypoxia (HCC)- (present on admission)   Assessment & Plan    Intubated 9/29  Continue full vent support  To critically ill for SBT  Wean off Flolan  All appropriate ventilator bundles have been initiated        Acute kidney injury (HCC)   Assessment & Plan    Improved with IV fluid resuscitation  Continue IV fluids  Avoid nephrotoxins        CAP (community acquired pneumonia)- (present on admission)   Assessment & Plan    Influenza negative  Continue Unasyn and azithromycin  Procalcitonin is normal, for what it's worth        Septic shock (HCC)   Assessment & Plan    Severe sepsis with associated acute respiratory failure  Pulmonary source  Continue IV fluid resuscitation  Continue antibiotics        Acute metabolic encephalopathy   Assessment & Plan    Suspect metabolic, due to sepsis  Limit sedatives  Follow neurologic exam        Elevated troponin- (present on admission)   Assessment & Plan    Continue aspirin and statin  Due to demand ischemia        Pulmonary hypertension (HCC)   Assessment & Plan    RVSP 65 mm Hg  CTA without evidence of pulmonary embolism        Hyperglycemia- (present on admission)   Assessment & Plan    Improved glucose control on insulin drip  Actively titrating insulin drip for glucose control        Dyslipidemia- (present on admission)   Assessment & Plan    Continue statin             VTE:  Lovenox  Ulcer: H2 Antagonist  Lines: Central Line  Ongoing indication addressed    I have performed a physical exam and reviewed and updated ROS and Plan today (10/1/2018). In review of yesterday's note (9/30/2018), there are no changes except as documented above.     I have assessed and reassessed his respiratory status with ventilator  adjustments and weaning of Flolan, airway mechanics, ventilator waveforms, hemodynamics, blood pressure, cardiovascular status with titration of phenylephrine, blood glucose with titration of an insulin drip and neurologic status with titration of propofol.  He is at increased risk for worsening respiratory, cardiovascular system dysfunction.    Discussed patient condition and risk of morbidity and/or mortality with Hospitalist, RN, RT, Pharmacy, Charge nurse / hot rounds and QA team  The patient remains critically ill.  Critical care time = 88 minutes in directly providing and coordinating critical care and extensive data review.  No time overlap and excludes procedures.    High risk of deterioration and worsening vital organ dysfunction and death without the above critical care interventions.    Brandon Paulino MD  Pulmonary and Critical Care Medicine

## 2018-10-01 NOTE — CARE PLAN
Problem: Communication  Goal: The ability to communicate needs accurately and effectively will improve  Outcome: PROGRESSING AS EXPECTED  Patient intubated/sedated, occasionally nods appropriately and follows some commands.    Problem: Safety  Goal: Will remain free from falls  Outcome: PROGRESSING AS EXPECTED  Restraints and bed alarm in use.

## 2018-10-01 NOTE — PROGRESS NOTES
Spoke to Dr. Brooks to notify him that patient converted to a-fib in the 120's and EKG was done. Received orders to have RT change vent settings, repeat ABG, and if patient is still in afib with rate >110 give lopressor. Notified RT.

## 2018-10-02 PROBLEM — E87.8 ELECTROLYTE ABNORMALITY: Status: ACTIVE | Noted: 2018-01-01

## 2018-10-02 PROBLEM — J81.0 ACUTE PULMONARY EDEMA (HCC): Status: ACTIVE | Noted: 2018-01-01

## 2018-10-02 NOTE — CARE PLAN
Problem: Venous Thromboembolism (VTW)/Deep Vein Thrombosis (DVT) Prevention:  Goal: Patient will participate in Venous Thrombosis (VTE)/Deep Vein Thrombosis (DVT)Prevention Measures  Outcome: PROGRESSING AS EXPECTED  Assessed and monitored for anticoagulation medication complication/ contraindications.   SDC hoses in now place.  Pt is vented and unable to prform active ROM.  Q 2 hours turns and passive ROM preformed/ Pt. Mobilized to edge of bed, he was only able to do about 5 minutes due to tachycardia (HR in 150s)

## 2018-10-02 NOTE — ASSESSMENT & PLAN NOTE
ECHO with normal LV systolic function  Significant pulmonary hypertension causing LV diastolic dysfunction  Volume overloaded clinically with worsening pulmonary edema radiographically and by exam  Reduce Lasix, 40 mg IV daily  Monitor I's and O's and BMP  Add Diamox for contraction alkalosis?  Continue current regimen  Monitor

## 2018-10-02 NOTE — PROGRESS NOTES
Received report from PATIENCE Hancock and assumed care of pt. VSS.  Went over plan of care and answered any questions. Verified lines and ensured patency, drips are set at the correct rate. Safety measurers implemented. Call light and personal belonging within reach. Pt is vented and resting in bed without s & s of pain or distress.

## 2018-10-02 NOTE — CARE PLAN
Problem: Nutritional:  Goal: Nutrition support tolerated and meeting greater than 85% of estimated needs  Outcome: MET Date Met: 10/02/18

## 2018-10-02 NOTE — PROGRESS NOTES
[facility] Hospitalist Progress Note    Date of Service: 10/2/2018    Chief Complaint  78 y.o. male admitted 2018 with cough and shortness of breath.  Patient was admitted to the hospital and shortly thereafter had significant increase in shortness of breath and required intubation.    Interval Problem Update    Chris 100 insulin drip at 5  Prop 25  SBP   Tmax 38.8  Afib  VD#4 PEEP 10 40%  + 6.1 L since admit  CXR worse                 Consultants/Specialty  Pulmonary  Cardiology    Disposition  To Remain in ICU        Review of Systems   Unable to perform ROS: Intubated      Physical Exam  Laboratory/Imaging   Hemodynamics  Temp (24hrs), Av.3 °C (100.9 °F), Min:37.8 °C (100 °F), Max:38.8 °C (101.8 °F)   Temperature: (!) 38.3 °C (100.9 °F)  Pulse  Av.1  Min: 62  Max: 142 Heart Rate (Monitored): (!) 107  NIBP: (!) 91/61 CVP (mm Hg): (!) 11 MM HG    Respiratory  Smith Vent Mode: APVCMV, Rate (breaths/min): 20, PEEP/CPAP: 10, PEEP/CPAP: 10, FiO2: 40, P Peak (PIP): 19, P MEAN: 13   Respiration: (!) 24, Pulse Oximetry: 97 %     #Flolan: Yes, Work Of Breathing / Effort: Vented  RUL Breath Sounds: Clear, RML Breath Sounds: Clear, RLL Breath Sounds: Diminished, BRITTANY Breath Sounds: Clear, LLL Breath Sounds: Diminished    Fluids    Intake/Output Summary (Last 24 hours) at 10/02/18 0924  Last data filed at 10/02/18 0600   Gross per 24 hour   Intake          2281.83 ml   Output              590 ml   Net          1691.83 ml       Nutrition  Orders Placed This Encounter   Procedures   • Diet NPO     Standing Status:   Standing     Number of Occurrences:   1     Order Specific Question:   Type:     Answer:   Now [1]     Order Specific Question:   Restrict to:     Answer:   Strict [1]     Physical Exam   Constitutional: He appears well-developed and well-nourished.   HENT:   Head: Normocephalic and atraumatic.   Right Ear: External ear normal.   Left Ear: External ear normal.   Mouth/Throat: No oropharyngeal  exudate.   Eyes: Conjunctivae are normal. Right eye exhibits no discharge. Left eye exhibits no discharge. No scleral icterus.   Neck: Neck supple. No JVD present. No tracheal deviation present.   Cardiovascular: Normal rate.  An irregular rhythm present. Exam reveals no gallop and no friction rub.    Murmur heard.  Pulmonary/Chest: Effort normal. No stridor. No respiratory distress. He has no wheezes. He has rales. He exhibits no tenderness.   Intubated   Abdominal: Soft. Bowel sounds are normal. He exhibits no distension. There is no tenderness. There is no rebound.   Musculoskeletal: He exhibits edema. He exhibits no tenderness.   Neurological: No cranial nerve deficit. He exhibits normal muscle tone.   Sedated   Skin: Skin is warm and dry. He is not diaphoretic. No cyanosis. Nails show no clubbing.   Psychiatric:   Sedated   Nursing note and vitals reviewed.      Recent Labs      09/30/18   0405  10/01/18   0400  10/02/18   0620   WBC  20.8*  15.9*  14.0*   RBC  4.88  4.30*  4.21*   HEMOGLOBIN  15.3  13.4*  13.2*   HEMATOCRIT  44.2  38.9*  38.3*   MCV  90.6  90.5  91.0   MCH  31.4  31.2  31.4   MCHC  34.6  34.4  34.5   RDW  44.7  45.6  47.0   PLATELETCT  184  120*  200   MPV  9.3  9.9  10.2     Recent Labs      09/30/18   0405  10/01/18   0400  10/02/18   0620   SODIUM  135  137  140   POTASSIUM  4.8  3.9  4.1   CHLORIDE  105  109  112   CO2  20  20  22   GLUCOSE  242*  189*  206*   BUN  28*  42*  50*   CREATININE  1.41*  1.09  0.97   CALCIUM  8.0*  7.8*  8.2*     Recent Labs      09/29/18   2232   INR  1.17*     Recent Labs      09/29/18   1317  09/29/18   1800   BNPBTYPENAT  360*  448*     Recent Labs      09/29/18   1800  10/01/18   0400   TRIGLYCERIDE  140  110          Assessment/Plan     * Acute respiratory failure with hypoxia (HCC)- (present on admission)   Assessment & Plan    CTA on 9-29 neg  for PE  Echo with normal EF and pulmonary hypertension  Vent management per critical care discussed with Dr. Ma  zacarias Bermudez        Acute pulmonary edema (HCC)   Assessment & Plan    Forced diuresis with IV Lasix        Acute kidney injury (HCC)   Assessment & Plan    Resolved  Monitor renal function with diuresis        CAP (community acquired pneumonia)- (present on admission)   Assessment & Plan    Continue Unasyn and azithromycin  Cultures negative today  Complete 5-day course        Septic shock (HCC)   Assessment & Plan    The organ system failure associated with this diagnosis is the respiratory system as is evidenced by the need for a intubation    Wean off pressor as tolerated  Continue current antibiotics and monitor        Acute metabolic encephalopathy   Assessment & Plan    Wean off sedation as tolerated        Elevated troponin- (present on admission)   Assessment & Plan    Likely demand ischemia        Electrolyte abnormality   Assessment & Plan    Hypophosphatemia    Replete and monitor         Elevated liver enzymes   Assessment & Plan    Likely shock liver recheck LFTs in a.m.        Pulmonary hypertension (HCC)   Assessment & Plan    9/29/18 echocardiogram with RVSP of 65 mmHg  Diuresis as  Tolerated  CT was negative for pulmonary embolism            Hyperglycemia- (present on admission)   Assessment & Plan    HbA1c 6.5 on 7/18  Continue sliding scale insulin monitor        Thrombocytopenia (HCC)- (present on admission)   Assessment & Plan    Initial platelet count 117 on 9/29  10/1 platelets: 120   monitor CBC        Dyslipidemia- (present on admission)   Assessment & Plan    Continue atorvastatin            Quality-Core Measures   Reviewed items::  Labs reviewed, Medications reviewed and Radiology images reviewed  Garces catheter::  Critically Ill - Requiring Accurate Measurement of Urinary Output, Unconscious / Sedated Patient on a Ventilator and Strict Intake and Output During Sepisis or Shock  Central line in place:  Sepsis, Shock and Vasopressors  DVT prophylaxis pharmacological::  Enoxaparin  (Lovenox)  Ulcer Prophylaxis::  Yes  Antibiotics:  Treating active infection/contamination beyond 24 hours perioperative coverage

## 2018-10-02 NOTE — PROGRESS NOTES
Critical Care Progress Note    Date of admission  9/29/2018    Chief Complaint  78 y.o. male admitted 9/29/2018 with cough and shortness of breath.    Hospital Course  This gentleman was admitted with severe sepsis, respiratory failure and pneumonia.    Interval Problem Update  Reviewed last 24 hour events:       -prop 25   -Chris 100   -AF   -insulin gtt 5   -TF at goal   -vent 4   -PEEP 10   -replete PO4   -decrease PEEP to 8   -SBT   -follows   -give Lasix 40 BID      Review of Systems  Review of Systems   Unable to perform ROS: Acuity of condition        Vital Signs for last 24 hours   Temp:  [37.8 °C (100 °F)-38.8 °C (101.8 °F)] 38.3 °C (100.9 °F)  Pulse:  [] 99  Resp:  [20-32] 26    Hemodynamic parameters for last 24 hours  CVP:  [9 MM HG-31 MM HG] 11 MM HG    Vent Settings for last 24 hours  Smith Vent Mode: APVCMV  Rate (breaths/min):  [20] 20  PEEP/CPAP:  [10] 10  FiO2:  [40] 40  P Peak (PIP):  [19-23] 20  P MEAN:  [14-17] 14    Physical Exam   Physical Exam   Constitutional:   Sedated on ventilator   HENT:   Head: Normocephalic and atraumatic.   Right Ear: External ear normal.   Left Ear: External ear normal.   Mouth/Throat: Oropharynx is clear and moist.   Eyes: Pupils are equal, round, and reactive to light. Conjunctivae are normal. Right eye exhibits no discharge. Left eye exhibits no discharge. No scleral icterus.   Neck: Normal range of motion. No JVD present. No tracheal deviation present.   Cardiovascular: Intact distal pulses.  Exam reveals no gallop and no friction rub.    Atrial fibrillation   Pulmonary/Chest: He has no wheezes. He has rales (Scattered crackles at the bases).   Abdominal: Soft. Bowel sounds are normal. He exhibits no distension. There is no tenderness. There is no rebound.   Tolerating enteral tube feedings   Musculoskeletal: He exhibits no tenderness or deformity.   No clubbing or cyanosis   Neurological:   Sedated.  Arouses.  Follows.  Moves all 4 extremities.   Skin:  Skin is warm and dry. He is not diaphoretic. No erythema. No pallor.       Medications  Current Facility-Administered Medications   Medication Dose Route Frequency Provider Last Rate Last Dose   • SODIUM CHLORIDE 0.9 % IV SOLN            • insulin regular human (HUMULIN/NOVOLIN R) 62.5 Units in  mL infusion per protocol  0-29 Units/hr Intravenous Continuous Brandon Paulino M.D. 20 mL/hr at 10/02/18 0628 5 Units/hr at 10/02/18 0628   • dextrose 50% (D50W) injection 25-50 mL  12.5-25 g Intravenous PRN Brandon Paulino M.D.       • Metoprolol Tartrate (LOPRESSOR) injection 5 mg  5 mg Intravenous EVERY 3 MINUTES PRN Garo Brooks M.D.   5 mg at 10/01/18 0349   • Respiratory Care per Protocol   Nebulization Continuous RT Walter Carrion M.D.       • ondansetron (ZOFRAN) syringe/vial injection 4 mg  4 mg Intravenous Q4HRS PRN Walter Carrion M.D.       • ampicillin/sulbactam (UNASYN) 3 g in  mL IVPB  3 g Intravenous Q6HRS Walter Carrion M.D. 200 mL/hr at 10/02/18 0614 3 g at 10/02/18 0614   • propofol (DIPRIVAN) injection  0-80 mcg/kg/min Intravenous Continuous Jeremy M Gonda, M.D. 13.5 mL/hr at 10/01/18 2256 25 mcg/kg/min at 10/01/18 2256   • MD Alert...ICU Electrolyte Replacement per Pharmacy   Other pharmacy to dose Jeremy M Gonda, M.D.       • enoxaparin (LOVENOX) inj 40 mg  40 mg Subcutaneous DAILY Jeremy M Gonda, M.D.   40 mg at 10/02/18 0616   • fentaNYL (SUBLIMAZE) injection 25 mcg  25 mcg Intravenous Q HOUR PRN Jeremy M Gonda, M.D.        Or   • fentaNYL (SUBLIMAZE) injection 50 mcg  50 mcg Intravenous Q HOUR PRN Jeremy M Gonda, M.D.        Or   • fentaNYL (SUBLIMAZE) injection 100 mcg  100 mcg Intravenous Q HOUR PRN Jeremy M Gonda, M.D.       • ipratropium-albuterol (DUONEB) nebulizer solution  3 mL Nebulization Q2HRS PRN (RT) Jeremy M Gonda, M.D.       • Pharmacy Consult: Enteral tube feeding - review meds/change route/product selection   Other PRN Jeremy M Gonda, M.D.       •  senna-docusate (PERICOLACE or SENOKOT S) 8.6-50 MG per tablet 2 Tab  2 Tab Feeding Tube BID Jeremy M Gonda, M.D.   2 Tab at 10/02/18 0600    And   • polyethylene glycol/lytes (MIRALAX) PACKET 1 Packet  1 Packet Feeding Tube QDAY PRN Jeremy M Gonda, M.D.        And   • magnesium hydroxide (MILK OF MAGNESIA) suspension 30 mL  30 mL Feeding Tube QDAY PRN Jeremy M Gonda, M.D.        And   • bisacodyl (DULCOLAX) suppository 10 mg  10 mg Rectal QDAY PRN Jeremy M Gonda, M.D.       • ondansetron (ZOFRAN ODT) dispertab 4 mg  4 mg Feeding Tube Q4HRS PRN Jeremy M Gonda, M.D.       • acetaminophen (TYLENOL) tablet 650 mg  650 mg Feeding Tube Q6HRS PRN Jeremy M Gonda, M.D.   650 mg at 10/02/18 0033   • azithromycin (ZITHROMAX) tablet 250 mg  250 mg Per NG Tube DAILY Jeremy M Gonda, M.D.   250 mg at 10/02/18 0616   • famotidine (PEPCID) tablet 20 mg  20 mg Feeding Tube Q12HRS Jeremy M Gonda, M.D.   20 mg at 10/02/18 0600   • atorvastatin (LIPITOR) tablet 40 mg  40 mg Per NG Tube Q EVENING Jeremy M Gonda, M.D.   40 mg at 10/01/18 1630   • aspirin (ASA) chewable tab 81 mg  81 mg Per NG Tube DAILY Jeremy M Gonda, M.D.   81 mg at 10/02/18 0616   • phenylephrine (MARYSOL-SYNEPHRINE) 40,000 mcg in  mL Infusion  1-300 mcg/min Intravenous Continuous Walter Carrion M.D. 37.5 mL/hr at 10/01/18 2136 100 mcg/min at 10/01/18 2136       Fluids    Intake/Output Summary (Last 24 hours) at 10/02/18 0642  Last data filed at 10/02/18 0400   Gross per 24 hour   Intake          2343.19 ml   Output              565 ml   Net          1778.19 ml       Laboratory  Recent Results (from the past 48 hour(s))   ACCU-CHEK GLUCOSE    Collection Time: 09/30/18 11:08 AM   Result Value Ref Range    Glucose - Accu-Ck 245 (H) 65 - 99 mg/dL   ACCU-CHEK GLUCOSE    Collection Time: 09/30/18 12:07 PM   Result Value Ref Range    Glucose - Accu-Ck 196 (H) 65 - 99 mg/dL   ACCU-CHEK GLUCOSE    Collection Time: 09/30/18  1:08 PM   Result Value Ref Range    Glucose -  Accu-Ck 213 (H) 65 - 99 mg/dL   ACCU-CHEK GLUCOSE    Collection Time: 18  2:22 PM   Result Value Ref Range    Glucose - Accu-Ck 186 (H) 65 - 99 mg/dL   ACCU-CHEK GLUCOSE    Collection Time: 18  4:02 PM   Result Value Ref Range    Glucose - Accu-Ck 157 (H) 65 - 99 mg/dL   ACCU-CHEK GLUCOSE    Collection Time: 18  5:17 PM   Result Value Ref Range    Glucose - Accu-Ck 142 (H) 65 - 99 mg/dL   ACCU-CHEK GLUCOSE    Collection Time: 18  6:32 PM   Result Value Ref Range    Glucose - Accu-Ck 147 (H) 65 - 99 mg/dL   EKG    Collection Time: 18  7:13 PM   Result Value Ref Range    Report       [facility]    Test Date:  2018  Pt Name:    TIMOTHY MOORE                 Department: 161  MRN:        [id number]                      Room:       [facility]  Gender:     Male                         Technician: JAZMINE  :        1940                   Requested By:CARMEL MATOS  Order #:    [id number]                    Reading MD: Luis Alberto Selby MD    Measurements  Intervals                                Axis  Rate:       112                          P:  NH:                                      QRS:        73  QRSD:       130                          T:          -4  QT:         380  QTc:        519    Interpretive Statements  ATRIAL FIBRILLATION  RIGHT BUNDLE BRANCH BLOCK  Compared to ECG 2018 17:53:56  Sinus tachycardia no longer present    Electronically Signed On 2018 23:03:45 PDT by Luis Alberto Selby MD     ACCU-CHEK GLUCOSE    Collection Time: 18  8:08 PM   Result Value Ref Range    Glucose - Accu-Ck 163 (H) 65 - 99 mg/dL   ISTAT ARTERIAL BLOOD GAS    Collection Time: 18  8:11 PM   Result Value Ref Range    Ph 7.352 (L) 7.400 - 7.500    Pco2 37.6 (H) 26.0 - 37.0 mmHg    Po2 161 (H) 64 - 87 mmHg    Tco2 22 20 - 33 mmol/L    S02 99 93 - 99 %    Hco3 20.9 17.0 - 25.0 mmol/L    BE -4 -4 - 3 mmol/L    Body Temp 38.1 C degrees    O2 Therapy 60 %    iPF Ratio 268      Ph Temp Cesar 7.336 (L) 7.400 - 7.500    Pco2 Temp Co 39.5 (H) 26.0 - 37.0 mmHg    Po2 Temp Cor 167 (H) 64 - 87 mmHg    Specimen Arterial     Action Range Triggered NO     Inst. Qualified Patient YES    ACCU-CHEK GLUCOSE    Collection Time: 09/30/18 10:01 PM   Result Value Ref Range    Glucose - Accu-Ck 150 (H) 65 - 99 mg/dL   ACCU-CHEK GLUCOSE    Collection Time: 10/01/18 12:03 AM   Result Value Ref Range    Glucose - Accu-Ck 148 (H) 65 - 99 mg/dL   ACCU-CHEK GLUCOSE    Collection Time: 10/01/18  1:59 AM   Result Value Ref Range    Glucose - Accu-Ck 172 (H) 65 - 99 mg/dL   CBC with Differential    Collection Time: 10/01/18  4:00 AM   Result Value Ref Range    WBC 15.9 (H) 4.8 - 10.8 K/uL    RBC 4.30 (L) 4.70 - 6.10 M/uL    Hemoglobin 13.4 (L) 14.0 - 18.0 g/dL    Hematocrit 38.9 (L) 42.0 - 52.0 %    MCV 90.5 81.4 - 97.8 fL    MCH 31.2 27.0 - 33.0 pg    MCHC 34.4 33.7 - 35.3 g/dL    RDW 45.6 35.9 - 50.0 fL    Platelet Count 120 (L) 164 - 446 K/uL    MPV 9.9 9.0 - 12.9 fL    Neutrophils-Polys 81.60 (H) 44.00 - 72.00 %    Lymphocytes 8.10 (L) 22.00 - 41.00 %    Monocytes 9.20 0.00 - 13.40 %    Eosinophils 0.10 0.00 - 6.90 %    Basophils 0.20 0.00 - 1.80 %    Immature Granulocytes 0.80 0.00 - 0.90 %    Nucleated RBC 0.00 /100 WBC    Neutrophils (Absolute) 12.96 (H) 1.82 - 7.42 K/uL    Lymphs (Absolute) 1.28 1.00 - 4.80 K/uL    Monos (Absolute) 1.46 (H) 0.00 - 0.85 K/uL    Eos (Absolute) 0.02 0.00 - 0.51 K/uL    Baso (Absolute) 0.03 0.00 - 0.12 K/uL    Immature Granulocytes (abs) 0.12 (H) 0.00 - 0.11 K/uL    NRBC (Absolute) 0.00 K/uL   Magnesium    Collection Time: 10/01/18  4:00 AM   Result Value Ref Range    Magnesium 2.4 1.5 - 2.5 mg/dL   Phosphorus    Collection Time: 10/01/18  4:00 AM   Result Value Ref Range    Phosphorus 2.5 2.5 - 4.5 mg/dL   COMP METABOLIC PANEL    Collection Time: 10/01/18  4:00 AM   Result Value Ref Range    Sodium 137 135 - 145 mmol/L    Potassium 3.9 3.6 - 5.5 mmol/L    Chloride 109 96 -  112 mmol/L    Co2 20 20 - 33 mmol/L    Anion Gap 8.0 0.0 - 11.9    Glucose 189 (H) 65 - 99 mg/dL    Bun 42 (H) 8 - 22 mg/dL    Creatinine 1.09 0.50 - 1.40 mg/dL    Calcium 7.8 (L) 8.5 - 10.5 mg/dL    AST(SGOT) 126 (H) 12 - 45 U/L    ALT(SGPT) 251 (H) 2 - 50 U/L    Alkaline Phosphatase 87 30 - 99 U/L    Total Bilirubin 0.6 0.1 - 1.5 mg/dL    Albumin 3.2 3.2 - 4.9 g/dL    Total Protein 5.2 (L) 6.0 - 8.2 g/dL    Globulin 2.0 1.9 - 3.5 g/dL    A-G Ratio 1.6 g/dL   TRIGLYCERIDE    Collection Time: 10/01/18  4:00 AM   Result Value Ref Range    Triglycerides 110 0 - 149 mg/dL   ESTIMATED GFR    Collection Time: 10/01/18  4:00 AM   Result Value Ref Range    GFR If African American >60 >60 mL/min/1.73 m 2    GFR If Non African American >60 >60 mL/min/1.73 m 2   ISTAT ARTERIAL BLOOD GAS    Collection Time: 10/01/18  4:00 AM   Result Value Ref Range    Ph 7.364 (L) 7.400 - 7.500    Pco2 34.4 26.0 - 37.0 mmHg    Po2 131 (H) 64 - 87 mmHg    Tco2 21 20 - 33 mmol/L    S02 99 93 - 99 %    Hco3 19.6 17.0 - 25.0 mmol/L    BE -5 (L) -4 - 3 mmol/L    Body Temp 38.3 C degrees    O2 Therapy 40 %    iPF Ratio 328     Ph Temp Cesar 7.345 (L) 7.400 - 7.500    Pco2 Temp Co 36.4 26.0 - 37.0 mmHg    Po2 Temp Cor 139 (H) 64 - 87 mmHg    Specimen Arterial     Action Range Triggered NO     Inst. Qualified Patient YES    ACCU-CHEK GLUCOSE    Collection Time: 10/01/18  4:01 AM   Result Value Ref Range    Glucose - Accu-Ck 175 (H) 65 - 99 mg/dL   ACCU-CHEK GLUCOSE    Collection Time: 10/01/18  6:08 AM   Result Value Ref Range    Glucose - Accu-Ck 186 (H) 65 - 99 mg/dL   ACCU-CHEK GLUCOSE    Collection Time: 10/01/18  6:55 AM   Result Value Ref Range    Glucose - Accu-Ck 179 (H) 65 - 99 mg/dL   ACCU-CHEK GLUCOSE    Collection Time: 10/01/18  8:47 AM   Result Value Ref Range    Glucose - Accu-Ck 152 (H) 65 - 99 mg/dL   ACCU-CHEK GLUCOSE    Collection Time: 10/01/18 10:52 AM   Result Value Ref Range    Glucose - Accu-Ck 142 (H) 65 - 99 mg/dL   ACCU-CHEK  GLUCOSE    Collection Time: 10/01/18  1:15 PM   Result Value Ref Range    Glucose - Accu-Ck 148 (H) 65 - 99 mg/dL   ACCU-CHEK GLUCOSE    Collection Time: 10/01/18  3:02 PM   Result Value Ref Range    Glucose - Accu-Ck 155 (H) 65 - 99 mg/dL   ACCU-CHEK GLUCOSE    Collection Time: 10/01/18  4:59 PM   Result Value Ref Range    Glucose - Accu-Ck 162 (H) 65 - 99 mg/dL   ACCU-CHEK GLUCOSE    Collection Time: 10/01/18  7:19 PM   Result Value Ref Range    Glucose - Accu-Ck 178 (H) 65 - 99 mg/dL   ACCU-CHEK GLUCOSE    Collection Time: 10/01/18  9:26 PM   Result Value Ref Range    Glucose - Accu-Ck 179 (H) 65 - 99 mg/dL   ACCU-CHEK GLUCOSE    Collection Time: 10/01/18 11:14 PM   Result Value Ref Range    Glucose - Accu-Ck 174 (H) 65 - 99 mg/dL   ACCU-CHEK GLUCOSE    Collection Time: 10/02/18  1:09 AM   Result Value Ref Range    Glucose - Accu-Ck 192 (H) 65 - 99 mg/dL   ACCU-CHEK GLUCOSE    Collection Time: 10/02/18  3:07 AM   Result Value Ref Range    Glucose - Accu-Ck 182 (H) 65 - 99 mg/dL   ISTAT ARTERIAL BLOOD GAS    Collection Time: 10/02/18  3:55 AM   Result Value Ref Range    Ph 7.365 (L) 7.400 - 7.500    Pco2 32.0 26.0 - 37.0 mmHg    Po2 91 (H) 64 - 87 mmHg    Tco2 19 (L) 20 - 33 mmol/L    S02 97 93 - 99 %    Hco3 18.3 17.0 - 25.0 mmol/L    BE -6 (L) -4 - 3 mmol/L    Body Temp 38.3 C degrees    O2 Therapy 40 %    iPF Ratio 228     Ph Temp Cesar 7.347 (L) 7.400 - 7.500    Pco2 Temp Co 33.9 26.0 - 37.0 mmHg    Po2 Temp Cor 98 (H) 64 - 87 mmHg    Specimen Arterial     Action Range Triggered NO     Inst. Qualified Patient YES    ACCU-CHEK GLUCOSE    Collection Time: 10/02/18  5:14 AM   Result Value Ref Range    Glucose - Accu-Ck 184 (H) 65 - 99 mg/dL       Imaging  X-Ray:  I have personally reviewed the images and compared with prior images. and My impression is: Increased edema and right greater than left lung opacities    Assessment/Plan  * Acute respiratory failure with hypoxia (HCC)- (present on admission)   Assessment  & Plan    Intubated 9/29  Continue vent support  SBT as tolerated  Decrease PEEP to 8  All appropriate ventilator bundles have been initiated        Acute pulmonary edema (HCC)   Assessment & Plan    Force diuresis with furosemide  Echocardiogram with normal LV systolic function  Suspect that significant pulmonary hypertension is causing LV diastolic dysfunction        Acute kidney injury (HCC)   Assessment & Plan    Improved  Follow renal function  Avoid nephrotoxins        CAP (community acquired pneumonia)- (present on admission)   Assessment & Plan    Influenza negative  Continue azithromycin and Unasyn  Procalcitonin is normal, for what it's worth        Septic shock ([facility])   Assessment & Plan    Severe sepsis with associated acute respiratory failure  Pulmonary source  Continue antibiotics  Titrating phenylephrine to keep MAP > 65        Acute metabolic encephalopathy   Assessment & Plan    Improved  Etiology is metabolic as well as due to sepsis  Limit sedatives        Elevated troponin- (present on admission)   Assessment & Plan    Due to demand ischemia  Continue aspirin and statin        Pulmonary hypertension (HCC)   Assessment & Plan    RVSP 65 mm Hg  CTA without evidence of pulmonary embolism  Force diuresis with furosemide        Hyperglycemia- (present on admission)   Assessment & Plan    Titrating insulin drip for successful glucose control        Dyslipidemia- (present on admission)   Assessment & Plan    Continue statin             VTE:  Lovenox  Ulcer: H2 Antagonist  Lines: Central Line  Ongoing indication addressed    I have performed a physical exam and reviewed and updated ROS and Plan today (10/2/2018). In review of yesterday's note (10/1/2018), there are no changes except as documented above.     I have assessed and reassessed his respiratory status with ventilator adjustments, ventilator waveforms, airway mechanics, blood pressure, hemodynamics, cardiovascular status with titration of  phenylephrine, blood glucose with titration of an insulin drip and neurologic status with titration of propofol.  He is at increased risk for worsening respiratory and cardiovascular system dysfunction.    Discussed patient condition and risk of morbidity and/or mortality with Hospitalist, Family, RN, RT, Pharmacy, Charge nurse / hot rounds and QA team  The patient remains critically ill.  Critical care time = 90 minutes in directly providing and coordinating critical care and extensive data review.  No time overlap and excludes procedures.    High risk of deterioration and worsening vital organ dysfunction and death without the above critical care interventions.    Brandon Paulino MD  Pulmonary and Critical Care Medicine

## 2018-10-02 NOTE — DISCHARGE PLANNING
Anticipated Discharge Disposition: TBD    Action: Spoke to community CM w/ pt's INS from [facility].    Pt reports he lives w/ his spouse. He is independent w/ al ADLs. He works out 6 days a week and does not take any medications. Both he and his spouse drive. He prefers [facility] on [address] for scripts.    Barriers to Discharge: TBD    Plan: f/u w/ medical team

## 2018-10-02 NOTE — CARE PLAN
Problem: Mobility  Goal: Risk for activity intolerance will decrease  Outcome: PROGRESSING AS EXPECTED  Pt is vented and unable to prform active ROM.  Q 2 hours turns and passive ROM preformed/ Pt. was mobilized to edge of bed with help of 3 RNs. Pt heart rate up to the 150's so we only did about 5 minutes at EOB. PT came up right after we mobilized, will come again tomorrow to try work with him

## 2018-10-03 PROBLEM — J98.11 ATELECTASIS: Status: ACTIVE | Noted: 2018-01-01

## 2018-10-03 PROBLEM — I48.0 PAROXYSMAL A-FIB (HCC): Status: ACTIVE | Noted: 2018-01-01

## 2018-10-03 PROBLEM — N17.9 ACUTE KIDNEY INJURY (HCC): Status: RESOLVED | Noted: 2018-01-01 | Resolved: 2018-01-01

## 2018-10-03 NOTE — PROGRESS NOTES
Critical Care Progress Note    Date of admission  9/29/2018    Chief Complaint  78 y.o. male admitted 9/29/2018 with cough and shortness of breath.    Hospital Course  This gentleman was admitted with severe sepsis, respiratory failure and pneumonia.    Interval Problem Update  Reviewed last 24 hour events:       -follows   -AF   -start amio gtt   -Chris 40   -TF at 50 (goal)   -prop   -insulin 6   -vent 5   -PEEP 8  40%   -increase Lovenox to full dose   -stop Lasix and K      Review of Systems  Review of Systems   Unable to perform ROS: Acuity of condition        Vital Signs for last 24 hours   Temp:  [37.6 °C (99.7 °F)-38.2 °C (100.8 °F)] 38.1 °C (100.6 °F)  Pulse:  [] 113  Resp:  [17-26] 21    Hemodynamic parameters for last 24 hours  CVP:  [7 MM HG-39 MM HG] 7 MM HG    Vent Settings for last 24 hours  Smith Vent Mode: APVCMV  Rate (breaths/min):  [20] 20  PEEP/CPAP:  [8-10] 8  FiO2:  [30-40] 30  P Peak (PIP):  [19-22] 19  P MEAN:  [11-13] 11    Physical Exam   Physical Exam   Constitutional:   Sedated on ventilator   HENT:   Head: Normocephalic.   Right Ear: External ear normal.   Left Ear: External ear normal.   Mouth/Throat: No oropharyngeal exudate.   Eyes: Pupils are equal, round, and reactive to light. Right eye exhibits no discharge. Left eye exhibits no discharge. No scleral icterus.   Neck: Neck supple. No JVD present. No tracheal deviation present.   Cardiovascular: Intact distal pulses.  Exam reveals no gallop and no friction rub.    Atrial fibrillation   Pulmonary/Chest: He has no wheezes. He has rales (Coarse crackles bilaterally).   Abdominal: Soft. Bowel sounds are normal. He exhibits no distension. There is no tenderness. There is no guarding.   Tolerating enteral tube feedings   Musculoskeletal: He exhibits no tenderness or deformity.   No clubbing or cyanosis   Neurological:   Sedated.  Arouses.  Moves all 4 extremities.  Follows.   Skin: Skin is warm and dry. He is not diaphoretic. No  erythema.       Medications  Current Facility-Administered Medications   Medication Dose Route Frequency Provider Last Rate Last Dose   • furosemide (LASIX) injection 40 mg  40 mg Intravenous BID DIURETIC Brandon Paulino M.D.   40 mg at 10/03/18 0507   • potassium bicarbonate (KLYTE) effervescent tablet 25 mEq  25 mEq Feeding Tube BID Brandon Paulino M.D.   25 mEq at 10/03/18 0506   • ondansetron (ZOFRAN ODT) dispertab 4 mg  4 mg Oral Q4HRS PRN Brandon Paulino M.D.       • famotidine (PEPCID) tablet 20 mg  20 mg Feeding Tube Q12HRS Brandon Paulino M.D.   20 mg at 10/03/18 0506   • insulin regular human (HUMULIN/NOVOLIN R) 62.5 Units in  mL infusion per protocol  0-29 Units/hr Intravenous Continuous Brandon Paulino M.D. 16 mL/hr at 10/03/18 0553 4 Units/hr at 10/03/18 0553   • dextrose 50% (D50W) injection 25-50 mL  12.5-25 g Intravenous PRN Brandon Paulino M.D.       • Respiratory Care per Protocol   Nebulization Continuous RT Walter Carrion M.D.       • ondansetron (ZOFRAN) syringe/vial injection 4 mg  4 mg Intravenous Q4HRS PRN Walter Carrion M.D.       • ampicillin/sulbactam (UNASYN) 3 g in  mL IVPB  3 g Intravenous Q6HRS Walter Carrion M.D.   Stopped at 10/03/18 0537   • propofol (DIPRIVAN) injection  0-80 mcg/kg/min Intravenous Continuous Jeremy M Gonda, M.D. 13.5 mL/hr at 10/03/18 0050 25 mcg/kg/min at 10/03/18 0050   • MD Alert...ICU Electrolyte Replacement per Pharmacy   Other pharmacy to dose Jeremy M Gonda, M.D.       • enoxaparin (LOVENOX) inj 40 mg  40 mg Subcutaneous DAILY Jeremy M Gonda, M.D.   40 mg at 10/03/18 0600   • fentaNYL (SUBLIMAZE) injection 25 mcg  25 mcg Intravenous Q HOUR PRN Jeremy M Gonda, M.D.        Or   • fentaNYL (SUBLIMAZE) injection 50 mcg  50 mcg Intravenous Q HOUR PRN Jeremy M Gonda, M.D.        Or   • fentaNYL (SUBLIMAZE) injection 100 mcg  100 mcg Intravenous Q HOUR PRN Jeremy M Gonda, M.D.       • ipratropium-albuterol  (DUONEB) nebulizer solution  3 mL Nebulization Q2HRS PRN (RT) Jeremy M Gonda, M.D.       • Pharmacy Consult: Enteral tube feeding - review meds/change route/product selection   Other PRN Jeremy M Gonda, M.D.       • senna-docusate (PERICOLACE or SENOKOT S) 8.6-50 MG per tablet 2 Tab  2 Tab Feeding Tube BID Jeremy M Gonda, M.D.   Stopped at 10/03/18 0600    And   • polyethylene glycol/lytes (MIRALAX) PACKET 1 Packet  1 Packet Feeding Tube QDAY PRN Jeremy M Gonda, M.D.        And   • magnesium hydroxide (MILK OF MAGNESIA) suspension 30 mL  30 mL Feeding Tube QDAY PRN Jeremy M Gonda, M.D.   30 mL at 10/02/18 1322    And   • bisacodyl (DULCOLAX) suppository 10 mg  10 mg Rectal QDAY PRN Jeremy M Gonda, M.D.   10 mg at 10/02/18 1651   • acetaminophen (TYLENOL) tablet 650 mg  650 mg Feeding Tube Q6HRS PRN Jeremy M Gonda, M.D.   650 mg at 10/02/18 1434   • atorvastatin (LIPITOR) tablet 40 mg  40 mg Per NG Tube Q EVENING Jeremy M Gonda, M.D.   40 mg at 10/02/18 1800   • aspirin (ASA) chewable tab 81 mg  81 mg Per NG Tube DAILY Jeremy M Gonda, M.D.   81 mg at 10/03/18 0506   • phenylephrine (MARYSOL-SYNEPHRINE) 40,000 mcg in  mL Infusion  1-300 mcg/min Intravenous Continuous Walter Carrion M.D. 37.5 mL/hr at 10/02/18 2105 100 mcg/min at 10/02/18 2105       Fluids    Intake/Output Summary (Last 24 hours) at 10/03/18 0651  Last data filed at 10/03/18 0600   Gross per 24 hour   Intake           3199.1 ml   Output             2710 ml   Net            489.1 ml       Laboratory  Recent Results (from the past 48 hour(s))   ACCU-CHEK GLUCOSE    Collection Time: 10/01/18  6:55 AM   Result Value Ref Range    Glucose - Accu-Ck 179 (H) 65 - 99 mg/dL   ACCU-CHEK GLUCOSE    Collection Time: 10/01/18  8:47 AM   Result Value Ref Range    Glucose - Accu-Ck 152 (H) 65 - 99 mg/dL   ACCU-CHEK GLUCOSE    Collection Time: 10/01/18 10:52 AM   Result Value Ref Range    Glucose - Accu-Ck 142 (H) 65 - 99 mg/dL   ACCU-CHEK GLUCOSE    Collection Time:  10/01/18  1:15 PM   Result Value Ref Range    Glucose - Accu-Ck 148 (H) 65 - 99 mg/dL   ACCU-CHEK GLUCOSE    Collection Time: 10/01/18  3:02 PM   Result Value Ref Range    Glucose - Accu-Ck 155 (H) 65 - 99 mg/dL   ACCU-CHEK GLUCOSE    Collection Time: 10/01/18  4:59 PM   Result Value Ref Range    Glucose - Accu-Ck 162 (H) 65 - 99 mg/dL   ACCU-CHEK GLUCOSE    Collection Time: 10/01/18  7:19 PM   Result Value Ref Range    Glucose - Accu-Ck 178 (H) 65 - 99 mg/dL   ACCU-CHEK GLUCOSE    Collection Time: 10/01/18  9:26 PM   Result Value Ref Range    Glucose - Accu-Ck 179 (H) 65 - 99 mg/dL   ACCU-CHEK GLUCOSE    Collection Time: 10/01/18 11:14 PM   Result Value Ref Range    Glucose - Accu-Ck 174 (H) 65 - 99 mg/dL   ACCU-CHEK GLUCOSE    Collection Time: 10/02/18  1:09 AM   Result Value Ref Range    Glucose - Accu-Ck 192 (H) 65 - 99 mg/dL   ACCU-CHEK GLUCOSE    Collection Time: 10/02/18  3:07 AM   Result Value Ref Range    Glucose - Accu-Ck 182 (H) 65 - 99 mg/dL   ISTAT ARTERIAL BLOOD GAS    Collection Time: 10/02/18  3:55 AM   Result Value Ref Range    Ph 7.365 (L) 7.400 - 7.500    Pco2 32.0 26.0 - 37.0 mmHg    Po2 91 (H) 64 - 87 mmHg    Tco2 19 (L) 20 - 33 mmol/L    S02 97 93 - 99 %    Hco3 18.3 17.0 - 25.0 mmol/L    BE -6 (L) -4 - 3 mmol/L    Body Temp 38.3 C degrees    O2 Therapy 40 %    iPF Ratio 228     Ph Temp Cesar 7.347 (L) 7.400 - 7.500    Pco2 Temp Co 33.9 26.0 - 37.0 mmHg    Po2 Temp Cor 98 (H) 64 - 87 mmHg    Specimen Arterial     Action Range Triggered NO     Inst. Qualified Patient YES    ACCU-CHEK GLUCOSE    Collection Time: 10/02/18  5:14 AM   Result Value Ref Range    Glucose - Accu-Ck 184 (H) 65 - 99 mg/dL   CBC with Differential    Collection Time: 10/02/18  6:20 AM   Result Value Ref Range    WBC 14.0 (H) 4.8 - 10.8 K/uL    RBC 4.21 (L) 4.70 - 6.10 M/uL    Hemoglobin 13.2 (L) 14.0 - 18.0 g/dL    Hematocrit 38.3 (L) 42.0 - 52.0 %    MCV 91.0 81.4 - 97.8 fL    MCH 31.4 27.0 - 33.0 pg    MCHC 34.5 33.7 -  35.3 g/dL    RDW 47.0 35.9 - 50.0 fL    Platelet Count 200 164 - 446 K/uL    MPV 10.2 9.0 - 12.9 fL    Neutrophils-Polys 77.90 (H) 44.00 - 72.00 %    Lymphocytes 9.80 (L) 22.00 - 41.00 %    Monocytes 10.70 0.00 - 13.40 %    Eosinophils 0.40 0.00 - 6.90 %    Basophils 0.30 0.00 - 1.80 %    Immature Granulocytes 0.90 0.00 - 0.90 %    Nucleated RBC 0.00 /100 WBC    Neutrophils (Absolute) 10.89 (H) 1.82 - 7.42 K/uL    Lymphs (Absolute) 1.37 1.00 - 4.80 K/uL    Monos (Absolute) 1.49 (H) 0.00 - 0.85 K/uL    Eos (Absolute) 0.06 0.00 - 0.51 K/uL    Baso (Absolute) 0.04 0.00 - 0.12 K/uL    Immature Granulocytes (abs) 0.12 (H) 0.00 - 0.11 K/uL    NRBC (Absolute) 0.00 K/uL   Basic Metabolic Panel (BMP)    Collection Time: 10/02/18  6:20 AM   Result Value Ref Range    Sodium 140 135 - 145 mmol/L    Potassium 4.1 3.6 - 5.5 mmol/L    Chloride 112 96 - 112 mmol/L    Co2 22 20 - 33 mmol/L    Glucose 206 (H) 65 - 99 mg/dL    Bun 50 (H) 8 - 22 mg/dL    Creatinine 0.97 0.50 - 1.40 mg/dL    Calcium 8.2 (L) 8.5 - 10.5 mg/dL    Anion Gap 6.0 0.0 - 11.9   Magnesium    Collection Time: 10/02/18  6:20 AM   Result Value Ref Range    Magnesium 2.4 1.5 - 2.5 mg/dL   Phosphorus    Collection Time: 10/02/18  6:20 AM   Result Value Ref Range    Phosphorus 2.1 (L) 2.5 - 4.5 mg/dL   ESTIMATED GFR    Collection Time: 10/02/18  6:20 AM   Result Value Ref Range    GFR If African American >60 >60 mL/min/1.73 m 2    GFR If Non African American >60 >60 mL/min/1.73 m 2   ACCU-CHEK GLUCOSE    Collection Time: 10/02/18  6:24 AM   Result Value Ref Range    Glucose - Accu-Ck 188 (H) 65 - 99 mg/dL   ACCU-CHEK GLUCOSE    Collection Time: 10/02/18  7:35 AM   Result Value Ref Range    Glucose - Accu-Ck 148 (H) 65 - 99 mg/dL   ACCU-CHEK GLUCOSE    Collection Time: 10/02/18  8:59 AM   Result Value Ref Range    Glucose - Accu-Ck 175 (H) 65 - 99 mg/dL   ACCU-CHEK GLUCOSE    Collection Time: 10/02/18  9:48 AM   Result Value Ref Range    Glucose - Accu-Ck 160 (H) 65 -  99 mg/dL   ACCU-CHEK GLUCOSE    Collection Time: 10/02/18 10:27 AM   Result Value Ref Range    Glucose - Accu-Ck 158 (H) 65 - 99 mg/dL   ACCU-CHEK GLUCOSE    Collection Time: 10/02/18 11:34 AM   Result Value Ref Range    Glucose - Accu-Ck 141 (H) 65 - 99 mg/dL   ACCU-CHEK GLUCOSE    Collection Time: 10/02/18  1:44 PM   Result Value Ref Range    Glucose - Accu-Ck 177 (H) 65 - 99 mg/dL   ACCU-CHEK GLUCOSE    Collection Time: 10/02/18  2:31 PM   Result Value Ref Range    Glucose - Accu-Ck 176 (H) 65 - 99 mg/dL   ACCU-CHEK GLUCOSE    Collection Time: 10/02/18  3:34 PM   Result Value Ref Range    Glucose - Accu-Ck 147 (H) 65 - 99 mg/dL   ACCU-CHEK GLUCOSE    Collection Time: 10/02/18  4:42 PM   Result Value Ref Range    Glucose - Accu-Ck 173 (H) 65 - 99 mg/dL   ACCU-CHEK GLUCOSE    Collection Time: 10/02/18  5:30 PM   Result Value Ref Range    Glucose - Accu-Ck 191 (H) 65 - 99 mg/dL   ACCU-CHEK GLUCOSE    Collection Time: 10/02/18  6:42 PM   Result Value Ref Range    Glucose - Accu-Ck 166 (H) 65 - 99 mg/dL   ACCU-CHEK GLUCOSE    Collection Time: 10/02/18  7:43 PM   Result Value Ref Range    Glucose - Accu-Ck 174 (H) 65 - 99 mg/dL   ACCU-CHEK GLUCOSE    Collection Time: 10/02/18  8:34 PM   Result Value Ref Range    Glucose - Accu-Ck 146 (H) 65 - 99 mg/dL   ACCU-CHEK GLUCOSE    Collection Time: 10/02/18  9:52 PM   Result Value Ref Range    Glucose - Accu-Ck 138 (H) 65 - 99 mg/dL   ACCU-CHEK GLUCOSE    Collection Time: 10/02/18 10:41 PM   Result Value Ref Range    Glucose - Accu-Ck 150 (H) 65 - 99 mg/dL   ACCU-CHEK GLUCOSE    Collection Time: 10/02/18 11:59 PM   Result Value Ref Range    Glucose - Accu-Ck 146 (H) 65 - 99 mg/dL   ACCU-CHEK GLUCOSE    Collection Time: 10/03/18 12:58 AM   Result Value Ref Range    Glucose - Accu-Ck 154 (H) 65 - 99 mg/dL   ACCU-CHEK GLUCOSE    Collection Time: 10/03/18  3:42 AM   Result Value Ref Range    Glucose - Accu-Ck 156 (H) 65 - 99 mg/dL   ISTAT ARTERIAL BLOOD GAS    Collection Time:  10/03/18  3:43 AM   Result Value Ref Range    Ph 7.478 7.400 - 7.500    Pco2 32.9 26.0 - 37.0 mmHg    Po2 69 64 - 87 mmHg    Tco2 25 20 - 33 mmol/L    S02 95 93 - 99 %    Hco3 24.4 17.0 - 25.0 mmol/L    BE 1 -4 - 3 mmol/L    Body Temp 38.3 C degrees    O2 Therapy 30 %    iPF Ratio 230     Ph Temp Cesar 7.458 7.400 - 7.500    Pco2 Temp Co 34.8 26.0 - 37.0 mmHg    Po2 Temp Cor 75 64 - 87 mmHg    Specimen Arterial     Action Range Triggered NO     Inst. Qualified Patient YES    HEPATIC FUNCTION PANEL    Collection Time: 10/03/18  5:30 AM   Result Value Ref Range    Alkaline Phosphatase 114 (H) 30 - 99 U/L    AST(SGOT) 35 12 - 45 U/L    ALT(SGPT) 143 (H) 2 - 50 U/L    Total Bilirubin 0.5 0.1 - 1.5 mg/dL    Direct Bilirubin 0.1 0.1 - 0.5 mg/dL    Indirect Bilirubin 0.4 0.0 - 1.0 mg/dL    Albumin 2.7 (L) 3.2 - 4.9 g/dL    Total Protein 5.1 (L) 6.0 - 8.2 g/dL   Basic Metabolic Panel (BMP)    Collection Time: 10/03/18  5:30 AM   Result Value Ref Range    Sodium 144 135 - 145 mmol/L    Potassium 4.3 3.6 - 5.5 mmol/L    Chloride 112 96 - 112 mmol/L    Co2 25 20 - 33 mmol/L    Glucose 171 (H) 65 - 99 mg/dL    Bun 59 (H) 8 - 22 mg/dL    Creatinine 0.92 0.50 - 1.40 mg/dL    Calcium 8.2 (L) 8.5 - 10.5 mg/dL    Anion Gap 7.0 0.0 - 11.9   MAGNESIUM    Collection Time: 10/03/18  5:30 AM   Result Value Ref Range    Magnesium 2.3 1.5 - 2.5 mg/dL   PHOSPHORUS    Collection Time: 10/03/18  5:30 AM   Result Value Ref Range    Phosphorus 2.2 (L) 2.5 - 4.5 mg/dL   ESTIMATED GFR    Collection Time: 10/03/18  5:30 AM   Result Value Ref Range    GFR If African American >60 >60 mL/min/1.73 m 2    GFR If Non African American >60 >60 mL/min/1.73 m 2   ACCU-CHEK GLUCOSE    Collection Time: 10/03/18  5:42 AM   Result Value Ref Range    Glucose - Accu-Ck 153 (H) 65 - 99 mg/dL       Imaging  X-Ray:  I have personally reviewed the images and compared with prior images. and My impression is: Increased edema and left lung opacities    Assessment/Plan  *  Acute respiratory failure with hypoxia (HCC)- (present on admission)   Assessment & Plan    Intubated 9/29  Continue vent support  SBT as tolerated  All appropriate ventilator bundles have been initiated        Acute pulmonary edema (HCC)   Assessment & Plan    Echo with normal LV systolic function  Significant pulmonary hypertension causing LV diastolic dysfunction  Stop Lasix with increasing BUN        CAP (community acquired pneumonia)- (present on admission)   Assessment & Plan    Influenza negative  Continue Unasyn and azithromycin  Procalcitonin is normal, for what it's worth        Septic shock (HCC)   Assessment & Plan    Severe sepsis with associated acute respiratory failure  Pulmonary source  Continue antibiotics  Titrating phenylephrine to keep MAP > 65        Paroxysmal A-fib (HCC)   Assessment & Plan    Start amiodarone drip  Optimize potassium and magnesium  Anticoagulate with Lovenox, 1 mg/kg every 12 hours        Pulmonary hypertension (HCC)   Assessment & Plan    RVSP 65 mm Hg  CTA without evidence of pulmonary embolism  Stop Lasix with increasing BUN        Acute metabolic encephalopathy   Assessment & Plan    Etiology is metabolic as well as due to sepsis  Limit sedatives  Improved        Elevated troponin- (present on admission)   Assessment & Plan    Due to demand ischemia  Continue aspirin and statin        Hyperglycemia- (present on admission)   Assessment & Plan    Titrating insulin drip to control glucose        Dyslipidemia- (present on admission)   Assessment & Plan    Continue statin             VTE:  Lovenox  Ulcer: H2 Antagonist  Lines: Central Line  Ongoing indication addressed    I have performed a physical exam and reviewed and updated ROS and Plan today (10/3/2018). In review of yesterday's note (10/2/2018), there are no changes except as documented above.     I have assessed and reassessed his respiratory status with spontaneous breathing trials and ventilator adjustments, airway  mechanics, ventilator waveforms, blood pressure, hemodynamics, cardiovascular status with titration of phenylephrine, blood glucose with titration of an insulin drip and neurologic status with titration of propofol.  He is at increased risk for worsening cardiovascular and respiratory system dysfunction.    Discussed patient condition and risk of morbidity and/or mortality with Hospitalist, Family, RN, RT, Pharmacy, Charge nurse / hot rounds and QA team  The patient remains critically ill.  Critical care time = 87 minutes in directly providing and coordinating critical care and extensive data review.  No time overlap and excludes procedures.    High risk of deterioration and worsening vital organ dysfunction and death without the above critical care interventions.    Brandon Paulino MD  Pulmonary and Critical Care Medicine

## 2018-10-03 NOTE — RESPIRATORY CARE
Ventilator Weaning Update    Patient is on vent day 5.  SBT was tolerated for a minimum of .25 hours on settings of 12/8    .    Wean parameters for this SBT were:  #FVC / Vital Capacity (liters) :  (unable to follow) (10/03/18 1644)  NIF (cm H2O) :  (unable to follow) (10/03/18 1644)  Rapid Shallow Breathing Index (RR/VT): 105 (10/03/18 1644)  RR (bpm): (!) 35 (10/03/18 1644)  Spontaneous VE: 6.6 (10/03/18 1644)  Spontaneous VT: 486 (10/03/18 1644)    Barriers to Wean: No Order  Weaning Trial Stopped due to:: RSBI >105 (Rapid Shallow Breathing Index)

## 2018-10-03 NOTE — PROCEDURES
Date of Procedure:  10/3/2018    Title of Procedure:  Diagnostic and therapeutic flexible fiberoptic bronchoscopy with bronchoalveolar lavage    Indication for Procedure:   Atelectasis    Post-procedure Diagnoses:    1.  Normal endobronchial anatomy  2.  No endobronchial tumor identified  3.  Friable and erythematous airways in the left upper lobe and lingula as well as in the medial bronchus intermedius.  No definite endobronchial tumor identified.  4.  Moderate amount of blood-tinged yellow secretions seen bilaterally.  All secretions suctioned until clear.    Narrative:    The patient was sedated, intubated and ventilated at the time of this procedure.  The flexible fiberoptic bronchoscope was inserted through the lumen of the endotracheal tube and advanced into the distal trachea without difficulty.  The airways were examined to the subsegmental bronchus level bilaterally.    The endobronchial anatomy was normal.  No tumor was identified.    The airways in the left upper lobe and lingula were friable and erythematous.  There was an additional area of friable and erythematous airways in the medial bronchus intermedius.  No definite endobronchial tumor was identified.  There was a moderate amount of blood-tinged yellow secretions seen bilaterally.  All the secretions were suctioned until clear.    Bronchoalveolar lavage was carried out in the left upper lobe using the standard technique with good fluid return.    Bronchoalveolar lavage fluid from the left upper lobe is submitted to the laboratory for cytology, gram stain, culture and sensitivity, acid fast bacilli smear and culture and fungal culture.    The patient tolerated the procedure quite nicely.  No complications were apparent.  The heart rate and rhythm, blood pressure and oxygenation saturation were continuously monitored.      Brandon Paulino MD  Pulmonary and Critical Care Medicine

## 2018-10-03 NOTE — CARE PLAN
Problem: Ventilation Defect:  Goal: Ability to achieve and maintain unassisted ventilation or tolerate decreased levels of ventilator support  Outcome: PROGRESSING SLOWER THAN EXPECTED  Adult Ventilation Update    Total Vent Days: 5    Patient Lines/Drains/Airways Status    Active Airway     Name: Placement date: Placement time: Site: Days:    Airway ETT Oral 8.0@ 26  09/29/18 1714   Oral   5              CMV 20/440/8/30%    Plateau Pressure (Q Shift): 19 (10/02/18 1925)  Static Compliance (ml / cm H2O): 55.2 (10/03/18 0248)    Sputum/Suction   Cough: Productive (10/03/18 0248)  Sputum Amount: Small (10/03/18 0248)  Sputum Color: Pink Tinged (10/03/18 0248)  Sputum Consistency: Thick (10/03/18 0248)    Mobility  Level of Mobility: Level I (10/02/18 2000)  Activity Performed: Edge of bed (10/02/18 1400)  Time Activity Tolerated: 4 min (10/02/18 1400)  Distance Per Occurrence (ft.): 0 feet (10/02/18 1400)  Assistance / Tolerance: Assistance of Two or More (10/02/18 2000)  Pt Calls for Assistance: No (10/02/18 2000)  Staff Present for Mobilization: RN (10/02/18 2000)  Gait: Unable to Ambulate (10/02/18 1400)  Assistive Devices: Hand held assist (10/02/18 1400)  Reason Not Mobilized:  (peep 10, pressors, will try later) (10/02/18 1200)  Mobilization Comments:  (pt HR up to 150, only did 3 minutes before he tached up ) (10/02/18 1400)    Events/Summary/Plan: No ventilator changes made.

## 2018-10-03 NOTE — PROGRESS NOTES
2 RN skin check to be completed at bedside.   Pt finger tips have multiple finger stick marks, some have scabs that are black in color. Many bruises noted on pt skin. Bottom covered with Mepilex, skin underweight red and blenching.

## 2018-10-03 NOTE — ASSESSMENT & PLAN NOTE
Continue amiodarone and metoprolol  Now in sinux  Lovenox discontinued given hemoptysis  Reverted to sinus rhythm continue telemetry monitoring

## 2018-10-03 NOTE — PROGRESS NOTES
[facility] Hospitalist Progress Note    Date of Service: 10/3/2018    Chief Complaint  78 y.o. male admitted 2018 with cough and shortness of breath.  Patient was admitted to the hospital and shortly thereafter had significant increase in shortness of breath and required intubation.    Interval Problem Update    Chris 42   AFib with RVRV 120-150   Tmax 38.2  TF at goal  BM last night  VD#5  Insulin drip at 5  + 6.6 L since admit                  Consultants/Specialty  Pulmonary  Cardiology    Disposition  To Remain in ICU        Review of Systems   Unable to perform ROS: Intubated      Physical Exam  Laboratory/Imaging   Hemodynamics  Temp (24hrs), Av.9 °C (100.3 °F), Min:37.6 °C (99.7 °F), Max:38.2 °C (100.8 °F)   Temperature: (!) 38.2 °C (100.8 °F)  Pulse  Av.2  Min: 62  Max: 142 Heart Rate (Monitored): (!) 128  NIBP: 113/70 CVP (mm Hg): (!) 7 MM HG    Respiratory  Smith Vent Mode: APVCMV, Rate (breaths/min): 20, PEEP/CPAP: 8, PEEP/CPAP: 8, FiO2: 30, P Peak (PIP): 26, P MEAN: 13   Respiration: (!) 24, Pulse Oximetry: 97 %     Work Of Breathing / Effort: Vented  RUL Breath Sounds: Clear, RML Breath Sounds: Clear, RLL Breath Sounds: Diminished, BRITTANY Breath Sounds: Clear, LLL Breath Sounds: Diminished    Fluids    Intake/Output Summary (Last 24 hours) at 10/03/18 0938  Last data filed at 10/03/18 0800   Gross per 24 hour   Intake          2957.63 ml   Output             3420 ml   Net          -462.37 ml       Nutrition  Orders Placed This Encounter   Procedures   • Diet NPO     Standing Status:   Standing     Number of Occurrences:   1     Order Specific Question:   Type:     Answer:   Now [1]     Order Specific Question:   Restrict to:     Answer:   Strict [1]     Physical Exam   Constitutional: He appears well-developed and well-nourished.   HENT:   Head: Normocephalic and atraumatic.   Mouth/Throat: No oropharyngeal exudate.   Cortrack in place   Eyes: Conjunctivae are normal. Right eye exhibits no  discharge. Left eye exhibits no discharge. No scleral icterus.   Neck: Neck supple. No JVD present. No tracheal deviation present.   Cardiovascular: An irregularly irregular rhythm present. Tachycardia present.  Exam reveals no gallop and no friction rub.    No murmur heard.  Pulmonary/Chest: Effort normal. No respiratory distress. He has no wheezes. He has rales. He exhibits no tenderness.   Intubated   Abdominal: Soft. Bowel sounds are normal. He exhibits no distension. There is no tenderness. There is no rebound.   Musculoskeletal: He exhibits edema. He exhibits no tenderness.   Neurological: A cranial nerve deficit is present.   Sedated no focal deficits noted   Skin: Skin is warm and dry. He is not diaphoretic. No cyanosis. Nails show no clubbing.   Psychiatric:   Sedated   Nursing note and vitals reviewed.      Recent Labs      10/01/18   0400  10/02/18   0620  10/03/18   0844   WBC  15.9*  14.0*  13.3*   RBC  4.30*  4.21*  4.33*   HEMOGLOBIN  13.4*  13.2*  13.1*   HEMATOCRIT  38.9*  38.3*  39.7*   MCV  90.5  91.0  91.7   MCH  31.2  31.4  30.3   MCHC  34.4  34.5  33.0*   RDW  45.6  47.0  47.8   PLATELETCT  120*  200  207   MPV  9.9  10.2  10.0     Recent Labs      10/01/18   0400  10/02/18   0620  10/03/18   0530   SODIUM  137  140  144   POTASSIUM  3.9  4.1  4.3   CHLORIDE  109  112  112   CO2  20  22  25   GLUCOSE  189*  206*  171*   BUN  42*  50*  59*   CREATININE  1.09  0.97  0.92   CALCIUM  7.8*  8.2*  8.2*             Recent Labs      10/01/18   0400   TRIGLYCERIDE  110          Assessment/Plan     * Acute respiratory failure with hypoxia (HCC)- (present on admission)   Assessment & Plan    CTA on 9-29 neg  for PE  Echo with normal EF and pulmonary hypertension  Vent management per Dr. Paulino  Plan is for bronchoscopy today        CAP (community acquired pneumonia)- (present on admission)   Assessment & Plan    On Unasyn and azithromycin to complete 5-day course        Septic shock (HCC)    Assessment & Plan    The organ system failure associated with this diagnosis is the respiratory system as is evidenced by the need for a intubation    Wean off pressor as tolerated  Continue current antibiotics and monitor        Atelectasis   Assessment & Plan    Bronchoscopy and follow-up on BAL        Acute metabolic encephalopathy   Assessment & Plan    Wean off sedation as tolerated        Elevated troponin- (present on admission)   Assessment & Plan    Likely demand ischemia        Paroxysmal A-fib (HCC)   Assessment & Plan    With rapid ventricular response  Given hypotension start amiodarone drip  Lovenox  Telemetry monitoring        Electrolyte abnormality   Assessment & Plan    Hypophosphatemia    Replete with K-Phos and monitor        Elevated liver enzymes   Assessment & Plan    Likely shock liver     Transaminases trended down        Pulmonary hypertension (HCC)   Assessment & Plan    9/29/18 echocardiogram with RVSP of 65 mmHg  CT was negative for pulmonary embolism      Consider diuresis        Hyperglycemia- (present on admission)   Assessment & Plan    HbA1c 6.5 on 7/18  Stable on Insulin drip         Thrombocytopenia (HCC)- (present on admission)   Assessment & Plan    Resolved  Monitor with anticoagulation        Dyslipidemia- (present on admission)   Assessment & Plan    Continue atorvastatin            Quality-Core Measures   Reviewed items::  Labs reviewed, Medications reviewed and Radiology images reviewed  Garces catheter::  Critically Ill - Requiring Accurate Measurement of Urinary Output, Unconscious / Sedated Patient on a Ventilator and Strict Intake and Output During Sepisis or Shock  Central line in place:  Sepsis, Shock and Vasopressors  DVT prophylaxis pharmacological::  Enoxaparin (Lovenox)  Ulcer Prophylaxis::  Yes  Antibiotics:  Treating active infection/contamination beyond 24 hours perioperative coverage

## 2018-10-03 NOTE — THERAPY
"Physical Therapy Evaluation completed.   Bed Mobility:  Supine to Sit: Total Assist X 2  Transfers: Sit to Stand: Unable to Participate  Gait: Level Of Assist: Unable to Participate with No Equipment Needed       Plan of Care: Will benefit from Physical Therapy 2 times per week  Discharge Recommendations: Equipment: Will Continue to Assess for Equipment Needs. Post-acute therapy Discharge to a transitional care facility for continued skilled therapy services.    See \"Rehab Therapy-Acute\" Patient Summary Report for complete documentation.     "

## 2018-10-03 NOTE — FLOWSHEET NOTE
10/03/18 1232   Events/Summary/Plan   Events/Summary/Plan Bronch w Anil Bermudez   Bronchoscopy Procedure   Bronchoscopy Procedure Yes   Pre-Op Teaching Yes   Consent Signed Yes   Time Out Performed Yes   Medication Allergy Reviewed Yes   Procedure Performed in Bronch Suite? No   #Diagnostic Procedure  Yes   Start Time 1232   End Time 1237   Performing Physician Anil Bermudez   Indication(s) for Procedure Identified by Physician Refractory Atelectasis / Pneumonitis   Specimen(s) Specified by Physician BAL Quantitative Respiratory Culture Intracellular Pathogens   Specimen(s) Identified / Labeled for Location BRITTANY   Scope Serial Number [id number]   Post Procedure Response Pt tolerated well

## 2018-10-03 NOTE — PROGRESS NOTES
12 hour chart check     12 hour monitor summery:    Rhythm: a fib  Rate:90's-130's  Ectopy: rare PAC

## 2018-10-04 PROBLEM — D69.6 THROMBOCYTOPENIA (HCC): Status: RESOLVED | Noted: 2017-05-19 | Resolved: 2018-01-01

## 2018-10-04 NOTE — PROGRESS NOTES
Critical Care Progress Note    Date of admission  9/29/2018    Chief Complaint  78 y.o. male admitted 9/29/2018 with cough and shortness of breath.    Hospital Course  This gentleman was admitted with severe sepsis, respiratory failure and pneumonia.    Interval Problem Update  Reviewed last 24 hour events:       -AF - converted to SR   -amio gtt to enteral prep   -insulin 3.5   -prop 20   -TF at 50   -vent 6   -PEEP 8   -30%   -SBT   -Lovenox full dose   -today is last day of Unasyn   -stop Lasix      Review of Systems  Review of Systems   Unable to perform ROS: Acuity of condition        Vital Signs for last 24 hours   Temp:  [37.4 °C (99.3 °F)-38.2 °C (100.8 °F)] 37.8 °C (100 °F)  Pulse:  [] 103  Resp:  [18-31] 22    Hemodynamic parameters for last 24 hours  CVP:  [1 MM HG-7 MM HG] 4 MM HG    Vent Settings for last 24 hours  Smith Vent Mode: Spont  Rate (breaths/min):  [16-20] 16  PEEP/CPAP:  [8] 8  FiO2:  [30-40] 30  P Peak (PIP):  [19-26] 21  P MEAN:  [10-13] 10    Physical Exam   Physical Exam   Constitutional:   Sedated on ventilator   HENT:   Head: Normocephalic.   Right Ear: External ear normal.   Left Ear: External ear normal.   Mouth/Throat: Oropharynx is clear and moist.   Eyes: Pupils are equal, round, and reactive to light. Conjunctivae are normal. Right eye exhibits no discharge. Left eye exhibits no discharge. No scleral icterus.   Neck: Neck supple. No JVD present. No tracheal deviation present.   Cardiovascular: Intact distal pulses.  Exam reveals no gallop and no friction rub.    Atrial fibrillation   Pulmonary/Chest: He has no wheezes. He has rales (Fine and coarse crackles at the bases).   Abdominal: Soft. Bowel sounds are normal. He exhibits no distension. There is no tenderness. There is no rebound.   Tolerating enteral tube feedings   Musculoskeletal: He exhibits no tenderness or deformity.   No clubbing or cyanosis   Neurological:   Sedated.  Arouses and follows.  Moves all 4  extremities.   Skin: Skin is warm and dry. No rash noted. He is not diaphoretic. No pallor.       Medications  Current Facility-Administered Medications   Medication Dose Route Frequency Provider Last Rate Last Dose   • amiodarone (CORDARONE) 450 mg in D5W 250 mL Infusion  0.5-1 mg/min Intravenous Continuous Dre Robbins M.D. 17 mL/hr at 10/03/18 1826 0.5 mg/min at 10/03/18 1826   • enoxaparin (LOVENOX) inj 100 mg  1 mg/kg Subcutaneous Q12HRS Dre Robbins M.D.   100 mg at 10/04/18 0507   • ondansetron (ZOFRAN ODT) dispertab 4 mg  4 mg Oral Q4HRS PRN Brandon Paulino M.D.       • famotidine (PEPCID) tablet 20 mg  20 mg Feeding Tube Q12HRS Brandon Paulino M.D.   20 mg at 10/04/18 0507   • insulin regular human (HUMULIN/NOVOLIN R) 62.5 Units in  mL infusion per protocol  0-29 Units/hr Intravenous Continuous Brandon Paulino M.D. 14 mL/hr at 10/04/18 0634 3.5 Units/hr at 10/04/18 0634   • dextrose 50% (D50W) injection 25-50 mL  12.5-25 g Intravenous PRN Brandon Paulino M.D.       • Respiratory Care per Protocol   Nebulization Continuous RT Walter Carrion M.D.       • ondansetron (ZOFRAN) syringe/vial injection 4 mg  4 mg Intravenous Q4HRS PRN Walter Carrion M.D.       • ampicillin/sulbactam (UNASYN) 3 g in  mL IVPB  3 g Intravenous Q6HRS Walter Carrion M.D.   Stopped at 10/04/18 0537   • propofol (DIPRIVAN) injection  0-80 mcg/kg/min Intravenous Continuous Jeremy M Gonda, M.D. 10.8 mL/hr at 10/04/18 0612 20 mcg/kg/min at 10/04/18 0612   • MD Alert...ICU Electrolyte Replacement per Pharmacy   Other pharmacy to dose Jeremy M Gonda, M.D.       • fentaNYL (SUBLIMAZE) injection 25 mcg  25 mcg Intravenous Q HOUR PRN Jeremy M Gonda, M.D.        Or   • fentaNYL (SUBLIMAZE) injection 50 mcg  50 mcg Intravenous Q HOUR PRN Jeremy M Gonda, M.D.        Or   • fentaNYL (SUBLIMAZE) injection 100 mcg  100 mcg Intravenous Q HOUR PRN Jeremy M Gonda, M.D.       • ipratropium-albuterol  (DUONEB) nebulizer solution  3 mL Nebulization Q2HRS PRN (RT) Jeremy M Gonda, M.D.       • Pharmacy Consult: Enteral tube feeding - review meds/change route/product selection   Other PRN Jeremy M Gonda, M.D.       • senna-docusate (PERICOLACE or SENOKOT S) 8.6-50 MG per tablet 2 Tab  2 Tab Feeding Tube BID Jeremy M Gonda, M.D.   2 Tab at 10/04/18 0507    And   • polyethylene glycol/lytes (MIRALAX) PACKET 1 Packet  1 Packet Feeding Tube QDAY PRN Jeremy M Gonda, M.D.        And   • magnesium hydroxide (MILK OF MAGNESIA) suspension 30 mL  30 mL Feeding Tube QDAY PRN Jeremy M Gonda, M.D.   30 mL at 10/02/18 1322    And   • bisacodyl (DULCOLAX) suppository 10 mg  10 mg Rectal QDAY PRN Jeremy M Gonda, M.D.   10 mg at 10/02/18 1651   • acetaminophen (TYLENOL) tablet 650 mg  650 mg Feeding Tube Q6HRS PRN Jeremy M Gonda, M.D.   650 mg at 10/03/18 0805   • atorvastatin (LIPITOR) tablet 40 mg  40 mg Per NG Tube Q EVENING Jeremy M Gonda, M.D.   40 mg at 10/03/18 1708   • aspirin (ASA) chewable tab 81 mg  81 mg Per NG Tube DAILY Jeremy M Gonda, M.D.   81 mg at 10/04/18 0507   • phenylephrine (MARYSOL-SYNEPHRINE) 40,000 mcg in  mL Infusion  1-300 mcg/min Intravenous Continuous Walter Carrion M.D.   Stopped at 10/03/18 1137       Fluids    Intake/Output Summary (Last 24 hours) at 10/04/18 0656  Last data filed at 10/04/18 0600   Gross per 24 hour   Intake          2660.74 ml   Output             2055 ml   Net           605.74 ml       Laboratory  Recent Results (from the past 48 hour(s))   ACCU-CHEK GLUCOSE    Collection Time: 10/02/18  7:35 AM   Result Value Ref Range    Glucose - Accu-Ck 148 (H) 65 - 99 mg/dL   ACCU-CHEK GLUCOSE    Collection Time: 10/02/18  8:59 AM   Result Value Ref Range    Glucose - Accu-Ck 175 (H) 65 - 99 mg/dL   ACCU-CHEK GLUCOSE    Collection Time: 10/02/18  9:48 AM   Result Value Ref Range    Glucose - Accu-Ck 160 (H) 65 - 99 mg/dL   ACCU-CHEK GLUCOSE    Collection Time: 10/02/18 10:27 AM   Result  Value Ref Range    Glucose - Accu-Ck 158 (H) 65 - 99 mg/dL   ACCU-CHEK GLUCOSE    Collection Time: 10/02/18 11:34 AM   Result Value Ref Range    Glucose - Accu-Ck 141 (H) 65 - 99 mg/dL   ACCU-CHEK GLUCOSE    Collection Time: 10/02/18  1:44 PM   Result Value Ref Range    Glucose - Accu-Ck 177 (H) 65 - 99 mg/dL   ACCU-CHEK GLUCOSE    Collection Time: 10/02/18  2:31 PM   Result Value Ref Range    Glucose - Accu-Ck 176 (H) 65 - 99 mg/dL   ACCU-CHEK GLUCOSE    Collection Time: 10/02/18  3:34 PM   Result Value Ref Range    Glucose - Accu-Ck 147 (H) 65 - 99 mg/dL   ACCU-CHEK GLUCOSE    Collection Time: 10/02/18  4:42 PM   Result Value Ref Range    Glucose - Accu-Ck 173 (H) 65 - 99 mg/dL   ACCU-CHEK GLUCOSE    Collection Time: 10/02/18  5:30 PM   Result Value Ref Range    Glucose - Accu-Ck 191 (H) 65 - 99 mg/dL   ACCU-CHEK GLUCOSE    Collection Time: 10/02/18  6:42 PM   Result Value Ref Range    Glucose - Accu-Ck 166 (H) 65 - 99 mg/dL   ACCU-CHEK GLUCOSE    Collection Time: 10/02/18  7:43 PM   Result Value Ref Range    Glucose - Accu-Ck 174 (H) 65 - 99 mg/dL   ACCU-CHEK GLUCOSE    Collection Time: 10/02/18  8:34 PM   Result Value Ref Range    Glucose - Accu-Ck 146 (H) 65 - 99 mg/dL   ACCU-CHEK GLUCOSE    Collection Time: 10/02/18  9:52 PM   Result Value Ref Range    Glucose - Accu-Ck 138 (H) 65 - 99 mg/dL   ACCU-CHEK GLUCOSE    Collection Time: 10/02/18 10:41 PM   Result Value Ref Range    Glucose - Accu-Ck 150 (H) 65 - 99 mg/dL   ACCU-CHEK GLUCOSE    Collection Time: 10/02/18 11:59 PM   Result Value Ref Range    Glucose - Accu-Ck 146 (H) 65 - 99 mg/dL   ACCU-CHEK GLUCOSE    Collection Time: 10/03/18 12:58 AM   Result Value Ref Range    Glucose - Accu-Ck 154 (H) 65 - 99 mg/dL   ACCU-CHEK GLUCOSE    Collection Time: 10/03/18  3:42 AM   Result Value Ref Range    Glucose - Accu-Ck 156 (H) 65 - 99 mg/dL   ISTAT ARTERIAL BLOOD GAS    Collection Time: 10/03/18  3:43 AM   Result Value Ref Range    Ph 7.478 7.400 - 7.500    Pco2  32.9 26.0 - 37.0 mmHg    Po2 69 64 - 87 mmHg    Tco2 25 20 - 33 mmol/L    S02 95 93 - 99 %    Hco3 24.4 17.0 - 25.0 mmol/L    BE 1 -4 - 3 mmol/L    Body Temp 38.3 C degrees    O2 Therapy 30 %    iPF Ratio 230     Ph Temp Cesar 7.458 7.400 - 7.500    Pco2 Temp Co 34.8 26.0 - 37.0 mmHg    Po2 Temp Cor 75 64 - 87 mmHg    Specimen Arterial     Action Range Triggered NO     Inst. Qualified Patient YES    HEPATIC FUNCTION PANEL    Collection Time: 10/03/18  5:30 AM   Result Value Ref Range    Alkaline Phosphatase 114 (H) 30 - 99 U/L    AST(SGOT) 35 12 - 45 U/L    ALT(SGPT) 143 (H) 2 - 50 U/L    Total Bilirubin 0.5 0.1 - 1.5 mg/dL    Direct Bilirubin 0.1 0.1 - 0.5 mg/dL    Indirect Bilirubin 0.4 0.0 - 1.0 mg/dL    Albumin 2.7 (L) 3.2 - 4.9 g/dL    Total Protein 5.1 (L) 6.0 - 8.2 g/dL   Basic Metabolic Panel (BMP)    Collection Time: 10/03/18  5:30 AM   Result Value Ref Range    Sodium 144 135 - 145 mmol/L    Potassium 4.3 3.6 - 5.5 mmol/L    Chloride 112 96 - 112 mmol/L    Co2 25 20 - 33 mmol/L    Glucose 171 (H) 65 - 99 mg/dL    Bun 59 (H) 8 - 22 mg/dL    Creatinine 0.92 0.50 - 1.40 mg/dL    Calcium 8.2 (L) 8.5 - 10.5 mg/dL    Anion Gap 7.0 0.0 - 11.9   MAGNESIUM    Collection Time: 10/03/18  5:30 AM   Result Value Ref Range    Magnesium 2.3 1.5 - 2.5 mg/dL   PHOSPHORUS    Collection Time: 10/03/18  5:30 AM   Result Value Ref Range    Phosphorus 2.2 (L) 2.5 - 4.5 mg/dL   ESTIMATED GFR    Collection Time: 10/03/18  5:30 AM   Result Value Ref Range    GFR If African American >60 >60 mL/min/1.73 m 2    GFR If Non African American >60 >60 mL/min/1.73 m 2   ACCU-CHEK GLUCOSE    Collection Time: 10/03/18  5:42 AM   Result Value Ref Range    Glucose - Accu-Ck 153 (H) 65 - 99 mg/dL   ACCU-CHEK GLUCOSE    Collection Time: 10/03/18  7:35 AM   Result Value Ref Range    Glucose - Accu-Ck 198 (H) 65 - 99 mg/dL   ACCU-CHEK GLUCOSE    Collection Time: 10/03/18  8:37 AM   Result Value Ref Range    Glucose - Accu-Ck 189 (H) 65 - 99 mg/dL    CBC WITH DIFFERENTIAL    Collection Time: 10/03/18  8:44 AM   Result Value Ref Range    WBC 13.3 (H) 4.8 - 10.8 K/uL    RBC 4.33 (L) 4.70 - 6.10 M/uL    Hemoglobin 13.1 (L) 14.0 - 18.0 g/dL    Hematocrit 39.7 (L) 42.0 - 52.0 %    MCV 91.7 81.4 - 97.8 fL    MCH 30.3 27.0 - 33.0 pg    MCHC 33.0 (L) 33.7 - 35.3 g/dL    RDW 47.8 35.9 - 50.0 fL    Platelet Count 207 164 - 446 K/uL    MPV 10.0 9.0 - 12.9 fL    Neutrophils-Polys 76.20 (H) 44.00 - 72.00 %    Lymphocytes 11.00 (L) 22.00 - 41.00 %    Monocytes 10.40 0.00 - 13.40 %    Eosinophils 1.10 0.00 - 6.90 %    Basophils 0.30 0.00 - 1.80 %    Immature Granulocytes 1.00 (H) 0.00 - 0.90 %    Nucleated RBC 0.20 /100 WBC    Neutrophils (Absolute) 10.11 (H) 1.82 - 7.42 K/uL    Lymphs (Absolute) 1.46 1.00 - 4.80 K/uL    Monos (Absolute) 1.38 (H) 0.00 - 0.85 K/uL    Eos (Absolute) 0.15 0.00 - 0.51 K/uL    Baso (Absolute) 0.04 0.00 - 0.12 K/uL    Immature Granulocytes (abs) 0.13 (H) 0.00 - 0.11 K/uL    NRBC (Absolute) 0.02 K/uL   ACCU-CHEK GLUCOSE    Collection Time: 10/03/18  9:32 AM   Result Value Ref Range    Glucose - Accu-Ck 173 (H) 65 - 99 mg/dL   ACCU-CHEK GLUCOSE    Collection Time: 10/03/18 10:31 AM   Result Value Ref Range    Glucose - Accu-Ck 178 (H) 65 - 99 mg/dL   ACCU-CHEK GLUCOSE    Collection Time: 10/03/18 11:30 AM   Result Value Ref Range    Glucose - Accu-Ck 199 (H) 65 - 99 mg/dL   ACCU-CHEK GLUCOSE    Collection Time: 10/03/18 12:42 PM   Result Value Ref Range    Glucose - Accu-Ck 160 (H) 65 - 99 mg/dL   FUNGAL CULTURE    Collection Time: 10/03/18 12:45 PM   Result Value Ref Range    Significant Indicator NEG     Source RESP     Site Bronchoalveolar Lavage     Fungal Culture Culture in progress.    GRAM STAIN    Collection Time: 10/03/18 12:45 PM   Result Value Ref Range    Significant Indicator .     Source RESP     Site Bronchoalveolar Lavage     Gram Stain Result Few WBCs.  No organisms seen.      ACCU-CHEK GLUCOSE    Collection Time: 10/03/18  1:29 PM    Result Value Ref Range    Glucose - Accu-Ck 146 (H) 65 - 99 mg/dL   ACCU-CHEK GLUCOSE    Collection Time: 10/03/18  2:35 PM   Result Value Ref Range    Glucose - Accu-Ck 149 (H) 65 - 99 mg/dL   ACCU-CHEK GLUCOSE    Collection Time: 10/03/18  4:37 PM   Result Value Ref Range    Glucose - Accu-Ck 120 (H) 65 - 99 mg/dL   ACCU-CHEK GLUCOSE    Collection Time: 10/03/18  5:45 PM   Result Value Ref Range    Glucose - Accu-Ck 123 (H) 65 - 99 mg/dL   ACCU-CHEK GLUCOSE    Collection Time: 10/03/18  6:28 PM   Result Value Ref Range    Glucose - Accu-Ck 132 (H) 65 - 99 mg/dL   ACCU-CHEK GLUCOSE    Collection Time: 10/03/18  7:36 PM   Result Value Ref Range    Glucose - Accu-Ck 118 (H) 65 - 99 mg/dL   ACCU-CHEK GLUCOSE    Collection Time: 10/03/18  8:35 PM   Result Value Ref Range    Glucose - Accu-Ck 136 (H) 65 - 99 mg/dL   ACCU-CHEK GLUCOSE    Collection Time: 10/03/18  9:37 PM   Result Value Ref Range    Glucose - Accu-Ck 146 (H) 65 - 99 mg/dL   ACCU-CHEK GLUCOSE    Collection Time: 10/03/18 10:43 PM   Result Value Ref Range    Glucose - Accu-Ck 145 (H) 65 - 99 mg/dL   ACCU-CHEK GLUCOSE    Collection Time: 10/04/18 12:43 AM   Result Value Ref Range    Glucose - Accu-Ck 144 (H) 65 - 99 mg/dL   ISTAT ARTERIAL BLOOD GAS    Collection Time: 10/04/18  4:06 AM   Result Value Ref Range    Ph 7.450 7.400 - 7.500    Pco2 35.4 26.0 - 37.0 mmHg    Po2 68 64 - 87 mmHg    Tco2 26 20 - 33 mmol/L    S02 94 93 - 99 %    Hco3 24.6 17.0 - 25.0 mmol/L    BE 1 -4 - 3 mmol/L    Body Temp 37.6 C degrees    O2 Therapy 30 %    iPF Ratio 227     Ph Temp Cesar 7.441 7.400 - 7.500    Pco2 Temp Co 36.4 26.0 - 37.0 mmHg    Po2 Temp Cor 71 64 - 87 mmHg    Specimen Arterial     Action Range Triggered NO     Inst. Qualified Patient YES    CBC with Differential    Collection Time: 10/04/18  5:00 AM   Result Value Ref Range    WBC 10.2 4.8 - 10.8 K/uL    RBC 4.18 (L) 4.70 - 6.10 M/uL    Hemoglobin 12.7 (L) 14.0 - 18.0 g/dL    Hematocrit 38.6 (L) 42.0 - 52.0  %    MCV 92.3 81.4 - 97.8 fL    MCH 30.4 27.0 - 33.0 pg    MCHC 32.9 (L) 33.7 - 35.3 g/dL    RDW 47.6 35.9 - 50.0 fL    Platelet Count 156 (L) 164 - 446 K/uL    MPV 10.6 9.0 - 12.9 fL    Neutrophils-Polys 74.00 (H) 44.00 - 72.00 %    Lymphocytes 13.10 (L) 22.00 - 41.00 %    Monocytes 9.60 0.00 - 13.40 %    Eosinophils 2.20 0.00 - 6.90 %    Basophils 0.30 0.00 - 1.80 %    Immature Granulocytes 0.80 0.00 - 0.90 %    Nucleated RBC 0.20 /100 WBC    Neutrophils (Absolute) 7.55 (H) 1.82 - 7.42 K/uL    Lymphs (Absolute) 1.34 1.00 - 4.80 K/uL    Monos (Absolute) 0.98 (H) 0.00 - 0.85 K/uL    Eos (Absolute) 0.22 0.00 - 0.51 K/uL    Baso (Absolute) 0.03 0.00 - 0.12 K/uL    Immature Granulocytes (abs) 0.08 0.00 - 0.11 K/uL    NRBC (Absolute) 0.02 K/uL   Basic Metabolic Panel (BMP)    Collection Time: 10/04/18  5:00 AM   Result Value Ref Range    Sodium 142 135 - 145 mmol/L    Potassium 4.1 3.6 - 5.5 mmol/L    Chloride 110 96 - 112 mmol/L    Co2 27 20 - 33 mmol/L    Glucose 166 (H) 65 - 99 mg/dL    Bun 69 (H) 8 - 22 mg/dL    Creatinine 1.02 0.50 - 1.40 mg/dL    Calcium 8.3 (L) 8.5 - 10.5 mg/dL    Anion Gap 5.0 0.0 - 11.9   MAGNESIUM    Collection Time: 10/04/18  5:00 AM   Result Value Ref Range    Magnesium 2.5 1.5 - 2.5 mg/dL   PHOSPHORUS    Collection Time: 10/04/18  5:00 AM   Result Value Ref Range    Phosphorus 3.7 2.5 - 4.5 mg/dL   TRIGLYCERIDE    Collection Time: 10/04/18  5:00 AM   Result Value Ref Range    Triglycerides 82 0 - 149 mg/dL   ESTIMATED GFR    Collection Time: 10/04/18  5:00 AM   Result Value Ref Range    GFR If African American >60 >60 mL/min/1.73 m 2    GFR If Non African American >60 >60 mL/min/1.73 m 2       Imaging  X-Ray:  I have personally reviewed the images and compared with prior images. and My impression is: Increased edema and left lung opacities    Assessment/Plan  * Acute respiratory failure with hypoxia (HCC)- (present on admission)   Assessment & Plan    Intubated 9/29  Continue vent  support  SBT as tolerated  All appropriate ventilator bundles have been initiated        Acute pulmonary edema (HCC)   Assessment & Plan    Echo with normal LV systolic function  Significant pulmonary hypertension causing LV diastolic dysfunction  Hold Lasix with increasing BUN        CAP (community acquired pneumonia)- (present on admission)   Assessment & Plan    Influenza negative  Continue Unasyn and azithromycin  Procalcitonin is normal, for what it's worth        Septic shock ([facility])   Assessment & Plan    Severe sepsis with associated acute respiratory failure  Pulmonary source  Titrating phenylephrine to keep MAP > 65  Continue antibiotics        Paroxysmal A-fib ([facility])   Assessment & Plan    Change amiodarone drip to enteral preparation, 400 mg twice daily  Anticoagulate with Lovenox, 1 mg/kg every 12 hours  Optimize potassium and magnesium        Pulmonary hypertension (HCC)   Assessment & Plan    RVSP 65 mm Hg  CTA without evidence of pulmonary embolism  Hold Lasix with increasing BUN        Acute metabolic encephalopathy   Assessment & Plan    Etiology is metabolic as well as due to sepsis  Limit sedatives  Improved        Elevated troponin- (present on admission)   Assessment & Plan    Due to demand ischemia  Continue aspirin and statin        Hyperglycemia- (present on admission)   Assessment & Plan    Titrating insulin drip for glucose control        Dyslipidemia- (present on admission)   Assessment & Plan    Continue statin             VTE:  Lovenox  Ulcer: H2 Antagonist  Lines: Central Line  Ongoing indication addressed    I have performed a physical exam and reviewed and updated ROS and Plan today (10/4/2018). In review of yesterday's note (10/3/2018), there are no changes except as documented above.     I have assessed and reassessed his respiratory status with spontaneous breathing trials and ventilator adjustments, ventilator waveforms, airway mechanics, blood pressure, hemodynamics, cardiovascular  status with titration of phenylephrine, blood glucose with titration of an insulin drip and neurologic status with titration of propofol.  He is at increased risk for worsening respiratory and cardiovascular system dysfunction.    Discussed patient condition and risk of morbidity and/or mortality with Hospitalist, RN, RT, Pharmacy, Charge nurse / hot rounds and QA team  The patient remains critically ill.  Critical care time = 32 minutes in directly providing and coordinating critical care and extensive data review.  No time overlap and excludes procedures.    High risk of deterioration and worsening vital organ dysfunction and death without the above critical care interventions.    Brandon Paulino MD  Pulmonary and Critical Care Medicine

## 2018-10-04 NOTE — ASSESSMENT & PLAN NOTE
Continue loading with amiodarone, 400 mg twice daily  Continue additional IV doses of amiodarone to supplement/accelerate loading as needed  No anticoagulation due to hemoptysis, monitor - last episode a.m. 10/12  Optimize magnesium and potassium aggressively  Rate control with CCB or BB as Bp allows, alternatively digoxin therapy as needed  Monitor

## 2018-10-04 NOTE — PROGRESS NOTES
[facility] Hospitalist Progress Note    Date of Service: 10/4/2018    Chief Complaint  78 y.o. male admitted 2018 with cough and shortness of breath.  Patient was admitted to the hospital and shortly thereafter had significant increase in shortness of breath and required intubation.    Interval Problem Update    AFib  110's converted to SR this morning  tmax 38.1  TF at goal  Off pressors  VD#6   Tolerated SBT  Last day of unasyn  Lasix on hold given increasing BUN                      Consultants/Specialty  Pulmonary  Cardiology    Disposition  To Remain in ICU        Review of Systems   Unable to perform ROS: Intubated      Physical Exam  Laboratory/Imaging   Hemodynamics  Temp (24hrs), Av.7 °C (99.9 °F), Min:37.4 °C (99.3 °F), Max:38.1 °C (100.6 °F)   Temperature: 37.5 °C (99.5 °F)  Pulse  Av.6  Min: 62  Max: 142 Heart Rate (Monitored): (!) 117  NIBP: 110/66 CVP (mm Hg): 6 MM HG    Respiratory  Smtih Vent Mode: Spont, Rate (breaths/min): 16, PEEP/CPAP: 8, PEEP/CPAP: 8, FiO2: 30, P Peak (PIP): 21, P MEAN: 10   Respiration: (!) 23, Pulse Oximetry: 95 %     Work Of Breathing / Effort: Vented  RUL Breath Sounds: Rhonchi, RML Breath Sounds: Rhonchi, RLL Breath Sounds: Diminished, BRITTANY Breath Sounds: Rhonchi, LLL Breath Sounds: Diminished    Fluids    Intake/Output Summary (Last 24 hours) at 10/04/18 0940  Last data filed at 10/04/18 0800   Gross per 24 hour   Intake          2554.91 ml   Output             1380 ml   Net          1174.91 ml       Nutrition  Orders Placed This Encounter   Procedures   • Diet NPO     Standing Status:   Standing     Number of Occurrences:   1     Order Specific Question:   Type:     Answer:   Now [1]     Order Specific Question:   Restrict to:     Answer:   Strict [1]     Physical Exam   Constitutional: He appears well-developed and well-nourished.   HENT:   Head: Normocephalic and atraumatic.   Mouth/Throat: Oropharynx is clear and moist.   Cortrack left nare   Eyes: Pupils  are equal, round, and reactive to light. Conjunctivae are normal.   Neck: Neck supple. No JVD present. No tracheal deviation present.   Cardiovascular: Normal rate and regular rhythm.  Exam reveals no gallop and no friction rub.    No murmur heard.  Pulmonary/Chest: Effort normal. No respiratory distress. He has decreased breath sounds. He has rales. He exhibits no tenderness.   Intubated   Abdominal: Soft. Bowel sounds are normal. He exhibits no distension. There is no tenderness. There is no rebound.   Musculoskeletal: He exhibits no edema or tenderness.   Neurological: No cranial nerve deficit. He exhibits normal muscle tone.   Sedated   Skin: Skin is warm and dry. He is not diaphoretic. No cyanosis or erythema. Nails show no clubbing.   Psychiatric:   Sedated   Nursing note and vitals reviewed.      Recent Labs      10/02/18   0620  10/03/18   0844  10/04/18   0500   WBC  14.0*  13.3*  10.2   RBC  4.21*  4.33*  4.18*   HEMOGLOBIN  13.2*  13.1*  12.7*   HEMATOCRIT  38.3*  39.7*  38.6*   MCV  91.0  91.7  92.3   MCH  31.4  30.3  30.4   MCHC  34.5  33.0*  32.9*   RDW  47.0  47.8  47.6   PLATELETCT  200  207  156*   MPV  10.2  10.0  10.6     Recent Labs      10/02/18   0620  10/03/18   0530  10/04/18   0500   SODIUM  140  144  142   POTASSIUM  4.1  4.3  4.1   CHLORIDE  112  112  110   CO2  22  25  27   GLUCOSE  206*  171*  166*   BUN  50*  59*  69*   CREATININE  0.97  0.92  1.02   CALCIUM  8.2*  8.2*  8.3*             Recent Labs      10/04/18   0500   TRIGLYCERIDE  82          Assessment/Plan     * Acute respiratory failure with hypoxia (HCC)- (present on admission)   Assessment & Plan    CTA on 9-29 neg  for PE  Echo with normal EF and pulmonary hypertension    Vent management per critical care discussed with Dr. Paulino          Acute pulmonary edema (HCC)   Assessment & Plan    Lasix on hold given increasing BUN and serum creatinine        CAP (community acquired pneumonia)- (present on admission)    Assessment & Plan    Completed 5-day course of Unasyn and azithromycin        Septic shock (HCC)   Assessment & Plan    The organ system failure associated with this diagnosis is the respiratory system as is evidenced by the need for a intubation    Shock resolved        Paroxysmal A-fib (HCC)   Assessment & Plan    Back in sinus rhythm  Transition to oral amiodarone  Continue Lovenox  Continue telemetry monitoring        Pulmonary hypertension (HCC)   Assessment & Plan    9/29/18 echocardiogram with RVSP of 65 mmHg  CT was negative for pulmonary embolism              Elevated troponin- (present on admission)   Assessment & Plan    Likely demand ischemia        Elevated liver enzymes   Assessment & Plan    Likely shock liver     Transaminases trended down        Hyperglycemia- (present on admission)   Assessment & Plan    HbA1c 6.5 on 7/18    Stable on insulin drip continue close monitoring        Dyslipidemia- (present on admission)   Assessment & Plan    Continue atorvastatin            Quality-Core Measures   Reviewed items::  Labs reviewed, Medications reviewed and Radiology images reviewed  Garces catheter::  Critically Ill - Requiring Accurate Measurement of Urinary Output, Unconscious / Sedated Patient on a Ventilator and Strict Intake and Output During Sepisis or Shock  Central line in place:  Sepsis, Shock and Vasopressors  DVT prophylaxis pharmacological::  Enoxaparin (Lovenox)  Ulcer Prophylaxis::  Yes  Antibiotics:  Treating active infection/contamination beyond 24 hours perioperative coverage

## 2018-10-04 NOTE — CARE PLAN
Problem: Oxygenation:  Goal: Maintain adequate oxygenation dependent on patient condition  Outcome: PROGRESSING AS EXPECTED  Adult Ventilation Update    Total Vent Days: 6    Patient Lines/Drains/Airways Status    Active Airway     Name: Placement date: Placement time: Site: Days:    Airway ETT Oral 8.0 09/29/18 1714   Oral   4            CMV 16/440/+8/30%    In the last 24 hours, the patient tolerated SBT for 6 hours on settings of 5/8.    #FVC / Vital Capacity (liters) :  (not following) (10/04/18 1518)  NIF (cm H2O) : -20 (10/04/18 1518)  Rapid Shallow Breathing Index (RR/VT): 74 (10/04/18 1518)  Plateau Pressure (Q Shift): 19 (10/02/18 1925)  Static Compliance (ml / cm H2O): 74.7 (10/04/18 1518)                      Events/Summary/Plan: Getting SBT parameters (10/04/18 1028)

## 2018-10-04 NOTE — RESPIRATORY CARE
Adult Ventilation Update    Total Vent Days: 6    Patient Lines/Drains/Airways Status    Active Airway     Name: Placement date: Placement time: Site: Days:    Airway ETT Oral 8.0 09/29/18   1714   Oral   6              No SBT    Weaning as tolerated.

## 2018-10-05 PROBLEM — R50.9 FEVER: Status: ACTIVE | Noted: 2018-01-01

## 2018-10-05 PROBLEM — A41.9 SEPTIC SHOCK (HCC): Status: RESOLVED | Noted: 2018-01-01 | Resolved: 2018-01-01

## 2018-10-05 PROBLEM — R65.21 SEPTIC SHOCK (HCC): Status: RESOLVED | Noted: 2018-01-01 | Resolved: 2018-01-01

## 2018-10-05 NOTE — PROGRESS NOTES
Critical Care Progress Note    Date of admission  9/29/2018    Chief Complaint  78 y.o. male admitted 9/29/2018 with cough and shortness of breath.    Hospital Course  This gentleman was admitted with severe sepsis, respiratory failure and pneumonia.    Interval Problem Update  Reviewed last 24 hour events:       -prop 20   -SR   -TF at 50 (goal)   -insulin 3   -vent 7   -PEEP 8   -SBT   -resume Lasix and albumin   -Chris titrating      Review of Systems  Review of Systems   Unable to perform ROS: Acuity of condition        Vital Signs for last 24 hours   Temp:  [37.5 °C (99.5 °F)-37.7 °C (99.9 °F)] 37.7 °C (99.9 °F)  Pulse:  [] 72  Resp:  [20-33] 22    Hemodynamic parameters for last 24 hours  CVP:  [4 MM HG-8 MM HG] 8 MM HG    Vent Settings for last 24 hours  Smith Vent Mode: APVCMV  Rate (breaths/min):  [16] 16  PEEP/CPAP:  [8] 8  FiO2:  [30-40] 40  P Peak (PIP):  [15-22] 20  P MEAN:  [10-12] 12    Physical Exam   Physical Exam   Constitutional:   Sedated on ventilator   HENT:   Head: Normocephalic and atraumatic.   Right Ear: External ear normal.   Left Ear: External ear normal.   Mouth/Throat: No oropharyngeal exudate.   Eyes: Pupils are equal, round, and reactive to light. Right eye exhibits no discharge. Left eye exhibits no discharge. No scleral icterus.   Neck: Normal range of motion. No JVD present. No tracheal deviation present.   Cardiovascular: Intact distal pulses.  Exam reveals no gallop and no friction rub.    Atrial fibrillation   Pulmonary/Chest: He has no wheezes. He has rales (Fine and coarse crackles bilaterally).   Abdominal: Soft. Bowel sounds are normal. He exhibits no distension. There is no tenderness. There is no guarding.   Tolerating enteral tube feedings   Musculoskeletal: He exhibits no tenderness or deformity.   No clubbing or cyanosis   Neurological:   Sedated.  Arouses and follows.  Moves all 4 extremities.   Skin: Skin is warm and dry. He is not diaphoretic. No erythema.        Medications  Current Facility-Administered Medications   Medication Dose Route Frequency Provider Last Rate Last Dose   • amiodarone (CORDARONE) tablet 400 mg  400 mg Per NG Tube TWICE DAILY Brandon Paulino M.D.   400 mg at 10/05/18 0511   • enoxaparin (LOVENOX) inj 100 mg  1 mg/kg Subcutaneous Q12HRS Dre Robbins M.D.   100 mg at 10/05/18 0511   • ondansetron (ZOFRAN ODT) dispertab 4 mg  4 mg Oral Q4HRS PRN Brandon Paulino M.D.       • famotidine (PEPCID) tablet 20 mg  20 mg Feeding Tube Q12HRS Brandon Paulino M.D.   20 mg at 10/05/18 0511   • insulin regular human (HUMULIN/NOVOLIN R) 62.5 Units in  mL infusion per protocol  0-29 Units/hr Intravenous Continuous Brandon Paulino M.D. 10 mL/hr at 10/05/18 0245 2.5 Units/hr at 10/05/18 0245   • dextrose 50% (D50W) injection 25-50 mL  12.5-25 g Intravenous PRN Brandon Paulino M.D.       • Respiratory Care per Protocol   Nebulization Continuous RT Walter Carrion M.D.       • ondansetron (ZOFRAN) syringe/vial injection 4 mg  4 mg Intravenous Q4HRS PRN Walter Carrion M.D.       • propofol (DIPRIVAN) injection  0-80 mcg/kg/min Intravenous Continuous Jeremy M Gonda, M.D. 21.7 mL/hr at 10/05/18 0131 40 mcg/kg/min at 10/05/18 0131   • MD Alert...ICU Electrolyte Replacement per Pharmacy   Other pharmacy to dose Jeremy M Gonda, M.D.       • fentaNYL (SUBLIMAZE) injection 25 mcg  25 mcg Intravenous Q HOUR PRN Jeremy M Gonda, M.D.        Or   • fentaNYL (SUBLIMAZE) injection 50 mcg  50 mcg Intravenous Q HOUR PRN Jeremy M Gonda, M.D.        Or   • fentaNYL (SUBLIMAZE) injection 100 mcg  100 mcg Intravenous Q HOUR PRN Daniele M Gonda, M.D.       • ipratropium-albuterol (DUONEB) nebulizer solution  3 mL Nebulization Q2HRS PRN (RT) Jeremy M Gonda, M.D.       • Pharmacy Consult: Enteral tube feeding - review meds/change route/product selection   Other PRN Jeremy M Gonda, M.D.       • senna-docusate (PERICOLACE or SENOKOT S) 8.6-44  MG per tablet 2 Tab  2 Tab Feeding Tube BID Jeremy M Gonda, M.D.   2 Tab at 10/05/18 0511    And   • polyethylene glycol/lytes (MIRALAX) PACKET 1 Packet  1 Packet Feeding Tube QDAY PRN Jeremy M Gonda, M.D.        And   • magnesium hydroxide (MILK OF MAGNESIA) suspension 30 mL  30 mL Feeding Tube QDAY PRN Jeremy M Gonda, M.D.   30 mL at 10/02/18 1322    And   • bisacodyl (DULCOLAX) suppository 10 mg  10 mg Rectal QDAY PRN Jeremy M Gonda, M.D.   10 mg at 10/02/18 1651   • acetaminophen (TYLENOL) tablet 650 mg  650 mg Feeding Tube Q6HRS PRN Jeremy M Gonda, M.D.   650 mg at 10/03/18 0805   • atorvastatin (LIPITOR) tablet 40 mg  40 mg Per NG Tube Q EVENING Jeremy M Gonda, M.D.   40 mg at 10/04/18 1729   • aspirin (ASA) chewable tab 81 mg  81 mg Per NG Tube DAILY Jeremy M Gonda, M.D.   81 mg at 10/05/18 0511       Fluids    Intake/Output Summary (Last 24 hours) at 10/05/18 0525  Last data filed at 10/05/18 0400   Gross per 24 hour   Intake          2373.24 ml   Output             1745 ml   Net           628.24 ml       Laboratory  Recent Results (from the past 48 hour(s))   HEPATIC FUNCTION PANEL    Collection Time: 10/03/18  5:30 AM   Result Value Ref Range    Alkaline Phosphatase 114 (H) 30 - 99 U/L    AST(SGOT) 35 12 - 45 U/L    ALT(SGPT) 143 (H) 2 - 50 U/L    Total Bilirubin 0.5 0.1 - 1.5 mg/dL    Direct Bilirubin 0.1 0.1 - 0.5 mg/dL    Indirect Bilirubin 0.4 0.0 - 1.0 mg/dL    Albumin 2.7 (L) 3.2 - 4.9 g/dL    Total Protein 5.1 (L) 6.0 - 8.2 g/dL   Basic Metabolic Panel (BMP)    Collection Time: 10/03/18  5:30 AM   Result Value Ref Range    Sodium 144 135 - 145 mmol/L    Potassium 4.3 3.6 - 5.5 mmol/L    Chloride 112 96 - 112 mmol/L    Co2 25 20 - 33 mmol/L    Glucose 171 (H) 65 - 99 mg/dL    Bun 59 (H) 8 - 22 mg/dL    Creatinine 0.92 0.50 - 1.40 mg/dL    Calcium 8.2 (L) 8.5 - 10.5 mg/dL    Anion Gap 7.0 0.0 - 11.9   MAGNESIUM    Collection Time: 10/03/18  5:30 AM   Result Value Ref Range    Magnesium 2.3 1.5 - 2.5  mg/dL   PHOSPHORUS    Collection Time: 10/03/18  5:30 AM   Result Value Ref Range    Phosphorus 2.2 (L) 2.5 - 4.5 mg/dL   ESTIMATED GFR    Collection Time: 10/03/18  5:30 AM   Result Value Ref Range    GFR If African American >60 >60 mL/min/1.73 m 2    GFR If Non African American >60 >60 mL/min/1.73 m 2   ACCU-CHEK GLUCOSE    Collection Time: 10/03/18  5:42 AM   Result Value Ref Range    Glucose - Accu-Ck 153 (H) 65 - 99 mg/dL   ACCU-CHEK GLUCOSE    Collection Time: 10/03/18  7:35 AM   Result Value Ref Range    Glucose - Accu-Ck 198 (H) 65 - 99 mg/dL   ACCU-CHEK GLUCOSE    Collection Time: 10/03/18  8:37 AM   Result Value Ref Range    Glucose - Accu-Ck 189 (H) 65 - 99 mg/dL   CBC WITH DIFFERENTIAL    Collection Time: 10/03/18  8:44 AM   Result Value Ref Range    WBC 13.3 (H) 4.8 - 10.8 K/uL    RBC 4.33 (L) 4.70 - 6.10 M/uL    Hemoglobin 13.1 (L) 14.0 - 18.0 g/dL    Hematocrit 39.7 (L) 42.0 - 52.0 %    MCV 91.7 81.4 - 97.8 fL    MCH 30.3 27.0 - 33.0 pg    MCHC 33.0 (L) 33.7 - 35.3 g/dL    RDW 47.8 35.9 - 50.0 fL    Platelet Count 207 164 - 446 K/uL    MPV 10.0 9.0 - 12.9 fL    Neutrophils-Polys 76.20 (H) 44.00 - 72.00 %    Lymphocytes 11.00 (L) 22.00 - 41.00 %    Monocytes 10.40 0.00 - 13.40 %    Eosinophils 1.10 0.00 - 6.90 %    Basophils 0.30 0.00 - 1.80 %    Immature Granulocytes 1.00 (H) 0.00 - 0.90 %    Nucleated RBC 0.20 /100 WBC    Neutrophils (Absolute) 10.11 (H) 1.82 - 7.42 K/uL    Lymphs (Absolute) 1.46 1.00 - 4.80 K/uL    Monos (Absolute) 1.38 (H) 0.00 - 0.85 K/uL    Eos (Absolute) 0.15 0.00 - 0.51 K/uL    Baso (Absolute) 0.04 0.00 - 0.12 K/uL    Immature Granulocytes (abs) 0.13 (H) 0.00 - 0.11 K/uL    NRBC (Absolute) 0.02 K/uL   ACCU-CHEK GLUCOSE    Collection Time: 10/03/18  9:32 AM   Result Value Ref Range    Glucose - Accu-Ck 173 (H) 65 - 99 mg/dL   ACCU-CHEK GLUCOSE    Collection Time: 10/03/18 10:31 AM   Result Value Ref Range    Glucose - Accu-Ck 178 (H) 65 - 99 mg/dL   ACCU-CHEK GLUCOSE     Collection Time: 10/03/18 11:30 AM   Result Value Ref Range    Glucose - Accu-Ck 199 (H) 65 - 99 mg/dL   ACCU-CHEK GLUCOSE    Collection Time: 10/03/18 12:42 PM   Result Value Ref Range    Glucose - Accu-Ck 160 (H) 65 - 99 mg/dL   QUANT BRONCHIAL WASHING    Collection Time: 10/03/18 12:45 PM   Result Value Ref Range    Significant Indicator NEG     Source RESP     Site Bronchoalveolar Lavage     Quantitative Bronch Washing No growth at 24 hours.     Gram Stain Result Few WBCs.  No organisms seen.      AFB CULTURE    Collection Time: 10/03/18 12:45 PM   Result Value Ref Range    Significant Indicator NEG     Source RESP     Site Bronchoalveolar Lavage     AFB Culture Culture in progress.     AFB Smear Results No acid fast bacilli seen.    FUNGAL CULTURE    Collection Time: 10/03/18 12:45 PM   Result Value Ref Range    Significant Indicator NEG     Source RESP     Site Bronchoalveolar Lavage     Fungal Culture Culture in progress.    GRAM STAIN    Collection Time: 10/03/18 12:45 PM   Result Value Ref Range    Significant Indicator .     Source RESP     Site Bronchoalveolar Lavage     Gram Stain Result Few WBCs.  No organisms seen.      ACID FAST STAIN    Collection Time: 10/03/18 12:45 PM   Result Value Ref Range    Significant Indicator NEG     Source RESP     Site Bronchoalveolar Lavage     AFB Smear Results No acid fast bacilli seen.    ACCU-CHEK GLUCOSE    Collection Time: 10/03/18  1:29 PM   Result Value Ref Range    Glucose - Accu-Ck 146 (H) 65 - 99 mg/dL   ACCU-CHEK GLUCOSE    Collection Time: 10/03/18  2:35 PM   Result Value Ref Range    Glucose - Accu-Ck 149 (H) 65 - 99 mg/dL   ACCU-CHEK GLUCOSE    Collection Time: 10/03/18  4:37 PM   Result Value Ref Range    Glucose - Accu-Ck 120 (H) 65 - 99 mg/dL   ACCU-CHEK GLUCOSE    Collection Time: 10/03/18  5:45 PM   Result Value Ref Range    Glucose - Accu-Ck 123 (H) 65 - 99 mg/dL   ACCU-CHEK GLUCOSE    Collection Time: 10/03/18  6:28 PM   Result Value Ref Range     Glucose - Accu-Ck 132 (H) 65 - 99 mg/dL   ACCU-CHEK GLUCOSE    Collection Time: 10/03/18  7:36 PM   Result Value Ref Range    Glucose - Accu-Ck 118 (H) 65 - 99 mg/dL   ACCU-CHEK GLUCOSE    Collection Time: 10/03/18  8:35 PM   Result Value Ref Range    Glucose - Accu-Ck 136 (H) 65 - 99 mg/dL   ACCU-CHEK GLUCOSE    Collection Time: 10/03/18  9:37 PM   Result Value Ref Range    Glucose - Accu-Ck 146 (H) 65 - 99 mg/dL   ACCU-CHEK GLUCOSE    Collection Time: 10/03/18 10:43 PM   Result Value Ref Range    Glucose - Accu-Ck 145 (H) 65 - 99 mg/dL   ACCU-CHEK GLUCOSE    Collection Time: 10/04/18 12:43 AM   Result Value Ref Range    Glucose - Accu-Ck 144 (H) 65 - 99 mg/dL   ACCU-CHEK GLUCOSE    Collection Time: 10/04/18  2:29 AM   Result Value Ref Range    Glucose - Accu-Ck 156 (H) 65 - 99 mg/dL   ISTAT ARTERIAL BLOOD GAS    Collection Time: 10/04/18  4:06 AM   Result Value Ref Range    Ph 7.450 7.400 - 7.500    Pco2 35.4 26.0 - 37.0 mmHg    Po2 68 64 - 87 mmHg    Tco2 26 20 - 33 mmol/L    S02 94 93 - 99 %    Hco3 24.6 17.0 - 25.0 mmol/L    BE 1 -4 - 3 mmol/L    Body Temp 37.6 C degrees    O2 Therapy 30 %    iPF Ratio 227     Ph Temp Cesar 7.441 7.400 - 7.500    Pco2 Temp Co 36.4 26.0 - 37.0 mmHg    Po2 Temp Cor 71 64 - 87 mmHg    Specimen Arterial     Action Range Triggered NO     Inst. Qualified Patient YES    ACCU-CHEK GLUCOSE    Collection Time: 10/04/18  4:32 AM   Result Value Ref Range    Glucose - Accu-Ck 148 (H) 65 - 99 mg/dL   CBC with Differential    Collection Time: 10/04/18  5:00 AM   Result Value Ref Range    WBC 10.2 4.8 - 10.8 K/uL    RBC 4.18 (L) 4.70 - 6.10 M/uL    Hemoglobin 12.7 (L) 14.0 - 18.0 g/dL    Hematocrit 38.6 (L) 42.0 - 52.0 %    MCV 92.3 81.4 - 97.8 fL    MCH 30.4 27.0 - 33.0 pg    MCHC 32.9 (L) 33.7 - 35.3 g/dL    RDW 47.6 35.9 - 50.0 fL    Platelet Count 156 (L) 164 - 446 K/uL    MPV 10.6 9.0 - 12.9 fL    Neutrophils-Polys 74.00 (H) 44.00 - 72.00 %    Lymphocytes 13.10 (L) 22.00 - 41.00 %     Monocytes 9.60 0.00 - 13.40 %    Eosinophils 2.20 0.00 - 6.90 %    Basophils 0.30 0.00 - 1.80 %    Immature Granulocytes 0.80 0.00 - 0.90 %    Nucleated RBC 0.20 /100 WBC    Neutrophils (Absolute) 7.55 (H) 1.82 - 7.42 K/uL    Lymphs (Absolute) 1.34 1.00 - 4.80 K/uL    Monos (Absolute) 0.98 (H) 0.00 - 0.85 K/uL    Eos (Absolute) 0.22 0.00 - 0.51 K/uL    Baso (Absolute) 0.03 0.00 - 0.12 K/uL    Immature Granulocytes (abs) 0.08 0.00 - 0.11 K/uL    NRBC (Absolute) 0.02 K/uL   Basic Metabolic Panel (BMP)    Collection Time: 10/04/18  5:00 AM   Result Value Ref Range    Sodium 142 135 - 145 mmol/L    Potassium 4.1 3.6 - 5.5 mmol/L    Chloride 110 96 - 112 mmol/L    Co2 27 20 - 33 mmol/L    Glucose 166 (H) 65 - 99 mg/dL    Bun 69 (H) 8 - 22 mg/dL    Creatinine 1.02 0.50 - 1.40 mg/dL    Calcium 8.3 (L) 8.5 - 10.5 mg/dL    Anion Gap 5.0 0.0 - 11.9   MAGNESIUM    Collection Time: 10/04/18  5:00 AM   Result Value Ref Range    Magnesium 2.5 1.5 - 2.5 mg/dL   PHOSPHORUS    Collection Time: 10/04/18  5:00 AM   Result Value Ref Range    Phosphorus 3.7 2.5 - 4.5 mg/dL   TRIGLYCERIDE    Collection Time: 10/04/18  5:00 AM   Result Value Ref Range    Triglycerides 82 0 - 149 mg/dL   ESTIMATED GFR    Collection Time: 10/04/18  5:00 AM   Result Value Ref Range    GFR If African American >60 >60 mL/min/1.73 m 2    GFR If Non African American >60 >60 mL/min/1.73 m 2   ACCU-CHEK GLUCOSE    Collection Time: 10/04/18  6:28 AM   Result Value Ref Range    Glucose - Accu-Ck 159 (H) 65 - 99 mg/dL   ACCU-CHEK GLUCOSE    Collection Time: 10/04/18  8:35 AM   Result Value Ref Range    Glucose - Accu-Ck 158 (H) 65 - 99 mg/dL   ACCU-CHEK GLUCOSE    Collection Time: 10/04/18 10:57 AM   Result Value Ref Range    Glucose - Accu-Ck 161 (H) 65 - 99 mg/dL   ACCU-CHEK GLUCOSE    Collection Time: 10/04/18 12:38 PM   Result Value Ref Range    Glucose - Accu-Ck 155 (H) 65 - 99 mg/dL   ACCU-CHEK GLUCOSE    Collection Time: 10/04/18  2:38 PM   Result Value Ref  Range    Glucose - Accu-Ck 164 (H) 65 - 99 mg/dL   ACCU-CHEK GLUCOSE    Collection Time: 10/04/18  4:31 PM   Result Value Ref Range    Glucose - Accu-Ck 146 (H) 65 - 99 mg/dL   ACCU-CHEK GLUCOSE    Collection Time: 10/04/18  6:31 PM   Result Value Ref Range    Glucose - Accu-Ck 160 (H) 65 - 99 mg/dL   ACCU-CHEK GLUCOSE    Collection Time: 10/04/18  8:15 PM   Result Value Ref Range    Glucose - Accu-Ck 151 (H) 65 - 99 mg/dL   ACCU-CHEK GLUCOSE    Collection Time: 10/04/18 10:21 PM   Result Value Ref Range    Glucose - Accu-Ck 142 (H) 65 - 99 mg/dL   ACCU-CHEK GLUCOSE    Collection Time: 10/05/18 12:26 AM   Result Value Ref Range    Glucose - Accu-Ck 138 (H) 65 - 99 mg/dL   ACCU-CHEK GLUCOSE    Collection Time: 10/05/18  1:34 AM   Result Value Ref Range    Glucose - Accu-Ck 164 (H) 65 - 99 mg/dL   ISTAT ARTERIAL BLOOD GAS    Collection Time: 10/05/18  4:42 AM   Result Value Ref Range    Ph 7.467 7.400 - 7.500    Pco2 38.3 (H) 26.0 - 37.0 mmHg    Po2 76 64 - 87 mmHg    Tco2 29 20 - 33 mmol/L    S02 96 93 - 99 %    Hco3 27.7 (H) 17.0 - 25.0 mmol/L    BE 4 (H) -4 - 3 mmol/L    Body Temp 37.6 C degrees    O2 Therapy 40 %    iPF Ratio 190     Ph Temp Cesar 7.458 7.400 - 7.500    Pco2 Temp Co 39.3 (H) 26.0 - 37.0 mmHg    Po2 Temp Cor 79 64 - 87 mmHg    Specimen Arterial     Action Range Triggered NO     Inst. Qualified Patient YES        Imaging  X-Ray:  I have personally reviewed the images and compared with prior images. and My impression is: Worsening pulmonary edema    Assessment/Plan  * Acute respiratory failure with hypoxia (HCC)- (present on admission)   Assessment & Plan    Intubated 9/29  SBT as tolerated  Continue vent support  All appropriate ventilator bundles have been initiated        Acute pulmonary edema (HCC)   Assessment & Plan    Echo with normal LV systolic function  Significant pulmonary hypertension causing LV diastolic dysfunction  Resume Lasix, 40 mg IV every 12 hours  Give albumin with Lasix         CAP (community acquired pneumonia)- (present on admission)   Assessment & Plan    Influenza negative  Completed 5 days of Unasyn and azithromycin on 10/4  Observe off antibiotics        Fever   Assessment & Plan    Source not clear  WBC is decreased  Check UA  Culture blood        Paroxysmal A-fib (HCC)   Assessment & Plan    Continue amiodarone, 400 mg twice daily  Continue Lovenox, 1 mg/kg every 12 hours  Optimize magnesium and potassium        Pulmonary hypertension (HCC)   Assessment & Plan    RVSP 65 mm Hg  CTA without evidence of pulmonary embolism  Resume diuresis with furosemide        Acute metabolic encephalopathy   Assessment & Plan    Etiology is metabolic as well as due to sepsis  Limit sedatives  Improved        Elevated troponin- (present on admission)   Assessment & Plan    Continue aspirin and statin  Suspect due to demand ischemia        Hyperglycemia- (present on admission)   Assessment & Plan    Titrating insulin drip to control glucose        Dyslipidemia- (present on admission)   Assessment & Plan    Continue statin             VTE:  Lovenox  Ulcer: H2 Antagonist  Lines: Central Line  Ongoing indication addressed    I have performed a physical exam and reviewed and updated ROS and Plan today (10/5/2018). In review of yesterday's note (10/4/2018), there are no changes except as documented above.     I have assessed and reassessed his respiratory status with spontaneous breathing trials and ventilator adjustments, airway mechanics, ventilator waveforms, hemodynamics, cardiovascular status with titration of phenylephrine, blood glucose with titration of an insulin drip and neurologic status with titration of sedation.  He is at increased risk for worsening respiratory and cardiovascular system dysfunction.    Discussed patient condition and risk of morbidity and/or mortality with Hospitalist, RN, RT, Pharmacy, Charge nurse / hot rounds and QA team  The patient remains critically ill.  Critical  care time = 35 minutes in directly providing and coordinating critical care and extensive data review.  No time overlap and excludes procedures.    High risk of deterioration and worsening vital organ dysfunction and death without the above critical care interventions.    Brandon Paulino MD  Pulmonary and Critical Care Medicine

## 2018-10-05 NOTE — ASSESSMENT & PLAN NOTE
This was due to dexmedetomidine  Fever has resolved after dexmedetomidine discontinued  UA unremarkable  Blood cultures negative from 10/5  Bronchial washings from 10/3 with no growth  Avoid dexmedetomidine, recent fever with trial

## 2018-10-05 NOTE — CARE PLAN
Problem: Psychosocial Needs:  Goal: Level of anxiety will decrease  Outcome: NOT MET  Patient is sedated on Diprivan drip, will continue to monitor.

## 2018-10-05 NOTE — PROGRESS NOTES
[facility] Hospitalist Progress Note    Date of Service: 10/5/2018    Chief Complaint  78 y.o. male admitted 2018 with cough and shortness of breath.  Patient was admitted to the hospital and shortly thereafter had significant increase in shortness of breath and required intubation.    Interval Problem Update      No ON vents  Withdraws to pain  SR 70's   CVP 10  Afebrile  TF at goal   UO 1.675 L + 7.9 L since admit  VD#7 16/440/8/40   SBT for 6 hours yesterday did not follow for parameters  Insulin drip at 3   CXR worsening pulm edema                            Consultants/Specialty  Pulmonary  Cardiology    Disposition  To Remain in ICU        Review of Systems   Unable to perform ROS: Intubated      Physical Exam  Laboratory/Imaging   Hemodynamics  Temp (24hrs), Av.7 °C (99.9 °F), Min:37.6 °C (99.7 °F), Max:37.7 °C (99.9 °F)   Temperature: 37.7 °C (99.9 °F)  Pulse  Av.7  Min: 62  Max: 142 Heart Rate (Monitored): 77  NIBP: 101/64 CVP (mm Hg): (!) 11 MM HG    Respiratory  Smith Vent Mode: Spont, Rate (breaths/min): 16, PEEP/CPAP: 8, PEEP/CPAP: 8, FiO2: 40, P Peak (PIP): 15, P MEAN: 11   Respiration: (!) 24, Pulse Oximetry: 95 %     Work Of Breathing / Effort: Vented  RUL Breath Sounds: Coarse Crackles, RML Breath Sounds: Coarse Crackles, RLL Breath Sounds: Diminished, BRITTANY Breath Sounds: Coarse Crackles, LLL Breath Sounds: Diminished    Fluids    Intake/Output Summary (Last 24 hours) at 10/05/18 0936  Last data filed at 10/05/18 0600   Gross per 24 hour   Intake          1915.84 ml   Output             1550 ml   Net           365.84 ml       Nutrition  Orders Placed This Encounter   Procedures   • Diet NPO     Standing Status:   Standing     Number of Occurrences:   1     Order Specific Question:   Type:     Answer:   Now [1]     Order Specific Question:   Restrict to:     Answer:   Strict [1]     Physical Exam   Constitutional: He appears well-developed and well-nourished.   HENT:   Head: Normocephalic  and atraumatic.   Right Ear: External ear normal.   Left Ear: External ear normal.   Mouth/Throat: Oropharynx is clear and moist.   cortrak in place   Eyes: Pupils are equal, round, and reactive to light. Right eye exhibits no discharge. Left eye exhibits no discharge.   Neck: Neck supple. No JVD present. No tracheal deviation present.   Cardiovascular: Normal rate, regular rhythm and normal heart sounds.    Pulmonary/Chest: Effort normal. No stridor. No respiratory distress. He has decreased breath sounds. He has no wheezes. He has rales in the right lower field and the left lower field. He exhibits no tenderness.   Intubated   Abdominal: Soft. Bowel sounds are normal. He exhibits no distension. There is no tenderness. There is no rebound and no guarding.   Musculoskeletal: He exhibits edema. He exhibits no tenderness.   Neurological: No cranial nerve deficit. He exhibits normal muscle tone.   Exam limited by sedation no focal deficits noted   Skin: Skin is warm and dry. No rash noted. He is not diaphoretic. No cyanosis or erythema. Nails show no clubbing.   Psychiatric:   Sedated   Nursing note and vitals reviewed.      Recent Labs      10/03/18   0844  10/04/18   0500  10/05/18   0520   WBC  13.3*  10.2  9.2   RBC  4.33*  4.18*  3.85*   HEMOGLOBIN  13.1*  12.7*  12.1*   HEMATOCRIT  39.7*  38.6*  36.2*   MCV  91.7  92.3  94.0   MCH  30.3  30.4  31.4   MCHC  33.0*  32.9*  33.4*   RDW  47.8  47.6  47.9   PLATELETCT  207  156*  142*   MPV  10.0  10.6  10.5     Recent Labs      10/03/18   0530  10/04/18   0500  10/05/18   0520   SODIUM  144  142  143   POTASSIUM  4.3  4.1  4.2   CHLORIDE  112  110  111   CO2  25  27  27   GLUCOSE  171*  166*  184*   BUN  59*  69*  68*   CREATININE  0.92  1.02  1.01   CALCIUM  8.2*  8.3*  8.8             Recent Labs      10/04/18   0500   TRIGLYCERIDE  82          Assessment/Plan     * Acute respiratory failure with hypoxia (HCC)- (present on admission)   Assessment & Plan    CTA on  9-29 neg  for PE  Echo with normal EF and pulmonary hypertension    Vent management per critical care resume diuresis    SBT as tolerated  Discussed with Dr. Geronimo          Acute pulmonary edema (HCC)   Assessment & Plan    Resume diuresis with IV Lasix  We will also give albumin        CAP (community acquired pneumonia)- (present on admission)   Assessment & Plan    Previously completed 5-day course of antibiotics        Paroxysmal A-fib (HCC)   Assessment & Plan    Back in sinus rhythm  Continue amiodarone and Lovenox hold off on oral anticoagulants in case tracheostomy as needed        Pulmonary hypertension (HCC)   Assessment & Plan    9/29/18 echocardiogram with RVSP of 65 mmHg  CT was negative for pulmonary embolism    Diurese as tolerated              Elevated troponin- (present on admission)   Assessment & Plan    Likely demand ischemia        Elevated liver enzymes   Assessment & Plan    Likely shock liver     Transaminases trended down        Hyperglycemia- (present on admission)   Assessment & Plan    HbA1c 6.5 on 7/18    Continue insulin drip currently at 3 units/h  Monitor CBGs        Dyslipidemia- (present on admission)   Assessment & Plan    Continue atorvastatin            Quality-Core Measures   Reviewed items::  Labs reviewed, Medications reviewed and Radiology images reviewed  Garces catheter::  Critically Ill - Requiring Accurate Measurement of Urinary Output, Unconscious / Sedated Patient on a Ventilator and Strict Intake and Output During Sepisis or Shock  Central line in place:  Sepsis, Shock and Vasopressors  DVT prophylaxis pharmacological::  Enoxaparin (Lovenox)  Ulcer Prophylaxis::  Yes  Antibiotics:  Treating active infection/contamination beyond 24 hours perioperative coverage

## 2018-10-05 NOTE — RESPIRATORY CARE
Ventilator Weaning Update    Patient is on vent day 6.  SBT was tolerated for a minimum of 6 hours on settings of 5/8.    Wean parameters for this SBT were:  #FVC / Vital Capacity (liters) :  (not following) (10/04/18 1518)  NIF (cm H2O) : -20 (10/04/18 1518)  Rapid Shallow Breathing Index (RR/VT): 74 (10/04/18 1518)  RR (bpm): 28 (10/04/18 1518)  Spontaneous VE: 9.2 (10/04/18 1518)  Spontaneous VT: 336 (10/04/18 1518)    Pt not opening eyes or following commands

## 2018-10-05 NOTE — CARE PLAN
Problem: Ventilation Defect:  Goal: Ability to achieve and maintain unassisted ventilation or tolerate decreased levels of ventilator support    Intervention: Support and monitor invasive and noninvasive mechanical ventilation  Adult Ventilation Update    Total Vent Days: 7    CMV 16 440 8+ 40%     Patient Lines/Drains/Airways Status    Active Airway     Name: Placement date: Placement time: Site: Days:    Airway ETT Oral 8.0 @ 26 09/29/18   1714   Oral   5            Events/Summary/Plan: Pt 89%-90% on 30% titrated to 40% (10/04/18 9995)

## 2018-10-05 NOTE — CARE PLAN
Problem: Infection  Goal: Will remain free from infection  Outcome: PROGRESSING AS EXPECTED  Patient completed  IV abx therapy today.

## 2018-10-05 NOTE — PROGRESS NOTES
Updated Dr. Paulino regarding low blood pressure. Orders received for MARYSOL to keep MAP above 65

## 2018-10-06 PROBLEM — R04.2 HEMOPTYSIS: Status: ACTIVE | Noted: 2018-01-01

## 2018-10-06 NOTE — PROGRESS NOTES
Critical Care Progress Note    Date of admission  9/29/2018    Chief Complaint  78 y.o. male admitted 9/29/2018 with cough and shortness of breath.    Hospital Course  This gentleman was admitted with severe sepsis, respiratory failure and pneumonia.    Interval Problem Update  Reviewed last 24 hour events:       -CXR with improved edema   -temp 103.6   -TF at 50   -dex 1 - stop and go back to prop   -prop off   -insulin 2   -vent 8   -AF   -Chris 50   -Hemoptysis -stop Lovenox      Review of Systems  Review of Systems   Unable to perform ROS: Acuity of condition        Vital Signs for last 24 hours   Temp:  [37.6 °C (99.7 °F)-39.7 °C (103.5 °F)] 39.1 °C (102.4 °F)  Pulse:  [60-90] 67  Resp:  [18-30] 28    Hemodynamic parameters for last 24 hours  CVP:  [7 MM HG-17 MM HG] 13 MM HG    Vent Settings for last 24 hours  Smith Vent Mode: APVCMV  Rate (breaths/min):  [16] 16  PEEP/CPAP:  [8] 8  FiO2:  [40] 40  P Peak (PIP):  [15-25] 21  P MEAN:  [11-13] 12    Physical Exam   Physical Exam   Constitutional:   Sedated on ventilator   HENT:   Head: Normocephalic.   Right Ear: External ear normal.   Left Ear: External ear normal.   Mouth/Throat: Oropharynx is clear and moist.   Eyes: Pupils are equal, round, and reactive to light. Conjunctivae are normal. Right eye exhibits no discharge. Left eye exhibits no discharge. No scleral icterus.   Neck: Neck supple. No JVD present. No tracheal deviation present.   Cardiovascular: Intact distal pulses.  Exam reveals no gallop and no friction rub.    Atrial fibrillation   Pulmonary/Chest: He has no wheezes. He has rales (Coarse crackles bilaterally).   Abdominal: Soft. Bowel sounds are normal. He exhibits no distension. There is no tenderness. There is no rebound.   Tolerating enteral tube feedings   Musculoskeletal: He exhibits no tenderness or deformity.   No clubbing or cyanosis   Neurological:   Sedated.  Arouses and follows.  Moves all 4 extremities.   Skin: Skin is warm and  dry. No rash noted. He is not diaphoretic.       Medications  Current Facility-Administered Medications   Medication Dose Route Frequency Provider Last Rate Last Dose   • furosemide (LASIX) injection 40 mg  40 mg Intravenous Q12HRS Brandon Paulino M.D.   40 mg at 10/06/18 0510   • potassium bicarbonate (KLYTE) effervescent tablet 25 mEq  25 mEq Feeding Tube BID Brandon Paulino M.D.   25 mEq at 10/06/18 0509   • albumin human 25% solution 25 g  25 g Intravenous Q6HRS Brandon Paulino M.D. 150 mL/hr at 10/06/18 0509 25 g at 10/06/18 0509   • dexmedetomidine (PRECEDEX) 400 mcg/100mL NS premix infusion  0.1-1.5 mcg/kg/hr Intravenous Continuous Brandon Paulino M.D. 40.5 mL/hr at 10/06/18 0413 1.5 mcg/kg/hr at 10/06/18 0413   • phenylephrine (MARYSOL-SYNEPHRINE) 40,000 mcg in  mL Infusion  0-300 mcg/min Intravenous Continuous Brandon Paulino M.D. 9.4 mL/hr at 10/05/18 2200 25 mcg/min at 10/05/18 2200   • amiodarone (CORDARONE) tablet 400 mg  400 mg Per NG Tube TWICE DAILY Brandon Paulino M.D.   400 mg at 10/06/18 0510   • enoxaparin (LOVENOX) inj 100 mg  1 mg/kg Subcutaneous Q12HRS Dre Robbins M.D.   100 mg at 10/06/18 0509   • ondansetron (ZOFRAN ODT) dispertab 4 mg  4 mg Oral Q4HRS PRN Brandon Paulino M.D.       • famotidine (PEPCID) tablet 20 mg  20 mg Feeding Tube Q12HRS Brandon Paulino M.D.   20 mg at 10/06/18 0510   • insulin regular human (HUMULIN/NOVOLIN R) 62.5 Units in  mL infusion per protocol  0-29 Units/hr Intravenous Continuous Brandon Paulino M.D. 16 mL/hr at 10/06/18 0450 4 Units/hr at 10/06/18 0450   • dextrose 50% (D50W) injection 25-50 mL  12.5-25 g Intravenous PRN Brandon Paulino M.D.       • Respiratory Care per Protocol   Nebulization Continuous RT Walter Carrion M.D.       • ondansetron (ZOFRAN) syringe/vial injection 4 mg  4 mg Intravenous Q4HRS PRN Walter Carrion M.D.       • propofol (DIPRIVAN) injection  0-80  mcg/kg/min Intravenous Continuous Jeremy M Gonda, M.D.   Stopped at 10/05/18 1101   • MD Alert...ICU Electrolyte Replacement per Pharmacy   Other pharmacy to dose Jeremy M Gonda, M.D.       • fentaNYL (SUBLIMAZE) injection 25 mcg  25 mcg Intravenous Q HOUR PRN Jeremy M Gonda, M.D.   25 mcg at 10/05/18 2059    Or   • fentaNYL (SUBLIMAZE) injection 50 mcg  50 mcg Intravenous Q HOUR PRN Jeremy M Gonda, M.D.   50 mcg at 10/06/18 0236    Or   • fentaNYL (SUBLIMAZE) injection 100 mcg  100 mcg Intravenous Q HOUR PRN Jeremy M Gonda, M.D.   100 mcg at 10/05/18 1207   • ipratropium-albuterol (DUONEB) nebulizer solution  3 mL Nebulization Q2HRS PRN (RT) Jeremy M Gonda, M.D.       • Pharmacy Consult: Enteral tube feeding - review meds/change route/product selection   Other PRN Jeremy M Gonda, M.D.       • senna-docusate (PERICOLACE or SENOKOT S) 8.6-50 MG per tablet 2 Tab  2 Tab Feeding Tube BID Jeremy M Gonda, M.D.   2 Tab at 10/06/18 0509    And   • polyethylene glycol/lytes (MIRALAX) PACKET 1 Packet  1 Packet Feeding Tube QDAY PRN Jeremy M Gonda, M.D.   1 Packet at 10/05/18 1738    And   • magnesium hydroxide (MILK OF MAGNESIA) suspension 30 mL  30 mL Feeding Tube QDAY PRN Jeremy M Gonda, M.D.   30 mL at 10/02/18 1322    And   • bisacodyl (DULCOLAX) suppository 10 mg  10 mg Rectal QDAY PRN Jeremy M Gonda, M.D.   10 mg at 10/02/18 1651   • acetaminophen (TYLENOL) tablet 650 mg  650 mg Feeding Tube Q6HRS PRN Jeremy M Gonda, M.D.   650 mg at 10/06/18 0510   • atorvastatin (LIPITOR) tablet 40 mg  40 mg Per NG Tube Q EVENING Jeremy M Gonda, M.D.   40 mg at 10/05/18 1737   • aspirin (ASA) chewable tab 81 mg  81 mg Per NG Tube DAILY Jeremy M Gonda, M.D.   81 mg at 10/06/18 0510       Fluids    Intake/Output Summary (Last 24 hours) at 10/06/18 0511  Last data filed at 10/06/18 0200   Gross per 24 hour   Intake          3831.97 ml   Output             2115 ml   Net          1716.97 ml       Laboratory  Recent Results (from the past  48 hour(s))   ACCU-CHEK GLUCOSE    Collection Time: 10/04/18  6:28 AM   Result Value Ref Range    Glucose - Accu-Ck 159 (H) 65 - 99 mg/dL   ACCU-CHEK GLUCOSE    Collection Time: 10/04/18  8:35 AM   Result Value Ref Range    Glucose - Accu-Ck 158 (H) 65 - 99 mg/dL   ACCU-CHEK GLUCOSE    Collection Time: 10/04/18 10:57 AM   Result Value Ref Range    Glucose - Accu-Ck 161 (H) 65 - 99 mg/dL   ACCU-CHEK GLUCOSE    Collection Time: 10/04/18 12:38 PM   Result Value Ref Range    Glucose - Accu-Ck 155 (H) 65 - 99 mg/dL   ACCU-CHEK GLUCOSE    Collection Time: 10/04/18  2:38 PM   Result Value Ref Range    Glucose - Accu-Ck 164 (H) 65 - 99 mg/dL   ACCU-CHEK GLUCOSE    Collection Time: 10/04/18  4:31 PM   Result Value Ref Range    Glucose - Accu-Ck 146 (H) 65 - 99 mg/dL   ACCU-CHEK GLUCOSE    Collection Time: 10/04/18  6:31 PM   Result Value Ref Range    Glucose - Accu-Ck 160 (H) 65 - 99 mg/dL   ACCU-CHEK GLUCOSE    Collection Time: 10/04/18  8:15 PM   Result Value Ref Range    Glucose - Accu-Ck 151 (H) 65 - 99 mg/dL   ACCU-CHEK GLUCOSE    Collection Time: 10/04/18 10:21 PM   Result Value Ref Range    Glucose - Accu-Ck 142 (H) 65 - 99 mg/dL   ACCU-CHEK GLUCOSE    Collection Time: 10/05/18 12:26 AM   Result Value Ref Range    Glucose - Accu-Ck 138 (H) 65 - 99 mg/dL   ACCU-CHEK GLUCOSE    Collection Time: 10/05/18  1:34 AM   Result Value Ref Range    Glucose - Accu-Ck 164 (H) 65 - 99 mg/dL   ACCU-CHEK GLUCOSE    Collection Time: 10/05/18  2:44 AM   Result Value Ref Range    Glucose - Accu-Ck 162 (H) 65 - 99 mg/dL   ISTAT ARTERIAL BLOOD GAS    Collection Time: 10/05/18  4:42 AM   Result Value Ref Range    Ph 7.467 7.400 - 7.500    Pco2 38.3 (H) 26.0 - 37.0 mmHg    Po2 76 64 - 87 mmHg    Tco2 29 20 - 33 mmol/L    S02 96 93 - 99 %    Hco3 27.7 (H) 17.0 - 25.0 mmol/L    BE 4 (H) -4 - 3 mmol/L    Body Temp 37.6 C degrees    O2 Therapy 40 %    iPF Ratio 190     Ph Temp Cesar 7.458 7.400 - 7.500    Pco2 Temp Co 39.3 (H) 26.0 - 37.0 mmHg     Po2 Temp Cor 79 64 - 87 mmHg    Specimen Arterial     Action Range Triggered NO     Inst. Qualified Patient YES    CBC with Differential    Collection Time: 10/05/18  5:20 AM   Result Value Ref Range    WBC 9.2 4.8 - 10.8 K/uL    RBC 3.85 (L) 4.70 - 6.10 M/uL    Hemoglobin 12.1 (L) 14.0 - 18.0 g/dL    Hematocrit 36.2 (L) 42.0 - 52.0 %    MCV 94.0 81.4 - 97.8 fL    MCH 31.4 27.0 - 33.0 pg    MCHC 33.4 (L) 33.7 - 35.3 g/dL    RDW 47.9 35.9 - 50.0 fL    Platelet Count 142 (L) 164 - 446 K/uL    MPV 10.5 9.0 - 12.9 fL    Neutrophils-Polys 74.80 (H) 44.00 - 72.00 %    Lymphocytes 11.10 (L) 22.00 - 41.00 %    Monocytes 10.40 0.00 - 13.40 %    Eosinophils 2.40 0.00 - 6.90 %    Basophils 0.20 0.00 - 1.80 %    Immature Granulocytes 1.10 (H) 0.00 - 0.90 %    Nucleated RBC 0.00 /100 WBC    Neutrophils (Absolute) 6.89 1.82 - 7.42 K/uL    Lymphs (Absolute) 1.02 1.00 - 4.80 K/uL    Monos (Absolute) 0.96 (H) 0.00 - 0.85 K/uL    Eos (Absolute) 0.22 0.00 - 0.51 K/uL    Baso (Absolute) 0.02 0.00 - 0.12 K/uL    Immature Granulocytes (abs) 0.10 0.00 - 0.11 K/uL    NRBC (Absolute) 0.00 K/uL   Basic Metabolic Panel (BMP)    Collection Time: 10/05/18  5:20 AM   Result Value Ref Range    Sodium 143 135 - 145 mmol/L    Potassium 4.2 3.6 - 5.5 mmol/L    Chloride 111 96 - 112 mmol/L    Co2 27 20 - 33 mmol/L    Glucose 184 (H) 65 - 99 mg/dL    Bun 68 (H) 8 - 22 mg/dL    Creatinine 1.01 0.50 - 1.40 mg/dL    Calcium 8.8 8.5 - 10.5 mg/dL    Anion Gap 5.0 0.0 - 11.9   MAGNESIUM    Collection Time: 10/05/18  5:20 AM   Result Value Ref Range    Magnesium 2.5 1.5 - 2.5 mg/dL   PHOSPHORUS    Collection Time: 10/05/18  5:20 AM   Result Value Ref Range    Phosphorus 4.4 2.5 - 4.5 mg/dL   ESTIMATED GFR    Collection Time: 10/05/18  5:20 AM   Result Value Ref Range    GFR If African American >60 >60 mL/min/1.73 m 2    GFR If Non African American >60 >60 mL/min/1.73 m 2   ACCU-CHEK GLUCOSE    Collection Time: 10/05/18  5:31 AM   Result Value Ref Range     Glucose - Accu-Ck 183 (H) 65 - 99 mg/dL   ACCU-CHEK GLUCOSE    Collection Time: 10/05/18  6:17 AM   Result Value Ref Range    Glucose - Accu-Ck 174 (H) 65 - 99 mg/dL   ACCU-CHEK GLUCOSE    Collection Time: 10/05/18  8:09 AM   Result Value Ref Range    Glucose - Accu-Ck 151 (H) 65 - 99 mg/dL   ACCU-CHEK GLUCOSE    Collection Time: 10/05/18 10:27 AM   Result Value Ref Range    Glucose - Accu-Ck 170 (H) 65 - 99 mg/dL   ACCU-CHEK GLUCOSE    Collection Time: 10/05/18 12:22 PM   Result Value Ref Range    Glucose - Accu-Ck 176 (H) 65 - 99 mg/dL   ACCU-CHEK GLUCOSE    Collection Time: 10/05/18  2:40 PM   Result Value Ref Range    Glucose - Accu-Ck 184 (H) 65 - 99 mg/dL   ACCU-CHEK GLUCOSE    Collection Time: 10/05/18  4:08 PM   Result Value Ref Range    Glucose - Accu-Ck 169 (H) 65 - 99 mg/dL   ACCU-CHEK GLUCOSE    Collection Time: 10/05/18  5:05 PM   Result Value Ref Range    Glucose - Accu-Ck 175 (H) 65 - 99 mg/dL   ACCU-CHEK GLUCOSE    Collection Time: 10/05/18  6:00 PM   Result Value Ref Range    Glucose - Accu-Ck 186 (H) 65 - 99 mg/dL   URINALYSIS    Collection Time: 10/05/18  6:21 PM   Result Value Ref Range    Color Yellow     Character Clear     Specific Gravity 1.019 <1.035    Ph 5.5 5.0 - 8.0    Glucose Negative Negative mg/dL    Ketones Negative Negative mg/dL    Protein Negative Negative mg/dL    Bilirubin Negative Negative    Urobilinogen, Urine 0.2 Negative    Nitrite Negative Negative    Leukocyte Esterase Small (A) Negative    Occult Blood Moderate (A) Negative    Micro Urine Req Microscopic    URINE MICROSCOPIC (W/UA)    Collection Time: 10/05/18  6:21 PM   Result Value Ref Range    WBC 2-5 (A) /hpf    RBC 20-50 (A) /hpf    Bacteria Negative None /hpf    Epithelial Cells Negative /hpf    Hyaline Cast 3-5 (A) /lpf       Imaging  X-Ray:  I have personally reviewed the images and compared with prior images. and My impression is: Improved pulmonary edema    Assessment/Plan  * Acute respiratory failure with  hypoxia (HCC)- (present on admission)   Assessment & Plan    Intubated 9/29  All appropriate ventilator bundles have been initiated  Continue vent support        Acute pulmonary edema (HCC)   Assessment & Plan    Echo with normal LV systolic function  Significant pulmonary hypertension causing LV diastolic dysfunction  Decrease Lasix to 40 mg IV daily        CAP (community acquired pneumonia)- (present on admission)   Assessment & Plan    Influenza negative  Completed 5 days of Unasyn and azithromycin on 10/4  Observe off antibiotics        Hemoptysis   Assessment & Plan    Bronchoscopy revealed friable bleeding mucosa in the left upper lobe and lingula  Stop anticoagulation  Plan on repeating bronchoscopy with biopsy after Lovenox discontinued        Fever   Assessment & Plan    I suspect due to dexmedetomidine  Stopped dexmedetomidine and use propofol for sedation  UA unremarkable  Blood cultures negative from 10/5  Bronchial washings from 10/3 with no growth  Hold on antibiotics for now        Paroxysmal A-fib (HCC)   Assessment & Plan    Continue amiodarone, 400 mg twice daily  Stop anticoagulation due to hemoptysis  Optimize potassium and magnesium        Pulmonary hypertension (HCC)   Assessment & Plan    RVSP 65 mm Hg  CTA without evidence of pulmonary embolism  Force diuresis as tolerated        Acute metabolic encephalopathy   Assessment & Plan    Etiology is metabolic as well as due to sepsis  Limit sedatives  Improved        Elevated troponin- (present on admission)   Assessment & Plan    Continue aspirin and statin  Due to demand ischemia        Hyperglycemia- (present on admission)   Assessment & Plan    Titrating insulin drip for adequate glucose control        Dyslipidemia- (present on admission)   Assessment & Plan    Continue statin             VTE:  Contraindicated  Ulcer: H2 Antagonist  Lines: Central Line  Ongoing indication addressed    I have performed a physical exam and reviewed and updated  ROS and Plan today (10/6/2018). In review of yesterday's note (10/5/2018), there are no changes except as documented above.     I have assessed and reassessed his respiratory status with ventilator adjustments, ventilator waveforms, airway mechanics, blood pressure, hemodynamics, cardiovascular status with titration of phenylephrine, blood glucose with titration of an insulin drip and neurologic status with titration of propofol.  He is at increased risk for worsening respiratory and cardiovascular system dysfunction.    Discussed patient condition and risk of morbidity and/or mortality with Hospitalist, RN, RT, Pharmacy, Charge nurse / hot rounds and QA team  The patient remains critically ill.  Critical care time = 87 minutes in directly providing and coordinating critical care and extensive data review.  No time overlap and excludes procedures.    High risk of deterioration and worsening vital organ dysfunction and death without the above critical care interventions.    Brandon Paulino MD  Pulmonary and Critical Care Medicine

## 2018-10-06 NOTE — CARE PLAN
Problem: Ventilation Defect:  Goal: Ability to achieve and maintain unassisted ventilation or tolerate decreased levels of ventilator support  Outcome: PROGRESSING AS EXPECTED    Intervention: Support and monitor invasive and noninvasive mechanical ventilation  Adult Ventilation Update    Total Vent Days: 8    Patient Lines/Drains/Airways Status    Active Airway     Name: Placement date: Placement time: Site: Days:    Airway ETT Oral 8.0 09/29/18   1714   Oral   6              In the last 24 hours, the patient tolerated SBT for 1.6 hours on settings of 5/8.    Plateau Pressure (Q Shift): 16 (10/05/18 1902)  Static Compliance (ml / cm H2O): 43 (10/06/18 0300)    Pt remains on vent with no changes made overnight.

## 2018-10-06 NOTE — PROGRESS NOTES
[facility] Hospitalist Progress Note    Date of Service: 10/6/2018    Chief Complaint  78 y.o. male admitted 2018 with cough and shortness of breath.  Patient was admitted to the hospital and shortly thereafter had significant increase in shortness of breath and required intubation.    Interval Problem Update      Tmax 103.6  Follows command  precedex  1.0  SR 60-70  Chris at 50  TF at 50   Bloody secretions  Insulin drip 2  VD 8  SBT 50 minutes                              Consultants/Specialty  Pulmonary  Cardiology    Disposition  To Remain in ICU        Review of Systems   Unable to perform ROS: Intubated      Physical Exam  Laboratory/Imaging   Hemodynamics  Temp (24hrs), Av °C (102.2 °F), Min:38 °C (100.4 °F), Max:39.7 °C (103.5 °F)   Temperature: (!) 38.2 °C (100.8 °F), Monitored Temp: 38.6 °C (101.5 °F)  Pulse  Av.4  Min: 60  Max: 142 Heart Rate (Monitored): 66  NIBP: (!) 96/62 CVP (mm Hg): (!) 13 MM HG    Respiratory  Smith Vent Mode: APVCMV, Rate (breaths/min): 16, PEEP/CPAP: 8, PEEP/CPAP: 8, FiO2: 40, P Peak (PIP): 22, P MEAN: 13   Respiration: (!) 29, Pulse Oximetry: 96 %     Work Of Breathing / Effort: Vented  RUL Breath Sounds: Clear, RML Breath Sounds: Diminished, RLL Breath Sounds: Diminished, BRITTANY Breath Sounds: Clear, LLL Breath Sounds: Diminished    Fluids    Intake/Output Summary (Last 24 hours) at 10/06/18 0910  Last data filed at 10/06/18 0600   Gross per 24 hour   Intake           3953.8 ml   Output             2295 ml   Net           1658.8 ml       Nutrition  Orders Placed This Encounter   Procedures   • Diet NPO     Standing Status:   Standing     Number of Occurrences:   1     Order Specific Question:   Type:     Answer:   Now [1]     Order Specific Question:   Restrict to:     Answer:   Strict [1]     Physical Exam   Constitutional: He appears well-developed and well-nourished.   HENT:   Head: Normocephalic and atraumatic.   Right Ear: External ear normal.   Left Ear: External  ear normal.   Nose: Nose normal.   Mouth/Throat: No oropharyngeal exudate.   cortrak    Eyes: Pupils are equal, round, and reactive to light. Conjunctivae are normal. Right eye exhibits no discharge. Left eye exhibits no discharge.   Neck: Neck supple. No JVD present. No tracheal deviation present.   Cardiovascular: Normal rate, regular rhythm and normal heart sounds.    Pulmonary/Chest: Effort normal. No respiratory distress. He has decreased breath sounds. He has no wheezes. He has rhonchi. He has rales in the right lower field and the left lower field. He exhibits no tenderness.   intubated   Abdominal: Soft. Bowel sounds are normal. He exhibits no distension. There is no tenderness. There is no rebound.   Musculoskeletal: He exhibits edema. He exhibits no tenderness.   Neurological: No cranial nerve deficit. He exhibits normal muscle tone.   Sedated, follows command   Skin: Skin is warm and dry. He is not diaphoretic. No cyanosis. Nails show no clubbing.   Psychiatric:   Sedated   Nursing note and vitals reviewed.      Recent Labs      10/04/18   0500  10/05/18   0520  10/06/18   0458   WBC  10.2  9.2  11.8*   RBC  4.18*  3.85*  3.70*   HEMOGLOBIN  12.7*  12.1*  11.2*   HEMATOCRIT  38.6*  36.2*  35.5*   MCV  92.3  94.0  95.9   MCH  30.4  31.4  30.3   MCHC  32.9*  33.4*  31.5*   RDW  47.6  47.9  48.5   PLATELETCT  156*  142*  199   MPV  10.6  10.5  10.5     Recent Labs      10/04/18   0500  10/05/18   0520  10/06/18   0458   SODIUM  142  143  145   POTASSIUM  4.1  4.2  4.5   CHLORIDE  110  111  111   CO2  27  27  26   GLUCOSE  166*  184*  185*   BUN  69*  68*  79*   CREATININE  1.02  1.01  1.06   CALCIUM  8.3*  8.8  8.9             Recent Labs      10/04/18   0500   TRIGLYCERIDE  82          Assessment/Plan     * Acute respiratory failure with hypoxia (HCC)- (present on admission)   Assessment & Plan    CTA on 9-29 neg  for PE  Echo with normal EF and pulmonary hypertension    Vent management per critical  care  Discussed with Dr. Paulino  Continue forced diuresis as tolerated            Acute pulmonary edema (HCC)   Assessment & Plan    Continue forced diuresis with IV Lasix  Continue albumin  Monitor intake and output        CAP (community acquired pneumonia)- (present on admission)   Assessment & Plan    Completed course of antibiotics         Fever   Assessment & Plan    Suspect drug fever  DC Precedex and monitor        Paroxysmal A-fib (HCC)   Assessment & Plan    Now in sinus rhythm  Continue amiodarone and Lovenox  Monitor CBC and clinically for signs of bleeding  Hold off on starting oral anticoagulants as patient may need tracheostomy        Pulmonary hypertension (HCC)   Assessment & Plan    9/29/18 echocardiogram with RVSP of 65 mmHg  CT was negative for pulmonary embolism    Continue forced diuresis as tolerated              Elevated troponin- (present on admission)   Assessment & Plan    Likely demand ischemia        Elevated liver enzymes   Assessment & Plan    Likely shock liver     LFTs improved        Hyperglycemia- (present on admission)   Assessment & Plan    HbA1c 6.5 on 7/18    Continue insulin drip and monitor CBGs still high requirements to transition to subcutaneous insulin        Dyslipidemia- (present on admission)   Assessment & Plan    Continue atorvastatin            Quality-Core Measures   Reviewed items::  Labs reviewed, Medications reviewed and Radiology images reviewed  Garces catheter::  Critically Ill - Requiring Accurate Measurement of Urinary Output, Unconscious / Sedated Patient on a Ventilator and Strict Intake and Output During Sepisis or Shock  Central line in place:  Sepsis, Shock and Vasopressors  DVT prophylaxis pharmacological::  Enoxaparin (Lovenox)  Ulcer Prophylaxis::  Yes  Antibiotics:  Treating active infection/contamination beyond 24 hours perioperative coverage

## 2018-10-06 NOTE — WOUND TEAM
Attempted to see patient for possible medical device related pressure ulcers. Procedure going on at this time in the room. Plan for wound team to return tomorrow.

## 2018-10-06 NOTE — PROGRESS NOTES
Monitor Summary: .16/.12/.44. Rhythm: SR. Rate: 60's-80's.     12 hour chart check.     2 RN skin check.

## 2018-10-06 NOTE — CARE PLAN
Problem: Bowel/Gastric:  Goal: Normal bowel function is maintained or improved  Outcome: PROGRESSING SLOWER THAN EXPECTED  LBM 10/3; Bowel protocol advanced    Problem: Knowledge Deficit  Goal: Knowledge of disease process/condition, treatment plan, diagnostic tests, and medications will improve  Outcome: PROGRESSING AS EXPECTED  Pt and Family oriented to unit routine. Pt and family educated regarding activity, diet, meds and plan of care; questions answered; verbalized understanding. Pt and Family denies having further needs at this time.    Problem: Skin Integrity  Goal: Risk for impaired skin integrity will decrease  Outcome: PROGRESSING SLOWER THAN EXPECTED  Wound consult placed and pictures taken for new skin breakdown on bilateral ears and posterior head.

## 2018-10-06 NOTE — CARE PLAN
Problem: Bowel/Gastric:  Goal: Will not experience complications related to bowel motility   10/05/18 2000 10/06/18 0000   OTHER   Last BM 10/03/18  (per report given) --    Number of Times Stooled --  0       Problem: Knowledge Deficit  Goal: Knowledge of the prescribed therapeutic regimen will improve    Intervention: Discuss information regarding therpeutic regimen and document in education  Completed. At this time patient sedated & intubated with no family at bedside. Overview given of therapeutic regimen. No evidence of learning.

## 2018-10-07 PROBLEM — E87.0 HYPERNATREMIA: Status: ACTIVE | Noted: 2018-01-01

## 2018-10-07 PROBLEM — K56.7 ILEUS (HCC): Status: ACTIVE | Noted: 2018-01-01

## 2018-10-07 NOTE — ASSESSMENT & PLAN NOTE
Bronchoscopy revealed friable and nodular mucosa in the left mainstem bronchus, no obvious malignancy  Hold all anticoagulation for now  Bronchoscopy with biopsy and brushing performed on 10/7, cytology and pathology negative for malignancy  Continue to hold VTE prophylaxis with subcu heparin as the patient still having intermittent hemoptysis

## 2018-10-07 NOTE — PROGRESS NOTES
Critical Care Progress Note    Date of admission  9/29/2018    Chief Complaint  78 y.o. male admitted 9/29/2018 with cough and shortness of breath.    Hospital Course  This gentleman was admitted with severe sepsis, respiratory failure and pneumonia.    Interval Problem Update  Reviewed last 24 hour events:       -enema - improved belly   -OG with minimal output   -Chris 50   -AF-flutter   -opens eyes - does not follow   -prop 20   -afebrile   -Lasix   -vent 9   -PEEP 8   -50%   -cont Lasix   -Reglan   -start trickle TF      Review of Systems  Review of Systems   Unable to perform ROS: Acuity of condition        Vital Signs for last 24 hours   Temp:  [38.2 °C (100.8 °F)-38.3 °C (100.9 °F)] 38.2 °C (100.8 °F)  Pulse:  [60-84] 73  Resp:  [18-41] 18    Hemodynamic parameters for last 24 hours  CVP:  [0 MM HG-20 MM HG] 14 MM HG    Vent Settings for last 24 hours  Smith Vent Mode: APVCMV  Rate (breaths/min):  [16-24] 16  PEEP/CPAP:  [8] 8  FiO2:  [40-80] 65  P Peak (PIP):  [16-26] 26  P MEAN:  [11-14] 14    Physical Exam   Physical Exam   Constitutional:   Sedated on ventilator   HENT:   Head: Normocephalic.   Right Ear: External ear normal.   Left Ear: External ear normal.   Mouth/Throat: No oropharyngeal exudate.   Eyes: Pupils are equal, round, and reactive to light. Conjunctivae are normal. Right eye exhibits no discharge. Left eye exhibits no discharge. No scleral icterus.   Neck: Neck supple. No JVD present. No tracheal deviation present.   Cardiovascular: Intact distal pulses.  Exam reveals no gallop and no friction rub.    Atrial fibrillation   Pulmonary/Chest: He has no wheezes. He has rales (Scattered crackles bilaterally).   Abdominal: Soft. Bowel sounds are normal. He exhibits no distension. There is no tenderness. There is no guarding.   Tolerating enteral tube feedings   Musculoskeletal: He exhibits no tenderness or deformity.   No clubbing or cyanosis   Neurological:   Sedated.  Arouses and follows.   Moves all 4 extremities.   Skin: Skin is warm and dry. He is not diaphoretic. No erythema.       Medications  Current Facility-Administered Medications   Medication Dose Route Frequency Provider Last Rate Last Dose   • Metoprolol Tartrate (LOPRESSOR) injection 5 mg  5 mg Intravenous Once Beni Stewart M.D.   Stopped at 10/07/18 0245   • propofol (DIPRIVAN) injection  0-80 mcg/kg/min Intravenous Continuous Brandon Paulino M.D. 23 mL/hr at 10/07/18 0202 35 mcg/kg/min at 10/07/18 0202   • furosemide (LASIX) injection 40 mg  40 mg Intravenous Q DAY Dre Robbins M.D.       • metoprolol (LOPRESSOR) tablet 12.5 mg  12.5 mg Oral TWICE DAILY Beni Stewart M.D.   Stopped at 10/07/18 0600   • potassium bicarbonate (KLYTE) effervescent tablet 25 mEq  25 mEq Feeding Tube BID Brandon Paulino M.D.   25 mEq at 10/06/18 1801   • phenylephrine (MARYSOL-SYNEPHRINE) 40,000 mcg in  mL Infusion  0-300 mcg/min Intravenous Continuous Brandon Paulino M.D.   Stopped at 10/06/18 2330   • amiodarone (CORDARONE) tablet 400 mg  400 mg Per NG Tube TWICE DAILY Brandon Paulino M.D.   400 mg at 10/06/18 1802   • ondansetron (ZOFRAN ODT) dispertab 4 mg  4 mg Oral Q4HRS PRN Brandon Paulino M.D.       • famotidine (PEPCID) tablet 20 mg  20 mg Feeding Tube Q12HRS Brandon Paulino M.D.   20 mg at 10/06/18 1801   • insulin regular human (HUMULIN/NOVOLIN R) 62.5 Units in  mL infusion per protocol  0-29 Units/hr Intravenous Continuous Brandon Paulino M.D.   Stopped at 10/07/18 0417   • dextrose 50% (D50W) injection 25-50 mL  12.5-25 g Intravenous PRN Brandon Paulino M.D.       • Respiratory Care per Protocol   Nebulization Continuous RT Walter Carrion M.D.       • ondansetron (ZOFRAN) syringe/vial injection 4 mg  4 mg Intravenous Q4HRS PRN Walter Carrion M.D.       • MD Alert...ICU Electrolyte Replacement per Pharmacy   Other pharmacy to dose Jeremy M Gonda, M.D.       • fentaNYL  (SUBLIMAZE) injection 25 mcg  25 mcg Intravenous Q HOUR PRN Jeremy M Gonda, M.D.   25 mcg at 10/05/18 2059    Or   • fentaNYL (SUBLIMAZE) injection 50 mcg  50 mcg Intravenous Q HOUR PRN Jeremy M Gonda, M.D.   50 mcg at 10/06/18 0856    Or   • fentaNYL (SUBLIMAZE) injection 100 mcg  100 mcg Intravenous Q HOUR PRN Jeremy M Gonda, M.D.   100 mcg at 10/05/18 1207   • ipratropium-albuterol (DUONEB) nebulizer solution  3 mL Nebulization Q2HRS PRN (RT) Jeremy M Gonda, M.D.       • Pharmacy Consult: Enteral tube feeding - review meds/change route/product selection   Other PRN Jeremy M Gonda, M.D.       • senna-docusate (PERICOLACE or SENOKOT S) 8.6-50 MG per tablet 2 Tab  2 Tab Feeding Tube BID Jeremy M Gonda, M.D.   2 Tab at 10/06/18 1801    And   • polyethylene glycol/lytes (MIRALAX) PACKET 1 Packet  1 Packet Feeding Tube QDAY PRN Jeremy M Gonda, M.D.   1 Packet at 10/06/18 1222    And   • magnesium hydroxide (MILK OF MAGNESIA) suspension 30 mL  30 mL Feeding Tube QDAY PRN Jeremy M Gonda, M.D.   30 mL at 10/02/18 1322    And   • bisacodyl (DULCOLAX) suppository 10 mg  10 mg Rectal QDAY PRN Jeremy M Gonda, M.D.   10 mg at 10/06/18 1222   • acetaminophen (TYLENOL) tablet 650 mg  650 mg Feeding Tube Q6HRS PRN Jeremy M Gonda, M.D.   650 mg at 10/06/18 1153   • atorvastatin (LIPITOR) tablet 40 mg  40 mg Per NG Tube Q EVENING Jeremy M Gonda, M.D.   40 mg at 10/06/18 1801   • aspirin (ASA) chewable tab 81 mg  81 mg Per NG Tube DAILY Jeremy M Gonda, M.D.   81 mg at 10/06/18 0510       Fluids    Intake/Output Summary (Last 24 hours) at 10/07/18 0458  Last data filed at 10/07/18 0400   Gross per 24 hour   Intake          2708.92 ml   Output             3030 ml   Net          -321.08 ml       Laboratory  Recent Results (from the past 48 hour(s))   CBC with Differential    Collection Time: 10/05/18  5:20 AM   Result Value Ref Range    WBC 9.2 4.8 - 10.8 K/uL    RBC 3.85 (L) 4.70 - 6.10 M/uL    Hemoglobin 12.1 (L) 14.0 - 18.0 g/dL     Hematocrit 36.2 (L) 42.0 - 52.0 %    MCV 94.0 81.4 - 97.8 fL    MCH 31.4 27.0 - 33.0 pg    MCHC 33.4 (L) 33.7 - 35.3 g/dL    RDW 47.9 35.9 - 50.0 fL    Platelet Count 142 (L) 164 - 446 K/uL    MPV 10.5 9.0 - 12.9 fL    Neutrophils-Polys 74.80 (H) 44.00 - 72.00 %    Lymphocytes 11.10 (L) 22.00 - 41.00 %    Monocytes 10.40 0.00 - 13.40 %    Eosinophils 2.40 0.00 - 6.90 %    Basophils 0.20 0.00 - 1.80 %    Immature Granulocytes 1.10 (H) 0.00 - 0.90 %    Nucleated RBC 0.00 /100 WBC    Neutrophils (Absolute) 6.89 1.82 - 7.42 K/uL    Lymphs (Absolute) 1.02 1.00 - 4.80 K/uL    Monos (Absolute) 0.96 (H) 0.00 - 0.85 K/uL    Eos (Absolute) 0.22 0.00 - 0.51 K/uL    Baso (Absolute) 0.02 0.00 - 0.12 K/uL    Immature Granulocytes (abs) 0.10 0.00 - 0.11 K/uL    NRBC (Absolute) 0.00 K/uL   Basic Metabolic Panel (BMP)    Collection Time: 10/05/18  5:20 AM   Result Value Ref Range    Sodium 143 135 - 145 mmol/L    Potassium 4.2 3.6 - 5.5 mmol/L    Chloride 111 96 - 112 mmol/L    Co2 27 20 - 33 mmol/L    Glucose 184 (H) 65 - 99 mg/dL    Bun 68 (H) 8 - 22 mg/dL    Creatinine 1.01 0.50 - 1.40 mg/dL    Calcium 8.8 8.5 - 10.5 mg/dL    Anion Gap 5.0 0.0 - 11.9   MAGNESIUM    Collection Time: 10/05/18  5:20 AM   Result Value Ref Range    Magnesium 2.5 1.5 - 2.5 mg/dL   PHOSPHORUS    Collection Time: 10/05/18  5:20 AM   Result Value Ref Range    Phosphorus 4.4 2.5 - 4.5 mg/dL   ESTIMATED GFR    Collection Time: 10/05/18  5:20 AM   Result Value Ref Range    GFR If African American >60 >60 mL/min/1.73 m 2    GFR If Non African American >60 >60 mL/min/1.73 m 2   ACCU-CHEK GLUCOSE    Collection Time: 10/05/18  5:31 AM   Result Value Ref Range    Glucose - Accu-Ck 183 (H) 65 - 99 mg/dL   ACCU-CHEK GLUCOSE    Collection Time: 10/05/18  6:17 AM   Result Value Ref Range    Glucose - Accu-Ck 174 (H) 65 - 99 mg/dL   ACCU-CHEK GLUCOSE    Collection Time: 10/05/18  8:09 AM   Result Value Ref Range    Glucose - Accu-Ck 151 (H) 65 - 99 mg/dL   ACCU-CHEK  GLUCOSE    Collection Time: 10/05/18 10:27 AM   Result Value Ref Range    Glucose - Accu-Ck 170 (H) 65 - 99 mg/dL   ACCU-CHEK GLUCOSE    Collection Time: 10/05/18 12:22 PM   Result Value Ref Range    Glucose - Accu-Ck 176 (H) 65 - 99 mg/dL   ACCU-CHEK GLUCOSE    Collection Time: 10/05/18  2:40 PM   Result Value Ref Range    Glucose - Accu-Ck 184 (H) 65 - 99 mg/dL   ACCU-CHEK GLUCOSE    Collection Time: 10/05/18  4:08 PM   Result Value Ref Range    Glucose - Accu-Ck 169 (H) 65 - 99 mg/dL   BLOOD CULTURE    Collection Time: 10/05/18  4:30 PM   Result Value Ref Range    Significant Indicator NEG     Source BLD     Site PERIPHERAL     Blood Culture       No Growth    Note: Blood cultures are incubated for 5 days and  are monitored continuously.Positive blood cultures  are called to the RN and reported as soon as  they are identified.     BLOOD CULTURE    Collection Time: 10/05/18  5:00 PM   Result Value Ref Range    Significant Indicator NEG     Source BLD     Site PERIPHERAL     Blood Culture       No Growth    Note: Blood cultures are incubated for 5 days and  are monitored continuously.Positive blood cultures  are called to the RN and reported as soon as  they are identified.     ACCU-CHEK GLUCOSE    Collection Time: 10/05/18  5:05 PM   Result Value Ref Range    Glucose - Accu-Ck 175 (H) 65 - 99 mg/dL   ACCU-CHEK GLUCOSE    Collection Time: 10/05/18  6:00 PM   Result Value Ref Range    Glucose - Accu-Ck 186 (H) 65 - 99 mg/dL   URINALYSIS    Collection Time: 10/05/18  6:21 PM   Result Value Ref Range    Color Yellow     Character Clear     Specific Gravity 1.019 <1.035    Ph 5.5 5.0 - 8.0    Glucose Negative Negative mg/dL    Ketones Negative Negative mg/dL    Protein Negative Negative mg/dL    Bilirubin Negative Negative    Urobilinogen, Urine 0.2 Negative    Nitrite Negative Negative    Leukocyte Esterase Small (A) Negative    Occult Blood Moderate (A) Negative    Micro Urine Req Microscopic    URINE MICROSCOPIC  (W/UA)    Collection Time: 10/05/18  6:21 PM   Result Value Ref Range    WBC 2-5 (A) /hpf    RBC 20-50 (A) /hpf    Bacteria Negative None /hpf    Epithelial Cells Negative /hpf    Hyaline Cast 3-5 (A) /lpf   ACCU-CHEK GLUCOSE    Collection Time: 10/05/18  7:50 PM   Result Value Ref Range    Glucose - Accu-Ck 153 (H) 65 - 99 mg/dL   ACCU-CHEK GLUCOSE    Collection Time: 10/05/18  8:36 PM   Result Value Ref Range    Glucose - Accu-Ck 153 (H) 65 - 99 mg/dL   ACCU-CHEK GLUCOSE    Collection Time: 10/05/18  9:27 PM   Result Value Ref Range    Glucose - Accu-Ck 186 (H) 65 - 99 mg/dL   ACCU-CHEK GLUCOSE    Collection Time: 10/05/18 10:26 PM   Result Value Ref Range    Glucose - Accu-Ck 162 (H) 65 - 99 mg/dL   ACCU-CHEK GLUCOSE    Collection Time: 10/05/18 11:33 PM   Result Value Ref Range    Glucose - Accu-Ck 168 (H) 65 - 99 mg/dL   ACCU-CHEK GLUCOSE    Collection Time: 10/06/18 12:41 AM   Result Value Ref Range    Glucose - Accu-Ck 187 (H) 65 - 99 mg/dL   ACCU-CHEK GLUCOSE    Collection Time: 10/06/18  1:47 AM   Result Value Ref Range    Glucose - Accu-Ck 170 (H) 65 - 99 mg/dL   ACCU-CHEK GLUCOSE    Collection Time: 10/06/18  2:31 AM   Result Value Ref Range    Glucose - Accu-Ck 171 (H) 65 - 99 mg/dL   ACCU-CHEK GLUCOSE    Collection Time: 10/06/18  3:59 AM   Result Value Ref Range    Glucose - Accu-Ck 161 (H) 65 - 99 mg/dL   ISTAT ARTERIAL BLOOD GAS    Collection Time: 10/06/18  4:42 AM   Result Value Ref Range    Ph 7.469 7.400 - 7.500    Pco2 36.1 26.0 - 37.0 mmHg    Po2 62 (L) 64 - 87 mmHg    Tco2 27 20 - 33 mmol/L    S02 93 93 - 99 %    Hco3 26.2 (H) 17.0 - 25.0 mmol/L    BE 2 -4 - 3 mmol/L    Body Temp 39.0 C degrees    O2 Therapy 40 %    iPF Ratio 155     Ph Temp Cesar 7.439 7.400 - 7.500    Pco2 Temp Co 39.4 (H) 26.0 - 37.0 mmHg    Po2 Temp Cor 71 64 - 87 mmHg    Specimen Arterial     Action Range Triggered NO     Inst. Qualified Patient YES    ACCU-CHEK GLUCOSE    Collection Time: 10/06/18  4:47 AM   Result Value  Ref Range    Glucose - Accu-Ck 154 (H) 65 - 99 mg/dL   CBC with Differential    Collection Time: 10/06/18  4:58 AM   Result Value Ref Range    WBC 11.8 (H) 4.8 - 10.8 K/uL    RBC 3.70 (L) 4.70 - 6.10 M/uL    Hemoglobin 11.2 (L) 14.0 - 18.0 g/dL    Hematocrit 35.5 (L) 42.0 - 52.0 %    MCV 95.9 81.4 - 97.8 fL    MCH 30.3 27.0 - 33.0 pg    MCHC 31.5 (L) 33.7 - 35.3 g/dL    RDW 48.5 35.9 - 50.0 fL    Platelet Count 199 164 - 446 K/uL    MPV 10.5 9.0 - 12.9 fL    Neutrophils-Polys 77.10 (H) 44.00 - 72.00 %    Lymphocytes 9.70 (L) 22.00 - 41.00 %    Monocytes 11.50 0.00 - 13.40 %    Eosinophils 0.10 0.00 - 6.90 %    Basophils 0.30 0.00 - 1.80 %    Immature Granulocytes 1.30 (H) 0.00 - 0.90 %    Nucleated RBC 0.30 /100 WBC    Neutrophils (Absolute) 9.07 (H) 1.82 - 7.42 K/uL    Lymphs (Absolute) 1.14 1.00 - 4.80 K/uL    Monos (Absolute) 1.35 (H) 0.00 - 0.85 K/uL    Eos (Absolute) 0.01 0.00 - 0.51 K/uL    Baso (Absolute) 0.04 0.00 - 0.12 K/uL    Immature Granulocytes (abs) 0.15 (H) 0.00 - 0.11 K/uL    NRBC (Absolute) 0.03 K/uL   MAGNESIUM    Collection Time: 10/06/18  4:58 AM   Result Value Ref Range    Magnesium 2.6 (H) 1.5 - 2.5 mg/dL   PHOSPHORUS    Collection Time: 10/06/18  4:58 AM   Result Value Ref Range    Phosphorus 3.9 2.5 - 4.5 mg/dL   COMP METABOLIC PANEL    Collection Time: 10/06/18  4:58 AM   Result Value Ref Range    Sodium 145 135 - 145 mmol/L    Potassium 4.5 3.6 - 5.5 mmol/L    Chloride 111 96 - 112 mmol/L    Co2 26 20 - 33 mmol/L    Anion Gap 8.0 0.0 - 11.9    Glucose 185 (H) 65 - 99 mg/dL    Bun 79 (HH) 8 - 22 mg/dL    Creatinine 1.06 0.50 - 1.40 mg/dL    Calcium 8.9 8.5 - 10.5 mg/dL    AST(SGOT) 18 12 - 45 U/L    ALT(SGPT) 39 2 - 50 U/L    Alkaline Phosphatase 76 30 - 99 U/L    Total Bilirubin 0.9 0.1 - 1.5 mg/dL    Albumin 3.4 3.2 - 4.9 g/dL    Total Protein 5.5 (L) 6.0 - 8.2 g/dL    Globulin 2.1 1.9 - 3.5 g/dL    A-G Ratio 1.6 g/dL   ESTIMATED GFR    Collection Time: 10/06/18  4:58 AM   Result Value  Ref Range    GFR If African American >60 >60 mL/min/1.73 m 2    GFR If Non African American >60 >60 mL/min/1.73 m 2   ACCU-CHEK GLUCOSE    Collection Time: 10/06/18  5:32 AM   Result Value Ref Range    Glucose - Accu-Ck 159 (H) 65 - 99 mg/dL   ACCU-CHEK GLUCOSE    Collection Time: 10/06/18  6:43 AM   Result Value Ref Range    Glucose - Accu-Ck 144 (H) 65 - 99 mg/dL   ACCU-CHEK GLUCOSE    Collection Time: 10/06/18  7:46 AM   Result Value Ref Range    Glucose - Accu-Ck 137 (H) 65 - 99 mg/dL   ACCU-CHEK GLUCOSE    Collection Time: 10/06/18  9:01 AM   Result Value Ref Range    Glucose - Accu-Ck 130 (H) 65 - 99 mg/dL   ACCU-CHEK GLUCOSE    Collection Time: 10/06/18  9:56 AM   Result Value Ref Range    Glucose - Accu-Ck 140 (H) 65 - 99 mg/dL   ACCU-CHEK GLUCOSE    Collection Time: 10/06/18 10:55 AM   Result Value Ref Range    Glucose - Accu-Ck 144 (H) 65 - 99 mg/dL   ACCU-CHEK GLUCOSE    Collection Time: 10/06/18 11:56 AM   Result Value Ref Range    Glucose - Accu-Ck 144 (H) 65 - 99 mg/dL   ACCU-CHEK GLUCOSE    Collection Time: 10/06/18  1:07 PM   Result Value Ref Range    Glucose - Accu-Ck 169 (H) 65 - 99 mg/dL   ACCU-CHEK GLUCOSE    Collection Time: 10/06/18  1:55 PM   Result Value Ref Range    Glucose - Accu-Ck 180 (H) 65 - 99 mg/dL   ACCU-CHEK GLUCOSE    Collection Time: 10/06/18  3:13 PM   Result Value Ref Range    Glucose - Accu-Ck 168 (H) 65 - 99 mg/dL   GRAM STAIN    Collection Time: 10/06/18  4:30 PM   Result Value Ref Range    Significant Indicator .     Source RESP     Site Quantitative BAL BRITTANY     Gram Stain Result Rare WBCs.  No organisms seen.      ACCU-CHEK GLUCOSE    Collection Time: 10/06/18  4:55 PM   Result Value Ref Range    Glucose - Accu-Ck 146 (H) 65 - 99 mg/dL   ACCU-CHEK GLUCOSE    Collection Time: 10/06/18  5:49 PM   Result Value Ref Range    Glucose - Accu-Ck 139 (H) 65 - 99 mg/dL   ACCU-CHEK GLUCOSE    Collection Time: 10/06/18  6:51 PM   Result Value Ref Range    Glucose - Accu-Ck 139 (H) 65 -  99 mg/dL   ACCU-CHEK GLUCOSE    Collection Time: 10/06/18  8:08 PM   Result Value Ref Range    Glucose - Accu-Ck 148 (H) 65 - 99 mg/dL   ACCU-CHEK GLUCOSE    Collection Time: 10/06/18  9:08 PM   Result Value Ref Range    Glucose - Accu-Ck 173 (H) 65 - 99 mg/dL   ACCU-CHEK GLUCOSE    Collection Time: 10/06/18 10:10 PM   Result Value Ref Range    Glucose - Accu-Ck 180 (H) 65 - 99 mg/dL   EKG    Collection Time: 10/06/18 10:51 PM   Result Value Ref Range    Report       [facility]    Test Date:  2018-10-06  Pt Name:    TIMOTHY MOORE                 Department: 161  MRN:        [id number]                      Room:       14  Gender:     Male                         Technician: JAZMINE  :        [id number]                   Requested By:BRIANA MENDIETA  Order #:    [id number]                    Reading MD:    Measurements  Intervals                                Axis  Rate:       130                          P:          0  DE:         118                          QRS:        56  QRSD:       136                          T:          -6  QT:         336  QTc:        494    Interpretive Statements  SINUS TACHYCARDIA  RIGHT BUNDLE BRANCH BLOCK  BASELINE WANDER IN LEAD(S) II,III,aVF  Compared to ECG 2018 19:13:10  Atrial fibrillation no longer present     ACCU-CHEK GLUCOSE    Collection Time: 10/06/18 11:04 PM   Result Value Ref Range    Glucose - Accu-Ck 166 (H) 65 - 99 mg/dL   BASIC METABOLIC PANEL    Collection Time: 10/06/18 11:15 PM   Result Value Ref Range    Sodium 147 (H) 135 - 145 mmol/L    Potassium 3.7 3.6 - 5.5 mmol/L    Chloride 113 (H) 96 - 112 mmol/L    Co2 25 20 - 33 mmol/L    Glucose 240 (H) 65 - 99 mg/dL    Bun 79 (HH) 8 - 22 mg/dL    Creatinine 0.94 0.50 - 1.40 mg/dL    Calcium 9.0 8.5 - 10.5 mg/dL    Anion Gap 9.0 0.0 - 11.9   TROPONIN    Collection Time: 10/06/18 11:15 PM   Result Value Ref Range    Troponin I 0.43 (H) 0.00 - 0.04 ng/mL   MAGNESIUM    Collection Time: 10/06/18 11:15 PM   Result  Value Ref Range    Magnesium 2.6 (H) 1.5 - 2.5 mg/dL   TRIGLYCERIDE    Collection Time: 10/06/18 11:15 PM   Result Value Ref Range    Triglycerides 98 0 - 149 mg/dL   ESTIMATED GFR    Collection Time: 10/06/18 11:15 PM   Result Value Ref Range    GFR If African American >60 >60 mL/min/1.73 m 2    GFR If Non African American >60 >60 mL/min/1.73 m 2   ACCU-CHEK GLUCOSE    Collection Time: 10/07/18 12:01 AM   Result Value Ref Range    Glucose - Accu-Ck 182 (H) 65 - 99 mg/dL   ACCU-CHEK GLUCOSE    Collection Time: 10/07/18  1:08 AM   Result Value Ref Range    Glucose - Accu-Ck 180 (H) 65 - 99 mg/dL   ACCU-CHEK GLUCOSE    Collection Time: 10/07/18  1:57 AM   Result Value Ref Range    Glucose - Accu-Ck 190 (H) 65 - 99 mg/dL   CBC with Differential    Collection Time: 10/07/18  4:10 AM   Result Value Ref Range    WBC 8.5 4.8 - 10.8 K/uL    RBC 3.31 (L) 4.70 - 6.10 M/uL    Hemoglobin 10.3 (L) 14.0 - 18.0 g/dL    Hematocrit 32.1 (L) 42.0 - 52.0 %    MCV 97.0 81.4 - 97.8 fL    MCH 31.1 27.0 - 33.0 pg    MCHC 32.1 (L) 33.7 - 35.3 g/dL    RDW 50.2 (H) 35.9 - 50.0 fL    Platelet Count 132 (L) 164 - 446 K/uL    MPV 10.3 9.0 - 12.9 fL    Neutrophils-Polys 73.90 (H) 44.00 - 72.00 %    Lymphocytes 12.60 (L) 22.00 - 41.00 %    Monocytes 10.60 0.00 - 13.40 %    Eosinophils 1.50 0.00 - 6.90 %    Basophils 0.20 0.00 - 1.80 %    Immature Granulocytes 1.20 (H) 0.00 - 0.90 %    Nucleated RBC 0.00 /100 WBC    Neutrophils (Absolute) 6.27 1.82 - 7.42 K/uL    Lymphs (Absolute) 1.07 1.00 - 4.80 K/uL    Monos (Absolute) 0.90 (H) 0.00 - 0.85 K/uL    Eos (Absolute) 0.13 0.00 - 0.51 K/uL    Baso (Absolute) 0.02 0.00 - 0.12 K/uL    Immature Granulocytes (abs) 0.10 0.00 - 0.11 K/uL    NRBC (Absolute) 0.00 K/uL   MAGNESIUM    Collection Time: 10/07/18  4:10 AM   Result Value Ref Range    Magnesium 2.7 (H) 1.5 - 2.5 mg/dL   PHOSPHORUS    Collection Time: 10/07/18  4:10 AM   Result Value Ref Range    Phosphorus 4.3 2.5 - 4.5 mg/dL   COMP METABOLIC PANEL     Collection Time: 10/07/18  4:10 AM   Result Value Ref Range    Sodium 148 (H) 135 - 145 mmol/L    Potassium 4.1 3.6 - 5.5 mmol/L    Chloride 114 (H) 96 - 112 mmol/L    Co2 27 20 - 33 mmol/L    Anion Gap 7.0 0.0 - 11.9    Glucose 136 (H) 65 - 99 mg/dL    Bun 76 (HH) 8 - 22 mg/dL    Creatinine 0.91 0.50 - 1.40 mg/dL    Calcium 8.9 8.5 - 10.5 mg/dL    AST(SGOT) 23 12 - 45 U/L    ALT(SGPT) 32 2 - 50 U/L    Alkaline Phosphatase 68 30 - 99 U/L    Total Bilirubin 0.7 0.1 - 1.5 mg/dL    Albumin 3.6 3.2 - 4.9 g/dL    Total Protein 4.9 (L) 6.0 - 8.2 g/dL    Globulin 1.3 (L) 1.9 - 3.5 g/dL    A-G Ratio 2.8 g/dL   ESTIMATED GFR    Collection Time: 10/07/18  4:10 AM   Result Value Ref Range    GFR If African American >60 >60 mL/min/1.73 m 2    GFR If Non African American >60 >60 mL/min/1.73 m 2       Imaging  X-Ray:  I have personally reviewed the images and compared with prior images. and My impression is: Slightly increased bilateral opacities    Assessment/Plan  * Acute respiratory failure with hypoxia (HCC)- (present on admission)   Assessment & Plan    Intubated 9/29  Continue vent support  All appropriate ventilator bundles have been initiated  I discussed tracheostomy with his wife and she is considering        Acute pulmonary edema (HCC)   Assessment & Plan    Echo with normal LV systolic function  Significant pulmonary hypertension causing LV diastolic dysfunction  Continue Lasix, 40 mg IV daily        CAP (community acquired pneumonia)- (present on admission)   Assessment & Plan    Influenza negative  Completed 5 days of Unasyn and azithromycin on 10/4  Observe off antibiotics        Hemoptysis   Assessment & Plan    Bronchoscopy revealed friable and nodular mucosa in the left mainstem bronchus  Hold all anticoagulation  Bronchoscopy with biopsy and brushing performed on 10/7        Fever   Assessment & Plan    This was due to dexmedetomidine  Fever has resolved after dexmedetomidine discontinued  UA  unremarkable  Blood cultures negative from 10/5  Bronchial washings from 10/3 with no growth        Paroxysmal atrial fibrillation/atrial flutter (HCC)   Assessment & Plan    Continue amiodarone, 400 mg twice daily  No anticoagulation due to hemoptysis  Optimize magnesium and potassium        Pulmonary hypertension (HCC)   Assessment & Plan    RVSP 65 mm Hg  CTA without evidence of pulmonary embolism  Force diuresis with Lasix        Acute metabolic encephalopathy   Assessment & Plan    Etiology is metabolic as well as due to sepsis  Limit sedatives  Improved        Elevated troponin- (present on admission)   Assessment & Plan    Continue aspirin and statin        Hypernatremia   Assessment & Plan    Start free water, 200 mL every 8 hours        Hyperglycemia- (present on admission)   Assessment & Plan    Titrating insulin drip for glucose control        Dyslipidemia- (present on admission)   Assessment & Plan    Continue statin             VTE:  Contraindicated  Ulcer: H2 Antagonist  Lines: Central Line  Ongoing indication addressed    I have performed a physical exam and reviewed and updated ROS and Plan today (10/7/2018). In review of yesterday's note (10/6/2018), there are no changes except as documented above.     I have assessed and reassessed his respiratory status with ventilator adjustments, airway mechanics, ventilator waveforms, blood pressure, hemodynamics, cardiovascular status with titration of phenylephrine, blood glucose with titration of an insulin drip and neurologic status with titration of propofol.  He is at increased risk for worsening respiratory and cardiovascular system dysfunction.    Discussed patient condition and risk of morbidity and/or mortality with Hospitalist, Family, RN, RT, Pharmacy, Charge nurse / hot rounds and QA team  The patient remains critically ill.  Critical care time = 90 minutes in directly providing and coordinating critical care and extensive data review.  No time  overlap and excludes procedures.    High risk of deterioration and worsening vital organ dysfunction and death without the above critical care interventions.    Brandon Paulino MD  Pulmonary and Critical Care Medicine

## 2018-10-07 NOTE — PROGRESS NOTES
Pt. 1600 assesment revealed coarse crackles in upper lobes, more pronounced in Left upper lobe.  Respiratory called for and suctioned out sharath red blood from endotracheal tube.  Physician called and exploratory bronch conducted.     Pt. Lung sounds still coarse and consolidated on upper lobes.  Secretions thick and bloody.

## 2018-10-07 NOTE — WOUND TEAM
"[facility]  Inpatient Services  Initial Wound and Skin Care Evaluation    Admission Date: 9/29/2018     Consult Date: 10/05/2018 @ 0709   HPI, PMH, SH: Reviewed    Unit where seen by Wound Team: [id number]     WOUND CONSULT RELATED TO:  Bilateral ears and back of head     SUBJECTIVE:  Pt intubated and restrained. Pts wife \"He needs to get better I can't go home without him\"      Self Report / Pain Level:  No signs or symptoms of pain or discomfort.       OBJECTIVE:  Pt lying in bed intubated and restrained. Wife at bedside.    WOUND TYPE, LOCATION, CHARACTERISTICS (Pressure Injuries: location, stage, POA or date identified)    Location and type of wound: Right Ear Pinna Anterior and posterior outer area non-blanching tissue of unknown etiology     Site Assessment Drainage;Excoriated;Light purple;Pink;Red; 2 small scabbed areas   Marisol-wound Assessment Dry;Clean;Intact;Pink   Margins Undefined edges   MEASUREMENTS OBTAINED 10/07/2018   Wound Length (cm) 2.1 cm   Wound Width (cm) 1.5 cm   Wound Depth (cm) 0 cm   Wound Surface Area (cm^2) 3.15 cm^2   Drainage Amount Scant   Treatments Cleansed;Site care, dressing applied   Cleansing Normal Saline Irrigation   Dressing Options Mepilex   Dressing Cleansing/Solutions Not Applicable   Dressing Changed New   Dressing Status Clean;Dry;Intact   Dressing Change Frequency As Needed   Odor None   Exposed Structures None       Vascular:    Dorsal Pedal pulses:  NA  Posterior tib pulses:   NA    OK:      NA    Lab Values:    WBC:       WBC   Date/Time Value Ref Range Status   10/07/2018 04:10 AM 8.5 4.8 - 10.8 K/uL Final     AIC:      Lab Results   Component Value Date/Time    HBA1C 6.5 (H) 07/27/2018 09:04 AM         Culture:   NA    INTERVENTIONS BY WOUND TEAM:  Wound RN in to assess patient. Pt wife at bedside agreeable to assessment. Left ear intact. Pt has small scabbed area to the back of his head no pressure injuries identified. Pts right ear however has a " "nonblanching area to the outer anterior and posterior pinna. The area was cleansed with NS and gauze. Measurement and picture were obtained. Unable to determine etiology as patients wife stated it was not present on admission, however she reports pt does have \"rough skin\" to that area. The wound is not overlying a bony prominence and no medical device was overlying the area. A piece of mepilex lite was placed over the wound to prevent further skin breakdown.    Dressing selection:  Mepilex lite         Interdisciplinary consultation: Patient, Bedside RN (Nayely), Patients Wife    EVALUATION: Pt is a critically ill older gentleman who is currently intubated.  Pt was a fairly active gentleman prior to admit and pts wife reports that he swam 4x a week and worked out 6x a week and that they live independently in [place]. Pt was admitted with Pneumonia requiring intubation. Per pts wife pt always has \"rough skin\" to the pinna of his ear and they treat it with moisturizer or polysporin if any part of it opens up. Pt now with a large wound to the area that is both on the anterior and posterior surface with majority of the wound anterior. There are two small open areas with a scab overlying the wounds. No medical device is overlying the area. Anchorfast in use as well as a right triple lumen IJ. Etiology of wound in unknown and sDTI can not be excluded. Mepilex lite placed over the area to prevent further breakdown. Wound team will continue to monitor wound evolution.    Factors affecting wound healing: Age, Critically ill, A. Fib, Hyperglycemia  Goals: Steady decrease in wound area and depth weekly.    NURSING PLAN OF CARE ORDERS (X):    Dressing changes: See Dressing Maintenance orders: X  Skin care: See Skin Care orders: X  Rectal tube care: See Rectal Tube Care orders:   Other orders:    RSKIN: CURRENT (X) ORDERED (O):   Q shift Gilbert:  X  Q shift pressure point assessments:  X  Pressure redistribution mattress X         "   ALEX          Bariatric ALEX         Bariatric foam           Heel float boots O         Float Heels off Bed with Pillows               Barrier wipes         Barrier Cream         Barrier paste O        Sacral silicone dressing         Silicone O2 tubing         Anchorfast  X       Cannula fixation Device (Tender )          Gray Foam Ear protectors           Trach with Optifoam split foam                 Waffle cushion        Waffle Overlay O        Rectal tube or BMS         Antifungal tx      Interdry          Turn q 2 hours X       Up to chair        Ambulate      PT/OT        Dietician        Diabetes Education      PO     TF X - Impact Peptide    TPN     NPO   # days   Other        WOUND TEAM PLAN OF CARE (X):   NPWT change 3 x week:        Dressing changes by wound team:       Follow up as needed: X, Wound team to re-evaluate progress of healing weekly     Other (explain):     Anticipated discharge plans (X):   SNF:           Home Care:           Outpatient Wound Center:            Self Care:            Other: X, TBD, pt lives at independently with wife in [place].

## 2018-10-07 NOTE — ASSESSMENT & PLAN NOTE
Clinically improved  Remains on Reglan  We will advance tube feeding and continue close monitoring

## 2018-10-07 NOTE — PROGRESS NOTES
Called to room 11 PM.  Patient had 7 beats of V. tach.  He also has abdominal distention.  Ordered KUB.  Ordered chemistry, magnesium.  Assessed patient hemodynamically and physical exam.  He is on a ventilator for hypoxic respiratory failure.  Reviewed chart extensively.  Started metoprolol 12-1/2 mg twice daily.  KUB showed significant ileus unable to rule out obstruction.  Reviewed per myself.  Magnesium was 2.6.  Potassium 3.7.  Ordered an NG tube, difficulty placing per nursing staff.  I attempted placement, felt like it went into the appropriate place, unable to aspirate any fluid.  Then placed per visualization with glide scope.  Visualized going into the esophagus.  Still unable to aspirate fluid.  KUB ordered.  On my review enteric tube appears to be in the stomach pending radiology read, will set to intermittent suction.  CT abdomen pelvis ordered because limited air aspirated per NG tube.  This is to review for obstruction.    Additional critical care time:   Patient is critically ill.  If untreated there is a high chance of deterioration and eventually death. The critical that has been undertaken is medically complex. There has been no overlap in critical care time. Critical Care Time not including procedures: 30 minutes

## 2018-10-07 NOTE — CARE PLAN
Problem: Ventilation Defect:  Goal: Ability to achieve and maintain unassisted ventilation or tolerate decreased levels of ventilator support  Outcome: PROGRESSING AS EXPECTED    Intervention: Support and monitor invasive and noninvasive mechanical ventilation  Adult Ventilation Update    Total Vent Days: 7    Patient Lines/Drains/Airways Status    Active Airway     Name: Placement date: Placement time: Site: Days:    Airway ETT Oral 8.0 09/29/18   1714   Oral   7              Plateau Pressure (Q Shift): 18 (10/06/18 1857)  Static Compliance (ml / cm H2O): 40 (10/07/18 0237)    Pt remains on vent, Fio2 titrated back down to 65%, no other changes made overnight.

## 2018-10-07 NOTE — PROGRESS NOTES
's from Sr 60's. Dr. Stewart updated. STAT EKG ordered. Awaiting Dr. Stewart to come to bedside. Vitals stable see flowsheet.

## 2018-10-07 NOTE — PROGRESS NOTES
Enema successful.  Pt had large soft stool.      Per protocol for FSBS at 0300, rate to change from 4 to 3 units/hr.  In addition, tube feeds were stopped due to possible ileus, which per protocol requires insulin rate to be decreased by 50%.  Overall rate changed from 4 mL/hr to 1.5 mL/hr.  MD aware.  Insulin infusion then paused per protocol at 0417.  Per protocol insulin to be restarted at 75% of previous rate I.e. 1.125. Dose not covered on rounding sheet, so insulin infusion restarted at exact rate of 1.12 units/hr.  MD aware.      Multiple nurses attempted to place NG or OG and were unsuccessful.  Dr. Stewart notified and attempted to place NG.  Eventually NG was placed by Dr. Stewart utilizing the glidescope.  Per Dr. Stewart, verify placement of NG via xray and then go to CT.  Xray taken at 0543.  Results posted right before shift change.  Charge RN and pt's primary RN, Nayely notified of need for pt to have CT.     Pt converted from Afib to Aflutter 120s-130s.  Per Dr. Stewart, give the5 mg of metoprolol that was previously held.  5 mg of metoprolol given per MAR.  No change in pt's rate.  Discussed pt's rate and pressor support (20 mcg/min neosynephrine) with Dr. Paulino and updated him on pt status.  No new orders received.  Per Dr. Paulino continue to monitor pt and notify him if pt requires additional pressor support.  Updated PATIENCE Adler on plan of care.

## 2018-10-07 NOTE — CARE PLAN
Problem: Venous Thromboembolism (VTW)/Deep Vein Thrombosis (DVT) Prevention:  Goal: Patient will participate in Venous Thrombosis (VTE)/Deep Vein Thrombosis (DVT)Prevention Measures  lovenox DC per MD order, related to bleeding in lungs.     Problem: Bowel/Gastric:  Goal: Normal bowel function is maintained or improved  Outcome: PROGRESSING SLOWER THAN EXPECTED  PRN medications given for BM, no bm as of 10/03

## 2018-10-07 NOTE — PROGRESS NOTES
Received bedside report from PATIENCE Arora and assumed pt care at approx. 0025.  Lines and infusions assessed and verified.  Insulin infusing at 3 mL/hr.  Next FSBS check at 0100.  Assessment completed.  Copious bloody secretions when suctioned.  MD aware.  Abdomen is distended and rigid.  Abdominal CT indicated pt most likely has an ileus but an obstruction cannot be excluded.  Stopped tube feed.  Enema given per MAR.  Prior to enema pt had a small amount of liquid stool.  Per report pt in ST 120s.  Rhythm reviewed.  Pt in Afib/Aflutter 110s-120s.     Paged Dr. Stewart and updated him on pt's KUB results and rhythm change.  Received orders for NG to suction, to stop feed, and one dose of 5 mg of metoprolol IV.

## 2018-10-07 NOTE — PROGRESS NOTES
[facility] Hospitalist Progress Note    Date of Service: 10/7/2018    Chief Complaint  78 y.o. male admitted 2018 with cough and shortness of breath.  Patient was admitted to the hospital and shortly thereafter had significant increase in shortness of breath and required intubation.    Interval Problem Update      AFib /flutter 100-1130   Chris 50  Afebrile  BM after enema  OG with minimal output  Does not follow, withdraws all 4  Prop 20  VD#9 8/50%  KUB ileus  Had bronchoscopy for hemoptysis yesterday and  lovenox stopped                                   Consultants/Specialty  Pulmonary  Cardiology    Disposition  To Remain in ICU        Review of Systems   Unable to perform ROS: Intubated      Physical Exam  Laboratory/Imaging   Hemodynamics  Temp (24hrs), Av.4 °C (99.4 °F), Min:36.8 °C (98.2 °F), Max:38.2 °C (100.8 °F)   Temperature: 36.8 °C (98.2 °F), Monitored Temp: 36.9 °C (98.42 °F)  Pulse  Av.9  Min: 60  Max: 142 Heart Rate (Monitored): (!) 126  NIBP: 104/68 CVP (mm Hg): (!) 218 MM HG    Respiratory  Smith Vent Mode: APVCMV, Rate (breaths/min): 16, PEEP/CPAP: 8, PEEP/CPAP: 8, FiO2: 60, P Peak (PIP): 19, P MEAN: 12   Respiration: 19, Pulse Oximetry: 97 %     Work Of Breathing / Effort: Vented  RUL Breath Sounds: Clear, RML Breath Sounds: Diminished, RLL Breath Sounds: Diminished, BRITTANY Breath Sounds: Coarse Crackles, LLL Breath Sounds: Diminished    Fluids    Intake/Output Summary (Last 24 hours) at 10/07/18 0905  Last data filed at 10/07/18 0600   Gross per 24 hour   Intake          2563.07 ml   Output             2755 ml   Net          -191.93 ml       Nutrition  Orders Placed This Encounter   Procedures   • Diet NPO     Standing Status:   Standing     Number of Occurrences:   1     Order Specific Question:   Type:     Answer:   Now [1]     Order Specific Question:   Restrict to:     Answer:   Strict [1]     Physical Exam   Constitutional: He appears well-developed and well-nourished.   HENT:    Head: Normocephalic and atraumatic.   Right Ear: External ear normal.   Left Ear: External ear normal.   Nose: Nose normal.   Mouth/Throat: No oropharyngeal exudate.   OG in place   Eyes: Right eye exhibits no discharge. Left eye exhibits no discharge.   Neck: Neck supple. No JVD present. No tracheal deviation present.   Cardiovascular: Normal heart sounds.  An irregularly irregular rhythm present. Tachycardia present.    Pulmonary/Chest: Effort normal. No stridor. No respiratory distress. He has no wheezes. He has rhonchi in the right upper field and the left upper field. He has rales. He exhibits no tenderness.   intubated   Abdominal: Soft. He exhibits distension. There is no tenderness. There is no rebound and no guarding.   Musculoskeletal: He exhibits edema. He exhibits no tenderness.   Neurological: No cranial nerve deficit. He exhibits normal muscle tone.   Sedated no focal deficits noted   Skin: Skin is warm and dry. He is not diaphoretic. No cyanosis or erythema. Nails show no clubbing.   Psychiatric:   Sedated   Nursing note and vitals reviewed.      Recent Labs      10/05/18   0520  10/06/18   0458  10/07/18   0410   WBC  9.2  11.8*  8.5   RBC  3.85*  3.70*  3.31*   HEMOGLOBIN  12.1*  11.2*  10.3*   HEMATOCRIT  36.2*  35.5*  32.1*   MCV  94.0  95.9  97.0   MCH  31.4  30.3  31.1   MCHC  33.4*  31.5*  32.1*   RDW  47.9  48.5  50.2*   PLATELETCT  142*  199  132*   MPV  10.5  10.5  10.3     Recent Labs      10/06/18   0458  10/06/18   2315  10/07/18   0410   SODIUM  145  147*  148*   POTASSIUM  4.5  3.7  4.1   CHLORIDE  111  113*  114*   CO2  26  25  27   GLUCOSE  185*  240*  136*   BUN  79*  79*  76*   CREATININE  1.06  0.94  0.91   CALCIUM  8.9  9.0  8.9             Recent Labs      10/06/18   2315   TRIGLYCERIDE  98          Assessment/Plan     * Acute respiratory failure with hypoxia (HCC)- (present on admission)   Assessment & Plan    CTA on 9-29 neg  for PE  Echo with normal EF and pulmonary  hypertension    Vent management per critical care  Discussed with Dr. Paulino  May need repeat bronchoscopy with biopsy if hemoptysis persists  Will likely need tracheostomy            Acute pulmonary edema (HCC)   Assessment & Plan    Continue IV Lasix  Monitor intake and output        CAP (community acquired pneumonia)- (present on admission)   Assessment & Plan    Completed course of antibiotics         Hemoptysis   Assessment & Plan    Lovenox discontinued  Monitor clinically  Possible repeat bronchoscopy with biopsy if persistent        Fever   Assessment & Plan    Improved with stopping Precedex continue to monitor        Paroxysmal A-fib (HCC)   Assessment & Plan    Continue amiodarone  Anticoagulation held given hemoptysis  Resume metoprolol when  off pressors        Pulmonary hypertension (HCC)   Assessment & Plan    9/29/18 echocardiogram with RVSP of 65 mmHg  CT was negative for pulmonary embolism    Continue IV Lasix              Elevated troponin- (present on admission)   Assessment & Plan    Likely demand ischemia        Hypernatremia   Assessment & Plan    Start free water flushes and monitor        Ileus (HCC)   Assessment & Plan    Abdominal exam is benign with no significant output from OG  Will start reglan  Start trickle TF  Close clinical monitoring  Consider further workup with CT if his symptoms worsen        Elevated liver enzymes   Assessment & Plan    Likely shock liver     LFTs trended down        Hyperglycemia- (present on admission)   Assessment & Plan    HbA1c 6.5 on 7/18    Continue insulin drip and monitor blood sugars        Dyslipidemia- (present on admission)   Assessment & Plan    Continue atorvastatin            Quality-Core Measures   Reviewed items::  Labs reviewed, Medications reviewed and Radiology images reviewed  Garces catheter::  Critically Ill - Requiring Accurate Measurement of Urinary Output, Unconscious / Sedated Patient on a Ventilator and Strict Intake and  Output During Sepisis or Shock  Central line in place:  Sepsis, Shock and Vasopressors  DVT prophylaxis pharmacological::  Contraindicated - High bleeding risk  Ulcer Prophylaxis::  Yes  Antibiotics:  Treating active infection/contamination beyond 24 hours perioperative coverage

## 2018-10-07 NOTE — CARE PLAN
Problem: Bowel/Gastric:  Goal: Normal bowel function is maintained or improved  Possible ileus.  Tube feed stopped.  NG inserted and connected to low continuous suction.  Fleet enema given per MAR.      Problem: Risk for injury related to physical restraint use  Goal: Safe and appropriate use of physical restraints. Restraints discontinued at the earliest possibility while ensuring patient safety.  Assessment of need for restraints completed, order verified, use of restraints explained to pt, no evidence of learning, turns, mouth care, skin assessment every 2 hours.        Problem: Mechanical Ventilation  Goal: Safe management of artificial airway and ventilation  Oral care, suctioning once or twice per encounter, copious bloody secretions, HOB at 30 degrees, ambu bag at bedside, ventilator plugged into red outlet.  Discussed purpose and necessity for ET tube. No evidence of learning.

## 2018-10-07 NOTE — PROGRESS NOTES
Dr. Stewart at bedside. Orders for STAT troponin, BMP, and Mg. STAT chest x-ray and abdomen ordered as well. Abdomen distended and firm in comparison to last night. Enema ordered as well.

## 2018-10-07 NOTE — PROCEDURES
Date of Procedure:  10/6/2018    Title of Procedure:  Diagnostic and therapeutic flexible fiberoptic bronchoscopy with bronchoalveolar lavage    Indication for Procedure:   Hemoptysis    Post-procedure Diagnoses:    1.  Normal endobronchial anatomy  2.  Friable and erythematous airways in the left upper lobe and lingula which are oozing bright red blood.  3.  Control of bleeding with topical epinephrine administration via the bronchoscope  4.  Moderate blood-tinged secretions noted in the left upper lobe, lingula and left lower lobe as well as the right lower lobe.  All secretions suctioned until clear.    Narrative:    The patient was sedated, intubated and ventilated at the time of this procedure.  The flexible fiberoptic bronchoscope was inserted through the lumen of the endotracheal tube and advanced into the distal trachea without difficulty.  The airways were examined to the subsegmental bronchus level bilaterally.    The endobronchial anatomy was normal.    The mucosa in the left upper lobe and lingula was friable and erythematous and was oozing bright red blood.  I administered topical epinephrine solution which stopped the oozing of bright red blood.  There was a moderate amount of blood-tinged secretions noted in the left upper lobe, lingula, left lower lobe as well as the right lower lobe.  All the secretions were suctioned until clear.    Bronchoalveolar lavage was carried out in the left upper lobe using the standard technique with good fluid return.    Bronchoalveolar lavage fluid from the left upper lobe is submitted to the laboratory for cytology, gram stain, culture and sensitivity, acid fast bacilli smear and culture and fungal culture.    The patient tolerated the procedure quite nicely.  No complications were apparent.  The heart rate and rhythm, blood pressure and oxygenation saturation were continuously monitored.      Brandon Paulino MD  Pulmonary and Critical Care Medicine

## 2018-10-08 PROBLEM — R74.8 ELEVATED LIVER ENZYMES: Status: RESOLVED | Noted: 2018-01-01 | Resolved: 2018-01-01

## 2018-10-08 NOTE — CONSULTS
"Reason for PC Consult: Advance Care Planning    Consulted by: Dr. Rochelle Robbins, Hosp    Assessment:  General: 77 y/o male admitted 9/29 for SOB and PNA/sepsis requiring intubation on admit. 9/29: EKG revealed RBBB, ECHO with normal EF (70%) and pulmonary HTN, troponin 0.8. Pt continues with afib/flutter with -130; pressors required intermittently. 10/8- R pleural effusion noted and concerns for ileus; TF held. PMHx of colon polyps. No history of diabetes but requiring insulin gtt. Diagnostic bronchoscopy 10/7.     Social: pt lives with his wife Oanh in [place]. Never smoker, no EtOH    Consults: cardiology, PMA    Dyspnea: Yes- requiring vent support  Last BM: 10/07/18 (Per report)-    Pain: No-    Depression: Unable to determine-    Dementia: Unable to Determine;       Spiritual:  Is Gnosticist or spirituality important for coping with this illness? No-    Has a  or spiritual provider visit been requested? No    Palliative Performance Scale: 40%    Advance Directive: None-    DPOA: No-NOK is wife Oanh    POLST: No-      Code Status: Full-      Outcome:  PC RN visited bedside; no family present at this time. Call placed to wife Oanh who states she's \"been under a lot of stress and having stomach issues off and on\" for several days. PC RN validated the difficulty of Herb's situation and how stressful it is for Oanh. PC RN explained the role of this RN and inquired about a plan to meet. She was hoping to get a little more rest having been up since 0300 and offered to meet this RN at 1400 today. PC RN agreed and encouraged her to call if she needed to change the time. She was appreciative of the support.    Updated: BS RN    Plan: family meeting today at 1400    Recommendations: I do not recommend an ethics or hospice consult at this time because POC not yet determined.    Thank you for allowing Palliative Care to participate in this patient's care. Please feel free to call [phone] with any questions or " concerns.

## 2018-10-08 NOTE — PROCEDURES
Date of Procedure:  10/7/2018    Title of Procedure:  Diagnostic and therapeutic flexible fiberoptic bronchoscopy with bronchoalveolar lavage, endobronchial biopsy and endobronchial brushings.    Indication for Procedure:   Hemoptysis    Post-procedure Diagnoses:    1.  Normal endobronchial anatomy  2.  Friable, nodular mucosa in the left mainstem bronchus  3.  Moderate blood-tinged secretions seen in the left upper lobe, lingula and left lower lobe.  All secretions suctioned until clear.    Narrative:    The patient was sedated, intubated and ventilated at the time of this procedure.  The flexible fiberoptic bronchoscope was inserted through the lumen of the endotracheal tube and advanced into the distal trachea without difficulty.  The airways were examined to the subsegmental bronchus level bilaterally.    The endobronchial anatomy was normal.    There was friable nodular mucosa in the anterior proximal left mainstem bronchus.  There was an additional area of friable, nodular mucosa in the distal left mainstem bronchus just proximal to the left upper lobe and lingula.  There was a moderate amount of blood-tinged secretions seen in the left upper lobe, lingula and left lower lobe.  All the secretions were suctioned until clear.  I took multiple forceps biopsies of the mucosa in the distal left mainstem bronchus.  Following this I performed brushings using a cytology brush.    Bronchoalveolar lavage was carried out in the left upper lobe using the standard technique with good fluid return.    Bronchoalveolar lavage fluid from the left upper lobe is submitted to the laboratory for cytology, gram stain, culture and sensitivity, acid fast bacilli smear and culture and fungal culture.    The brushings were submitted to the laboratory for cytology.  The biopsies were submitted to the laboratory for histology.  All bleeding stopped under direct visualization prior to termination of the procedure.    The patient tolerated  the procedure quite nicely.  No complications were apparent.  The heart rate and rhythm, blood pressure and oxygenation saturation were continuously monitored.      Brandon Paulino MD  Pulmonary and Critical Care Medicine

## 2018-10-08 NOTE — PROGRESS NOTES
Critical Care Progress Note    Date of admission  9/29/2018    Chief Complaint  78 y.o. male admitted 9/29/2018 with cough and shortness of breath.    Hospital Course  This gentleman was admitted with severe sepsis, respiratory failure and pneumonia.    Interval Problem Update  Reviewed last 24 hour events:    Dx FOB with Bx yesterday - no mass - friable   Follows some per RN but not for me  Propofol infusion 5  AFlutter -> SR with BB  Hemodynamics noted  Chris-Synephrine infusion off this AM  Afebrile  TF 10cc/hr, fixed rate  Insulin drip 2/hr, ongoing titration based on serial blood sugar analysis, reviewed times several today with RN  UO adequate  Vent day#10  CXR right-sided pleural effusion min change, perhaps mildly improved pulmonary edema/airspace opacities bilaterally  RSBI 86+ - not following, no other weaning parameters obtained  No VTE - hemoptysis    Plan of care reviewed with wife and then discussed the case with me  Will review with wife when time allows for the 2 of us to discuss the case  Wife adamant her  does not want tracheostomy and probably secondarily LTACH  Need to have further discussions Re: CODE STATUS, palliative care recommended tomorrow  Remains on full support, inappropriate for liberation      YESTERDAY   -enema - improved belly   -OG with minimal output   -Chris 50   -AF-flutter   -opens eyes - does not follow   -prop 20   -afebrile   -Lasix   -vent 9   -PEEP 8   -50%   -cont Lasix   -Reglan   -start trickle TF      Review of Systems  Review of Systems   Unable to perform ROS: Intubated        Vital Signs for last 24 hours   Temp:  [37.2 °C (99 °F)-37.4 °C (99.3 °F)] 37.4 °C (99.3 °F)  Pulse:  [] 80  Resp:  [17-28] 24    Hemodynamic parameters for last 24 hours  CVP:  [8 MM HG-13 MM HG] 9 MM HG    Vent Settings for last 24 hours  Smtih Vent Mode: Spont  Rate (breaths/min):  [16-24] 16  PEEP/CPAP:  [8] 8  FiO2:  [30-40] 30  P Peak (PIP):  [16-20] 16  P MEAN:  [11-12]  11    Ventilator mechanics and waveforms are reviewed at the bedside with RT    Physical Exam   Physical Exam   Constitutional: He appears lethargic.  Non-toxic appearance. He has a sickly appearance. No distress.   Sedated on ventilator, looks acute and chronically ill as well as uncomfortable   HENT:   Head: Normocephalic.   Right Ear: External ear normal.   Left Ear: External ear normal.   Mouth/Throat: Oropharynx is clear and moist. No oropharyngeal exudate.   Eyes: Pupils are equal, round, and reactive to light. Conjunctivae are normal. Right eye exhibits no discharge. Left eye exhibits no discharge. No scleral icterus.   Neck: Neck supple. No JVD present. No tracheal deviation present.   Cardiovascular: Normal rate, regular rhythm and intact distal pulses.   No extrasystoles are present. Exam reveals no gallop and no friction rub.    Murmur (2/6 flow murmur?  Gordon best at apex (nothing noted on echocardiogram)) heard.  Atrial fibrillation converted to sinus rhythm with bundle branch block   Pulmonary/Chest: No accessory muscle usage (On full vent support). No respiratory distress. He has no wheezes. He has no rhonchi. He has rales (Scattered crackles bilaterally) in the right lower field and the left lower field.   Abdominal: Soft. Bowel sounds are normal. He exhibits no distension. There is no tenderness. There is no rigidity, no rebound, no guarding and no CVA tenderness.   Tolerating enteral tube feedings   Musculoskeletal: He exhibits no tenderness or deformity.   No clubbing or cyanosis   Neurological: He appears lethargic. No cranial nerve deficit. GCS eye subscore is 4. GCS verbal subscore is 1. GCS motor subscore is 5.   Sedated.  Arouses but does not follow for me.  Moves all 4 extremities spontaneously and has spontaneous eye opening.   Skin: Skin is warm and dry. He is not diaphoretic. No cyanosis or erythema. Nails show no clubbing.   Psychiatric: He is noncommunicative.       Medications  Current  Facility-Administered Medications   Medication Dose Route Frequency Provider Last Rate Last Dose   • metoclopramide (REGLAN) injection 10 mg  10 mg Intravenous Q6HRS Dre Robbins M.D.   10 mg at 10/08/18 0555   • propofol (DIPRIVAN) injection  0-80 mcg/kg/min Intravenous Continuous Brandon Paulino M.D.   Stopped at 10/08/18 0710   • furosemide (LASIX) injection 40 mg  40 mg Intravenous Q DAY Dre Robbins M.D.   40 mg at 10/08/18 0555   • metoprolol (LOPRESSOR) tablet 12.5 mg  12.5 mg Oral TWICE DAILY Dre Robbins M.D.   Stopped at 10/07/18 0600   • potassium bicarbonate (KLYTE) effervescent tablet 25 mEq  25 mEq Feeding Tube BID Brandon Paulino M.D.   25 mEq at 10/08/18 0556   • phenylephrine (MARYSOL-SYNEPHRINE) 40,000 mcg in  mL Infusion  0-300 mcg/min Intravenous Continuous Brandon Paulino M.D.   Stopped at 10/08/18 0700   • amiodarone (CORDARONE) tablet 400 mg  400 mg Per NG Tube TWICE DAILY Brandon Paulino M.D.   400 mg at 10/08/18 0555   • ondansetron (ZOFRAN ODT) dispertab 4 mg  4 mg Oral Q4HRS PRN Brandon Paulino M.D.       • famotidine (PEPCID) tablet 20 mg  20 mg Feeding Tube Q12HRS Brandon Paulino M.D.   20 mg at 10/08/18 0556   • insulin regular human (HUMULIN/NOVOLIN R) 62.5 Units in  mL infusion per protocol  0-29 Units/hr Intravenous Continuous Brandon Paulino M.D. 6 mL/hr at 10/08/18 0740 1.5 Units/hr at 10/08/18 0740   • dextrose 50% (D50W) injection 25-50 mL  12.5-25 g Intravenous PRN Brandon Paulino M.D.       • Respiratory Care per Protocol   Nebulization Continuous RT Walter Carrion M.D.       • ondansetron (ZOFRAN) syringe/vial injection 4 mg  4 mg Intravenous Q4HRS PRN Walter Carrion M.D.       • MD Alert...ICU Electrolyte Replacement per Pharmacy   Other pharmacy to dose Jeremy M Gonda, M.D.       • fentaNYL (SUBLIMAZE) injection 25 mcg  25 mcg Intravenous Q HOUR PRN Jeremy M Gonda, M.D.   25 mcg at  10/05/18 2059    Or   • fentaNYL (SUBLIMAZE) injection 50 mcg  50 mcg Intravenous Q HOUR PRN Jeremy M Gonda, M.D.   50 mcg at 10/06/18 0856    Or   • fentaNYL (SUBLIMAZE) injection 100 mcg  100 mcg Intravenous Q HOUR PRN Jeremy M Gonda, M.D.   100 mcg at 10/05/18 1207   • ipratropium-albuterol (DUONEB) nebulizer solution  3 mL Nebulization Q2HRS PRN (RT) Jeremy M Gonda, M.D.       • Pharmacy Consult: Enteral tube feeding - review meds/change route/product selection   Other PRN Jeremy M Gonda, M.D.       • senna-docusate (PERICOLACE or SENOKOT S) 8.6-50 MG per tablet 2 Tab  2 Tab Feeding Tube BID Jeremy M Gonda, M.D.   2 Tab at 10/08/18 0556    And   • polyethylene glycol/lytes (MIRALAX) PACKET 1 Packet  1 Packet Feeding Tube QDAY PRN Jeremy M Gonda, M.D.   1 Packet at 10/06/18 1222    And   • magnesium hydroxide (MILK OF MAGNESIA) suspension 30 mL  30 mL Feeding Tube QDAY PRN Jeremy M Gonda, M.D.   30 mL at 10/02/18 1322    And   • bisacodyl (DULCOLAX) suppository 10 mg  10 mg Rectal QDAY PRN Jeremy M Gonda, M.D.   10 mg at 10/06/18 1222   • acetaminophen (TYLENOL) tablet 650 mg  650 mg Feeding Tube Q6HRS PRN Jeremy M Gonda, M.D.   650 mg at 10/06/18 1153   • atorvastatin (LIPITOR) tablet 40 mg  40 mg Per NG Tube Q EVENING Jeremy M Gonda, M.D.   40 mg at 10/07/18 1725   • aspirin (ASA) chewable tab 81 mg  81 mg Per NG Tube DAILY Jeremy M Gonda, M.D.   81 mg at 10/08/18 0556       Fluids    Intake/Output Summary (Last 24 hours) at 10/08/18 0800  Last data filed at 10/08/18 0600   Gross per 24 hour   Intake           1550.9 ml   Output             1780 ml   Net           -229.1 ml       Laboratory  Recent Results (from the past 48 hour(s))   ACCU-CHEK GLUCOSE    Collection Time: 10/06/18  9:01 AM   Result Value Ref Range    Glucose - Accu-Ck 130 (H) 65 - 99 mg/dL   ACCU-CHEK GLUCOSE    Collection Time: 10/06/18  9:56 AM   Result Value Ref Range    Glucose - Accu-Ck 140 (H) 65 - 99 mg/dL   ACCU-CHEK GLUCOSE     Collection Time: 10/06/18 10:55 AM   Result Value Ref Range    Glucose - Accu-Ck 144 (H) 65 - 99 mg/dL   ACCU-CHEK GLUCOSE    Collection Time: 10/06/18 11:56 AM   Result Value Ref Range    Glucose - Accu-Ck 144 (H) 65 - 99 mg/dL   ACCU-CHEK GLUCOSE    Collection Time: 10/06/18  1:07 PM   Result Value Ref Range    Glucose - Accu-Ck 169 (H) 65 - 99 mg/dL   ACCU-CHEK GLUCOSE    Collection Time: 10/06/18  1:55 PM   Result Value Ref Range    Glucose - Accu-Ck 180 (H) 65 - 99 mg/dL   ACCU-CHEK GLUCOSE    Collection Time: 10/06/18  3:13 PM   Result Value Ref Range    Glucose - Accu-Ck 168 (H) 65 - 99 mg/dL   QUANT BRONCHIAL WASHING    Collection Time: 10/06/18  4:30 PM   Result Value Ref Range    Significant Indicator NEG     Source RESP     Site Quantitative BAL BRITTANY     Quantitative Bronch Washing No growth at 24 hours.     Gram Stain Result Rare WBCs.  No organisms seen.      FUNGAL CULTURE    Collection Time: 10/06/18  4:30 PM   Result Value Ref Range    Significant Indicator NEG     Source RESP     Site Quantitative BAL BRITTANY     Fungal Culture Culture in progress.    AFB CULTURE    Collection Time: 10/06/18  4:30 PM   Result Value Ref Range    Significant Indicator NEG     Source RESP     Site Quantitative BAL BRITTANY     AFB Culture Culture in progress.     AFB Smear Results No acid fast bacilli seen.    GRAM STAIN    Collection Time: 10/06/18  4:30 PM   Result Value Ref Range    Significant Indicator .     Source RESP     Site Quantitative BAL BRITTANY     Gram Stain Result Rare WBCs.  No organisms seen.      ACID FAST STAIN    Collection Time: 10/06/18  4:30 PM   Result Value Ref Range    Significant Indicator NEG     Source RESP     Site Quantitative BAL BRITTANY     AFB Smear Results No acid fast bacilli seen.    ACCU-CHEK GLUCOSE    Collection Time: 10/06/18  4:55 PM   Result Value Ref Range    Glucose - Accu-Ck 146 (H) 65 - 99 mg/dL   ACCU-CHEK GLUCOSE    Collection Time: 10/06/18  5:49 PM   Result Value Ref Range    Glucose -  Accu-Ck 139 (H) 65 - 99 mg/dL   ACCU-CHEK GLUCOSE    Collection Time: 10/06/18  6:51 PM   Result Value Ref Range    Glucose - Accu-Ck 139 (H) 65 - 99 mg/dL   ACCU-CHEK GLUCOSE    Collection Time: 10/06/18  8:08 PM   Result Value Ref Range    Glucose - Accu-Ck 148 (H) 65 - 99 mg/dL   ACCU-CHEK GLUCOSE    Collection Time: 10/06/18  9:08 PM   Result Value Ref Range    Glucose - Accu-Ck 173 (H) 65 - 99 mg/dL   ACCU-CHEK GLUCOSE    Collection Time: 10/06/18 10:10 PM   Result Value Ref Range    Glucose - Accu-Ck 180 (H) 65 - 99 mg/dL   EKG    Collection Time: 10/06/18 10:51 PM   Result Value Ref Range    Report       [facility]    Test Date:  2018-10-06  Pt Name:    TIMOTHY MOORE                 Department: 161  MRN:        [id number]                      Room:       [facility]  Gender:     Male                         Technician: JAZMINE  :        [id number]                   Requested By:BRIANA MENDIETA  Order #:    [id number]                    Reading MD: Humphrey Kasper MD    Measurements  Intervals                                Axis  Rate:       130                          P:          0  IL:         118                          QRS:        56  QRSD:       136                          T:          -6  QT:         336  QTc:        494    Interpretive Statements  SINUS TACHYCARDIA  RIGHT BUNDLE BRANCH BLOCK  BASELINE WANDER IN LEAD(S) II,III,aVF  Compared to ECG 2018 19:13:10  Atrial fibrillation no longer present    Electronically Signed On 10-7-2018 22:26:27 PDT by Humphrey Kasper MD     ACCU-CHEK GLUCOSE    Collection Time: 10/06/18 11:04 PM   Result Value Ref Range    Glucose - Accu-Ck 166 (H) 65 - 99 mg/dL   BASIC METABOLIC PANEL    Collection Time: 10/06/18 11:15 PM   Result Value Ref Range    Sodium 147 (H) 135 - 145 mmol/L    Potassium 3.7 3.6 - 5.5 mmol/L    Chloride 113 (H) 96 - 112 mmol/L    Co2 25 20 - 33 mmol/L    Glucose 240 (H) 65 - 99 mg/dL    Bun 79 (HH) 8 - 22 mg/dL    Creatinine 0.94 0.50 - 1.40 mg/dL     Calcium 9.0 8.5 - 10.5 mg/dL    Anion Gap 9.0 0.0 - 11.9   TROPONIN    Collection Time: 10/06/18 11:15 PM   Result Value Ref Range    Troponin I 0.43 (H) 0.00 - 0.04 ng/mL   MAGNESIUM    Collection Time: 10/06/18 11:15 PM   Result Value Ref Range    Magnesium 2.6 (H) 1.5 - 2.5 mg/dL   TRIGLYCERIDE    Collection Time: 10/06/18 11:15 PM   Result Value Ref Range    Triglycerides 98 0 - 149 mg/dL   ESTIMATED GFR    Collection Time: 10/06/18 11:15 PM   Result Value Ref Range    GFR If African American >60 >60 mL/min/1.73 m 2    GFR If Non African American >60 >60 mL/min/1.73 m 2   ACCU-CHEK GLUCOSE    Collection Time: 10/07/18 12:01 AM   Result Value Ref Range    Glucose - Accu-Ck 182 (H) 65 - 99 mg/dL   ACCU-CHEK GLUCOSE    Collection Time: 10/07/18  1:08 AM   Result Value Ref Range    Glucose - Accu-Ck 180 (H) 65 - 99 mg/dL   ACCU-CHEK GLUCOSE    Collection Time: 10/07/18  1:57 AM   Result Value Ref Range    Glucose - Accu-Ck 190 (H) 65 - 99 mg/dL   ACCU-CHEK GLUCOSE    Collection Time: 10/07/18  3:09 AM   Result Value Ref Range    Glucose - Accu-Ck 155 (H) 65 - 99 mg/dL   CBC with Differential    Collection Time: 10/07/18  4:10 AM   Result Value Ref Range    WBC 8.5 4.8 - 10.8 K/uL    RBC 3.31 (L) 4.70 - 6.10 M/uL    Hemoglobin 10.3 (L) 14.0 - 18.0 g/dL    Hematocrit 32.1 (L) 42.0 - 52.0 %    MCV 97.0 81.4 - 97.8 fL    MCH 31.1 27.0 - 33.0 pg    MCHC 32.1 (L) 33.7 - 35.3 g/dL    RDW 50.2 (H) 35.9 - 50.0 fL    Platelet Count 132 (L) 164 - 446 K/uL    MPV 10.3 9.0 - 12.9 fL    Neutrophils-Polys 73.90 (H) 44.00 - 72.00 %    Lymphocytes 12.60 (L) 22.00 - 41.00 %    Monocytes 10.60 0.00 - 13.40 %    Eosinophils 1.50 0.00 - 6.90 %    Basophils 0.20 0.00 - 1.80 %    Immature Granulocytes 1.20 (H) 0.00 - 0.90 %    Nucleated RBC 0.00 /100 WBC    Neutrophils (Absolute) 6.27 1.82 - 7.42 K/uL    Lymphs (Absolute) 1.07 1.00 - 4.80 K/uL    Monos (Absolute) 0.90 (H) 0.00 - 0.85 K/uL    Eos (Absolute) 0.13 0.00 - 0.51 K/uL    Baso  (Absolute) 0.02 0.00 - 0.12 K/uL    Immature Granulocytes (abs) 0.10 0.00 - 0.11 K/uL    NRBC (Absolute) 0.00 K/uL   MAGNESIUM    Collection Time: 10/07/18  4:10 AM   Result Value Ref Range    Magnesium 2.7 (H) 1.5 - 2.5 mg/dL   PHOSPHORUS    Collection Time: 10/07/18  4:10 AM   Result Value Ref Range    Phosphorus 4.3 2.5 - 4.5 mg/dL   COMP METABOLIC PANEL    Collection Time: 10/07/18  4:10 AM   Result Value Ref Range    Sodium 148 (H) 135 - 145 mmol/L    Potassium 4.1 3.6 - 5.5 mmol/L    Chloride 114 (H) 96 - 112 mmol/L    Co2 27 20 - 33 mmol/L    Anion Gap 7.0 0.0 - 11.9    Glucose 136 (H) 65 - 99 mg/dL    Bun 76 (HH) 8 - 22 mg/dL    Creatinine 0.91 0.50 - 1.40 mg/dL    Calcium 8.9 8.5 - 10.5 mg/dL    AST(SGOT) 23 12 - 45 U/L    ALT(SGPT) 32 2 - 50 U/L    Alkaline Phosphatase 68 30 - 99 U/L    Total Bilirubin 0.7 0.1 - 1.5 mg/dL    Albumin 3.6 3.2 - 4.9 g/dL    Total Protein 4.9 (L) 6.0 - 8.2 g/dL    Globulin 1.3 (L) 1.9 - 3.5 g/dL    A-G Ratio 2.8 g/dL   ESTIMATED GFR    Collection Time: 10/07/18  4:10 AM   Result Value Ref Range    GFR If African American >60 >60 mL/min/1.73 m 2    GFR If Non African American >60 >60 mL/min/1.73 m 2   ACCU-CHEK GLUCOSE    Collection Time: 10/07/18  4:12 AM   Result Value Ref Range    Glucose - Accu-Ck 125 (H) 65 - 99 mg/dL   ACCU-CHEK GLUCOSE    Collection Time: 10/07/18  5:02 AM   Result Value Ref Range    Glucose - Accu-Ck 131 (H) 65 - 99 mg/dL   ISTAT ARTERIAL BLOOD GAS    Collection Time: 10/07/18  5:38 AM   Result Value Ref Range    Ph 7.444 7.400 - 7.500    Pco2 37.7 (H) 26.0 - 37.0 mmHg    Po2 67 64 - 87 mmHg    Tco2 27 20 - 33 mmol/L    S02 94 93 - 99 %    Hco3 25.9 (H) 17.0 - 25.0 mmol/L    BE 2 -4 - 3 mmol/L    Body Temp 36.8 C degrees    O2 Therapy 65 %    iPF Ratio 103     Ph Temp Cesar 7.447 7.400 - 7.500    Pco2 Temp Co 37.4 (H) 26.0 - 37.0 mmHg    Po2 Temp Cor 66 64 - 87 mmHg    Specimen Arterial     Action Range Triggered NO     Inst. Qualified Patient YES     ACCU-CHEK GLUCOSE    Collection Time: 10/07/18  6:17 AM   Result Value Ref Range    Glucose - Accu-Ck 138 (H) 65 - 99 mg/dL   ACCU-CHEK GLUCOSE    Collection Time: 10/07/18  7:04 AM   Result Value Ref Range    Glucose - Accu-Ck 150 (H) 65 - 99 mg/dL   ACCU-CHEK GLUCOSE    Collection Time: 10/07/18  7:57 AM   Result Value Ref Range    Glucose - Accu-Ck 173 (H) 65 - 99 mg/dL   ACCU-CHEK GLUCOSE    Collection Time: 10/07/18  9:31 AM   Result Value Ref Range    Glucose - Accu-Ck 161 (H) 65 - 99 mg/dL   ACCU-CHEK GLUCOSE    Collection Time: 10/07/18 10:09 AM   Result Value Ref Range    Glucose - Accu-Ck 165 (H) 65 - 99 mg/dL   ACCU-CHEK GLUCOSE    Collection Time: 10/07/18 11:14 AM   Result Value Ref Range    Glucose - Accu-Ck 194 (H) 65 - 99 mg/dL   ACCU-CHEK GLUCOSE    Collection Time: 10/07/18 12:09 PM   Result Value Ref Range    Glucose - Accu-Ck 197 (H) 65 - 99 mg/dL   ACCU-CHEK GLUCOSE    Collection Time: 10/07/18 12:57 PM   Result Value Ref Range    Glucose - Accu-Ck 197 (H) 65 - 99 mg/dL   ACCU-CHEK GLUCOSE    Collection Time: 10/07/18  2:26 PM   Result Value Ref Range    Glucose - Accu-Ck 181 (H) 65 - 99 mg/dL   ACCU-CHEK GLUCOSE    Collection Time: 10/07/18  2:55 PM   Result Value Ref Range    Glucose - Accu-Ck 176 (H) 65 - 99 mg/dL   ACCU-CHEK GLUCOSE    Collection Time: 10/07/18  4:26 PM   Result Value Ref Range    Glucose - Accu-Ck 172 (H) 65 - 99 mg/dL   ACCU-CHEK GLUCOSE    Collection Time: 10/07/18  5:16 PM   Result Value Ref Range    Glucose - Accu-Ck 166 (H) 65 - 99 mg/dL   ACCU-CHEK GLUCOSE    Collection Time: 10/07/18  7:09 PM   Result Value Ref Range    Glucose - Accu-Ck 139 (H) 65 - 99 mg/dL   ACCU-CHEK GLUCOSE    Collection Time: 10/07/18  7:44 PM   Result Value Ref Range    Glucose - Accu-Ck 167 (H) 65 - 99 mg/dL   ACCU-CHEK GLUCOSE    Collection Time: 10/07/18  8:37 PM   Result Value Ref Range    Glucose - Accu-Ck 192 (H) 65 - 99 mg/dL   ACCU-CHEK GLUCOSE    Collection Time: 10/07/18  9:53 PM    Result Value Ref Range    Glucose - Accu-Ck 187 (H) 65 - 99 mg/dL   ACCU-CHEK GLUCOSE    Collection Time: 10/07/18 10:37 PM   Result Value Ref Range    Glucose - Accu-Ck 192 (H) 65 - 99 mg/dL   ACCU-CHEK GLUCOSE    Collection Time: 10/07/18 11:40 PM   Result Value Ref Range    Glucose - Accu-Ck 166 (H) 65 - 99 mg/dL   ACCU-CHEK GLUCOSE    Collection Time: 10/08/18 12:51 AM   Result Value Ref Range    Glucose - Accu-Ck 155 (H) 65 - 99 mg/dL   ACCU-CHEK GLUCOSE    Collection Time: 10/08/18  1:41 AM   Result Value Ref Range    Glucose - Accu-Ck 150 (H) 65 - 99 mg/dL   ACCU-CHEK GLUCOSE    Collection Time: 10/08/18  2:38 AM   Result Value Ref Range    Glucose - Accu-Ck 141 (H) 65 - 99 mg/dL   ACCU-CHEK GLUCOSE    Collection Time: 10/08/18  3:44 AM   Result Value Ref Range    Glucose - Accu-Ck 123 (H) 65 - 99 mg/dL   ISTAT ARTERIAL BLOOD GAS    Collection Time: 10/08/18  4:10 AM   Result Value Ref Range    Ph 7.450 7.400 - 7.500    Pco2 40.3 (H) 26.0 - 37.0 mmHg    Po2 63 (L) 64 - 87 mmHg    Tco2 29 20 - 33 mmol/L    S02 93 93 - 99 %    Hco3 28.1 (H) 17.0 - 25.0 mmol/L    BE 4 (H) -4 - 3 mmol/L    Body Temp 37.5 C degrees    O2 Therapy 30 %    iPF Ratio 210     Ph Temp Cesar 7.443 7.400 - 7.500    Pco2 Temp Co 41.2 (H) 26.0 - 37.0 mmHg    Po2 Temp Cor 65 64 - 87 mmHg    Specimen Arterial     Action Range Triggered NO     Inst. Qualified Patient YES    ACCU-CHEK GLUCOSE    Collection Time: 10/08/18  4:44 AM   Result Value Ref Range    Glucose - Accu-Ck 127 (H) 65 - 99 mg/dL   ACCU-CHEK GLUCOSE    Collection Time: 10/08/18  5:59 AM   Result Value Ref Range    Glucose - Accu-Ck 138 (H) 65 - 99 mg/dL   MAGNESIUM    Collection Time: 10/08/18  6:17 AM   Result Value Ref Range    Magnesium 2.8 (H) 1.5 - 2.5 mg/dL   PHOSPHORUS    Collection Time: 10/08/18  6:17 AM   Result Value Ref Range    Phosphorus 4.7 (H) 2.5 - 4.5 mg/dL   COMP METABOLIC PANEL    Collection Time: 10/08/18  6:17 AM   Result Value Ref Range    Sodium 149 (H)  135 - 145 mmol/L    Potassium 4.5 3.6 - 5.5 mmol/L    Chloride 113 (H) 96 - 112 mmol/L    Co2 28 20 - 33 mmol/L    Anion Gap 8.0 0.0 - 11.9    Glucose 171 (H) 65 - 99 mg/dL    Bun 71 (HH) 8 - 22 mg/dL    Creatinine 0.98 0.50 - 1.40 mg/dL    Calcium 9.0 8.5 - 10.5 mg/dL    AST(SGOT) 24 12 - 45 U/L    ALT(SGPT) 33 2 - 50 U/L    Alkaline Phosphatase 74 30 - 99 U/L    Total Bilirubin 1.1 0.1 - 1.5 mg/dL    Albumin 3.6 3.2 - 4.9 g/dL    Total Protein 5.1 (L) 6.0 - 8.2 g/dL    Globulin 1.5 (L) 1.9 - 3.5 g/dL    A-G Ratio 2.4 g/dL   TRIGLYCERIDE    Collection Time: 10/08/18  6:17 AM   Result Value Ref Range    Triglycerides 75 0 - 149 mg/dL   ESTIMATED GFR    Collection Time: 10/08/18  6:17 AM   Result Value Ref Range    GFR If African American >60 >60 mL/min/1.73 m 2    GFR If Non African American >60 >60 mL/min/1.73 m 2   ACCU-CHEK GLUCOSE    Collection Time: 10/08/18  6:40 AM   Result Value Ref Range    Glucose - Accu-Ck 168 (H) 65 - 99 mg/dL       Imaging  X-Ray:  I have personally reviewed the images and compared with prior images. and My impression is: No change bilateral opacities consistent with pulmonary edema and bibasilar atelectasis    Assessment/Plan  * Acute respiratory failure with hypoxia (HCC)- (present on admission)   Assessment & Plan    Intubated 9/29  RT protocols  Continue vent support, not appropriate for liberation at this time  All appropriate ventilator bundles have been initiated  Serial ABG, chest x-ray and ventilator mechanics review  Ventilator adjusted daily or more frequently as needed  Wife is adamant no tracheostomy at this time, probably no LTACH as well  CODE STATUS is being reviewed        Acute pulmonary edema (HCC)   Assessment & Plan    Echo with normal LV systolic function  Significant pulmonary hypertension causing LV diastolic dysfunction  Continue Lasix, 40 mg IV daily  Monitor I's and O's and BMP  Bicarb increasing, monitor for the need for Diamox as an adjunct to furosemide           CAP (community acquired pneumonia)- (present on admission)   Assessment & Plan    Influenza negative  Completed 5 days of Unasyn and azithromycin on 10/4  Observe off antibiotics  Monitor  Update vaccines prior to discharge        Ileus (HCC)   Assessment & Plan    Secondary to above  Trickle tube feeds at 10 cc/h do not increase for now  Avoid lactulose  Bowel care protocols  Monitor for the need for Relistor therapy  Mobilize as tolerated        Hemoptysis   Assessment & Plan    Bronchoscopy revealed friable and nodular mucosa in the left mainstem bronchus, no obvious malignancy  Hold all anticoagulation for now  Bronchoscopy with biopsy and brushing performed on 10/7, cytology and pathology pending        Atelectasis   Assessment & Plan    Aggressive pulmonary toilet  Serial chest x-ray, therapeutic bronchoscopy as needed  Wean off ventilator mobilize/IS/PEP        Paroxysmal atrial fibrillation/atrial flutter (HCC)   Assessment & Plan    Continue amiodarone, 400 mg twice daily  No anticoagulation due to hemoptysis, monitor  Optimize magnesium and potassium aggressively  Rate control with CCB or BB as Bp allows, alternatively digoxin therapy as needed        Pulmonary hypertension (HCC)   Assessment & Plan    RVSP 65 mm Hg  CTA without evidence of pulmonary embolism  Force diuresis with Lasix  ROS for BEBA when extubated  Suspect chronic process given the severity of elevation        Acute metabolic encephalopathy   Assessment & Plan    Etiology is metabolic as well as due to sepsis  Limit sedatives  Improved but persisting        Elevated troponin- (present on admission)   Assessment & Plan    Continue aspirin and statin        Hypernatremia   Assessment & Plan    Continue free water, 200 mL every 8 hours as tolerated  Serial BMP        Fever   Assessment & Plan    This was due to dexmedetomidine  Fever has resolved after dexmedetomidine discontinued  UA unremarkable  Blood cultures negative from  10/5  Bronchial washings from 10/3 with no growth  Avoid dexmedetomidine        Hyperglycemia- (present on admission)   Assessment & Plan    Titrating insulin drip for glucose control  Daily reviewed insulin dosing with clinical pharmacist and at bedtime SSI/long-acting insulin as clinically appropriate  Hypoglycemia protocols        Dyslipidemia- (present on admission)   Assessment & Plan    Continue statin/diet             VTE:  Contraindicated  Ulcer: H2 Antagonist  Lines: Central Line  Ongoing indication addressed    I have performed a physical exam and reviewed and updated ROS and Plan today (10/8/2018). In review of yesterday's note (10/7/2018), there are no changes except as documented above.     Prognosis remains guarded.  I have assessed and reassessed this patient today.  Critically ill with unstable pulmonary status on full vent support inappropriate for liberation.  Additionally, the patient has actively titrated insulin and Chris-Synephrine was titrated off this a.m. for now.  High risk of deterioration and worsening vital organ dysfunction and death without the above critical care interventions.    Discussed patient condition and risk of morbidity and/or mortality with Hospitalist, Family, RN, RT, Pharmacy, , Code status disscussed and Charge nurse / hot rounds     The patient remains critically ill.  Critical care time = 45 minutes in directly providing and coordinating critical care and extensive data review.  No time overlap and excludes procedures.    High risk of deterioration and worsening vital organ dysfunction and death without the above critical care interventions.    Brandon Paulino MD  Pulmonary and Critical Care Medicine

## 2018-10-08 NOTE — DIETARY
Nutrition support weekly update:  Day 9 of admit.  Murali Lu is a 78 y.o. male with admitting DX of PNA.    Tube feeding initiated on 9/30.  TF currently held for possible ileus.  TF orders: Impact Peptide 1.5, goal rate 50 ml/hr to provide 1800 kcal, 113 grams protein, and 924 ml free water per day.    Assessment:  Weight 106.1 kg with BMI 33.56  Weight used in calculations: 90.3 kg (initial scale weight 9/29, now + 7.9 L fluid per I/O); BMI 28.56    Estimated needs:  REE per MSJ x1.1-1.2 = 0144-9852 kcal/day; RMR per [facility] (vent L/min 11.1, T max/24 hours 37.6) = 1985 kcal/day  0967-4789 kcal/day (20-22 kcal/day)  1.2-1.5 grams protein/kg = 108-135 grams protein/day      Evaluation:   1. Pt remains on vent support, day 10.  2. BM 10/7.  3. Labs: Na 149, glu 171, BUN 71, phos 4.7, mag 2.8, POC glu/24 hours 123-197  4. Meds: Lasix, insulin, reglan, electrolyte replacement, propofol @ 5 mcg/kg/min, bowel meds  5. 200 ml free water Q 8 hours.  6. Palliative Care on. Family meeting today.  7. Current feeding regimen remains appropriate.    Recommendations/Plan:  1. Resume TF when clinically appropriate.  2. Fluids per MD. LOTT following.

## 2018-10-08 NOTE — PROGRESS NOTES
Monitor Summary: .14/.14/.14. Rhythm: SR with a BBB. Rate: 70's-80's.    A.Flutter 120's-130's until 0025. Reverted to SR with BBB in the 70's-80's and sustained for remainder of shift. Occasional PACs.     12 hour chart check.    2 RN skin Check.

## 2018-10-08 NOTE — CARE PLAN
Problem: Venous Thromboembolism (VTW)/Deep Vein Thrombosis (DVT) Prevention:  Goal: Patient will participate in Venous Thrombosis (VTE)/Deep Vein Thrombosis (DVT)Prevention Measures   10/07/18 2000   Mechanical/VTE Prophylaxis   Mechanical Prophylaxis  SCDs, Sequential Compression Device   SCDs, Sequential Compression Device On   OTHER   Risk Assessment Score 3   VTE RISK High   Pharmacologic Prophylaxis Used Contraindicated per MD     At this time pharmacologic prophylaxis is contraindicated per MD. Active bleeding noted in lungs during bronchoscopy. SCD's in use.      Problem: Bowel/Gastric:  Goal: Normal bowel function is maintained or improved   10/07/18 2000   OTHER   Last BM 10/07/18  (Per report)   Number of Times Stooled 0     Scheduled reglan and senna ordered. Abdomen rounded and semi-firm. Bowel movement early this morning after enema. Will continue to monitor.

## 2018-10-08 NOTE — PALLIATIVE CARE
"Palliative Care follow-up  PC RN met with Oanh at bedside with RN student present and again explained the role of PC. She and Vimal live together and have a son they \"want nothing to do with.\" They were both born in [place] but met here in [place] and have been  since 1963. His siblings have all passed and she has one sister in [place]. They have a couple friends but have lost a lot of their good friend over the past couple years. They are very active; they swim 4 times a week and \"he does the elliptical for 2 miles 3 days a week,\" plus they live a very natural/holistic lifestyle with herbs, etc. He's \"never been sick\" or needed any \"real medication.\" She states he was good about seeing the doctor routinely and did his labs every 6 months as he was instructed. She states they have done a trust and she had medical power of ; she would try to bring a copy of the document but admittedly states \"he handled all of those things.\" She jokingly stated \"We always joked that \"in a couple years or whenever we were ready, we'd just die together so no one was left to be sad.\"    PC RN discussed goals, wishes and POC. PC RN discussed the role of SBT, current medications and concerns. Dr. Geronimo discussed trach with Oanh previously and she states after doing some research, this is in no way in line with Vimal's goals and \"he would hate me if I put him through that.\" She was very direct but sad and tearful, stating they've had many serious discussions about his wishes. She states \"I couldn't forgive myself for doing that to him knowing he wouldn't want it. This isn't about me.\" She struggles with how all of this has happened when he's been so healthy and took good care of himself. She understands that no decisions have to be made today and that discussions will continue to occur, hoping for the best but being prepared for the worst. She declined a spiritual visit. PC RN offered to check in on her and " she was agreeable. Emotional support, therapeutic touch and active listening provided. PC contact information provided and she was encouraged to call with any questions or needs.      Updated: MD/RN    Plan: Dr. Sierra to meet with Oanh today; continue to follow    Thank you for allowing Palliative Care to participate in this patient's care. Please feel free to call [phone] with any questions or concerns.

## 2018-10-08 NOTE — CARE PLAN
Problem: Ventilation Defect:  Goal: Ability to achieve and maintain unassisted ventilation or tolerate decreased levels of ventilator support  Outcome: PROGRESSING AS EXPECTED  Adult Ventilation Update    Total Vent Days: 10    Patient Lines/Drains/Airways Status    Active Airway     Name: Placement date: Placement time: Site: Days:    Airway ETT Oral 8.0 09/29/18 1714   Oral   8              In the last 24 hours, the patient tolerated SBT for 1 hour x 2 on settings of 5/8.    #FVC / Vital Capacity (liters) :  (pt not following commands) (10/08/18 1640)  NIF (cm H2O) :  (pt not following commands) (10/08/18 1640)  Rapid Shallow Breathing Index (RR/VT): 62 (10/08/18 1640)  Plateau Pressure (Q Shift): 17 (10/07/18 0701)  Static Compliance (ml / cm H2O): 73 (10/08/18 1640)    Patient failed trials because of Barriers to Wean: Evidence of active shock,hemmorhage or sepsis (10/07/18 0701)  Barriers to SBT Weaning Trial Stopped due to:: Pt weaned for 1 hour and returned to rest settings per protocol (10/08/18 1640)  Length of Weaning Trial Length of Weaning Trial (Hours): 1 hour (10/08/18 1640)    Cough: Productive (10/08/18 1600)  Sputum Amount: Small (10/08/18 1600)  Sputum Color: Tan;Pink Tinged;Bloody (10/08/18 1600)  Sputum Consistency: Thin (10/08/18 1600)    Mobility  Level of Mobility: Level I (10/08/18 1600)  Activity Performed: Edge of bed (10/08/18 1600)  Time Activity Tolerated: 10 min (10/08/18 1600)  Distance Per Occurrence (ft.): 0 feet (10/08/18 1600)  # of Times Distance was Traveled: 1 (10/08/18 1600)  Assistance / Tolerance: Assistance of Two or More (10/08/18 1600)  Pt Calls for Assistance: No (10/08/18 1600)  Staff Present for Mobilization: RN (10/08/18 1600)  Gait: Unable to Ambulate (10/08/18 1600)  Assistive Devices: Hand held assist (10/08/18 1600)  Reason Not Mobilized: Unstable condition (10/08/18 1200)  Mobilization Comments: Desat with movement, agitated off sedation, no follow (10/07/18  1600)    Events/Summary/Plan: SBT (10/08/18 1526)

## 2018-10-08 NOTE — CARE PLAN
Problem: Venous Thromboembolism (VTW)/Deep Vein Thrombosis (DVT) Prevention:  Goal: Patient will participate in Venous Thrombosis (VTE)/Deep Vein Thrombosis (DVT)Prevention Measures  Outcome: PROGRESSING AS EXPECTED  No anticoagulation ordered. SCDs on patient.     Problem: Mobility  Goal: Risk for activity intolerance will decrease  Outcome: PROGRESSING SLOWER THAN EXPECTED  Mobilized patient to edge of bed. Pt tolerates poorly, does not hold self up.

## 2018-10-08 NOTE — CARE PLAN
Problem: Safety  Goal: Will remain free from falls  Assessed room for safety risks    Problem: Skin Integrity  Goal: Risk for impaired skin integrity will decrease  Outcome: PROGRESSING AS EXPECTED  Assessed patient for signs and symptoms of skin breakdown frequently. Provided Q2H turns, waffle cushion in place, heels floated on pillows.

## 2018-10-08 NOTE — CARE PLAN
Problem: Ventilation Defect:  Goal: Ability to achieve and maintain unassisted ventilation or tolerate decreased levels of ventilator support  Adult Ventilation Update    Total Vent Days: 9    APVcmv 16, 440, +8, 30%    Patient Lines/Drains/Airways Status    Active Airway     Name: Placement date: Placement time: Site: Days:    Airway ETT Oral 8.0 09/29/18   1714   Oral   8            Cough: Productive (10/07/18 0701)  Sputum Amount: Moderate (10/07/18 1600)  Sputum Color: Bloody (10/07/18 1600)  Sputum Consistency: Thin (10/07/18 1600)    Mobility  Level of Mobility: Level I (10/07/18 0025)  Activity Performed: Unable to mobilize (10/07/18 1600)  Reason Not Mobilized: Unstable condition (10/07/18 1600)  Mobilization Comments: Desat with movement, agitated off sedation, no follow (10/07/18 1600)    Events/Summary/Plan: no SBTs today per sharath blood from ETT, bronch done with biopsy taken and sent to lab, no other vent changes made

## 2018-10-08 NOTE — THERAPY
Palliative RN currently meeting with pt and spouse upon PT attempt. Defer PT treatment at this time. Will reattempt as able.     Margo Gallegos, PT, DPT Pager: [phone]

## 2018-10-08 NOTE — CARE PLAN
Problem: Ventilation Defect:  Goal: Ability to achieve and maintain unassisted ventilation or tolerate decreased levels of ventilator support  Adult Ventilation Update    Total Vent Days:10    Patient Lines/Drains/Airways Status    Active Airway     Name: Placement date: Placement time: Site: Days:    Airway ETT Oral 8.0 09/29/18 1714   Oral   10                #FVC / Vital Capacity (liters) : 0.5 (10/06/18 1533)  NIF (cm H2O) : -11 (10/06/18 1533)  Rapid Shallow Breathing Index (RR/VT): 90 (10/06/18 1533)  Plateau Pressure (Q Shift): 17 (10/07/18 0701)  Static Compliance (ml / cm H2O): 55 (10/08/18 0423)    Patient failed trials because of Barriers to Wean: Evidence of active shock,hemmorhage or sepsis (10/07/18 0701)  Barriers to SBT Weaning Trial Stopped due to:: RSBI >105 (Rapid Shallow Breathing Index) (10/06/18 1533)  Length of Weaning Trial Length of Weaning Trial (Hours): .25 (10/03/18 1644)           Sputum/Suction   Cough: Productive (10/07/18 1914)  Sputum Amount: Small (10/08/18 0400)  Sputum Color: Tan;Bloody (10/08/18 0400)  Sputum Consistency: Thick (10/08/18 0400)    Mobility  Level of Mobility: Level I (10/08/18 0400)  Activity Performed: Edge of bed (10/08/18 0400)  Time Activity Tolerated: 5 min (10/08/18 0400)  Distance Per Occurrence (ft.): 0 feet (10/08/18 0400)  # of Times Distance was Traveled: 0 (10/08/18 0400)  Assistance / Tolerance: Assistance of Two or More (10/08/18 0400)  Pt Calls for Assistance: No (10/08/18 0400)  Staff Present for Mobilization: RN (10/08/18 0400)  Gait: Unable to Ambulate (10/06/18 1600)  Assistive Devices: Hand held assist (10/08/18 0400)  Reason Not Mobilized: Unstable condition (10/07/18 1600)  Mobilization Comments: Desat with movement, agitated off sedation, no follow (10/07/18 1600)      Continue to monitor vent status.

## 2018-10-09 PROBLEM — R79.89 AZOTEMIA: Status: ACTIVE | Noted: 2018-01-01

## 2018-10-09 NOTE — PROGRESS NOTES
[facility] Hospitalist Progress Note    Date of Service: 10/9/2018    Chief Complaint    78 y.o. male admitted 2018 with cough and shortness of breath.  Patient was admitted to the hospital and shortly thereafter had significant increase in shortness of breath.  CTA neg for PE but did show CAP.  Initially admitted to the floor however worsened requiring intubation     PMHx: HLD    Interval Problem Update  ROS unobtainable  Prop 15  Follows intermitently  Sinus  -130s  AFebrile  TFs at 20ml/hr  Insulin gtts  UOP 850ml/12hrs      Consultants/Specialty  Cardiology    Disposition  COnt in ICU        Review of Systems   Unable to perform ROS: Intubated      Physical Exam  Laboratory/Imaging   Hemodynamics  Temp (24hrs), Av.6 °C (99.6 °F), Min:37.4 °C (99.3 °F), Max:37.7 °C (99.9 °F)   Temperature: 37.4 °C (99.3 °F), Monitored Temp: 37.4 °C (99.3 °F)  Pulse  Av.5  Min: 60  Max: 142 Heart Rate (Monitored): 76  NIBP: 115/65 CVP (mm Hg): (!) 18 MM HG    Respiratory  Smith Vent Mode: APVCMV, Rate (breaths/min): 16, PEEP/CPAP: 8, PEEP/CPAP: 8, FiO2: 40, P Peak (PIP): 22, P MEAN: 12   Respiration: (!) 24, Pulse Oximetry: 99 %     Work Of Breathing / Effort: Vented  RUL Breath Sounds: Clear, RML Breath Sounds: Diminished, RLL Breath Sounds: Diminished, BRITTANY Breath Sounds: Clear, LLL Breath Sounds: Diminished    Fluids    Intake/Output Summary (Last 24 hours) at 10/09/18 0448  Last data filed at 10/09/18 0400   Gross per 24 hour   Intake           1643.3 ml   Output             2310 ml   Net           -666.7 ml       Nutrition  Orders Placed This Encounter   Procedures   • Diet NPO     Standing Status:   Standing     Number of Occurrences:   1     Order Specific Question:   Type:     Answer:   Now [1]     Order Specific Question:   Restrict to:     Answer:   Strict [1]     Physical Exam   Constitutional: He appears well-developed and well-nourished. No distress.   HENT:   Head: Normocephalic and atraumatic.    Eyes: Conjunctivae are normal.   Neck: No JVD present.   Cardiovascular: Normal rate.  Exam reveals no gallop.    Murmur heard.  Pulmonary/Chest: No stridor. No respiratory distress. He has no wheezes. He has rales.   Abdominal: Soft. There is no tenderness. There is no rebound and no guarding.   Musculoskeletal: He exhibits no edema.   Neurological:   Moving purposefully spontaneously.  Does not follow or attend   Skin: Skin is warm and dry. No rash noted. He is not diaphoretic.   Nursing note and vitals reviewed.      Recent Labs      10/07/18   0410  10/08/18   0617  10/09/18   0433   WBC  8.5  15.7*  13.0*   RBC  3.31*  3.62*  3.57*   HEMOGLOBIN  10.3*  11.1*  10.9*   HEMATOCRIT  32.1*  35.6*  34.8*   MCV  97.0  98.3*  97.5   MCH  31.1  30.7  30.5   MCHC  32.1*  31.2*  31.3*   RDW  50.2*  51.9*  50.1*   PLATELETCT  132*  237  197   MPV  10.3  10.1  10.3     Recent Labs      10/06/18   2315  10/07/18   0410  10/08/18   0617   SODIUM  147*  148*  149*   POTASSIUM  3.7  4.1  4.5   CHLORIDE  113*  114*  113*   CO2  25  27  28   GLUCOSE  240*  136*  171*   BUN  79*  76*  71*   CREATININE  0.94  0.91  0.98   CALCIUM  9.0  8.9  9.0             Recent Labs      10/06/18   2315  10/08/18   0617   TRIGLYCERIDE  98  75          Assessment/Plan     * Acute respiratory failure with hypoxia (HCC)- (present on admission)   Assessment & Plan    CTA on 9-29 neg  for PE  Echo with normal EF and pulmonary hypertension    Vent management per critical care discussed with Dr. Sierra              Acute pulmonary edema (HCC)   Assessment & Plan    Increasing lasix  Cont to follow I&Os, daily BMP, BP        CAP (community acquired pneumonia)- (present on admission)   Assessment & Plan    Completed course of antibiotics   Cultures neg        Ileus (HCC)   Assessment & Plan    Clinically improved  Remains on Reglan  We will advance tube feeding and continue close monitoring        Hemoptysis   Assessment & Plan    Lovenox  discontinued    Continue clinical monitoring and follow-up on lung biopsy        Paroxysmal atrial fibrillation/atrial flutter (HCC)   Assessment & Plan    Continue amiodarone and metoprolol  Now in sinux  Lovenox discontinued given hemoptysis  Reverted to sinus rhythm continue telemetry monitoring        Pulmonary hypertension (HCC)   Assessment & Plan    9/29/18 echocardiogram with RVSP of 65 mmHg  CT was negative for pulmonary embolism    Continue IV Lasix              Acute metabolic encephalopathy   Assessment & Plan    Non focal exam  Cont to address metabolic and infectious issues  Try to minimize sedation and CNS active mdications        Elevated troponin- (present on admission)   Assessment & Plan    Likely demand ischemia        Hypernatremia   Assessment & Plan    Increase Free water flushes          Hyperglycemia- (present on admission)   Assessment & Plan    HbA1c 6.5 on 7/18  On insulin drip continue close monitoring        Dyslipidemia- (present on admission)   Assessment & Plan    Continue atorvastatin            Quality-Core Measures   Reviewed items::  EKG reviewed, Labs reviewed, Medications reviewed and Radiology images reviewed  Garces catheter::  Unconscious / Sedated Patient on a Ventilator  DVT prophylaxis - mechanical:  SCDs  Ulcer Prophylaxis::  Yes  Antibiotics:  Treating active infection/contamination beyond 24 hours perioperative coverage

## 2018-10-09 NOTE — PROGRESS NOTES
Converted to Afib rates of 110s-150s at 1130. Dr. Sierra notified, orders entered in epic. Pt converted back to SR at 1300.

## 2018-10-09 NOTE — ASSESSMENT & PLAN NOTE
Secondary to above  Trickle tube feeds at 20 cc/h, continue to increase slowly as tolerated  Avoid lactulose, seem to make things worse for this patient  Bowel care protocols  Relistor therapy to begin as needed  Mobilize as tolerated  Slow increase tube feeds

## 2018-10-09 NOTE — ASSESSMENT & PLAN NOTE
Aggressive pulmonary toilet  Serial chest x-ray, therapeutic bronchoscopy as needed  Wean off ventilator mobilize/IS/PEP

## 2018-10-09 NOTE — CARE PLAN
Problem: Safety  Goal: Will remain free from falls  Outcome: PROGRESSING AS EXPECTED  High risk for falls. Vented and sedated. Restraints in use.   Intervention: Implement fall precautions  See flow sheet.       Problem: Discharge Barriers/Planning  Goal: Patient's continuum of care needs will be met  Outcome: PROGRESSING SLOWER THAN EXPECTED  SBT trials pending. Palliative following.

## 2018-10-09 NOTE — PROGRESS NOTES
Monitor summary    SR-ST 60s-110s with frequent PACs with a brief period of Afib (see previous note)    .14/.12/.38     12 hour chart check

## 2018-10-09 NOTE — TELEPHONE ENCOUNTER
Left message for patient to call the office back regarding scheduling AWV.  Please transfer patient returned call to [phone].

## 2018-10-09 NOTE — PROGRESS NOTES
12 hour chart check/ 2 RN skin check    MS: SR with occ PVC/PAC; 4 beats vtach  70-90  .12/.10/.34

## 2018-10-09 NOTE — PROGRESS NOTES
Critical Care Progress Note    Date of admission  9/29/2018    Chief Complaint  78 y.o. male admitted 9/29/2018 with cough and shortness of breath.    Hospital Course  This gentleman was admitted with severe sepsis, respiratory failure and pneumonia.    Interval Problem Update  Reviewed last 24 hour events:    Prop 15  Follows minimal  SR 80s  SBp 110-130s  Tm 99.9  UO adequate  TF 20cc/hr  Insulin drip 2U/hr  EOB with assistance  Vent day#11  CXR worsening bilateral opacities consistent with pulmonary edema/ARDS, bibasilar atelectasis, ET tube okay, no pneumothorax  RSBI > 150 with short trial on CPAP  Secretions min   VTE held for hemoptysis  Lasix  Creatinine unchanged, BUN borderline mildly improved but still elevated    SR -> AF/-150  Amiodarone 150 mg bolus ordered  Enteral loading of amiodarone ongoing    No trach per wife  Reviewed case with her at bedside at great length  Advanced directives reviewed with her at length the bedside  Reviewed goals of therapy at length with her bedside  Request ongoing continued aggressive treatment but no tracheostomy and no CPR    Serial follow-up  Oxygenation worse  PEEP increased from 8 to 10 and rechecked in 1-2 hours, still with borderline O2 sats  PEEP increased from 10 to 12, monitoring for recruitment and improved oxygenation  Hemodynamics and urine output noted     YESTERDAY  Dx FOB with Bx yesterday - no mass - friable   Follows some per RN but not for me  Propofol infusion 5  AFlutter -> SR with BB  Hemodynamics noted  Chris-Synephrine infusion off this AM  Afebrile  TF 10cc/hr, fixed rate  Insulin drip 2/hr, ongoing titration based on serial blood sugar analysis, reviewed times several today with RN  UO adequate  Vent day#10  CXR right-sided pleural effusion min change, perhaps mildly improved pulmonary edema/airspace opacities bilaterally  RSBI 86+ - not following, no other weaning parameters obtained  No VTE - hemoptysis    Plan of care reviewed with wife  and then discussed the case with me  Will review with wife when time allows for the 2 of us to discuss the case  Wife adamant her  does not want tracheostomy and probably secondarily LTACH  Need to have further discussions Re: CODE STATUS, palliative care recommended tomorrow  Remains on full support, inappropriate for liberation    Review of Systems  Review of Systems   Unable to perform ROS: Acuity of condition   Sedated with propofol    Vital Signs for last 24 hours   Temp:  [37.4 °C (99.3 °F)-37.7 °C (99.9 °F)] 37.4 °C (99.3 °F)  Pulse:  [75-93] 75  Resp:  [18-32] 21    Hemodynamic parameters for last 24 hours  CVP:  [9 MM HG-18 MM HG] 18 MM HG    Vent Settings for last 24 hours  Smith Vent Mode: APVCMV  Rate (breaths/min):  [16] 16  PEEP/CPAP:  [8] 8  FiO2:  [30-40] 40  P Peak (PIP):  [15-23] 23  P MEAN:  [11-13] 12    Ventilator mechanics and waveforms are reviewed at the bedside with RT    Physical Exam   Physical Exam   Constitutional: He appears lethargic.  Non-toxic appearance. He has a sickly appearance. No distress.   Sedated on ventilator, looks acute and chronically ill as well as uncomfortable   HENT:   Head: Normocephalic.   Right Ear: External ear normal.   Left Ear: External ear normal.   Mouth/Throat: Oropharynx is clear and moist. No oropharyngeal exudate.   Eyes: Pupils are equal, round, and reactive to light. Conjunctivae are normal. Right eye exhibits no discharge. Left eye exhibits no discharge. No scleral icterus.   Neck: Neck supple. No JVD present. No tracheal deviation present.   Cardiovascular: Normal rate, regular rhythm and intact distal pulses.   No extrasystoles are present. Exam reveals no gallop and no friction rub.    Murmur (2/6 flow murmur?  [place] best at apex (nothing noted on echocardiogram)) heard.  Atrial fibrillation converted to sinus rhythm with bundle branch block   Pulmonary/Chest: No accessory muscle usage (On full vent support). No respiratory distress. He  has no wheezes. He has no rhonchi. He has rales (Scattered crackles bilaterally, worse today) in the right lower field and the left lower field.   Abdominal: Soft. Bowel sounds are normal. He exhibits no distension. There is no tenderness. There is no rigidity, no rebound, no guarding and no CVA tenderness.   Tolerating enteral tube feedings   Musculoskeletal: He exhibits edema. He exhibits no tenderness or deformity.   No clubbing or cyanosis   Neurological: He appears lethargic. No cranial nerve deficit or sensory deficit. GCS eye subscore is 4. GCS verbal subscore is 1. GCS motor subscore is 5.   Sedated.  Arouses but does not follow for me.  Grimaces and appears uncomfortable.  Moves all 4 extremities spontaneously and has spontaneous eye opening.,  Localizes and withdraws to tactile stimuli.   Skin: Skin is warm and dry. He is not diaphoretic. No cyanosis or erythema. Nails show no clubbing.   Psychiatric: He is noncommunicative.       Medications  Current Facility-Administered Medications   Medication Dose Route Frequency Provider Last Rate Last Dose   • metoclopramide (REGLAN) injection 10 mg  10 mg Intravenous Q6HRS Dre Robbins M.D.   10 mg at 10/09/18 0535   • propofol (DIPRIVAN) injection  0-80 mcg/kg/min Intravenous Continuous Brandon Paulino M.D. 9.8 mL/hr at 10/09/18 0529 15 mcg/kg/min at 10/09/18 0529   • furosemide (LASIX) injection 40 mg  40 mg Intravenous Q DAY Dre Robbins M.D.   40 mg at 10/09/18 0535   • metoprolol (LOPRESSOR) tablet 12.5 mg  12.5 mg Oral TWICE DAILY Dre Robbins M.D.   12.5 mg at 10/09/18 0530   • potassium bicarbonate (KLYTE) effervescent tablet 25 mEq  25 mEq Feeding Tube BID Brandon Paulino M.D.   25 mEq at 10/09/18 0529   • phenylephrine (MARYSOL-SYNEPHRINE) 40,000 mcg in  mL Infusion  0-300 mcg/min Intravenous Continuous Brandon Paulino M.D.   Stopped at 10/08/18 0700   • amiodarone (CORDARONE) tablet 400 mg  400 mg Per NG  Tube TWICE DAILY Brandon Paulino M.D.   400 mg at 10/09/18 0531   • ondansetron (ZOFRAN ODT) dispertab 4 mg  4 mg Oral Q4HRS PRN Brandon Paulino M.D.       • famotidine (PEPCID) tablet 20 mg  20 mg Feeding Tube Q12HRS Brandon Paulino M.D.   20 mg at 10/09/18 0531   • insulin regular human (HUMULIN/NOVOLIN R) 62.5 Units in  mL infusion per protocol  0-29 Units/hr Intravenous Continuous Brandon Paulino M.D. 8 mL/hr at 10/09/18 0529 2 Units/hr at 10/09/18 0529   • dextrose 50% (D50W) injection 25-50 mL  12.5-25 g Intravenous PRN Brandon Paulino M.D.       • Respiratory Care per Protocol   Nebulization Continuous RT Walter Carrion M.D.       • ondansetron (ZOFRAN) syringe/vial injection 4 mg  4 mg Intravenous Q4HRS PRN Walter Carrion M.D.       • MD Alert...ICU Electrolyte Replacement per Pharmacy   Other pharmacy to dose Jeremy M Gonda, M.D.       • fentaNYL (SUBLIMAZE) injection 25 mcg  25 mcg Intravenous Q HOUR PRN Jeremy M Gonda, M.D.   25 mcg at 10/05/18 2059    Or   • fentaNYL (SUBLIMAZE) injection 50 mcg  50 mcg Intravenous Q HOUR PRN Jeremy M Gonda, M.D.   50 mcg at 10/06/18 0856    Or   • fentaNYL (SUBLIMAZE) injection 100 mcg  100 mcg Intravenous Q HOUR PRN Jeremy M Gonda, M.D.   100 mcg at 10/05/18 1207   • ipratropium-albuterol (DUONEB) nebulizer solution  3 mL Nebulization Q2HRS PRN (RT) Jeremy M Gonda, M.D.   3 mL at 10/08/18 1956   • Pharmacy Consult: Enteral tube feeding - review meds/change route/product selection   Other PRN Jeremy M Gonda, M.D.       • senna-docusate (PERICOLACE or SENOKOT S) 8.6-50 MG per tablet 2 Tab  2 Tab Feeding Tube BID Jeremy M Gonda, M.D.   2 Tab at 10/09/18 0531    And   • polyethylene glycol/lytes (MIRALAX) PACKET 1 Packet  1 Packet Feeding Tube QDAY PRN Jeremy M Gonda, M.D.   1 Packet at 10/06/18 1222    And   • magnesium hydroxide (MILK OF MAGNESIA) suspension 30 mL  30 mL Feeding Tube QDAY PRN Jeremy M Gonda, M.D.   30 mL at  10/02/18 1322    And   • bisacodyl (DULCOLAX) suppository 10 mg  10 mg Rectal QDAY PRN Jeremy M Gonda, M.D.   10 mg at 10/06/18 1222   • acetaminophen (TYLENOL) tablet 650 mg  650 mg Feeding Tube Q6HRS PRN Jeremy M Gonda, M.D.   650 mg at 10/06/18 1153   • atorvastatin (LIPITOR) tablet 40 mg  40 mg Per NG Tube Q EVENING Jeremy M Gonda, M.D.   40 mg at 10/08/18 1729   • aspirin (ASA) chewable tab 81 mg  81 mg Per NG Tube DAILY Jeremy M Gonda, M.D.   81 mg at 10/09/18 0531       Fluids    Intake/Output Summary (Last 24 hours) at 10/09/18 0601  Last data filed at 10/09/18 0400   Gross per 24 hour   Intake          1544.57 ml   Output             2110 ml   Net          -565.43 ml       Laboratory  Recent Results (from the past 48 hour(s))   ACCU-CHEK GLUCOSE    Collection Time: 10/07/18  6:17 AM   Result Value Ref Range    Glucose - Accu-Ck 138 (H) 65 - 99 mg/dL   ACCU-CHEK GLUCOSE    Collection Time: 10/07/18  7:04 AM   Result Value Ref Range    Glucose - Accu-Ck 150 (H) 65 - 99 mg/dL   ACCU-CHEK GLUCOSE    Collection Time: 10/07/18  7:57 AM   Result Value Ref Range    Glucose - Accu-Ck 173 (H) 65 - 99 mg/dL   ACCU-CHEK GLUCOSE    Collection Time: 10/07/18  9:31 AM   Result Value Ref Range    Glucose - Accu-Ck 161 (H) 65 - 99 mg/dL   ACCU-CHEK GLUCOSE    Collection Time: 10/07/18 10:09 AM   Result Value Ref Range    Glucose - Accu-Ck 165 (H) 65 - 99 mg/dL   ACCU-CHEK GLUCOSE    Collection Time: 10/07/18 11:14 AM   Result Value Ref Range    Glucose - Accu-Ck 194 (H) 65 - 99 mg/dL   ACCU-CHEK GLUCOSE    Collection Time: 10/07/18 12:09 PM   Result Value Ref Range    Glucose - Accu-Ck 197 (H) 65 - 99 mg/dL   ACCU-CHEK GLUCOSE    Collection Time: 10/07/18 12:57 PM   Result Value Ref Range    Glucose - Accu-Ck 197 (H) 65 - 99 mg/dL   QUANT BRONCHIAL WASHING    Collection Time: 10/07/18  2:00 PM   Result Value Ref Range    Significant Indicator NEG     Source RESP     Site Bronchoalveolar Lavage BRITTANY     Quantitative Bronch  Washing No growth at 24 hours.     Gram Stain Result Few WBCs.  No organisms seen.      FUNGAL CULTURE    Collection Time: 10/07/18  2:00 PM   Result Value Ref Range    Significant Indicator NEG     Source RESP     Site Bronchoalveolar Lavage BRITTANY     Fungal Culture Culture in progress.    AFB CULTURE    Collection Time: 10/07/18  2:00 PM   Result Value Ref Range    Significant Indicator NEG     Source RESP     Site Bronchoalveolar Lavage BRITTANY     AFB Culture Culture in progress.     AFB Smear Results No acid fast bacilli seen.    GRAM STAIN    Collection Time: 10/07/18  2:00 PM   Result Value Ref Range    Significant Indicator .     Source RESP     Site Bronchoalveolar Lavage BRITTANY     Gram Stain Result Few WBCs.  No organisms seen.      ACID FAST STAIN    Collection Time: 10/07/18  2:00 PM   Result Value Ref Range    Significant Indicator NEG     Source RESP     Site Bronchoalveolar Lavage BRITTANY     AFB Smear Results No acid fast bacilli seen.    ACCU-CHEK GLUCOSE    Collection Time: 10/07/18  2:26 PM   Result Value Ref Range    Glucose - Accu-Ck 181 (H) 65 - 99 mg/dL   ACCU-CHEK GLUCOSE    Collection Time: 10/07/18  2:55 PM   Result Value Ref Range    Glucose - Accu-Ck 176 (H) 65 - 99 mg/dL   ACCU-CHEK GLUCOSE    Collection Time: 10/07/18  4:26 PM   Result Value Ref Range    Glucose - Accu-Ck 172 (H) 65 - 99 mg/dL   ACCU-CHEK GLUCOSE    Collection Time: 10/07/18  5:16 PM   Result Value Ref Range    Glucose - Accu-Ck 166 (H) 65 - 99 mg/dL   ACCU-CHEK GLUCOSE    Collection Time: 10/07/18  7:09 PM   Result Value Ref Range    Glucose - Accu-Ck 139 (H) 65 - 99 mg/dL   ACCU-CHEK GLUCOSE    Collection Time: 10/07/18  7:44 PM   Result Value Ref Range    Glucose - Accu-Ck 167 (H) 65 - 99 mg/dL   ACCU-CHEK GLUCOSE    Collection Time: 10/07/18  8:37 PM   Result Value Ref Range    Glucose - Accu-Ck 192 (H) 65 - 99 mg/dL   ACCU-CHEK GLUCOSE    Collection Time: 10/07/18  9:53 PM   Result Value Ref Range    Glucose - Accu-Ck 187 (H)  65 - 99 mg/dL   ACCU-CHEK GLUCOSE    Collection Time: 10/07/18 10:37 PM   Result Value Ref Range    Glucose - Accu-Ck 192 (H) 65 - 99 mg/dL   ACCU-CHEK GLUCOSE    Collection Time: 10/07/18 11:40 PM   Result Value Ref Range    Glucose - Accu-Ck 166 (H) 65 - 99 mg/dL   ACCU-CHEK GLUCOSE    Collection Time: 10/08/18 12:51 AM   Result Value Ref Range    Glucose - Accu-Ck 155 (H) 65 - 99 mg/dL   ACCU-CHEK GLUCOSE    Collection Time: 10/08/18  1:41 AM   Result Value Ref Range    Glucose - Accu-Ck 150 (H) 65 - 99 mg/dL   ACCU-CHEK GLUCOSE    Collection Time: 10/08/18  2:38 AM   Result Value Ref Range    Glucose - Accu-Ck 141 (H) 65 - 99 mg/dL   ACCU-CHEK GLUCOSE    Collection Time: 10/08/18  3:44 AM   Result Value Ref Range    Glucose - Accu-Ck 123 (H) 65 - 99 mg/dL   ISTAT ARTERIAL BLOOD GAS    Collection Time: 10/08/18  4:10 AM   Result Value Ref Range    Ph 7.450 7.400 - 7.500    Pco2 40.3 (H) 26.0 - 37.0 mmHg    Po2 63 (L) 64 - 87 mmHg    Tco2 29 20 - 33 mmol/L    S02 93 93 - 99 %    Hco3 28.1 (H) 17.0 - 25.0 mmol/L    BE 4 (H) -4 - 3 mmol/L    Body Temp 37.5 C degrees    O2 Therapy 30 %    iPF Ratio 210     Ph Temp Cesar 7.443 7.400 - 7.500    Pco2 Temp Co 41.2 (H) 26.0 - 37.0 mmHg    Po2 Temp Cor 65 64 - 87 mmHg    Specimen Arterial     Action Range Triggered NO     Inst. Qualified Patient YES    ACCU-CHEK GLUCOSE    Collection Time: 10/08/18  4:44 AM   Result Value Ref Range    Glucose - Accu-Ck 127 (H) 65 - 99 mg/dL   ACCU-CHEK GLUCOSE    Collection Time: 10/08/18  5:59 AM   Result Value Ref Range    Glucose - Accu-Ck 138 (H) 65 - 99 mg/dL   CBC with Differential    Collection Time: 10/08/18  6:17 AM   Result Value Ref Range    WBC 15.7 (H) 4.8 - 10.8 K/uL    RBC 3.62 (L) 4.70 - 6.10 M/uL    Hemoglobin 11.1 (L) 14.0 - 18.0 g/dL    Hematocrit 35.6 (L) 42.0 - 52.0 %    MCV 98.3 (H) 81.4 - 97.8 fL    MCH 30.7 27.0 - 33.0 pg    MCHC 31.2 (L) 33.7 - 35.3 g/dL    RDW 51.9 (H) 35.9 - 50.0 fL    Platelet Count 237 164 - 446  K/uL    MPV 10.1 9.0 - 12.9 fL    Neutrophils-Polys 78.30 (H) 44.00 - 72.00 %    Lymphocytes 8.80 (L) 22.00 - 41.00 %    Monocytes 9.40 0.00 - 13.40 %    Eosinophils 1.80 0.00 - 6.90 %    Basophils 0.30 0.00 - 1.80 %    Immature Granulocytes 1.40 (H) 0.00 - 0.90 %    Nucleated RBC 0.10 /100 WBC    Neutrophils (Absolute) 12.27 (H) 1.82 - 7.42 K/uL    Lymphs (Absolute) 1.38 1.00 - 4.80 K/uL    Monos (Absolute) 1.47 (H) 0.00 - 0.85 K/uL    Eos (Absolute) 0.28 0.00 - 0.51 K/uL    Baso (Absolute) 0.04 0.00 - 0.12 K/uL    Immature Granulocytes (abs) 0.22 (H) 0.00 - 0.11 K/uL    NRBC (Absolute) 0.02 K/uL   MAGNESIUM    Collection Time: 10/08/18  6:17 AM   Result Value Ref Range    Magnesium 2.8 (H) 1.5 - 2.5 mg/dL   PHOSPHORUS    Collection Time: 10/08/18  6:17 AM   Result Value Ref Range    Phosphorus 4.7 (H) 2.5 - 4.5 mg/dL   COMP METABOLIC PANEL    Collection Time: 10/08/18  6:17 AM   Result Value Ref Range    Sodium 149 (H) 135 - 145 mmol/L    Potassium 4.5 3.6 - 5.5 mmol/L    Chloride 113 (H) 96 - 112 mmol/L    Co2 28 20 - 33 mmol/L    Anion Gap 8.0 0.0 - 11.9    Glucose 171 (H) 65 - 99 mg/dL    Bun 71 (HH) 8 - 22 mg/dL    Creatinine 0.98 0.50 - 1.40 mg/dL    Calcium 9.0 8.5 - 10.5 mg/dL    AST(SGOT) 24 12 - 45 U/L    ALT(SGPT) 33 2 - 50 U/L    Alkaline Phosphatase 74 30 - 99 U/L    Total Bilirubin 1.1 0.1 - 1.5 mg/dL    Albumin 3.6 3.2 - 4.9 g/dL    Total Protein 5.1 (L) 6.0 - 8.2 g/dL    Globulin 1.5 (L) 1.9 - 3.5 g/dL    A-G Ratio 2.4 g/dL   TRIGLYCERIDE    Collection Time: 10/08/18  6:17 AM   Result Value Ref Range    Triglycerides 75 0 - 149 mg/dL   ESTIMATED GFR    Collection Time: 10/08/18  6:17 AM   Result Value Ref Range    GFR If African American >60 >60 mL/min/1.73 m 2    GFR If Non African American >60 >60 mL/min/1.73 m 2   ACCU-CHEK GLUCOSE    Collection Time: 10/08/18  6:40 AM   Result Value Ref Range    Glucose - Accu-Ck 168 (H) 65 - 99 mg/dL   ACCU-CHEK GLUCOSE    Collection Time: 10/08/18  7:35 AM    Result Value Ref Range    Glucose - Accu-Ck 177 (H) 65 - 99 mg/dL   ACCU-CHEK GLUCOSE    Collection Time: 10/08/18  8:57 AM   Result Value Ref Range    Glucose - Accu-Ck 185 (H) 65 - 99 mg/dL   ACCU-CHEK GLUCOSE    Collection Time: 10/08/18 10:08 AM   Result Value Ref Range    Glucose - Accu-Ck 159 (H) 65 - 99 mg/dL   ACCU-CHEK GLUCOSE    Collection Time: 10/08/18 11:05 AM   Result Value Ref Range    Glucose - Accu-Ck 169 (H) 65 - 99 mg/dL   ACCU-CHEK GLUCOSE    Collection Time: 10/08/18 11:48 AM   Result Value Ref Range    Glucose - Accu-Ck 184 (H) 65 - 99 mg/dL   ACCU-CHEK GLUCOSE    Collection Time: 10/08/18  1:07 PM   Result Value Ref Range    Glucose - Accu-Ck 149 (H) 65 - 99 mg/dL   ACCU-CHEK GLUCOSE    Collection Time: 10/08/18  2:06 PM   Result Value Ref Range    Glucose - Accu-Ck 144 (H) 65 - 99 mg/dL   ACCU-CHEK GLUCOSE    Collection Time: 10/08/18  3:06 PM   Result Value Ref Range    Glucose - Accu-Ck 161 (H) 65 - 99 mg/dL   ACCU-CHEK GLUCOSE    Collection Time: 10/08/18  4:01 PM   Result Value Ref Range    Glucose - Accu-Ck 154 (H) 65 - 99 mg/dL   ACCU-CHEK GLUCOSE    Collection Time: 10/08/18  6:53 PM   Result Value Ref Range    Glucose - Accu-Ck 172 (H) 65 - 99 mg/dL   ACCU-CHEK GLUCOSE    Collection Time: 10/08/18 11:11 PM   Result Value Ref Range    Glucose - Accu-Ck 172 (H) 65 - 99 mg/dL   ACCU-CHEK GLUCOSE    Collection Time: 10/09/18  1:13 AM   Result Value Ref Range    Glucose - Accu-Ck 184 (H) 65 - 99 mg/dL   ACCU-CHEK GLUCOSE    Collection Time: 10/09/18  2:05 AM   Result Value Ref Range    Glucose - Accu-Ck 166 (H) 65 - 99 mg/dL   ACCU-CHEK GLUCOSE    Collection Time: 10/09/18  3:07 AM   Result Value Ref Range    Glucose - Accu-Ck 172 (H) 65 - 99 mg/dL   ACCU-CHEK GLUCOSE    Collection Time: 10/09/18  4:31 AM   Result Value Ref Range    Glucose - Accu-Ck 157 (H) 65 - 99 mg/dL   CBC with Differential    Collection Time: 10/09/18  4:33 AM   Result Value Ref Range    WBC 13.0 (H) 4.8 - 10.8  K/uL    RBC 3.57 (L) 4.70 - 6.10 M/uL    Hemoglobin 10.9 (L) 14.0 - 18.0 g/dL    Hematocrit 34.8 (L) 42.0 - 52.0 %    MCV 97.5 81.4 - 97.8 fL    MCH 30.5 27.0 - 33.0 pg    MCHC 31.3 (L) 33.7 - 35.3 g/dL    RDW 50.1 (H) 35.9 - 50.0 fL    Platelet Count 197 164 - 446 K/uL    MPV 10.3 9.0 - 12.9 fL    Neutrophils-Polys 80.40 (H) 44.00 - 72.00 %    Lymphocytes 9.00 (L) 22.00 - 41.00 %    Monocytes 8.40 0.00 - 13.40 %    Eosinophils 1.00 0.00 - 6.90 %    Basophils 0.40 0.00 - 1.80 %    Immature Granulocytes 0.80 0.00 - 0.90 %    Nucleated RBC 0.00 /100 WBC    Neutrophils (Absolute) 10.48 (H) 1.82 - 7.42 K/uL    Lymphs (Absolute) 1.17 1.00 - 4.80 K/uL    Monos (Absolute) 1.10 (H) 0.00 - 0.85 K/uL    Eos (Absolute) 0.13 0.00 - 0.51 K/uL    Baso (Absolute) 0.05 0.00 - 0.12 K/uL    Immature Granulocytes (abs) 0.11 0.00 - 0.11 K/uL    NRBC (Absolute) 0.00 K/uL   MAGNESIUM    Collection Time: 10/09/18  4:33 AM   Result Value Ref Range    Magnesium 2.6 (H) 1.5 - 2.5 mg/dL   PHOSPHORUS    Collection Time: 10/09/18  4:33 AM   Result Value Ref Range    Phosphorus 4.0 2.5 - 4.5 mg/dL   COMP METABOLIC PANEL    Collection Time: 10/09/18  4:33 AM   Result Value Ref Range    Sodium 149 (H) 135 - 145 mmol/L    Potassium 3.9 3.6 - 5.5 mmol/L    Chloride 114 (H) 96 - 112 mmol/L    Co2 29 20 - 33 mmol/L    Anion Gap 6.0 0.0 - 11.9    Glucose 177 (H) 65 - 99 mg/dL    Bun 67 (H) 8 - 22 mg/dL    Creatinine 0.90 0.50 - 1.40 mg/dL    Calcium 8.6 8.5 - 10.5 mg/dL    AST(SGOT) 16 12 - 45 U/L    ALT(SGPT) 27 2 - 50 U/L    Alkaline Phosphatase 79 30 - 99 U/L    Total Bilirubin 0.9 0.1 - 1.5 mg/dL    Albumin 3.1 (L) 3.2 - 4.9 g/dL    Total Protein 4.8 (L) 6.0 - 8.2 g/dL    Globulin 1.7 (L) 1.9 - 3.5 g/dL    A-G Ratio 1.8 g/dL   ESTIMATED GFR    Collection Time: 10/09/18  4:33 AM   Result Value Ref Range    GFR If African American >60 >60 mL/min/1.73 m 2    GFR If Non African American >60 >60 mL/min/1.73 m 2   ISTAT ARTERIAL BLOOD GAS    Collection  Time: 10/09/18  4:55 AM   Result Value Ref Range    Ph 7.489 7.400 - 7.500    Pco2 40.0 (H) 26.0 - 37.0 mmHg    Po2 66 64 - 87 mmHg    Tco2 32 20 - 33 mmol/L    S02 94 93 - 99 %    Hco3 30.4 (H) 17.0 - 25.0 mmol/L    BE 7 (H) -4 - 3 mmol/L    Body Temp 37.4 C degrees    O2 Therapy 40 %    iPF Ratio 165     Ph Temp Cesar 7.483 7.400 - 7.500    Pco2 Temp Co 40.7 (H) 26.0 - 37.0 mmHg    Po2 Temp Cor 68 64 - 87 mmHg    Specimen Arterial     Action Range Triggered NO     Inst. Qualified Patient YES        Imaging  X-Ray:  I have personally reviewed the images and compared with prior images.   Interpretation noted above and films are reviewed with Team on rounds      Assessment/Plan  * Acute respiratory failure with hypoxia (HCC)- (present on admission)   Assessment & Plan    Intubated 9/29  RT protocols  Continue vent support, not appropriate for liberation at this time  All appropriate ventilator bundles have been initiated  Serial ABG, chest x-ray and ventilator mechanics review  Ventilator adjusted daily or more frequently as needed  Oxygenation worse, titrating PEEP and FiO2 upwards  Wife is adamant no tracheostomy at this time, probably no LTACH as well    CODE STATUS is being reviewe        Acute pulmonary edema (HCC)   Assessment & Plan    Echo with normal LV systolic function  Significant pulmonary hypertension causing LV diastolic dysfunction  Volume overloaded clinically with worsening pulmonary edema radiographically and by exam  Continue Lasix, 40 mg IV, increase to twice daily dosing  Monitor I's and O's and BMP  Bicarb increasing, monitor for the need for Diamox as an adjunct to furosemide, may need tomorrow          CAP (community acquired pneumonia)- (present on admission)   Assessment & Plan    Influenza negative  Completed 5 days of Unasyn and azithromycin on 10/4  Observe off antibiotics  Monitor  Update vaccines prior to discharge  Monitor for HCAP, patient may need diagnostic and therapeutic  bronchoscopy        Ileus (HCC)   Assessment & Plan    Secondary to above  Trickle tube feeds at 10 cc/h do not increase for now  Avoid lactulose  Bowel care protocols  Monitor for the need for Relistor therapy  Mobilize as tolerated        Hemoptysis   Assessment & Plan    Bronchoscopy revealed friable and nodular mucosa in the left mainstem bronchus, no obvious malignancy  Hold all anticoagulation for now  Bronchoscopy with biopsy and brushing performed on 10/7, cytology and pathology pending        Atelectasis   Assessment & Plan    Aggressive pulmonary toilet  Serial chest x-ray, therapeutic bronchoscopy as needed  Wean off ventilator mobilize/IS/PEP        Paroxysmal atrial fibrillation/atrial flutter (HCC)   Assessment & Plan    Continue amiodarone, 400 mg twice daily  No anticoagulation due to hemoptysis, monitor  Optimize magnesium and potassium aggressively  Rate control with CCB or BB as Bp allows, alternatively digoxin therapy as needed        Pulmonary hypertension (HCC)   Assessment & Plan    RVSP 65 mm Hg  CTA without evidence of pulmonary embolism  Force diuresis with Lasix  ROS for BEBA when extubated  Suspect chronic process given the severity of elevation        Acute metabolic encephalopathy   Assessment & Plan    Etiology is metabolic as well as due to sepsis  Limit sedatives  Improved but persisting        Elevated troponin- (present on admission)   Assessment & Plan    Continue aspirin and statin        Azotemia   Assessment & Plan    EZRA improved  Remains fairly prerenal with elevated BUN and slight improved creatinine  Serum sodium borderline elevated  Clinically volume overloaded by exam systemically and pulmonary wise  Ongoing diuresis  Monitor BMP        Hypernatremia   Assessment & Plan    Continue free water, 200 mL every 8 hours as tolerated  Serial BMP        Fever   Assessment & Plan    This was due to dexmedetomidine  Fever has resolved after dexmedetomidine discontinued  UA  unremarkable  Blood cultures negative from 10/5  Bronchial washings from 10/3 with no growth  Avoid dexmedetomidine        Hyperglycemia- (present on admission)   Assessment & Plan    Titrating insulin drip for glucose control  Daily reviewed insulin dosing with clinical pharmacist and at bedtime SSI/long-acting insulin as clinically appropriate  Hypoglycemia protocols ongoing        Dyslipidemia- (present on admission)   Assessment & Plan    Continue statin/diet             VTE:  Contraindicated  Ulcer: H2 Antagonist  Lines: Central Line  Ongoing indication addressed    I have performed a physical exam and reviewed and updated ROS and Plan today (10/9/2018). In review of yesterday's note (10/8/2018), there are no changes except as documented above.     Prognosis remains very guarded.  I have assessed and reassessed this patient today.  Critically ill with unstable pulmonary status on full vent support inappropriate for liberation with titrating upward of FiO2 and PEEP without great success so far.  Additionally, the patient has actively titrated insulin and Chris-Synephrine was titrated off this a.m. for now.  Additional intravenous amiodarone being administered for A. fib with RVR.  Forced diuresis with IV Lasix being increased as well and necessity.  High risk of deterioration and worsening vital organ dysfunction and death without the above critical care interventions.    Discussed patient condition and risk of morbidity and/or mortality with Hospitalist, Family, RN, RT, Pharmacy, , Code status disscussed and Charge nurse / hot rounds     The patient remains critically ill.  Critical care time = 50 minutes in directly providing and coordinating critical care and extensive data review.  No time overlap and excludes procedures.    High risk of deterioration and worsening vital organ dysfunction and death without the above critical care interventions.    Pulmonary and Critical Care Medicine

## 2018-10-10 NOTE — CARE PLAN
Problem: Ventilation Defect:  Goal: Ability to achieve and maintain unassisted ventilation or tolerate decreased levels of ventilator support  Outcome: PROGRESSING AS EXPECTED  Adult Ventilation Update    Total Vent Days: 12  RR: 16 VT: 440 PEEP: 12 FiO2: 40%    Patient Lines/Drains/Airways Status    Active Airway     Name: Placement date: Placement time: Site: Days:    Airway ETT Oral 8.0 09/29/18   1714   Oral   12              Plateau Pressure (Q Shift): 20 (10/10/18 0632)  Static Compliance (ml / cm H2O): 39 (10/10/18 1332)    Sputum/Suction   Cough: Non Productive (10/10/18 1332)  Sputum Amount: Unable to Evaluate (10/10/18 1332)  Sputum Color: Unable to Evaluate (10/10/18 1332)  Sputum Consistency: Unable to Evaluate (10/10/18 1332)    Events/Summary/Plan: No vent changes made at this time (10/10/18 1332)

## 2018-10-10 NOTE — PROGRESS NOTES
Discussed Neuro finding with Dr. Church at bedside. Discussed assessment with turn down of fentanyl and turn up of sedation medication. Order placed for EEG.

## 2018-10-10 NOTE — PROGRESS NOTES
Critical Care Progress Note    Date of admission  9/29/2018    Chief Complaint  78 y.o. male admitted 9/29/2018 with cough and shortness of breath.    Hospital Course  This gentleman was admitted with severe sepsis, respiratory failure and pneumonia.    Interval Problem Update  Reviewed last 24 hour events:      EEG pending - AMS last night, prelim review no seizure activity  Propofol 5  Fentanyl 75  Not following - pourposeful  SBp 120s  SR 70s  Vent day#12  PEEP 12, fiO2 40%  CXR better edema  Secretions - bloody  TF goal  Lasix  Insulin 1.5  SCDs - no heparins    Drop lasix  Check FOB path from Sunday -negative for malignant cells     YESTERDAY  Prop 15  Follows minimal  SR 80s  SBp 110-130s  Tm 99.9  UO adequate  TF 20cc/hr  Insulin drip 2U/hr  EOB with assistance  Vent day#11  CXR worsening bilateral opacities consistent with pulmonary edema/ARDS, bibasilar atelectasis, ET tube okay, no pneumothorax  RSBI > 150 with short trial on CPAP  Secretions min   VTE held for hemoptysis  Lasix  Creatinine unchanged, BUN borderline mildly improved but still elevated    SR -> AF/-150  Amiodarone 150 mg bolus ordered  Enteral loading of amiodarone ongoing    No trach per wife  Reviewed case with her at bedside at great length  Advanced directives reviewed with her at length the bedside  Reviewed goals of therapy at length with her bedside  Request ongoing continued aggressive treatment but no tracheostomy and no CPR    Serial follow-up  Oxygenation worse  PEEP increased from 8 to 10 and rechecked in 1-2 hours, still with borderline O2 sats  PEEP increased from 10 to 12, monitoring for recruitment and improved oxygenation  Hemodynamics and urine output noted    Review of Systems  Review of Systems   Unable to perform ROS: Intubated   Sedated with propofol    Vital Signs for last 24 hours   Temp:  [37.2 °C (99 °F)-37.7 °C (99.9 °F)] 37.2 °C (99 °F)  Pulse:  [] 70  Resp:  [16-29] 16    Hemodynamic parameters for  last 24 hours       Vent Settings for last 24 hours  [place] Vent Mode: APVCMV  Rate (breaths/min):  [16] 16  PEEP/CPAP:  [8-12] 12  FiO2:  [40-60] 40  P Peak (PIP):  [15-26] 24  P MEAN:  [12-16] 15    Ventilator mechanics and waveforms are reviewed at the bedside with RT    Physical Exam   Physical Exam   Constitutional: He appears lethargic.  Non-toxic appearance. He has a sickly appearance (Unchanged). No distress.   Sedated on ventilator, looks acute and chronically ill as well as uncomfortable   HENT:   Head: Normocephalic.   Right Ear: External ear normal.   Left Ear: External ear normal.   Mouth/Throat: Oropharynx is clear and moist. No oropharyngeal exudate.   Eyes: Pupils are equal, round, and reactive to light. Conjunctivae are normal. Right eye exhibits no discharge. Left eye exhibits no discharge. No scleral icterus.   Neck: Neck supple. No JVD present. No tracheal deviation present.   Cardiovascular: Normal rate, regular rhythm and intact distal pulses.   No extrasystoles are present. Exam reveals no gallop and no friction rub.    Murmur (2/6 flow murmur?  Motley best at apex (nothing noted on echocardiogram), no change) heard.  Atrial fibrillation converted to sinus rhythm with bundle branch block   Pulmonary/Chest: No accessory muscle usage (On full vent support). No respiratory distress. He has no wheezes. He has no rhonchi. He has rales (Scattered crackles bilaterally, worse today) in the right lower field and the left lower field.   Abdominal: Soft. Bowel sounds are normal. He exhibits no distension. There is no hepatosplenomegaly. There is no tenderness. There is no rigidity, no rebound, no guarding and no CVA tenderness.   Tolerating enteral tube feedings   Musculoskeletal: He exhibits edema (Mild). He exhibits no tenderness or deformity.   No clubbing or cyanosis   Neurological: He appears lethargic. No cranial nerve deficit or sensory deficit. GCS eye subscore is 4. GCS verbal subscore is 1.  GCS motor subscore is 5.   Sedated.  Arouses but does not follow for me.  Grimaces and appears uncomfortable.  Moves all 4 extremities spontaneously and has spontaneous eye opening.,  Localizes and withdraws to tactile stimuli.  No change.   Skin: Skin is warm and dry. No rash noted. He is not diaphoretic. No cyanosis or erythema. Nails show no clubbing.   Psychiatric: He is noncommunicative.       Medications  Current Facility-Administered Medications   Medication Dose Route Frequency Provider Last Rate Last Dose   • furosemide (LASIX) injection 40 mg  40 mg Intravenous BID DIURETIC Gonzalo Rose D.O.   Stopped at 10/10/18 0600   • fentaNYL (SUBLIMAZE) 50 mcg/mL in 50mL (Continuous Infusion)   Intravenous Continuous Justin Sierra M.D. 1.5 mL/hr at 10/09/18 2100 75 mcg/hr at 10/09/18 2100   • metoclopramide (REGLAN) injection 10 mg  10 mg Intravenous Q6HRS Dre Robbins M.D.   10 mg at 10/10/18 0519   • propofol (DIPRIVAN) injection  0-80 mcg/kg/min Intravenous Continuous Brandon Paulino M.D.   Stopped at 10/10/18 0500   • metoprolol (LOPRESSOR) tablet 12.5 mg  12.5 mg Oral TWICE DAILY Dre Robbins M.D.   12.5 mg at 10/09/18 1700   • potassium bicarbonate (KLYTE) effervescent tablet 25 mEq  25 mEq Feeding Tube BID Brandon Paulino M.D.   Stopped at 10/10/18 0502   • phenylephrine (MARYSOL-SYNEPHRINE) 40,000 mcg in  mL Infusion  0-300 mcg/min Intravenous Continuous Brandon Paulino M.D.   Stopped at 10/08/18 0700   • amiodarone (CORDARONE) tablet 400 mg  400 mg Per NG Tube TWICE DAILY Brandon Paulino M.D.   400 mg at 10/10/18 0519   • ondansetron (ZOFRAN ODT) dispertab 4 mg  4 mg Oral Q4HRS PRN Brandon Paulino M.D.       • famotidine (PEPCID) tablet 20 mg  20 mg Feeding Tube Q12HRS Brandon Paulino M.D.   20 mg at 10/10/18 0520   • insulin regular human (HUMULIN/NOVOLIN R) 62.5 Units in  mL infusion per protocol  0-29 Units/hr Intravenous  Continuous Brandon Paulino M.D. 6 mL/hr at 10/10/18 0419 1.5 Units/hr at 10/10/18 0419   • dextrose 50% (D50W) injection 25-50 mL  12.5-25 g Intravenous PRN Brandon Paulino M.D.       • Respiratory Care per Protocol   Nebulization Continuous RT Walter Carrion M.D.       • ondansetron (ZOFRAN) syringe/vial injection 4 mg  4 mg Intravenous Q4HRS PRN Walter Carrion M.D.       • MD Alert...ICU Electrolyte Replacement per Pharmacy   Other pharmacy to dose Jeremy M Gonda, M.D.       • fentaNYL (SUBLIMAZE) injection 25 mcg  25 mcg Intravenous Q HOUR PRN Jeremy M Gonda, M.D.   25 mcg at 10/05/18 2059    Or   • fentaNYL (SUBLIMAZE) injection 50 mcg  50 mcg Intravenous Q HOUR PRN Jeremy M Gonda, M.D.   50 mcg at 10/09/18 2117    Or   • fentaNYL (SUBLIMAZE) injection 100 mcg  100 mcg Intravenous Q HOUR PRN Jeremy M Gonda, M.D.   100 mcg at 10/05/18 1207   • ipratropium-albuterol (DUONEB) nebulizer solution  3 mL Nebulization Q2HRS PRN (RT) Jeremy M Gonda, M.D.   3 mL at 10/08/18 1956   • Pharmacy Consult: Enteral tube feeding - review meds/change route/product selection   Other PRN Jeremy M Gonda, M.D.       • senna-docusate (PERICOLACE or SENOKOT S) 8.6-50 MG per tablet 2 Tab  2 Tab Feeding Tube BID Jeremy M Gonda, M.D.   2 Tab at 10/10/18 0519    And   • polyethylene glycol/lytes (MIRALAX) PACKET 1 Packet  1 Packet Feeding Tube QDAY PRN Jeremy M Gonda, M.D.   1 Packet at 10/06/18 1222    And   • magnesium hydroxide (MILK OF MAGNESIA) suspension 30 mL  30 mL Feeding Tube QDAY PRN Jeremy M Gonda, M.D.   30 mL at 10/02/18 1322    And   • bisacodyl (DULCOLAX) suppository 10 mg  10 mg Rectal QDAY PRN Jeremy M Gonda, M.D.   10 mg at 10/06/18 1222   • acetaminophen (TYLENOL) tablet 650 mg  650 mg Feeding Tube Q6HRS PRN Jeremy M Gonda, M.D.   650 mg at 10/06/18 1153   • atorvastatin (LIPITOR) tablet 40 mg  40 mg Per NG Tube Q EVENING Jeremy M Gonda, M.D.   40 mg at 10/09/18 1700   • aspirin (ASA) chewable tab 81 mg  81  mg Per NG Tube DAILY Jeremy M Gonda, M.D.   81 mg at 10/10/18 0519       Fluids    Intake/Output Summary (Last 24 hours) at 10/10/18 0602  Last data filed at 10/10/18 0400   Gross per 24 hour   Intake          1176.76 ml   Output             2200 ml   Net         -1023.24 ml       Laboratory  Recent Results (from the past 48 hour(s))   CBC with Differential    Collection Time: 10/08/18  6:17 AM   Result Value Ref Range    WBC 15.7 (H) 4.8 - 10.8 K/uL    RBC 3.62 (L) 4.70 - 6.10 M/uL    Hemoglobin 11.1 (L) 14.0 - 18.0 g/dL    Hematocrit 35.6 (L) 42.0 - 52.0 %    MCV 98.3 (H) 81.4 - 97.8 fL    MCH 30.7 27.0 - 33.0 pg    MCHC 31.2 (L) 33.7 - 35.3 g/dL    RDW 51.9 (H) 35.9 - 50.0 fL    Platelet Count 237 164 - 446 K/uL    MPV 10.1 9.0 - 12.9 fL    Neutrophils-Polys 78.30 (H) 44.00 - 72.00 %    Lymphocytes 8.80 (L) 22.00 - 41.00 %    Monocytes 9.40 0.00 - 13.40 %    Eosinophils 1.80 0.00 - 6.90 %    Basophils 0.30 0.00 - 1.80 %    Immature Granulocytes 1.40 (H) 0.00 - 0.90 %    Nucleated RBC 0.10 /100 WBC    Neutrophils (Absolute) 12.27 (H) 1.82 - 7.42 K/uL    Lymphs (Absolute) 1.38 1.00 - 4.80 K/uL    Monos (Absolute) 1.47 (H) 0.00 - 0.85 K/uL    Eos (Absolute) 0.28 0.00 - 0.51 K/uL    Baso (Absolute) 0.04 0.00 - 0.12 K/uL    Immature Granulocytes (abs) 0.22 (H) 0.00 - 0.11 K/uL    NRBC (Absolute) 0.02 K/uL   MAGNESIUM    Collection Time: 10/08/18  6:17 AM   Result Value Ref Range    Magnesium 2.8 (H) 1.5 - 2.5 mg/dL   PHOSPHORUS    Collection Time: 10/08/18  6:17 AM   Result Value Ref Range    Phosphorus 4.7 (H) 2.5 - 4.5 mg/dL   COMP METABOLIC PANEL    Collection Time: 10/08/18  6:17 AM   Result Value Ref Range    Sodium 149 (H) 135 - 145 mmol/L    Potassium 4.5 3.6 - 5.5 mmol/L    Chloride 113 (H) 96 - 112 mmol/L    Co2 28 20 - 33 mmol/L    Anion Gap 8.0 0.0 - 11.9    Glucose 171 (H) 65 - 99 mg/dL    Bun 71 (HH) 8 - 22 mg/dL    Creatinine 0.98 0.50 - 1.40 mg/dL    Calcium 9.0 8.5 - 10.5 mg/dL    AST(SGOT) 24 12 - 45  U/L    ALT(SGPT) 33 2 - 50 U/L    Alkaline Phosphatase 74 30 - 99 U/L    Total Bilirubin 1.1 0.1 - 1.5 mg/dL    Albumin 3.6 3.2 - 4.9 g/dL    Total Protein 5.1 (L) 6.0 - 8.2 g/dL    Globulin 1.5 (L) 1.9 - 3.5 g/dL    A-G Ratio 2.4 g/dL   TRIGLYCERIDE    Collection Time: 10/08/18  6:17 AM   Result Value Ref Range    Triglycerides 75 0 - 149 mg/dL   ESTIMATED GFR    Collection Time: 10/08/18  6:17 AM   Result Value Ref Range    GFR If African American >60 >60 mL/min/1.73 m 2    GFR If Non African American >60 >60 mL/min/1.73 m 2   ACCU-CHEK GLUCOSE    Collection Time: 10/08/18  6:40 AM   Result Value Ref Range    Glucose - Accu-Ck 168 (H) 65 - 99 mg/dL   ACCU-CHEK GLUCOSE    Collection Time: 10/08/18  7:35 AM   Result Value Ref Range    Glucose - Accu-Ck 177 (H) 65 - 99 mg/dL   ACCU-CHEK GLUCOSE    Collection Time: 10/08/18  8:57 AM   Result Value Ref Range    Glucose - Accu-Ck 185 (H) 65 - 99 mg/dL   ACCU-CHEK GLUCOSE    Collection Time: 10/08/18 10:08 AM   Result Value Ref Range    Glucose - Accu-Ck 159 (H) 65 - 99 mg/dL   ACCU-CHEK GLUCOSE    Collection Time: 10/08/18 11:05 AM   Result Value Ref Range    Glucose - Accu-Ck 169 (H) 65 - 99 mg/dL   ACCU-CHEK GLUCOSE    Collection Time: 10/08/18 11:48 AM   Result Value Ref Range    Glucose - Accu-Ck 184 (H) 65 - 99 mg/dL   ACCU-CHEK GLUCOSE    Collection Time: 10/08/18  1:07 PM   Result Value Ref Range    Glucose - Accu-Ck 149 (H) 65 - 99 mg/dL   ACCU-CHEK GLUCOSE    Collection Time: 10/08/18  2:06 PM   Result Value Ref Range    Glucose - Accu-Ck 144 (H) 65 - 99 mg/dL   ACCU-CHEK GLUCOSE    Collection Time: 10/08/18  3:06 PM   Result Value Ref Range    Glucose - Accu-Ck 161 (H) 65 - 99 mg/dL   ACCU-CHEK GLUCOSE    Collection Time: 10/08/18  4:01 PM   Result Value Ref Range    Glucose - Accu-Ck 154 (H) 65 - 99 mg/dL   ACCU-CHEK GLUCOSE    Collection Time: 10/08/18  6:53 PM   Result Value Ref Range    Glucose - Accu-Ck 172 (H) 65 - 99 mg/dL   ACCU-CHEK GLUCOSE     Collection Time: 10/08/18  9:14 PM   Result Value Ref Range    Glucose - Accu-Ck 160 (H) 65 - 99 mg/dL   ACCU-CHEK GLUCOSE    Collection Time: 10/08/18 11:11 PM   Result Value Ref Range    Glucose - Accu-Ck 172 (H) 65 - 99 mg/dL   ACCU-CHEK GLUCOSE    Collection Time: 10/09/18  1:13 AM   Result Value Ref Range    Glucose - Accu-Ck 184 (H) 65 - 99 mg/dL   ACCU-CHEK GLUCOSE    Collection Time: 10/09/18  2:05 AM   Result Value Ref Range    Glucose - Accu-Ck 166 (H) 65 - 99 mg/dL   ACCU-CHEK GLUCOSE    Collection Time: 10/09/18  3:07 AM   Result Value Ref Range    Glucose - Accu-Ck 172 (H) 65 - 99 mg/dL   ACCU-CHEK GLUCOSE    Collection Time: 10/09/18  4:31 AM   Result Value Ref Range    Glucose - Accu-Ck 157 (H) 65 - 99 mg/dL   CBC with Differential    Collection Time: 10/09/18  4:33 AM   Result Value Ref Range    WBC 13.0 (H) 4.8 - 10.8 K/uL    RBC 3.57 (L) 4.70 - 6.10 M/uL    Hemoglobin 10.9 (L) 14.0 - 18.0 g/dL    Hematocrit 34.8 (L) 42.0 - 52.0 %    MCV 97.5 81.4 - 97.8 fL    MCH 30.5 27.0 - 33.0 pg    MCHC 31.3 (L) 33.7 - 35.3 g/dL    RDW 50.1 (H) 35.9 - 50.0 fL    Platelet Count 197 164 - 446 K/uL    MPV 10.3 9.0 - 12.9 fL    Neutrophils-Polys 80.40 (H) 44.00 - 72.00 %    Lymphocytes 9.00 (L) 22.00 - 41.00 %    Monocytes 8.40 0.00 - 13.40 %    Eosinophils 1.00 0.00 - 6.90 %    Basophils 0.40 0.00 - 1.80 %    Immature Granulocytes 0.80 0.00 - 0.90 %    Nucleated RBC 0.00 /100 WBC    Neutrophils (Absolute) 10.48 (H) 1.82 - 7.42 K/uL    Lymphs (Absolute) 1.17 1.00 - 4.80 K/uL    Monos (Absolute) 1.10 (H) 0.00 - 0.85 K/uL    Eos (Absolute) 0.13 0.00 - 0.51 K/uL    Baso (Absolute) 0.05 0.00 - 0.12 K/uL    Immature Granulocytes (abs) 0.11 0.00 - 0.11 K/uL    NRBC (Absolute) 0.00 K/uL   MAGNESIUM    Collection Time: 10/09/18  4:33 AM   Result Value Ref Range    Magnesium 2.6 (H) 1.5 - 2.5 mg/dL   PHOSPHORUS    Collection Time: 10/09/18  4:33 AM   Result Value Ref Range    Phosphorus 4.0 2.5 - 4.5 mg/dL   COMP METABOLIC  PANEL    Collection Time: 10/09/18  4:33 AM   Result Value Ref Range    Sodium 149 (H) 135 - 145 mmol/L    Potassium 3.9 3.6 - 5.5 mmol/L    Chloride 114 (H) 96 - 112 mmol/L    Co2 29 20 - 33 mmol/L    Anion Gap 6.0 0.0 - 11.9    Glucose 177 (H) 65 - 99 mg/dL    Bun 67 (H) 8 - 22 mg/dL    Creatinine 0.90 0.50 - 1.40 mg/dL    Calcium 8.6 8.5 - 10.5 mg/dL    AST(SGOT) 16 12 - 45 U/L    ALT(SGPT) 27 2 - 50 U/L    Alkaline Phosphatase 79 30 - 99 U/L    Total Bilirubin 0.9 0.1 - 1.5 mg/dL    Albumin 3.1 (L) 3.2 - 4.9 g/dL    Total Protein 4.8 (L) 6.0 - 8.2 g/dL    Globulin 1.7 (L) 1.9 - 3.5 g/dL    A-G Ratio 1.8 g/dL   ESTIMATED GFR    Collection Time: 10/09/18  4:33 AM   Result Value Ref Range    GFR If African American >60 >60 mL/min/1.73 m 2    GFR If Non African American >60 >60 mL/min/1.73 m 2   ISTAT ARTERIAL BLOOD GAS    Collection Time: 10/09/18  4:55 AM   Result Value Ref Range    Ph 7.489 7.400 - 7.500    Pco2 40.0 (H) 26.0 - 37.0 mmHg    Po2 66 64 - 87 mmHg    Tco2 32 20 - 33 mmol/L    S02 94 93 - 99 %    Hco3 30.4 (H) 17.0 - 25.0 mmol/L    BE 7 (H) -4 - 3 mmol/L    Body Temp 37.4 C degrees    O2 Therapy 40 %    iPF Ratio 165     Ph Temp Cesar 7.483 7.400 - 7.500    Pco2 Temp Co 40.7 (H) 26.0 - 37.0 mmHg    Po2 Temp Cor 68 64 - 87 mmHg    Specimen Arterial     Action Range Triggered NO     Inst. Qualified Patient YES    ACCU-CHEK GLUCOSE    Collection Time: 10/09/18  6:06 AM   Result Value Ref Range    Glucose - Accu-Ck 149 (H) 65 - 99 mg/dL   ACCU-CHEK GLUCOSE    Collection Time: 10/09/18  9:42 AM   Result Value Ref Range    Glucose - Accu-Ck 149 (H) 65 - 99 mg/dL   ACCU-CHEK GLUCOSE    Collection Time: 10/09/18 11:36 AM   Result Value Ref Range    Glucose - Accu-Ck 154 (H) 65 - 99 mg/dL   ACCU-CHEK GLUCOSE    Collection Time: 10/09/18  1:58 PM   Result Value Ref Range    Glucose - Accu-Ck 168 (H) 65 - 99 mg/dL   ACCU-CHEK GLUCOSE    Collection Time: 10/09/18  4:15 PM   Result Value Ref Range    Glucose -  Accu-Ck 177 (H) 65 - 99 mg/dL   ACCU-CHEK GLUCOSE    Collection Time: 10/09/18  5:46 PM   Result Value Ref Range    Glucose - Accu-Ck 157 (H) 65 - 99 mg/dL   ACCU-CHEK GLUCOSE    Collection Time: 10/09/18  8:46 PM   Result Value Ref Range    Glucose - Accu-Ck 140 (H) 65 - 99 mg/dL   ACCU-CHEK GLUCOSE    Collection Time: 10/09/18 10:51 PM   Result Value Ref Range    Glucose - Accu-Ck 138 (H) 65 - 99 mg/dL   ACCU-CHEK GLUCOSE    Collection Time: 10/09/18 11:58 PM   Result Value Ref Range    Glucose - Accu-Ck 146 (H) 65 - 99 mg/dL   ACCU-CHEK GLUCOSE    Collection Time: 10/10/18  1:37 AM   Result Value Ref Range    Glucose - Accu-Ck 156 (H) 65 - 99 mg/dL   CBC with Differential    Collection Time: 10/10/18  3:59 AM   Result Value Ref Range    WBC 14.9 (H) 4.8 - 10.8 K/uL    RBC 3.48 (L) 4.70 - 6.10 M/uL    Hemoglobin 11.0 (L) 14.0 - 18.0 g/dL    Hematocrit 34.2 (L) 42.0 - 52.0 %    MCV 98.3 (H) 81.4 - 97.8 fL    MCH 31.6 27.0 - 33.0 pg    MCHC 32.2 (L) 33.7 - 35.3 g/dL    RDW 51.0 (H) 35.9 - 50.0 fL    Platelet Count 208 164 - 446 K/uL    MPV 10.7 9.0 - 12.9 fL    Neutrophils-Polys 82.50 (H) 44.00 - 72.00 %    Lymphocytes 7.70 (L) 22.00 - 41.00 %    Monocytes 7.90 0.00 - 13.40 %    Eosinophils 0.90 0.00 - 6.90 %    Basophils 0.30 0.00 - 1.80 %    Immature Granulocytes 0.70 0.00 - 0.90 %    Nucleated RBC 0.00 /100 WBC    Neutrophils (Absolute) 12.29 (H) 1.82 - 7.42 K/uL    Lymphs (Absolute) 1.14 1.00 - 4.80 K/uL    Monos (Absolute) 1.17 (H) 0.00 - 0.85 K/uL    Eos (Absolute) 0.13 0.00 - 0.51 K/uL    Baso (Absolute) 0.04 0.00 - 0.12 K/uL    Immature Granulocytes (abs) 0.11 0.00 - 0.11 K/uL    NRBC (Absolute) 0.00 K/uL   MAGNESIUM    Collection Time: 10/10/18  3:59 AM   Result Value Ref Range    Magnesium 2.9 (H) 1.5 - 2.5 mg/dL   PHOSPHORUS    Collection Time: 10/10/18  3:59 AM   Result Value Ref Range    Phosphorus 5.0 (H) 2.5 - 4.5 mg/dL   COMP METABOLIC PANEL    Collection Time: 10/10/18  3:59 AM   Result Value Ref  Range    Sodium 149 (H) 135 - 145 mmol/L    Potassium 4.3 3.6 - 5.5 mmol/L    Chloride 112 96 - 112 mmol/L    Co2 30 20 - 33 mmol/L    Anion Gap 7.0 0.0 - 11.9    Glucose 160 (H) 65 - 99 mg/dL    Bun 79 (HH) 8 - 22 mg/dL    Creatinine 1.37 0.50 - 1.40 mg/dL    Calcium 8.0 (L) 8.5 - 10.5 mg/dL    AST(SGOT) 16 12 - 45 U/L    ALT(SGPT) 22 2 - 50 U/L    Alkaline Phosphatase 77 30 - 99 U/L    Total Bilirubin 0.9 0.1 - 1.5 mg/dL    Albumin 3.2 3.2 - 4.9 g/dL    Total Protein 4.7 (L) 6.0 - 8.2 g/dL    Globulin 1.5 (L) 1.9 - 3.5 g/dL    A-G Ratio 2.1 g/dL   ESTIMATED GFR    Collection Time: 10/10/18  3:59 AM   Result Value Ref Range    GFR If African American >60 >60 mL/min/1.73 m 2    GFR If Non African American 50 (A) >60 mL/min/1.73 m 2   ISTAT ARTERIAL BLOOD GAS    Collection Time: 10/10/18  4:52 AM   Result Value Ref Range    Ph 7.429 7.400 - 7.500    Pco2 45.3 (H) 26.0 - 37.0 mmHg    Po2 83 64 - 87 mmHg    Tco2 31 20 - 33 mmol/L    S02 96 93 - 99 %    Hco3 30.0 (H) 17.0 - 25.0 mmol/L    BE 5 (H) -4 - 3 mmol/L    Body Temp 37.2 C degrees    O2 Therapy 40 %    iPF Ratio 208     Ph Temp Cesar 7.426 7.400 - 7.500    Pco2 Temp Co 45.7 (H) 26.0 - 37.0 mmHg    Po2 Temp Cor 84 64 - 87 mmHg    Specimen Arterial     Action Range Triggered NO     Inst. Qualified Patient YES        Imaging  X-Ray:  I have personally reviewed the images and compared with prior images.   Interpretation noted above and films are reviewed with Team on rounds again      Assessment/Plan  * Acute respiratory failure with hypoxia (HCC)- (present on admission)   Assessment & Plan    Intubated 9/29  RT protocols  Continue vent support, not appropriate for liberation at this time  All appropriate ventilator bundles have been initiated  Serial ABG, chest x-ray and ventilator mechanics review  Ventilator adjusted daily or more frequently as needed  Oxygenation worse, PEEP up to 12  Wife is adamant no tracheostomy at this time, probably no LTACH as well           Acute pulmonary edema (HCC)   Assessment & Plan    Echo with normal LV systolic function  Significant pulmonary hypertension causing LV diastolic dysfunction  Volume overloaded clinically with worsening pulmonary edema radiographically and by exam  Continue Lasix, 40 mg IV BID oh  Monitor I's and O's and BMP  Add Diamox for contraction alkalosis?          CAP (community acquired pneumonia)- (present on admission)   Assessment & Plan    Influenza negative  Completed 5 days of Unasyn and azithromycin on 10/4  Observe off antibiotics  Update vaccines prior to discharge  Monitor for HCAP, patient may need diagnostic and therapeutic bronchoscopy  Monitor        Ileus (HCC)   Assessment & Plan    Secondary to above  Trickle tube feeds at 10 cc/h, increase slowly as tolerated  Avoid lactulose  Bowel care protocols  Relistor therapy  Mobilize as tolerated        Hemoptysis   Assessment & Plan    Bronchoscopy revealed friable and nodular mucosa in the left mainstem bronchus, no obvious malignancy  Hold all anticoagulation for now  Bronchoscopy with biopsy and brushing performed on 10/7, cytology and pathology negative for malignancy        Atelectasis   Assessment & Plan    Aggressive pulmonary toilet  Serial chest x-ray, therapeutic bronchoscopy as needed  Wean off ventilator mobilize/IS/PEP        Paroxysmal atrial fibrillation/atrial flutter (HCC)   Assessment & Plan    Continue loading with amiodarone, 400 mg twice daily  No anticoagulation due to hemoptysis, monitor  Optimize magnesium and potassium aggressively  Rate control with CCB or BB as Bp allows, alternatively digoxin therapy as needed  Monitor        Pulmonary hypertension (HCC)   Assessment & Plan    RVSP 65 mm Hg  CTA without evidence of pulmonary embolism  Force diuresis with Lasix ongoing  ROS for BEBA when extubated  Suspect chronic process given the severity of elevation        Acute metabolic encephalopathy   Assessment & Plan    Etiology is metabolic  as well as due to sepsis  Limit sedatives  Improved but persisting, not following        Elevated troponin- (present on admission)   Assessment & Plan    Continue aspirin and statin  Monitor        Azotemia   Assessment & Plan    EZRA worse again  Both BUN and creatinine worse  Serum sodium borderline elevated, unchanged  Clinically volume overloaded by exam systemically and pulmonary wise, no change  Ongoing diuresis with good results  Monitor BMP and  I/Os        Hypernatremia   Assessment & Plan    Continue free water, 200 mL every 8 hours as tolerated  Serial BMP  monitor        Fever   Assessment & Plan    This was due to dexmedetomidine  Fever has resolved after dexmedetomidine discontinued  UA unremarkable  Blood cultures negative from 10/5  Bronchial washings from 10/3 with no growth  Avoid dexmedetomidine, recent fever with trial        Hyperglycemia- (present on admission)   Assessment & Plan    Titrating insulin drip for glucose control  Daily reviewed insulin dosing with clinical pharmacist and at bedtime SSI/long-acting insulin as clinically appropriate  Hypoglycemia protocols ongoing  Monitor        Dyslipidemia- (present on admission)   Assessment & Plan    Continue statin/diet             VTE:  Contraindicated  Ulcer: H2 Antagonist  Lines: Central Line  Ongoing indication addressed    I have performed a physical exam and reviewed and updated ROS and Plan today (10/10/2018). In review of yesterday's note (10/9/2018), there are no changes except as documented above.     Prognosis remains very guarded.  I have assessed and reassessed this patient today.  Critically ill with unstable pulmonary status on full vent support inappropriate for liberation with high PEEP.  Additionally, the patient has actively titrated insulin.  Additional intravenous amiodarone being administered for A. fib with RVR.  Forced diuresis with IV Lasix being increased as well and necessity.  High risk of deterioration and worsening  vital organ dysfunction and death without the above critical care interventions.    Discussed patient condition and risk of morbidity and/or mortality with Hospitalist, RN, RT, Pharmacy, , Code status disscussed and Charge nurse / hot rounds     The patient remains critically ill.  Critical care time = 40 minutes in directly providing and coordinating critical care and extensive data review.  No time overlap and excludes procedures.    High risk of deterioration and worsening vital organ dysfunction and death without the above critical care interventions.    Pulmonary and Critical Care Medicine

## 2018-10-10 NOTE — PROCEDURES
VIDEO ELECTROENCEPHALOGRAM REPORT      Referring provider: Dr. Chruch.     DOS:  10/10/2018 (total recording of 27 minutes).     INDICATION:  Murali Lu 78 y.o. male presenting with altered mental status.     CURRENT ANTIEPILEPTIC REGIMEN: sedation held for study.     TECHNIQUE: 30 channel video electroencephalogram (EEG) was performed in accordance with the international 10-20 system. The study was reviewed in bipolar and referential montages. The recording examined a sedated patient.     DESCRIPTION OF THE RECORD:  Diffuse delta activity, with improvement in the background to a 7 hz theta activity with stimulation and once off sedation. Rare triphasic waves noted. No seizures.      ACTIVATION PROCEDURES:   Not performed.     EKG: sampling of the EKG recording demonstrated sinus rhythm.     EVENTS:  None.     INTERPRETATION:  This is an abnormal video EEG recording in a sedated patient. Mild diffuse cerebral dysfunction, likely toxic / metabolic, given presence of triphasics.  No seizures captured. Clinical correlation is recommended.      Ciro Lawrence MD   Epilepsy and Neurodiagnostics.   Clinical  of Neurology [facility].   Diplomate in Neurology, Epilepsy, and Electrodiagnostic Medicine.   Office: [phone]  Fax: [phone]

## 2018-10-10 NOTE — PROGRESS NOTES
12 hour chart check/ 2 RN skin check    MS: SR w/ Occ PVC/PAC  .16/.08/.38  70-90 (more ectopy noted w/ higher heart rate)

## 2018-10-10 NOTE — ASSESSMENT & PLAN NOTE
EZRA worse again  Sodium, BUN and creatinine worse  Clinically volume overloaded by exam systemically and pulmonary wise, oxygenation actually better  Ongoing diuresis with good results!  However he needs some free water now we will start with 200 cc every 6 hours via feeding tube  However I believe the need to reduce furosemide today 10/11  Monitor BMP and  I/Os

## 2018-10-10 NOTE — PROGRESS NOTES
[facility] Hospitalist Progress Note    Date of Service: 10/10/2018    Chief Complaint    78 y.o. male admitted 2018 with cough and shortness of breath.  Patient was admitted to the hospital and shortly thereafter had significant increase in shortness of breath.  CTA neg for PE but did show CAP.  Initially admitted to the floor however worsened requiring intubation     PMHx: HLD    Interval Problem Update  ROS unobtainable  Prop 5  fent 75  Follows intermitently  Sinus  -120s  AFebrile  TFs at 20ml/hr  Insulin gtts  UOP 1900ml/12hrs on 40 BID lasix      Consultants/Specialty  Cardiology    Disposition  COnt in ICU    Prognosis is poor at this time.  Wife very clear pt would not want a Trach.  May need to progress to Hospice    Review of Systems   Unable to perform ROS: Intubated      Physical Exam  Laboratory/Imaging   Hemodynamics  Temp (24hrs), Av.4 °C (99.4 °F), Min:37.2 °C (99 °F), Max:37.7 °C (99.9 °F)   Temperature: 37.2 °C (99 °F), Monitored Temp: 37.2 °C (99 °F)  Pulse  Av.7  Min: 60  Max: 142 Heart Rate (Monitored): 70  NIBP: (!) 99/59     Respiratory  Smith Vent Mode: APVCMV, Rate (breaths/min): 16, PEEP/CPAP: 12, PEEP/CPAP: 12, FiO2: 40, P Peak (PIP): 24, P MEAN: 15   Respiration: 16, Pulse Oximetry: 98 %     Work Of Breathing / Effort: Vented  RUL Breath Sounds: Clear, RML Breath Sounds: Diminished, RLL Breath Sounds: Diminished, BRITTANY Breath Sounds: Clear, LLL Breath Sounds: Rhonchi    Fluids    Intake/Output Summary (Last 24 hours) at 10/10/18 0555  Last data filed at 10/10/18 0400   Gross per 24 hour   Intake          1811.56 ml   Output             2275 ml   Net          -463.44 ml       Nutrition  Orders Placed This Encounter   Procedures   • Diet NPO     Standing Status:   Standing     Number of Occurrences:   1     Order Specific Question:   Type:     Answer:   Now [1]     Order Specific Question:   Restrict to:     Answer:   Strict [1]     Physical Exam   Constitutional: He  appears well-developed and well-nourished. No distress.   HENT:   Head: Normocephalic and atraumatic.   Eyes: Conjunctivae are normal.   Neck: Neck supple. No JVD present.   Cardiovascular: Normal rate.  Exam reveals no gallop.    Murmur heard.  Pulmonary/Chest: No stridor. No respiratory distress. He has no wheezes. He has rales.   Abdominal: Soft. There is no tenderness. There is no rebound and no guarding.   Musculoskeletal: He exhibits edema.   Neurological:   Moving purposefully spontaneously.  Does not follow or attend   Skin: Skin is warm and dry. No rash noted. He is not diaphoretic.   Nursing note and vitals reviewed.      Recent Labs      10/08/18   0617  10/09/18   0433  10/10/18   0359   WBC  15.7*  13.0*  14.9*   RBC  3.62*  3.57*  3.48*   HEMOGLOBIN  11.1*  10.9*  11.0*   HEMATOCRIT  35.6*  34.8*  34.2*   MCV  98.3*  97.5  98.3*   MCH  30.7  30.5  31.6   MCHC  31.2*  31.3*  32.2*   RDW  51.9*  50.1*  51.0*   PLATELETCT  237  197  208   MPV  10.1  10.3  10.7     Recent Labs      10/08/18   0617  10/09/18   0433  10/10/18   0359   SODIUM  149*  149*  149*   POTASSIUM  4.5  3.9  4.3   CHLORIDE  113*  114*  112   CO2  28  29  30   GLUCOSE  171*  177*  160*   BUN  71*  67*  79*   CREATININE  0.98  0.90  1.37   CALCIUM  9.0  8.6  8.0*             Recent Labs      10/08/18   0617   TRIGLYCERIDE  75          Assessment/Plan     * Acute respiratory failure with hypoxia (HCC)- (present on admission)   Assessment & Plan    CTA on 9-29 neg  for PE  Echo with normal EF and pulmonary hypertension    Vent management per critical care discussed with Dr. Sierra              Acute pulmonary edema (HCC)   Assessment & Plan    Increasing lasix  Cont to follow I&Os, daily BMP, BP        CAP (community acquired pneumonia)- (present on admission)   Assessment & Plan    Completed course of antibiotics   Cultures neg        Ileus (HCC)   Assessment & Plan    Clinically improved  Remains on Reglan  We will advance tube  feeding and continue close monitoring        Hemoptysis   Assessment & Plan    Lovenox discontinued    Continue clinical monitoring and follow-up on lung biopsy        Paroxysmal atrial fibrillation/atrial flutter (HCC)   Assessment & Plan    Continue amiodarone and metoprolol  Now in sinux  Lovenox discontinued given hemoptysis  Reverted to sinus rhythm continue telemetry monitoring        Pulmonary hypertension (HCC)   Assessment & Plan    9/29/18 echocardiogram with RVSP of 65 mmHg  CT was negative for pulmonary embolism    Continue IV Lasix              Acute metabolic encephalopathy   Assessment & Plan    Non focal exam  Cont to address metabolic and infectious issues  Try to minimize sedation and CNS active mdications        Elevated troponin- (present on admission)   Assessment & Plan    Likely demand ischemia        Hypernatremia   Assessment & Plan    Increase Free water flushes          Hyperglycemia- (present on admission)   Assessment & Plan    HbA1c 6.5 on 7/18  On insulin drip continue close monitoring        Dyslipidemia- (present on admission)   Assessment & Plan    Continue atorvastatin            Quality-Core Measures   Reviewed items::  EKG reviewed, Labs reviewed, Medications reviewed and Radiology images reviewed  Garces catheter::  Unconscious / Sedated Patient on a Ventilator  DVT prophylaxis - mechanical:  SCDs  Ulcer Prophylaxis::  Yes  Antibiotics:  Treating active infection/contamination beyond 24 hours perioperative coverage

## 2018-10-10 NOTE — DISCHARGE PLANNING
"Care Transition Team Assessment  Anticipated Discharge Disposition: TBD    Action: LSW spoke to RNs during rounds.    LSw spoke to pt's wife who has had many people close to her pass away recently including friends. Pt is not doing well and medical team would like to discuss his care plan w/ his wife.    LSW spoke to wife to offer resources for grief. She indicates she does not have any support system other than the swim class buddies she and pt used to see weekly. Wife explains there is no way pt can be this sick. She is very upset pt's \"eyes were rolling into the back of his head yesterday.\" She states she cannot comprehend what is going on w/ pt as they exercise daily and take no prescription medications due to their healthy lifestyle. She is very angry about people stating pt's higher age indicates his health decline.     She states he was having SOB at home and she took him into clinic. They had her bring him into the Er and now he is on so many meds they are making him like a zombie.    She disagrees that pt is not going well and reports each of his last MD visits indicating the MDs stated he was the healthiest person.    She declined counseling services. She declined to contact their friends from the swim class for lunch or coffee.    LSw updated bedside RN accordingly.           Barriers to Discharge: TBD    Plan: f/u w/ medical team, palliative,       Information Source  Orientation : Unable to Assess  Information Given By: Spouse  Informant's Name: Oanh  Who is responsible for making decisions for patient? : Patient    Readmission Evaluation  Is this a readmission?: No    Elopement Risk  Legal Hold: No  Ambulatory or Self Mobile in Wheelchair: No-Not an Elopement Risk  Elopement Risk: Not at Risk for Elopement    Interdisciplinary Discharge Planning  Primary Care Physician: Jh Acosta  Lives with - Patient's Self Care Capacity: Spouse  Support Systems: Spouse / Significant Other  Housing / Facility: " Unable To Determine At This Time  Do You Take your Prescribed Medications Regularly:  (takes vitamins and not prescribed meds )  Prior Services: None    Discharge Preparedness  What is your plan after discharge?: Uncertain - pending medical team collaboration  Prior Functional Level: Ambulatory, Drives Self, Independent with Activities of Daily Living    Functional Assesment  Prior Functional Level: Ambulatory, Drives Self, Independent with Activities of Daily Living    Finances  Prescription Coverage: Yes                   Domestic Abuse  Have you ever been the victim of abuse or violence?: No              Anticipated Discharge Information  Anticipated discharge disposition: Discharge needs currently unknown

## 2018-10-10 NOTE — WOUND TEAM
[facility]  Inpatient Services  Wound and Skin Care Progress Note    Admission Date: 9/29/2018     Consult Date: 10/05/2018 @ 0709   HPI, PMH, SH: Reviewed    Unit where seen by Wound Team: [id number]     WOUND CONSULT RELATED TO:  Recheck of Right Ear    SUBJECTIVE:  Pt intubated and restrained.       Self Report / Pain Level:  No signs or symptoms of pain or discomfort.       OBJECTIVE:  Pt lying in bed intubated and restrained.     WOUND TYPE, LOCATION, CHARACTERISTICS (Pressure Injuries: location, stage, POA or date identified)    Location and type of wound: Right Ear Pinna Anterior and posterior outer area non-blanching tissue of unknown etiology     Site Assessment Scabbed kendall brown/red   Marisol-wound Assessment Dry;Clean;Intact;Pink   Margins Undefined edges   MEASUREMENTS OBTAINED 10/10/2018   Wound Length (cm) 2 cm   Wound Width (cm) 2 cm   Wound Depth (cm) 0 cm   Wound Surface Area (cm^2) 4 cm^2   Drainage Amount None   Treatments Cleansed;Site care, dressing applied   Cleansing Normal Saline Irrigation   Dressing Options Mepilex Lite   Dressing Cleansing/Solutions Not Applicable   Dressing Changed New   Dressing Status Clean;Dry;Intact   Dressing Change Frequency Daily and As Needed   Odor None   Exposed Structures None       Vascular:    Dorsal Pedal pulses:  NA  Posterior tib pulses:   NA    OK:      NA    Lab Values:    WBC:       WBC   Date/Time Value Ref Range Status   10/10/2018 03:59 AM 14.9 (H) 4.8 - 10.8 K/uL Final     AIC:      Lab Results   Component Value Date/Time    HBA1C 6.5 (H) 07/27/2018 09:04 AM         Culture:   NA    INTERVENTIONS BY WOUND TEAM:  Wound RN in to re-assess patient. Pts right ear wound now appears to have a kendall brown/red scab overlying the area to the outer anterior and posterior pinna. The area was cleansed with NS and gauze. Measurement and picture were obtained. Etiology remains undetermined as patients wife stated it was not present on admission,  "however she reports pt does have \"rough skin\" to that area. The wound is not overlying a bony prominence and no medical device was overlying the area. The wound appears to be healing. A piece of mepilex lite was placed over the wound to prevent further skin breakdown.    Dressing selection:  Mepilex lite         Interdisciplinary consultation: Patient, Bedside RN (Cedrick), Bedside RN (June)    EVALUATION: Pt is a critically ill older gentleman who is currently intubated.  Pt was a fairly active gentleman prior to admit and pts wife reports that he swam 4x a week and worked out 6x a week and that they live independently in [place]. Pt was admitted with Pneumonia requiring intubation. Per pts wife pt always has \"rough skin\" to the pinna of his ear and they treat it with moisturizer or polysporin if any part of it opens up. Pt now with a large wound to the area that is both on the anterior and posterior surface with majority of the wound anterior. There are two small open areas with a scab overlying the wounds. No medical device is overlying the area. Anchorfast in use as well as a right triple lumen IJ. Etiology of wound in unknown and unstageable pressure injury can not be excluded. Mepilex lite placed over the area to prevent further breakdown. Wound team will continue to monitor wound evolution.    Factors affecting wound healing: Age, Critically ill, A. Fib, Hyperglycemia  Goals: Steady decrease in wound area and depth weekly.    NURSING PLAN OF CARE ORDERS (X):    Dressing changes: See Dressing Maintenance orders: X  Skin care: See Skin Care orders: X  Rectal tube care: See Rectal Tube Care orders:   Other orders:    RSKIN: CURRENT (X) ORDERED (O):   Q shift Gilbert:  X  Q shift pressure point assessments:  X  Pressure redistribution mattress X           ALEX          Bariatric ALEX         Bariatric foam           Heel float boots O         Float Heels off Bed with Pillows               Barrier wipes       "   Barrier Cream         Barrier paste O        Sacral silicone dressing         Silicone O2 tubing         Anchorfast  X       Cannula fixation Device (Tender )          Gray Foam Ear protectors           Trach with Optifoam split foam                 Waffle cushion        Waffle Overlay O        Rectal tube or BMS         Antifungal tx      Interdry          Turn q 2 hours X       Up to chair        Ambulate      PT/OT        Dietician        Diabetes Education      PO     TF X - Impact Peptide    TPN     NPO   # days   Other        WOUND TEAM PLAN OF CARE (X):   NPWT change 3 x week:        Dressing changes by wound team:       Follow up as needed: X, Wound team to re-evaluate progress of healing weekly     Other (explain):     Anticipated discharge plans (X):   SNF:           Home Care:           Outpatient Wound Center:            Self Care:            Other: X, TBD, pt lives at independently with wife in [place].

## 2018-10-10 NOTE — ASSESSMENT & PLAN NOTE
Non focal exam  Not showing any improvement  EEG consistent with difuse metabolic encephalopathy.  Neg for non convulsive status  Cont to address metabolic and infectious issues  Try to minimize sedation and CNS active mdications

## 2018-10-10 NOTE — PROGRESS NOTES
Pt off propofol for 1 hour approx. Then became agitated. Seemed to appropriately nod yes for pain. Medicated per MAR. Noted multiple runs of PVC, bigem, and vtach beats 3-4. Strips printed and in chart.

## 2018-10-10 NOTE — CARE PLAN
Problem: Ventilation Defect:  Goal: Ability to achieve and maintain unassisted ventilation or tolerate decreased levels of ventilator support  Outcome: NOT MET    Intervention: Support and monitor invasive and noninvasive mechanical ventilation  Adult Ventilation Update    Total Vent Days: 11    Patient Lines/Drains/Airways Status    Active Airway     Name: Placement date: Placement time: Site: Days:    Airway ETT Oral 8.0 09/29/18   1714   Oral   10              In the last 24 hours, the patient tolerated SBT for 10 min on settings of 5/8.    #FVC / Vital Capacity (liters) :  (pt not following commands) (10/08/18 1640)  NIF (cm H2O) :  (pt not following commands) (10/08/18 1640)  Rapid Shallow Breathing Index (RR/VT): 62 (10/08/18 1640)  Plateau Pressure (Q Shift): 20 (10/08/18 1957)  Static Compliance (ml / cm H2O): 104 (10/09/18 1547)    Patient failed trials because of Barriers to Wean: Evidence of active shock,hemmorhage or sepsis (10/07/18 0701)  Barriers to SBT Weaning Trial Stopped due to:: RSBI >105 (Rapid Shallow Breathing Index) (10/09/18 0637)  Length of Weaning Trial Length of Weaning Trial (Hours): 1 hour (10/08/18 1640)      Sputum/Suction   Cough: Strong;Productive (bloody) (10/09/18 1600)  Sputum Amount: Small (10/09/18 1600)  Sputum Color: Tan;Pink Tinged;Bloody (10/09/18 1600)  Sputum Consistency: Thin (10/09/18 1600)    Mobility  Level of Mobility: Level I (10/09/18 1500)  Activity Performed: Edge of bed (10/09/18 1500)  Time Activity Tolerated: 5 min (10/09/18 1500)  Distance Per Occurrence (ft.): 0 feet (10/08/18 2000)  # of Times Distance was Traveled: 1 (10/09/18 1500)  Assistance / Tolerance: Assistance of Two or More (10/09/18 1500)  Pt Calls for Assistance: No (10/09/18 1500)  Staff Present for Mobilization: RN (10/09/18 1500)  Gait: Unable to Ambulate (10/09/18 1600)  Assistive Devices: Hand held assist (10/09/18 1500)  Reason Not Mobilized: Unstable condition (10/08/18  1200)  Mobilization Comments: Desat with movement, agitated off sedation, no follow (10/07/18 1600)    Events/Summary/Plan: Dr. Sierra changed peep to 12 (10/09/18 1132)

## 2018-10-10 NOTE — CARE PLAN
Problem: Ventilation Defect:  Goal: Ability to achieve and maintain unassisted ventilation or tolerate decreased levels of ventilator support    Intervention: Support and monitor invasive and noninvasive mechanical ventilation  Adult Ventilation Update    Total Vent Days: 12    Patient Lines/Drains/Airways Status    Active Airway     Name: Placement date: Placement time: Site: Days:    Airway ETT Oral 8.0 09/29/18   1714   Oral   11              Plateau Pressure (Q Shift): 23 (10/10/18 0230)  Static Compliance (ml / cm H2O): 42 (10/10/18 0449)    Sputum/Suction   Cough: Strong;Productive (bloody) (10/10/18 0400)  Sputum Amount: Small (10/10/18 0400)  Sputum Color: Tan;Pink Tinged;Bloody (10/10/18 0400)  Sputum Consistency: Thin (10/10/18 0400)    Mobility  Level of Mobility: Level I (10/09/18 1934)  Activity Performed: Unable to mobilize (10/09/18 1934)  Time Activity Tolerated: 5 min (10/09/18 1500)  Distance Per Occurrence (ft.): 0 feet (10/08/18 2000)  # of Times Distance was Traveled: 1 (10/09/18 1500)  Assistance / Tolerance: Assistance of Two or More (10/09/18 1500)  Pt Calls for Assistance: No (10/09/18 1500)  Staff Present for Mobilization: RN (10/09/18 1500)  Gait: Unable to Ambulate (10/10/18 0400)  Assistive Devices: Hand held assist (10/09/18 1500)  Reason Not Mobilized: Unstable condition (10/09/18 1934)  Mobilization Comments:  PEEP 12, RASS -4 (10/09/18 1934)    Events/Summary/Plan: No changes made. Will continue to monitor.

## 2018-10-11 NOTE — CARE PLAN
Problem: Nutritional:  Goal: Nutrition support tolerated and meeting greater than 85% of estimated needs  Outcome: PROGRESSING SLOWER THAN EXPECTED  TF @ 20 ml/hr with goal rate of 50 ml/hr.   Pt on reglan Q 6 hours since 10/7, will be stopped today.   OK per MD(s) to slowly advance TF as tolerated.  ?Overall POC    RD following.

## 2018-10-11 NOTE — PROGRESS NOTES
[facility] Hospitalist Progress Note    Date of Service: 10/11/2018    Chief Complaint    78 y.o. male admitted 2018 with cough and shortness of breath.  Patient was admitted to the hospital and shortly thereafter had significant increase in shortness of breath.  CTA neg for PE but did show CAP.  Initially admitted to the floor however worsened requiring intubation     PMHx: HLD    Interval Problem Update  ROS unobtainable  Prop 5  fent 75  Follows intermitently  Sinus  -120s  AFebrile  TFs 20  Insulin gtts  UOP 1100ml/12hrs on 40 BID lasix      Consultants/Specialty  Cardiology    Disposition  COnt in ICU    Prognosis is poor at this time.  Wife very clear pt would not want a Trach.  May need to progress to Hospice    Review of Systems   Unable to perform ROS: Intubated      Physical Exam  Laboratory/Imaging   Hemodynamics  Temp (24hrs), Av.2 °C (99 °F), Min:37.1 °C (98.8 °F), Max:37.2 °C (99 °F)   Temperature: 37.2 °C (99 °F), Monitored Temp: 37.2 °C (99 °F)  Pulse  Av  Min: 60  Max: 142 Heart Rate (Monitored): 67  NIBP: 107/62     Respiratory  Smith Vent Mode: APVCMV, Rate (breaths/min): 16, PEEP/CPAP: 12, PEEP/CPAP: 12, FiO2: 40, P Peak (PIP): 26, P MEAN: 15   Respiration: 16, Pulse Oximetry: 100 %     Work Of Breathing / Effort: Vented  RUL Breath Sounds: Rhonchi, RML Breath Sounds: Rhonchi, RLL Breath Sounds: Diminished, BRITTANY Breath Sounds: Rhonchi, LLL Breath Sounds: Diminished    Fluids    Intake/Output Summary (Last 24 hours) at 10/11/18 0549  Last data filed at 10/10/18 2000   Gross per 24 hour   Intake           1036.2 ml   Output             1005 ml   Net             31.2 ml       Nutrition  Orders Placed This Encounter   Procedures   • Diet NPO     Standing Status:   Standing     Number of Occurrences:   1     Order Specific Question:   Type:     Answer:   Now [1]     Order Specific Question:   Restrict to:     Answer:   Strict [1]     Physical Exam   Constitutional: He appears  well-developed and well-nourished. No distress.   HENT:   Head: Normocephalic and atraumatic.   Eyes: Conjunctivae are normal.   Neck: Neck supple. No JVD present.   Cardiovascular: Normal rate.  Exam reveals no gallop.    Murmur heard.  Pulmonary/Chest: No stridor. No respiratory distress. He has no wheezes. He has rales.   Abdominal: Soft. There is no tenderness. There is no rebound and no guarding.   Musculoskeletal: He exhibits edema.   Neurological:   Responds weakly to pain   Skin: Skin is warm and dry. No rash noted. He is not diaphoretic.   Nursing note and vitals reviewed.      Recent Labs      10/08/18   0617  10/09/18   0433  10/10/18   0359   WBC  15.7*  13.0*  14.9*   RBC  3.62*  3.57*  3.48*   HEMOGLOBIN  11.1*  10.9*  11.0*   HEMATOCRIT  35.6*  34.8*  34.2*   MCV  98.3*  97.5  98.3*   MCH  30.7  30.5  31.6   MCHC  31.2*  31.3*  32.2*   RDW  51.9*  50.1*  51.0*   PLATELETCT  237  197  208   MPV  10.1  10.3  10.7     Recent Labs      10/08/18   0617  10/09/18   0433  10/10/18   0359   SODIUM  149*  149*  149*   POTASSIUM  4.5  3.9  4.3   CHLORIDE  113*  114*  112   CO2  28  29  30   GLUCOSE  171*  177*  160*   BUN  71*  67*  79*   CREATININE  0.98  0.90  1.37   CALCIUM  9.0  8.6  8.0*             Recent Labs      10/08/18   0617   TRIGLYCERIDE  75          Assessment/Plan     * Acute respiratory failure with hypoxia (HCC)- (present on admission)   Assessment & Plan    CTA on 9-29 neg  for PE  Echo with normal EF and pulmonary hypertension    Vent management per critical care discussed with Dr. Sierra              Acute pulmonary edema (HCC)   Assessment & Plan    Neg fluid balance on lasix however CXR and clinical situation are not improving  Cont to follow I&Os, daily BMP, BP        CAP (community acquired pneumonia)- (present on admission)   Assessment & Plan    Completed course of antibiotics   Cultures neg        Ileus (HCC)   Assessment & Plan    Clinically improved  Remains on Reglan  We will  advance tube feeding and continue close monitoring        Hemoptysis   Assessment & Plan    Lovenox discontinued    Continue clinical monitoring and follow-up on lung biopsy        Paroxysmal atrial fibrillation/atrial flutter (HCC)   Assessment & Plan    Continue amiodarone and metoprolol  Now in sinux  Lovenox discontinued given hemoptysis  Reverted to sinus rhythm continue telemetry monitoring        Pulmonary hypertension (HCC)   Assessment & Plan    9/29/18 echocardiogram with RVSP of 65 mmHg  CT was negative for pulmonary embolism    Continue IV Lasix              Acute metabolic encephalopathy   Assessment & Plan    Non focal exam  Not showing any improvement  EEG consistent with difuse metabolic encephalopathy.  Neg for non convulsive status  Cont to address metabolic and infectious issues  Try to minimize sedation and CNS active mdications        Elevated troponin- (present on admission)   Assessment & Plan    Likely demand ischemia        Hypernatremia   Assessment & Plan    Stable on Free H2O          Hyperglycemia- (present on admission)   Assessment & Plan    HbA1c 6.5 on 7/18  On insulin drip continue close monitoring        Dyslipidemia- (present on admission)   Assessment & Plan    Continue atorvastatin            Quality-Core Measures   Reviewed items::  EKG reviewed, Labs reviewed, Medications reviewed and Radiology images reviewed  Garces catheter::  Unconscious / Sedated Patient on a Ventilator  DVT prophylaxis - mechanical:  SCDs  Ulcer Prophylaxis::  Yes  Antibiotics:  Treating active infection/contamination beyond 24 hours perioperative coverage

## 2018-10-11 NOTE — CARE PLAN
Problem: Ventilation Defect:  Goal: Ability to achieve and maintain unassisted ventilation or tolerate decreased levels of ventilator support    Intervention: Support and monitor invasive and noninvasive mechanical ventilation  Adult Ventilation Update    Total Vent Days: 13    Patient Lines/Drains/Airways Status    Active Airway     Name: Placement date: Placement time: Site: Days:    Airway ETT Oral 8.0 09/29/18   1714   Oral   12              Plateau Pressure (Q Shift): 21 (10/11/18 0210)  Static Compliance (ml / cm H2O): 81 (10/11/18 0439)    Sputum/Suction   Cough: Non Productive (10/11/18 0210)  Sputum Amount: Small (10/11/18 0210)  Sputum Color: Tan (10/11/18 0210)  Sputum Consistency: Thin (10/11/18 0210)    Mobility  Level of Mobility: Level II (10/10/18 1600)  Activity Performed: Unable to mobilize (10/10/18 0800)  Time Activity Tolerated: 5 min (10/09/18 1500)  Distance Per Occurrence (ft.): 0 feet (10/08/18 2000)  # of Times Distance was Traveled: 1 (10/09/18 1500)  Assistance / Tolerance: Assistance of Two or More (10/09/18 1500)  Pt Calls for Assistance: No (10/10/18 0800)  Staff Present for Mobilization: RN (10/09/18 1500)  Gait: Unable to Ambulate (10/10/18 2000)  Assistive Devices: Hand held assist (10/09/18 1500)  Reason Not Mobilized: Unstable condition (10/10/18 0800)  Mobilization Comments: PEEP 12 RASS -3 (10/10/18 0800)    Events/Summary/Plan: No changes made. Will continue to monitor.

## 2018-10-11 NOTE — CARE PLAN
Problem: Ventilation Defect:  Goal: Ability to achieve and maintain unassisted ventilation or tolerate decreased levels of ventilator support  Outcome: PROGRESSING SLOWER THAN EXPECTED    Intervention: Support and monitor invasive and noninvasive mechanical ventilation  Adult Ventilation Update    Total Vent Days: 13  APVcmv: 16/440/+10/35% FIO2    Patient Lines/Drains/Airways Status    Active Airway     Name: Placement date: Placement time: Site: Days:    Airway ETT Oral 8.0 09/29/18   1714   Oral   13              Events/Summary/Plan: Titrated FIO2 to 35%. No other vent changes at this time. No vent weaning at this time per Dr. Sierra.    Intervention: Monitor ventilator weaning response  No vent weaning at this time  Intervention: Perform ventilator associated pneumonia prevention interventions  See VAP flowsheet

## 2018-10-11 NOTE — PROGRESS NOTES
Critical Care Progress Note    Date of admission  9/29/2018    Chief Complaint  78 y.o. male admitted 9/29/2018 with cough and shortness of breath.    Hospital Course  This gentleman was admitted with severe sepsis, respiratory failure and pneumonia.    Interval Problem Update  Reviewed last 24 hour events:    Vent day #13  CXR improved P-edema  PEEP 12, fiO2 0.4  Not following - opens eys -withdrawls  SR 70s  SBp 120s  Afeb  TF 20  UO great  Fentanyl 75  Propofol 5    Lasix  Fluid balance reviewed, positive for 32 cc / 24 hours  Pathology from bronchoscopy on 10 7 finally back, no evidence of malignancy  No further hemoptysis    FiO2 holding  PEEP dropped to 10 earlier today  Hemodynamics, pulmonary mechanics and respiratory exam unchanged    Long conversation by phone with patient's wife  Updated her in regards to current status  Reaffirmed DNR status  We will aggressively treat throughout the weekend and monitor progress  No tracheostomy under any circumstances  Consider further options such as cc early next week     YESTERDAY  EEG pending - AMS last night, prelim review no seizure activity  Propofol 5  Fentanyl 75  Not following - pourposeful  SBp 120s  SR 70s  Vent day#12  PEEP 12, fiO2 40%  CXR better edema  Secretions - bloody  TF goal  Lasix  Insulin 1.5  SCDs - no heparins    Drop lasix  Check FOB path from Sunday -> negative for malignant cells    Review of Systems  Review of Systems   Unable to perform ROS: Acuity of condition   Sedated with propofol    Vital Signs for last 24 hours   Temp:  [37.1 °C (98.8 °F)-37.2 °C (99 °F)] 37.2 °C (99 °F)  Pulse:  [65-85] 69  Resp:  [15-25] 16    Hemodynamic parameters for last 24 hours       Vent Settings for last 24 hours  [place] Vent Mode: APVCMV  Rate (breaths/min):  [16] 16  PEEP/CPAP:  [12] 12  FiO2:  [40] 40  P Peak (PIP):  [21-26] 26  P MEAN:  [14-16] 15    Ventilator mechanics and waveforms are reviewed at the bedside with RT    Physical Exam    Physical Exam   Constitutional: He appears lethargic.  Non-toxic appearance. He has a sickly appearance (Unchanged). No distress.   Sedated on ventilator, looks acute and chronically ill as well as uncomfortable   HENT:   Head: Normocephalic.   Right Ear: External ear normal.   Left Ear: External ear normal.   Mouth/Throat: Oropharynx is clear and moist. No oropharyngeal exudate.   Eyes: Pupils are equal, round, and reactive to light. Conjunctivae are normal. Right eye exhibits no discharge. Left eye exhibits no discharge. No scleral icterus.   Neck: Neck supple. No JVD present. No tracheal deviation present.   Cardiovascular: Normal rate, regular rhythm and intact distal pulses.   No extrasystoles are present. Exam reveals no gallop and no friction rub.    Murmur (2/6 flow murmur?  [place] best at apex (nothing noted on echocardiogram), no change again) heard.  Atrial fibrillation converted to sinus rhythm with bundle branch block   Pulmonary/Chest: No accessory muscle usage (On full vent support). No respiratory distress. He has decreased breath sounds (Mild) in the right lower field and the left lower field. He has no wheezes. He has no rhonchi. He has rales (Scattered crackles bilaterally, better) in the right lower field and the left lower field.   Abdominal: Soft. Bowel sounds are normal. He exhibits no distension. There is no hepatosplenomegaly. There is no tenderness. There is no rigidity, no rebound, no guarding and no CVA tenderness.   Tolerating enteral tube feedings   Musculoskeletal: He exhibits no tenderness or deformity. Edema: Slight better.   No clubbing or cyanosis   Neurological: He appears lethargic. No cranial nerve deficit or sensory deficit. GCS eye subscore is 4. GCS verbal subscore is 1. GCS motor subscore is 5.   Sedated.  Arouses but does not follow for me.  Grimaces and appears uncomfortable.  Moves all 4 extremities spontaneously and has spontaneous eye opening.,  Localizes and withdraws  to tactile stimuli.  No change.    Follow-up exam later in day after prolonged sedation vacation finds patient following simple commands in all extremities!  Subsequently became agitated requiring reinstitution of sedation   Skin: Skin is warm and dry. No rash noted. He is not diaphoretic. No cyanosis or erythema. Nails show no clubbing.   Psychiatric: He is noncommunicative.       Medications  Current Facility-Administered Medications   Medication Dose Route Frequency Provider Last Rate Last Dose   • furosemide (LASIX) injection 40 mg  40 mg Intravenous Q DAY Gonzalo Rose D.OShelley   40 mg at 10/11/18 0535   • fentaNYL (SUBLIMAZE) 50 mcg/mL in 50mL (Continuous Infusion)   Intravenous Continuous Justin Sierra M.D. 1.5 mL/hr at 10/10/18 1700 75 mcg/hr at 10/10/18 1700   • metoclopramide (REGLAN) injection 10 mg  10 mg Intravenous Q6HRS Dre Robbisn M.D.   10 mg at 10/11/18 0535   • propofol (DIPRIVAN) injection  0-80 mcg/kg/min Intravenous Continuous Brandon Paulino M.D. 6.6 mL/hr at 10/11/18 0320 10 mcg/kg/min at 10/11/18 0320   • metoprolol (LOPRESSOR) tablet 12.5 mg  12.5 mg Oral TWICE DAILY Dre Robbins M.D.   Stopped at 10/11/18 0600   • potassium bicarbonate (KLYTE) effervescent tablet 25 mEq  25 mEq Feeding Tube BID Brandon Paulino M.D.   25 mEq at 10/11/18 0535   • phenylephrine (MARYSOL-SYNEPHRINE) 40,000 mcg in  mL Infusion  0-300 mcg/min Intravenous Continuous Brandon Paulino M.D.   Stopped at 10/08/18 0700   • amiodarone (CORDARONE) tablet 400 mg  400 mg Per NG Tube TWICE DAILY Brandon Paulino M.D.   400 mg at 10/11/18 0535   • ondansetron (ZOFRAN ODT) dispertab 4 mg  4 mg Oral Q4HRS PRN Brandon Paulino M.D.       • famotidine (PEPCID) tablet 20 mg  20 mg Feeding Tube Q12HRS Brandon Paulino M.D.   20 mg at 10/11/18 0535   • insulin regular human (HUMULIN/NOVOLIN R) 62.5 Units in  mL infusion per protocol  0-29 Units/hr  Intravenous Continuous Brandon Paulino M.D. 6 mL/hr at 10/10/18 2023 1.5 Units/hr at 10/10/18 2023   • dextrose 50% (D50W) injection 25-50 mL  12.5-25 g Intravenous PRN Brandon Paulino M.D.       • Respiratory Care per Protocol   Nebulization Continuous RT Walter Carrion M.D.       • ondansetron (ZOFRAN) syringe/vial injection 4 mg  4 mg Intravenous Q4HRS PRN Walter Carrion M.D.       • MD Alert...ICU Electrolyte Replacement per Pharmacy   Other pharmacy to dose Jeremy M Gonda, M.D.       • fentaNYL (SUBLIMAZE) injection 25 mcg  25 mcg Intravenous Q HOUR PRN Jeremy M Gonda, M.D.   25 mcg at 10/05/18 2059    Or   • fentaNYL (SUBLIMAZE) injection 50 mcg  50 mcg Intravenous Q HOUR PRN Jeremy M Gonda, M.D.   50 mcg at 10/10/18 0604    Or   • fentaNYL (SUBLIMAZE) injection 100 mcg  100 mcg Intravenous Q HOUR PRN Jeremy M Gonda, M.D.   100 mcg at 10/05/18 1207   • ipratropium-albuterol (DUONEB) nebulizer solution  3 mL Nebulization Q2HRS PRN (RT) Jeremy M Gonda, M.D.   3 mL at 10/08/18 1956   • Pharmacy Consult: Enteral tube feeding - review meds/change route/product selection   Other PRN Jeremy M Gonda, M.D.       • senna-docusate (PERICOLACE or SENOKOT S) 8.6-50 MG per tablet 2 Tab  2 Tab Feeding Tube BID Jeremy M Gonda, M.D.   2 Tab at 10/11/18 0600    And   • polyethylene glycol/lytes (MIRALAX) PACKET 1 Packet  1 Packet Feeding Tube QDAY PRN Jeremy M Gonda, M.D.   1 Packet at 10/06/18 1222    And   • magnesium hydroxide (MILK OF MAGNESIA) suspension 30 mL  30 mL Feeding Tube QDAY PRN Jeremy M Gonda, M.D.   30 mL at 10/02/18 1322    And   • bisacodyl (DULCOLAX) suppository 10 mg  10 mg Rectal QDAY PRN Jeremy M Gonda, M.D.   10 mg at 10/06/18 1222   • acetaminophen (TYLENOL) tablet 650 mg  650 mg Feeding Tube Q6HRS PRN Jeremy M Gonda, M.D.   650 mg at 10/06/18 1153   • atorvastatin (LIPITOR) tablet 40 mg  40 mg Per NG Tube Q EVENING Jeremy M Gonda, M.D.   40 mg at 10/10/18 1656   • aspirin (ASA) chewable  tab 81 mg  81 mg Per NG Tube DAILY Jeremy M Gonda, M.D.   81 mg at 10/11/18 0535       Fluids    Intake/Output Summary (Last 24 hours) at 10/11/18 0556  Last data filed at 10/10/18 2000   Gross per 24 hour   Intake           1036.2 ml   Output             1005 ml   Net             31.2 ml       Laboratory  Recent Results (from the past 48 hour(s))   ACCU-CHEK GLUCOSE    Collection Time: 10/09/18  6:06 AM   Result Value Ref Range    Glucose - Accu-Ck 149 (H) 65 - 99 mg/dL   ACCU-CHEK GLUCOSE    Collection Time: 10/09/18  9:42 AM   Result Value Ref Range    Glucose - Accu-Ck 149 (H) 65 - 99 mg/dL   ACCU-CHEK GLUCOSE    Collection Time: 10/09/18 11:36 AM   Result Value Ref Range    Glucose - Accu-Ck 154 (H) 65 - 99 mg/dL   ACCU-CHEK GLUCOSE    Collection Time: 10/09/18  1:58 PM   Result Value Ref Range    Glucose - Accu-Ck 168 (H) 65 - 99 mg/dL   ACCU-CHEK GLUCOSE    Collection Time: 10/09/18  4:15 PM   Result Value Ref Range    Glucose - Accu-Ck 177 (H) 65 - 99 mg/dL   ACCU-CHEK GLUCOSE    Collection Time: 10/09/18  5:46 PM   Result Value Ref Range    Glucose - Accu-Ck 157 (H) 65 - 99 mg/dL   ACCU-CHEK GLUCOSE    Collection Time: 10/09/18  8:46 PM   Result Value Ref Range    Glucose - Accu-Ck 140 (H) 65 - 99 mg/dL   ACCU-CHEK GLUCOSE    Collection Time: 10/09/18 10:51 PM   Result Value Ref Range    Glucose - Accu-Ck 138 (H) 65 - 99 mg/dL   ACCU-CHEK GLUCOSE    Collection Time: 10/09/18 11:58 PM   Result Value Ref Range    Glucose - Accu-Ck 146 (H) 65 - 99 mg/dL   ACCU-CHEK GLUCOSE    Collection Time: 10/10/18  1:37 AM   Result Value Ref Range    Glucose - Accu-Ck 156 (H) 65 - 99 mg/dL   ACCU-CHEK GLUCOSE    Collection Time: 10/10/18  2:29 AM   Result Value Ref Range    Glucose - Accu-Ck 145 (H) 65 - 99 mg/dL   ACCU-CHEK GLUCOSE    Collection Time: 10/10/18  3:14 AM   Result Value Ref Range    Glucose - Accu-Ck 162 (H) 65 - 99 mg/dL   CBC with Differential    Collection Time: 10/10/18  3:59 AM   Result Value Ref Range     WBC 14.9 (H) 4.8 - 10.8 K/uL    RBC 3.48 (L) 4.70 - 6.10 M/uL    Hemoglobin 11.0 (L) 14.0 - 18.0 g/dL    Hematocrit 34.2 (L) 42.0 - 52.0 %    MCV 98.3 (H) 81.4 - 97.8 fL    MCH 31.6 27.0 - 33.0 pg    MCHC 32.2 (L) 33.7 - 35.3 g/dL    RDW 51.0 (H) 35.9 - 50.0 fL    Platelet Count 208 164 - 446 K/uL    MPV 10.7 9.0 - 12.9 fL    Neutrophils-Polys 82.50 (H) 44.00 - 72.00 %    Lymphocytes 7.70 (L) 22.00 - 41.00 %    Monocytes 7.90 0.00 - 13.40 %    Eosinophils 0.90 0.00 - 6.90 %    Basophils 0.30 0.00 - 1.80 %    Immature Granulocytes 0.70 0.00 - 0.90 %    Nucleated RBC 0.00 /100 WBC    Neutrophils (Absolute) 12.29 (H) 1.82 - 7.42 K/uL    Lymphs (Absolute) 1.14 1.00 - 4.80 K/uL    Monos (Absolute) 1.17 (H) 0.00 - 0.85 K/uL    Eos (Absolute) 0.13 0.00 - 0.51 K/uL    Baso (Absolute) 0.04 0.00 - 0.12 K/uL    Immature Granulocytes (abs) 0.11 0.00 - 0.11 K/uL    NRBC (Absolute) 0.00 K/uL   MAGNESIUM    Collection Time: 10/10/18  3:59 AM   Result Value Ref Range    Magnesium 2.9 (H) 1.5 - 2.5 mg/dL   PHOSPHORUS    Collection Time: 10/10/18  3:59 AM   Result Value Ref Range    Phosphorus 5.0 (H) 2.5 - 4.5 mg/dL   COMP METABOLIC PANEL    Collection Time: 10/10/18  3:59 AM   Result Value Ref Range    Sodium 149 (H) 135 - 145 mmol/L    Potassium 4.3 3.6 - 5.5 mmol/L    Chloride 112 96 - 112 mmol/L    Co2 30 20 - 33 mmol/L    Anion Gap 7.0 0.0 - 11.9    Glucose 160 (H) 65 - 99 mg/dL    Bun 79 (HH) 8 - 22 mg/dL    Creatinine 1.37 0.50 - 1.40 mg/dL    Calcium 8.0 (L) 8.5 - 10.5 mg/dL    AST(SGOT) 16 12 - 45 U/L    ALT(SGPT) 22 2 - 50 U/L    Alkaline Phosphatase 77 30 - 99 U/L    Total Bilirubin 0.9 0.1 - 1.5 mg/dL    Albumin 3.2 3.2 - 4.9 g/dL    Total Protein 4.7 (L) 6.0 - 8.2 g/dL    Globulin 1.5 (L) 1.9 - 3.5 g/dL    A-G Ratio 2.1 g/dL   ESTIMATED GFR    Collection Time: 10/10/18  3:59 AM   Result Value Ref Range    GFR If African American >60 >60 mL/min/1.73 m 2    GFR If Non African American 50 (A) >60 mL/min/1.73 m 2    ACCU-CHEK GLUCOSE    Collection Time: 10/10/18  4:02 AM   Result Value Ref Range    Glucose - Accu-Ck 152 (H) 65 - 99 mg/dL   ISTAT ARTERIAL BLOOD GAS    Collection Time: 10/10/18  4:52 AM   Result Value Ref Range    Ph 7.429 7.400 - 7.500    Pco2 45.3 (H) 26.0 - 37.0 mmHg    Po2 83 64 - 87 mmHg    Tco2 31 20 - 33 mmol/L    S02 96 93 - 99 %    Hco3 30.0 (H) 17.0 - 25.0 mmol/L    BE 5 (H) -4 - 3 mmol/L    Body Temp 37.2 C degrees    O2 Therapy 40 %    iPF Ratio 208     Ph Temp Cesar 7.426 7.400 - 7.500    Pco2 Temp Co 45.7 (H) 26.0 - 37.0 mmHg    Po2 Temp Cor 84 64 - 87 mmHg    Specimen Arterial     Action Range Triggered NO     Inst. Qualified Patient YES    ACCU-CHEK GLUCOSE    Collection Time: 10/10/18  6:01 AM   Result Value Ref Range    Glucose - Accu-Ck 168 (H) 65 - 99 mg/dL   ACCU-CHEK GLUCOSE    Collection Time: 10/10/18  7:51 AM   Result Value Ref Range    Glucose - Accu-Ck 161 (H) 65 - 99 mg/dL   ACCU-CHEK GLUCOSE    Collection Time: 10/10/18 10:08 AM   Result Value Ref Range    Glucose - Accu-Ck 146 (H) 65 - 99 mg/dL   ACCU-CHEK GLUCOSE    Collection Time: 10/10/18 12:14 PM   Result Value Ref Range    Glucose - Accu-Ck 165 (H) 65 - 99 mg/dL   ACCU-CHEK GLUCOSE    Collection Time: 10/10/18  2:12 PM   Result Value Ref Range    Glucose - Accu-Ck 156 (H) 65 - 99 mg/dL   ACCU-CHEK GLUCOSE    Collection Time: 10/10/18  4:36 PM   Result Value Ref Range    Glucose - Accu-Ck 159 (H) 65 - 99 mg/dL   ACCU-CHEK GLUCOSE    Collection Time: 10/10/18  5:22 PM   Result Value Ref Range    Glucose - Accu-Ck 161 (H) 65 - 99 mg/dL   ACCU-CHEK GLUCOSE    Collection Time: 10/10/18  8:21 PM   Result Value Ref Range    Glucose - Accu-Ck 177 (H) 65 - 99 mg/dL   ACCU-CHEK GLUCOSE    Collection Time: 10/10/18 11:06 PM   Result Value Ref Range    Glucose - Accu-Ck 160 (H) 65 - 99 mg/dL   ACCU-CHEK GLUCOSE    Collection Time: 10/11/18 12:41 AM   Result Value Ref Range    Glucose - Accu-Ck 160 (H) 65 - 99 mg/dL   ACCU-CHEK GLUCOSE     Collection Time: 10/11/18  2:37 AM   Result Value Ref Range    Glucose - Accu-Ck 169 (H) 65 - 99 mg/dL   ACCU-CHEK GLUCOSE    Collection Time: 10/11/18  4:10 AM   Result Value Ref Range    Glucose - Accu-Ck 153 (H) 65 - 99 mg/dL   ISTAT ARTERIAL BLOOD GAS    Collection Time: 10/11/18  4:39 AM   Result Value Ref Range    Ph 7.478 7.400 - 7.500    Pco2 43.2 (H) 26.0 - 37.0 mmHg    Po2 78 64 - 87 mmHg    Tco2 33 20 - 33 mmol/L    S02 96 93 - 99 %    Hco3 32.0 (H) 17.0 - 25.0 mmol/L    BE 8 (H) -4 - 3 mmol/L    Body Temp 37.2 C degrees    O2 Therapy 40 %    iPF Ratio 195     Ph Temp Cesar 7.475 7.400 - 7.500    Pco2 Temp Co 43.6 (H) 26.0 - 37.0 mmHg    Po2 Temp Cor 79 64 - 87 mmHg    Specimen Arterial     Action Range Triggered NO     Inst. Qualified Patient YES        Imaging  X-Ray:  I have personally reviewed the images and compared with prior images.   Interpretation noted above and films are reviewed with Team on rounds again!      Assessment/Plan  * Acute respiratory failure with hypoxia (HCC)- (present on admission)   Assessment & Plan    Intubated 9/29  RT protocols  Continue vent support, not appropriate for liberation at this time  All appropriate ventilator bundles have been initiated  Serial ABG, chest x-ray and ventilator mechanics review  Ventilator adjusted daily or more frequently as needed  Oxygenation better, chest x-ray better, trial reduce PEEP to 10  Wife is adamant no tracheostomy at this time, probably no LTACH as well, reaffirmed          Acute pulmonary edema (HCC)   Assessment & Plan    Echo with normal LV systolic function  Significant pulmonary hypertension causing LV diastolic dysfunction  Volume overloaded clinically with worsening pulmonary edema radiographically and by exam  Continue Lasix, 40 mg IV BID   Monitor I's and O's and BMP  Add Diamox for contraction alkalosis?  Continue current regimen          CAP (community acquired pneumonia)- (present on admission)   Assessment & Plan     Influenza negative  Completed 5 days of Unasyn and azithromycin on 10/4  Observe off antibiotics  Update vaccines prior to discharge  Monitor for HCAP, patient may need diagnostic and therapeutic bronchoscopy  No new signs of pneumonia, continue to monitor        Ileus (HCC)   Assessment & Plan    Secondary to above  Trickle tube feeds at 10 cc/h, increase slowly as tolerated  Avoid lactulose  Bowel care protocols  Relistor therapy to begin  Mobilize as tolerated  Slow increase tube feeds          Hemoptysis   Assessment & Plan    Bronchoscopy revealed friable and nodular mucosa in the left mainstem bronchus, no obvious malignancy  Hold all anticoagulation for now  Bronchoscopy with biopsy and brushing performed on 10/7, cytology and pathology negative for malignancy  Okay to resume VTE prophylaxis with subcu heparin          Atelectasis   Assessment & Plan    Aggressive pulmonary toilet  Serial chest x-ray, therapeutic bronchoscopy as needed  Wean off ventilator mobilize/IS/PEP        Paroxysmal atrial fibrillation/atrial flutter (HCC)   Assessment & Plan    Continue loading with amiodarone, 400 mg twice daily  Continue additional IV doses of amiodarone to supplement/accelerate loading as needed  No anticoagulation due to hemoptysis, monitor  Optimize magnesium and potassium aggressively  Rate control with CCB or BB as Bp allows, alternatively digoxin therapy as needed  Monitor        Pulmonary hypertension (HCC)   Assessment & Plan    RVSP 65 mm Hg  CTA without evidence of pulmonary embolism  Force diuresis with Lasix ongoing, dose adjusted daily as needed  ROS for BEBA when extubated  Suspect chronic process given the severity of elevation  Continue current regimen        Acute metabolic encephalopathy   Assessment & Plan    Etiology is metabolic as well as due to sepsis  Limit sedatives  Improved but persisting, following with very prolonged sedation vacations        Elevated troponin- (present on admission)    Assessment & Plan    Continue aspirin and statin  Monitor        Azotemia   Assessment & Plan    EZRA worse again  Sodium, BUN and creatinine worse  Clinically volume overloaded by exam systemically and pulmonary wise, oxygenation actually better  Ongoing diuresis with good results!  However I believe the need to reduce furosemide today 10/11  Monitor BMP and  I/Os        Hypernatremia   Assessment & Plan    Continue free water, 200 mL every 8 hours as tolerated  Serial BMP  monitor        Fever   Assessment & Plan    This was due to dexmedetomidine  Fever has resolved after dexmedetomidine discontinued  UA unremarkable  Blood cultures negative from 10/5  Bronchial washings from 10/3 with no growth  Avoid dexmedetomidine, recent fever with trial        Hyperglycemia- (present on admission)   Assessment & Plan    Titrating insulin drip for glucose control  Daily reviewed insulin dosing with clinical pharmacist and at bedtime SSI/long-acting insulin as clinically appropriate  Hypoglycemia protocols ongoing  Monitor        Dyslipidemia- (present on admission)   Assessment & Plan    Continue statin/diet             VTE:  Contraindicated  Ulcer: H2 Antagonist  Lines: Central Line  Ongoing indication addressed    I have performed a physical exam and reviewed and updated ROS and Plan today (10/11/2018). In review of yesterday's note (10/10/2018), there are no changes except as documented above.     Prognosis remains very guarded.  I have assessed and reassessed this patient today again.  Critically ill with unstable pulmonary status on full vent support inappropriate for liberation with high PEEP which we will attempt to wean today.  Additionally, the patient has actively titrated insulin.  Amiodarone converted to enteral route for now.  Forced diuresis with IV Lasix ongoing.  High risk of deterioration and worsening vital organ dysfunction and death without the above critical care interventions.  Long conversation with  wife today Re: Diagnosis, treatment, prognosis and plans/goals of care.    Discussed patient condition and risk of morbidity and/or mortality with Hospitalist, RN, RT, Pharmacy, , Code status disscussed and Charge nurse / hot rounds     The patient remains critically ill.  Critical care time = 60 minutes in directly providing and coordinating critical care and extensive data review.  No time overlap and excludes procedures.    High risk of deterioration and worsening vital organ dysfunction and death without the above critical care interventions.    Pulmonary and Critical Care Medicine

## 2018-10-12 NOTE — PALLIATIVE CARE
"Palliative Care follow-up  PC RN called Oanh to check in on her and provide support. She stated she was unable to come in today d/t some maintenance at her apartment but expressed gratitude for the call from Dr. Sierra. She again states \"I just can't put Moises through anything else. We'll see what each day brings and take it from there.\" PC RN validated her feelings. She asked if it would be okay to call this RN if she was needing help and support, which was definitely encouraged. She denied any questions at this time.    Plan: continue to support    Thank you for allowing Palliative Care to participate in this patient's care. Please feel free to call [phone] with any questions or concerns.  "

## 2018-10-12 NOTE — CARE PLAN
Problem: Bowel/Gastric:  Goal: Normal bowel function is maintained or improved  Pt has hyperactive bowel sounds in all four quadrants. Abdomen rounded and taut. MDs aware    Problem: Pain Management  Goal: Pain level will decrease to patient's comfort goal  Pt on continuous fentanyl drip for pain management per MDs

## 2018-10-12 NOTE — PROGRESS NOTES
[facility] Hospitalist Progress Note    Date of Service: 10/12/2018    Chief Complaint    78 y.o. male admitted 2018 with cough and shortness of breath.  Patient was admitted to the hospital and shortly thereafter had significant increase in shortness of breath.  CTA neg for PE but did show CAP.  Initially admitted to the floor however worsened requiring intubation     PMHx: HLD    Interval Problem Update  ROS unobtainable  Prop 5  fent 75  Follows intermitently  Sinus  -120s  AFebrile  TFs 20  Insulin gtts 1.5  UOP 825ml/12hrs on 40 BID lasix    Spoke with pt's wife at bedside with Dr Sierra    Consultants/Specialty  Cardiology    Disposition  COnt in ICU    Prognosis is poor at this time.  Wife very clear pt would not want a Trach.  May need to progress to Hospice    Review of Systems   Unable to perform ROS: Intubated      Physical Exam  Laboratory/Imaging   Hemodynamics  Temp (24hrs), Av.2 °C (99 °F), Min:37.1 °C (98.8 °F), Max:37.3 °C (99.1 °F)   Temperature: 37.3 °C (99.1 °F), Monitored Temp: 37 °C (98.6 °F)  Pulse  Av.5  Min: 60  Max: 142 Heart Rate (Monitored): 89  NIBP: 112/68     Respiratory  Smith Vent Mode: APVCMV, Rate (breaths/min): 16, PEEP/CPAP: 10, PEEP/CPAP: 10, FiO2: 30, P Peak (PIP): 24, P MEAN: 14   Respiration: 16, Pulse Oximetry: 98 %     Work Of Breathing / Effort: Vented  RUL Breath Sounds: Crackles, RML Breath Sounds: Crackles, RLL Breath Sounds: Diminished, BRITTANY Breath Sounds: Crackles, LLL Breath Sounds: Diminished    Fluids    Intake/Output Summary (Last 24 hours) at 10/12/18 0549  Last data filed at 10/12/18 0400   Gross per 24 hour   Intake           671.91 ml   Output             2810 ml   Net         -2138.09 ml       Nutrition  Orders Placed This Encounter   Procedures   • Diet NPO     Standing Status:   Standing     Number of Occurrences:   1     Order Specific Question:   Type:     Answer:   Now [1]     Order Specific Question:   Restrict to:     Answer:    Strict [1]     Physical Exam   Constitutional: He appears well-developed and well-nourished. No distress.   HENT:   Head: Normocephalic and atraumatic.   Eyes: Conjunctivae are normal.   Neck: Neck supple. No JVD present.   Cardiovascular: Normal rate.  Exam reveals no gallop.    Murmur heard.  Pulmonary/Chest: No stridor. No respiratory distress. He has no wheezes. He has rales.   Abdominal: Soft. There is no tenderness. There is no rebound and no guarding.   Musculoskeletal: He exhibits edema.   Neurological:   Follows inconsistently   Skin: Skin is warm and dry. No rash noted. He is not diaphoretic.   Nursing note and vitals reviewed.      Recent Labs      10/10/18   0359  10/11/18   0530  10/12/18   0535   WBC  14.9*  15.7*  16.1*   RBC  3.48*  3.39*  3.51*   HEMOGLOBIN  11.0*  10.9*  10.9*   HEMATOCRIT  34.2*  33.6*  34.6*   MCV  98.3*  99.1*  98.6*   MCH  31.6  32.2  31.1   MCHC  32.2*  32.4*  31.5*   RDW  51.0*  51.5*  51.8*   PLATELETCT  208  216  193   MPV  10.7  10.5  10.1     Recent Labs      10/10/18   0359  10/11/18   0530   SODIUM  149*  151*   POTASSIUM  4.3  4.3   CHLORIDE  112  114*   CO2  30  31   GLUCOSE  160*  172*   BUN  79*  80*   CREATININE  1.37  1.24   CALCIUM  8.0*  8.6             Recent Labs      10/11/18   0530   TRIGLYCERIDE  51          Assessment/Plan     * Acute respiratory failure with hypoxia (HCC)- (present on admission)   Assessment & Plan    CTA on 9-29 neg  for PE  Echo with normal EF and pulmonary hypertension  Hold diuresis as creat is creeping up  Vent management per critical care discussed with Dr. Sierra              Acute pulmonary edema (HCC)   Assessment & Plan    Neg fluid balance on lasix however creat climbing so hold for today  Cont to follow I&Os, daily BMP, BP        CAP (community acquired pneumonia)- (present on admission)   Assessment & Plan    Completed course of antibiotics   Cultures neg        Ileus (HCC)   Assessment & Plan    Clinically  improved  Remains on Reglan  We will advance tube feeding and continue close monitoring        Hemoptysis   Assessment & Plan    Lovenox discontinued    Continue clinical monitoring and follow-up on lung biopsy        Paroxysmal atrial fibrillation/atrial flutter (HCC)   Assessment & Plan    Continue amiodarone and metoprolol  Now in sinux  Lovenox discontinued given hemoptysis  Reverted to sinus rhythm continue telemetry monitoring        Pulmonary hypertension (HCC)   Assessment & Plan    9/29/18 echocardiogram with RVSP of 65 mmHg  CT was negative for pulmonary embolism    Continue IV Lasix              Acute metabolic encephalopathy   Assessment & Plan    Non focal exam  Not showing any improvement  EEG consistent with difuse metabolic encephalopathy.  Neg for non convulsive status  Cont to address metabolic and infectious issues  Try to minimize sedation and CNS active mdications        Elevated troponin- (present on admission)   Assessment & Plan    Likely demand ischemia        Hypernatremia   Assessment & Plan    Stable on Free H2O          Hyperglycemia- (present on admission)   Assessment & Plan    HbA1c 6.5 on 7/18  On insulin drip continue close monitoring        Dyslipidemia- (present on admission)   Assessment & Plan    Continue atorvastatin            Quality-Core Measures   Reviewed items::  EKG reviewed, Labs reviewed, Medications reviewed and Radiology images reviewed  Garces catheter::  Unconscious / Sedated Patient on a Ventilator  DVT prophylaxis - mechanical:  SCDs  Ulcer Prophylaxis::  Yes  Antibiotics:  Treating active infection/contamination beyond 24 hours perioperative coverage

## 2018-10-12 NOTE — PROGRESS NOTES
Pt went into a 1:1 aflutter rhythm with HR between 125-138.     Propofol turned back on due to increased agitation and coughing strong on ventilator. Dr. Sierra aware. No further updates.

## 2018-10-12 NOTE — PROGRESS NOTES
Care Plans for 10/10/18    Problem: Mobility:  Pt unable to mobilize due to RASS -4 at times and PEEP of 12    Problem: Safety  Pt being turned from side to side, soft wrist restraints in place per order, bed in lowest position.

## 2018-10-12 NOTE — PROGRESS NOTES
Critical Care Progress Note    Date of admission  9/29/2018    Chief Complaint  78 y.o. male admitted 9/29/2018 with cough and shortness of breath.    Hospital Course  This gentleman was admitted with severe sepsis, respiratory failure and pneumonia.    Interval Problem Update  Reviewed last 24 hour events:    VENT day#14  PEEP 10, fiO2 0.3  CXR still with effusions/stx/edema  Secretions bloody secretions again  Follows with sedation vacations, wiggles toes  Brainstem reflexes intact  Propofol 5  Fentanyl 75  SR 90s  SBp 90-110s  UO great  I/Os neg 2272cc/24hrs  Tm 99.1  Na 153  TF 20/hr  Insulin drip 2 -> 1.5    Patient's wife presented to the bedside  Case reviewed at great length with her and hospitalist  Reaffirmed no CODE STATUS but full treat  Reaffirmed no tracheostomy  Neurologically he is a bit improved and oxygenation a bit improved and patient's wife now a little more optimistic     YESTERDAY  Vent day #13  CXR improved P-edema  PEEP 12, fiO2 0.4  Not following - opens eys -withdrawls  SR 70s  SBp 120s  Afeb  TF 20  UO great  Fentanyl 75  Propofol 5    Lasix  Fluid balance reviewed, positive for 32 cc / 24 hours  Pathology from bronchoscopy on 10 7 finally back, no evidence of malignancy  No further hemoptysis    FiO2 holding  PEEP dropped to 10 earlier today  Hemodynamics, pulmonary mechanics and respiratory exam unchanged    Long conversation by phone with patient's wife  Updated her in regards to current status  Reaffirmed DNR status  We will aggressively treat throughout the weekend and monitor progress  No tracheostomy under any circumstances  Consider further options such as cc early next week    Review of Systems  Review of Systems   Unable to perform ROS: Intubated   Sedated with propofol    Vital Signs for last 24 hours   Temp:  [37.1 °C (98.8 °F)-37.3 °C (99.1 °F)] 37.2 °C (99 °F)  Pulse:  [] 99  Resp:  [16-18] 16    Hemodynamic parameters for last 24 hours       Vent Settings for  last 24 hours  [place] Vent Mode: APVCMV  Rate (breaths/min):  [16] 16  PEEP/CPAP:  [10-12] 10  FiO2:  [30-40] 30  P Peak (PIP):  [20-29] 23  P MEAN:  [13-15] 14    Ventilator mechanics and waveforms are reviewed at the bedside with RT    Physical Exam   Physical Exam   Constitutional:  Non-toxic appearance. He does not have a sickly appearance. He appears ill (Appears acute and chronically ill). No distress. He is sedated and intubated.   Sedated on ventilator, looks acute and chronically ill as well as uncomfortable   HENT:   Head: Normocephalic.   Right Ear: External ear normal.   Left Ear: External ear normal.   Mouth/Throat: Oropharynx is clear and moist. No oropharyngeal exudate.   Eyes: Pupils are equal, round, and reactive to light. Conjunctivae are normal. Right eye exhibits no discharge. Left eye exhibits no discharge. No scleral icterus.   Neck: Neck supple. No JVD present. No tracheal deviation present.   CVC site appears okay   Cardiovascular: Normal rate, regular rhythm and intact distal pulses.   No extrasystoles are present. Exam reveals no gallop and no friction rub.    Murmur: 2/6 flow murmur?  Larimer best at apex (nothing noted on echocardiogram), no change again today.  Atrial fibrillation converted to sinus rhythm with bundle branch block, no change   Pulmonary/Chest: No accessory muscle usage (On full vent support). He is intubated. No respiratory distress. He has decreased breath sounds (Mild and diffuse). He has no wheezes. He has no rhonchi. He has rales (Scattered crackles bilaterally, mainly at bases) in the right lower field and the left lower field.   Abdominal: Soft. Bowel sounds are normal. He exhibits no distension. There is no hepatosplenomegaly. There is no tenderness. There is no rigidity, no rebound, no guarding, no CVA tenderness and negative Pitts's sign.   Tolerating enteral tube feedings   Musculoskeletal: He exhibits no tenderness or deformity. Edema: Slight better.   No  clubbing or cyanosis   Neurological: He is unresponsive. No cranial nerve deficit or sensory deficit. He exhibits normal muscle tone. GCS eye subscore is 4. GCS verbal subscore is 1. GCS motor subscore is 5.   Sedated.  Arouses but does not follow for me.  Grimaces and appears uncomfortable.  Moves all 4 extremities spontaneously and has spontaneous eye opening.,  Localizes and withdraws to tactile stimuli.  No change again.   Skin: Skin is warm and dry. No rash noted. He is not diaphoretic. No cyanosis or erythema. Nails show no clubbing.   Psychiatric: He is noncommunicative.       Medications  Current Facility-Administered Medications   Medication Dose Route Frequency Provider Last Rate Last Dose   • furosemide (LASIX) injection 40 mg  40 mg Intravenous Q DAY Gonzalo Rose D.OShelley   40 mg at 10/12/18 0527   • fentaNYL (SUBLIMAZE) 50 mcg/mL in 50mL (Continuous Infusion)   Intravenous Continuous Justin Sierra M.D. 1.5 mL/hr at 10/11/18 1828 75 mcg/hr at 10/11/18 1828   • propofol (DIPRIVAN) injection  0-80 mcg/kg/min Intravenous Continuous Brandon Paulino M.D. 6.6 mL/hr at 10/11/18 2313 10 mcg/kg/min at 10/11/18 2313   • metoprolol (LOPRESSOR) tablet 12.5 mg  12.5 mg Oral TWICE DAILY Dre Robbins M.D.   12.5 mg at 10/12/18 0527   • potassium bicarbonate (KLYTE) effervescent tablet 25 mEq  25 mEq Feeding Tube BID Brandon Paulino M.D.   25 mEq at 10/12/18 0527   • amiodarone (CORDARONE) tablet 400 mg  400 mg Per NG Tube TWICE DAILY Brandon Paulino M.D.   400 mg at 10/12/18 0526   • ondansetron (ZOFRAN ODT) dispertab 4 mg  4 mg Oral Q4HRS PRN Brandon Paulino M.D.       • famotidine (PEPCID) tablet 20 mg  20 mg Feeding Tube Q12HRS Brandon Paulino M.D.   20 mg at 10/12/18 0526   • insulin regular human (HUMULIN/NOVOLIN R) 62.5 Units in  mL infusion per protocol  0-29 Units/hr Intravenous Continuous Brandon Paulino M.D. 8 mL/hr at 10/12/18 0530 2  Units/hr at 10/12/18 0530   • dextrose 50% (D50W) injection 25-50 mL  12.5-25 g Intravenous PRN Brandon Paulino M.D.       • Respiratory Care per Protocol   Nebulization Continuous RT Walter Carrion M.D.       • ondansetron (ZOFRAN) syringe/vial injection 4 mg  4 mg Intravenous Q4HRS PRN Walter Carrion M.D.       • MD Alert...ICU Electrolyte Replacement per Pharmacy   Other pharmacy to dose Jeremy M Gonda, M.D.       • fentaNYL (SUBLIMAZE) injection 25 mcg  25 mcg Intravenous Q HOUR PRN Jeremy M Gonda, M.D.   25 mcg at 10/05/18 2059    Or   • fentaNYL (SUBLIMAZE) injection 50 mcg  50 mcg Intravenous Q HOUR PRN Jeremy M Gonda, M.D.   50 mcg at 10/10/18 0604    Or   • fentaNYL (SUBLIMAZE) injection 100 mcg  100 mcg Intravenous Q HOUR PRN Jeremy M Gonda, M.D.   100 mcg at 10/05/18 1207   • ipratropium-albuterol (DUONEB) nebulizer solution  3 mL Nebulization Q2HRS PRN (RT) Jeremy M Gonda, M.D.   3 mL at 10/11/18 0700   • Pharmacy Consult: Enteral tube feeding - review meds/change route/product selection   Other PRN Jeremy M Gonda, M.D.       • senna-docusate (PERICOLACE or SENOKOT S) 8.6-50 MG per tablet 2 Tab  2 Tab Feeding Tube BID Jeremy M Gonda, M.D.   2 Tab at 10/12/18 0527    And   • polyethylene glycol/lytes (MIRALAX) PACKET 1 Packet  1 Packet Feeding Tube QDAY PRN Jeremy M Gonda, M.D.   1 Packet at 10/06/18 1222    And   • magnesium hydroxide (MILK OF MAGNESIA) suspension 30 mL  30 mL Feeding Tube QDAY PRN Jeremy M Gonda, M.D.   30 mL at 10/02/18 1322    And   • bisacodyl (DULCOLAX) suppository 10 mg  10 mg Rectal QDAY PRN Jeremy M Gonda, M.D.   10 mg at 10/06/18 1222   • acetaminophen (TYLENOL) tablet 650 mg  650 mg Feeding Tube Q6HRS PRN Jeremy M Gonda, M.D.   650 mg at 10/06/18 1153   • atorvastatin (LIPITOR) tablet 40 mg  40 mg Per NG Tube Q EVENING Jeremy M Gonda, M.D.   40 mg at 10/11/18 9167   • aspirin (ASA) chewable tab 81 mg  81 mg Per NG Tube DAILY Jeremy M Gonda, M.D.   81 mg at 10/12/18 4998        Fluids    Intake/Output Summary (Last 24 hours) at 10/12/18 0641  Last data filed at 10/12/18 0600   Gross per 24 hour   Intake           777.44 ml   Output             3050 ml   Net         -2272.56 ml       Laboratory  Recent Results (from the past 48 hour(s))   ACCU-CHEK GLUCOSE    Collection Time: 10/10/18  7:51 AM   Result Value Ref Range    Glucose - Accu-Ck 161 (H) 65 - 99 mg/dL   ACCU-CHEK GLUCOSE    Collection Time: 10/10/18 10:08 AM   Result Value Ref Range    Glucose - Accu-Ck 146 (H) 65 - 99 mg/dL   ACCU-CHEK GLUCOSE    Collection Time: 10/10/18 12:14 PM   Result Value Ref Range    Glucose - Accu-Ck 165 (H) 65 - 99 mg/dL   ACCU-CHEK GLUCOSE    Collection Time: 10/10/18  2:12 PM   Result Value Ref Range    Glucose - Accu-Ck 156 (H) 65 - 99 mg/dL   ACCU-CHEK GLUCOSE    Collection Time: 10/10/18  4:36 PM   Result Value Ref Range    Glucose - Accu-Ck 159 (H) 65 - 99 mg/dL   ACCU-CHEK GLUCOSE    Collection Time: 10/10/18  5:22 PM   Result Value Ref Range    Glucose - Accu-Ck 161 (H) 65 - 99 mg/dL   ACCU-CHEK GLUCOSE    Collection Time: 10/10/18  8:21 PM   Result Value Ref Range    Glucose - Accu-Ck 177 (H) 65 - 99 mg/dL   ACCU-CHEK GLUCOSE    Collection Time: 10/10/18 11:06 PM   Result Value Ref Range    Glucose - Accu-Ck 160 (H) 65 - 99 mg/dL   ACCU-CHEK GLUCOSE    Collection Time: 10/11/18 12:41 AM   Result Value Ref Range    Glucose - Accu-Ck 160 (H) 65 - 99 mg/dL   ACCU-CHEK GLUCOSE    Collection Time: 10/11/18  2:37 AM   Result Value Ref Range    Glucose - Accu-Ck 169 (H) 65 - 99 mg/dL   ACCU-CHEK GLUCOSE    Collection Time: 10/11/18  4:10 AM   Result Value Ref Range    Glucose - Accu-Ck 153 (H) 65 - 99 mg/dL   ISTAT ARTERIAL BLOOD GAS    Collection Time: 10/11/18  4:39 AM   Result Value Ref Range    Ph 7.478 7.400 - 7.500    Pco2 43.2 (H) 26.0 - 37.0 mmHg    Po2 78 64 - 87 mmHg    Tco2 33 20 - 33 mmol/L    S02 96 93 - 99 %    Hco3 32.0 (H) 17.0 - 25.0 mmol/L    BE 8 (H) -4 - 3 mmol/L    Body Temp  37.2 C degrees    O2 Therapy 40 %    iPF Ratio 195     Ph Temp Cesar 7.475 7.400 - 7.500    Pco2 Temp Co 43.6 (H) 26.0 - 37.0 mmHg    Po2 Temp Cor 79 64 - 87 mmHg    Specimen Arterial     Action Range Triggered NO     Inst. Qualified Patient YES    CBC with Differential    Collection Time: 10/11/18  5:30 AM   Result Value Ref Range    WBC 15.7 (H) 4.8 - 10.8 K/uL    RBC 3.39 (L) 4.70 - 6.10 M/uL    Hemoglobin 10.9 (L) 14.0 - 18.0 g/dL    Hematocrit 33.6 (L) 42.0 - 52.0 %    MCV 99.1 (H) 81.4 - 97.8 fL    MCH 32.2 27.0 - 33.0 pg    MCHC 32.4 (L) 33.7 - 35.3 g/dL    RDW 51.5 (H) 35.9 - 50.0 fL    Platelet Count 216 164 - 446 K/uL    MPV 10.5 9.0 - 12.9 fL    Neutrophils-Polys 82.40 (H) 44.00 - 72.00 %    Lymphocytes 8.20 (L) 22.00 - 41.00 %    Monocytes 6.80 0.00 - 13.40 %    Eosinophils 1.80 0.00 - 6.90 %    Basophils 0.20 0.00 - 1.80 %    Immature Granulocytes 0.60 0.00 - 0.90 %    Nucleated RBC 0.00 /100 WBC    Neutrophils (Absolute) 12.92 (H) 1.82 - 7.42 K/uL    Lymphs (Absolute) 1.28 1.00 - 4.80 K/uL    Monos (Absolute) 1.06 (H) 0.00 - 0.85 K/uL    Eos (Absolute) 0.28 0.00 - 0.51 K/uL    Baso (Absolute) 0.03 0.00 - 0.12 K/uL    Immature Granulocytes (abs) 0.10 0.00 - 0.11 K/uL    NRBC (Absolute) 0.00 K/uL   MAGNESIUM    Collection Time: 10/11/18  5:30 AM   Result Value Ref Range    Magnesium 2.9 (H) 1.5 - 2.5 mg/dL   PHOSPHORUS    Collection Time: 10/11/18  5:30 AM   Result Value Ref Range    Phosphorus 4.5 2.5 - 4.5 mg/dL   COMP METABOLIC PANEL    Collection Time: 10/11/18  5:30 AM   Result Value Ref Range    Sodium 151 (H) 135 - 145 mmol/L    Potassium 4.3 3.6 - 5.5 mmol/L    Chloride 114 (H) 96 - 112 mmol/L    Co2 31 20 - 33 mmol/L    Anion Gap 6.0 0.0 - 11.9    Glucose 172 (H) 65 - 99 mg/dL    Bun 80 (HH) 8 - 22 mg/dL    Creatinine 1.24 0.50 - 1.40 mg/dL    Calcium 8.6 8.5 - 10.5 mg/dL    AST(SGOT) 17 12 - 45 U/L    ALT(SGPT) 22 2 - 50 U/L    Alkaline Phosphatase 97 30 - 99 U/L    Total Bilirubin 1.0 0.1 - 1.5  mg/dL    Albumin 3.1 (L) 3.2 - 4.9 g/dL    Total Protein 4.7 (L) 6.0 - 8.2 g/dL    Globulin 1.6 (L) 1.9 - 3.5 g/dL    A-G Ratio 1.9 g/dL   TRIGLYCERIDE    Collection Time: 10/11/18  5:30 AM   Result Value Ref Range    Triglycerides 51 0 - 149 mg/dL   ESTIMATED GFR    Collection Time: 10/11/18  5:30 AM   Result Value Ref Range    GFR If African American >60 >60 mL/min/1.73 m 2    GFR If Non  57 (A) >60 mL/min/1.73 m 2   ACCU-CHEK GLUCOSE    Collection Time: 10/11/18  6:28 AM   Result Value Ref Range    Glucose - Accu-Ck 159 (H) 65 - 99 mg/dL   ACCU-CHEK GLUCOSE    Collection Time: 10/11/18  8:03 AM   Result Value Ref Range    Glucose - Accu-Ck 165 (H) 65 - 99 mg/dL   ACCU-CHEK GLUCOSE    Collection Time: 10/11/18 10:41 AM   Result Value Ref Range    Glucose - Accu-Ck 159 (H) 65 - 99 mg/dL   ACCU-CHEK GLUCOSE    Collection Time: 10/11/18  3:07 PM   Result Value Ref Range    Glucose - Accu-Ck 167 (H) 65 - 99 mg/dL   ACCU-CHEK GLUCOSE    Collection Time: 10/11/18  5:25 PM   Result Value Ref Range    Glucose - Accu-Ck 171 (H) 65 - 99 mg/dL   ACCU-CHEK GLUCOSE    Collection Time: 10/11/18  7:37 PM   Result Value Ref Range    Glucose - Accu-Ck 164 (H) 65 - 99 mg/dL   ACCU-CHEK GLUCOSE    Collection Time: 10/11/18  9:34 PM   Result Value Ref Range    Glucose - Accu-Ck 167 (H) 65 - 99 mg/dL   ACCU-CHEK GLUCOSE    Collection Time: 10/11/18 11:23 PM   Result Value Ref Range    Glucose - Accu-Ck 163 (H) 65 - 99 mg/dL   ACCU-CHEK GLUCOSE    Collection Time: 10/12/18  1:50 AM   Result Value Ref Range    Glucose - Accu-Ck 146 (H) 65 - 99 mg/dL   ISTAT ARTERIAL BLOOD GAS    Collection Time: 10/12/18  4:24 AM   Result Value Ref Range    Ph 7.466 7.400 - 7.500    Pco2 41.6 (H) 26.0 - 37.0 mmHg    Po2 65 64 - 87 mmHg    Tco2 31 20 - 33 mmol/L    S02 94 93 - 99 %    Hco3 30.0 (H) 17.0 - 25.0 mmol/L    BE 6 (H) -4 - 3 mmol/L    Body Temp 37.2 C degrees    O2 Therapy 30 %    iPF Ratio 217     Ph Temp Cesar 7.463 7.400 -  7.500    Pco2 Temp Co 42.0 (H) 26.0 - 37.0 mmHg    Po2 Temp Cor 66 64 - 87 mmHg    Specimen Arterial     Action Range Triggered NO     Inst. Qualified Patient YES    ACCU-CHEK GLUCOSE    Collection Time: 10/12/18  5:23 AM   Result Value Ref Range    Glucose - Accu-Ck 168 (H) 65 - 99 mg/dL   CBC with Differential    Collection Time: 10/12/18  5:35 AM   Result Value Ref Range    WBC 16.1 (H) 4.8 - 10.8 K/uL    RBC 3.51 (L) 4.70 - 6.10 M/uL    Hemoglobin 10.9 (L) 14.0 - 18.0 g/dL    Hematocrit 34.6 (L) 42.0 - 52.0 %    MCV 98.6 (H) 81.4 - 97.8 fL    MCH 31.1 27.0 - 33.0 pg    MCHC 31.5 (L) 33.7 - 35.3 g/dL    RDW 51.8 (H) 35.9 - 50.0 fL    Platelet Count 193 164 - 446 K/uL    MPV 10.1 9.0 - 12.9 fL    Neutrophils-Polys 85.10 (H) 44.00 - 72.00 %    Lymphocytes 5.80 (L) 22.00 - 41.00 %    Monocytes 6.80 0.00 - 13.40 %    Eosinophils 1.40 0.00 - 6.90 %    Basophils 0.20 0.00 - 1.80 %    Immature Granulocytes 0.70 0.00 - 0.90 %    Nucleated RBC 0.00 /100 WBC    Neutrophils (Absolute) 13.66 (H) 1.82 - 7.42 K/uL    Lymphs (Absolute) 0.93 (L) 1.00 - 4.80 K/uL    Monos (Absolute) 1.10 (H) 0.00 - 0.85 K/uL    Eos (Absolute) 0.23 0.00 - 0.51 K/uL    Baso (Absolute) 0.03 0.00 - 0.12 K/uL    Immature Granulocytes (abs) 0.12 (H) 0.00 - 0.11 K/uL    NRBC (Absolute) 0.00 K/uL   MAGNESIUM    Collection Time: 10/12/18  5:35 AM   Result Value Ref Range    Magnesium 2.8 (H) 1.5 - 2.5 mg/dL   PHOSPHORUS    Collection Time: 10/12/18  5:35 AM   Result Value Ref Range    Phosphorus 4.1 2.5 - 4.5 mg/dL   COMP METABOLIC PANEL    Collection Time: 10/12/18  5:35 AM   Result Value Ref Range    Sodium 153 (H) 135 - 145 mmol/L    Potassium 4.1 3.6 - 5.5 mmol/L    Chloride 116 (H) 96 - 112 mmol/L    Co2 33 20 - 33 mmol/L    Anion Gap 4.0 0.0 - 11.9    Glucose 178 (H) 65 - 99 mg/dL    Bun 77 (HH) 8 - 22 mg/dL    Creatinine 1.14 0.50 - 1.40 mg/dL    Calcium 8.6 8.5 - 10.5 mg/dL    AST(SGOT) 15 12 - 45 U/L    ALT(SGPT) 19 2 - 50 U/L    Alkaline  Phosphatase 99 30 - 99 U/L    Total Bilirubin 1.0 0.1 - 1.5 mg/dL    Albumin 3.2 3.2 - 4.9 g/dL    Total Protein 4.9 (L) 6.0 - 8.2 g/dL    Globulin 1.7 (L) 1.9 - 3.5 g/dL    A-G Ratio 1.9 g/dL   ESTIMATED GFR    Collection Time: 10/12/18  5:35 AM   Result Value Ref Range    GFR If African American >60 >60 mL/min/1.73 m 2    GFR If Non African American >60 >60 mL/min/1.73 m 2       Imaging  X-Ray:  I have personally reviewed the images and compared with prior images.   Interpretation noted above and films are reviewed with Team on rounds again today      Assessment/Plan  * Acute respiratory failure with hypoxia (HCC)- (present on admission)   Assessment & Plan    Intubated 9/29  RT protocols  Continue vent support, not appropriate for liberation at this time  All appropriate ventilator bundles have been initiated  Serial ABG, chest x-ray and ventilator mechanics review  Ventilator adjusted daily or more frequently as needed  Oxygenation better, chest x-ray better, trial reduce PEEP to 8  Wife remains adamant no tracheostomy at this time, probably no LTACH as well, reaffirmed  Patient plateaus and does not clinically improve consider follow-up with palliative care team          Acute pulmonary edema (HCC)   Assessment & Plan    ECHO with normal LV systolic function  Significant pulmonary hypertension causing LV diastolic dysfunction  Volume overloaded clinically with worsening pulmonary edema radiographically and by exam  Reduce Lasix, 40 mg IV daily  Monitor I's and O's and BMP  Add Diamox for contraction alkalosis?  Continue current regimen  Monitor          CAP (community acquired pneumonia)- (present on admission)   Assessment & Plan    Influenza negative  Completed 5 days of Unasyn and azithromycin on 10/4  Observe off antibiotics  Update vaccines prior to discharge  Monitor for HCAP, patient may need diagnostic and therapeutic bronchoscopy  No new signs of pneumonia, continue to monitor  No change        Ileus  (HCC)   Assessment & Plan    Secondary to above  Trickle tube feeds at 20 cc/h, continue to increase slowly as tolerated  Avoid lactulose, seem to make things worse for this patient  Bowel care protocols  Relistor therapy to begin as needed  Mobilize as tolerated  Slow increase tube feeds          Hemoptysis   Assessment & Plan    Bronchoscopy revealed friable and nodular mucosa in the left mainstem bronchus, no obvious malignancy  Hold all anticoagulation for now  Bronchoscopy with biopsy and brushing performed on 10/7, cytology and pathology negative for malignancy  Continue to hold VTE prophylaxis with subcu heparin as the patient still having intermittent hemoptysis          Atelectasis   Assessment & Plan    Aggressive pulmonary toilet  Serial chest x-ray, therapeutic bronchoscopy as needed  Wean off ventilator mobilize/IS/PEP        Paroxysmal atrial fibrillation/atrial flutter (HCC)   Assessment & Plan    Continue loading with amiodarone, 400 mg twice daily  Continue additional IV doses of amiodarone to supplement/accelerate loading as needed  No anticoagulation due to hemoptysis, monitor - last episode a.m. 10/12  Optimize magnesium and potassium aggressively  Rate control with CCB or BB as Bp allows, alternatively digoxin therapy as needed  Monitor        Pulmonary hypertension (HCC)   Assessment & Plan    RVSP 65 mm Hg  CTA without evidence of pulmonary embolism  Force diuresis with Lasix ongoing, dose adjusted daily as needed  ROS for BEBA when extubated  Suspect chronic process given the severity of elevation  Continue current regimen  Monitor        Acute metabolic encephalopathy   Assessment & Plan    Etiology is metabolic as well as due to sepsis  Limit sedatives, they are contributing  Improved but persisting, following with very prolonged sedation vacations        Elevated troponin- (present on admission)   Assessment & Plan    Continue aspirin and statin  Monitor        Azotemia   Assessment & Plan     EZRA worse again  Sodium, BUN and creatinine worse  Clinically volume overloaded by exam systemically and pulmonary wise, oxygenation actually better  Ongoing diuresis with good results!  However he needs some free water now we will start with 200 cc every 6 hours via feeding tube  However I believe the need to reduce furosemide today 10/11  Monitor BMP and  I/Os        Hypernatremia   Assessment & Plan    Continue free water, 200 mL every 8 hours as tolerated  Serial BMP  monitor        Fever   Assessment & Plan    This was due to dexmedetomidine  Fever has resolved after dexmedetomidine discontinued  UA unremarkable  Blood cultures negative from 10/5  Bronchial washings from 10/3 with no growth  Avoid dexmedetomidine, recent fever with trial        Hyperglycemia- (present on admission)   Assessment & Plan    Titrating insulin drip for glucose control  Daily reviewed insulin dosing with clinical pharmacist and at bedtime SSI/long-acting insulin as clinically appropriate  Hypoglycemia protocols ongoing  Monitor        Dyslipidemia- (present on admission)   Assessment & Plan    Continue statin/diet             VTE:  Contraindicated  Ulcer: H2 Antagonist  Lines: Central Line  Ongoing indication addressed    I have performed a physical exam and reviewed and updated ROS and Plan today (10/12/2018). In review of yesterday's note (10/11/2018), there are no changes except as documented above.     Prognosis remains very guarded.  I have assessed and reassessed this patient today again.  I have met with the patient's wife today.  Patient remains critically ill with unstable pulmonary status on full vent support inappropriate for liberation with high PEEP which we will attempt to wean today as tolerated.  Additionally, the patient has actively titrated insulin.  Amiodarone converted to enteral route for now.  Forced diuresis with IV Lasix ongoing.  High risk of deterioration and worsening vital organ dysfunction and death  without the above critical care interventions.  Long conversation again with wife today at the bedside with the hospitalist Re: Diagnosis, treatment, prognosis and plans/goals of care.    Discussed patient condition and risk of morbidity and/or mortality with Hospitalist, Family, RN, RT, Pharmacy, , Code status disscussed and Charge nurse / hot rounds     The patient remains critically ill.  Critical care time = 50 minutes in directly providing and coordinating critical care and extensive data review.  No time overlap and excludes procedures.    High risk of deterioration and worsening vital organ dysfunction and death without the above critical care interventions.    Pulmonary and Critical Care Medicine

## 2018-10-13 NOTE — PROGRESS NOTES
Critical Care Progress Note    Date of admission  9/29/2018    Chief Complaint  78 y.o. male admitted 9/29/2018 with cough and shortness of breath.    Hospital Course  This gentleman was admitted with severe sepsis, respiratory failure and pneumonia.    Interval Problem Update  Reviewed last 24 hour events:    AF,  - 150  Opens eyes, tracks, moves all 4, not following  TF at 20, no further emesis  Insulin gtt 1.5 u/hr  Good UOP  CXR bilat inf's, unchanged  Vent day#15, not anthony any SBT  Ongoing hemoptysis/brown  Completed Unasyn/azithro   Tm 100, WBC up to 19; ? Repeat bronch  One-way extubation Monday      Yesterday:  VENT day#14  PEEP 10, fiO2 0.3  CXR still with effusions/stx/edema  Secretions bloody secretions again  Follows with sedation vacations, wiggles toes  Brainstem reflexes intact  Propofol 5  Fentanyl 75  SR 90s  SBp 90-110s  UO great  I/Os neg 2272cc/24hrs  Tm 99.1  Na 153  TF 20/hr  Insulin drip 2 -> 1.5    Patient's wife presented to the bedside  Case reviewed at great length with her and hospitalist  Reaffirmed no CODE STATUS but full treat  Reaffirmed no tracheostomy  Neurologically he is a bit improved and oxygenation a bit improved and patient's wife now a little more optimistic    Review of Systems  Review of Systems   Unable to perform ROS: Intubated   Sedated with propofol    Vital Signs for last 24 hours   Temp:  [37.1 °C (98.8 °F)-37.9 °C (100.2 °F)] 37.9 °C (100.2 °F)  Pulse:  [] 125  Resp:  [14-28] 22    Hemodynamic parameters for last 24 hours       Vent Settings for last 24 hours  [place] Vent Mode: APVCMV  Rate (breaths/min):  [16] 16  PEEP/CPAP:  [8] 8  FiO2:  [30] 30  P Peak (PIP):  [16-24] 24  P MEAN:  [10-11] 11    Ventilator mechanics and waveforms are reviewed at the bedside with RT    Physical Exam   Physical Exam   Constitutional:  Non-toxic appearance. He does not have a sickly appearance. He appears ill (Appears acute and chronically ill). No distress. He is  sedated and intubated.   Sedated on ventilator, looks acute and chronically ill as well as uncomfortable   HENT:   Head: Normocephalic.   Right Ear: External ear normal.   Left Ear: External ear normal.   Mouth/Throat: Oropharynx is clear and moist. No oropharyngeal exudate.   Eyes: Pupils are equal, round, and reactive to light. Conjunctivae are normal. Right eye exhibits no discharge. Left eye exhibits no discharge. No scleral icterus.   Neck: Neck supple. No JVD present. No tracheal deviation present.   CVC site appears okay   Cardiovascular: Normal rate, regular rhythm and intact distal pulses.   No extrasystoles are present. Exam reveals no gallop and no friction rub.    Murmur: 2/6 flow murmur?  Larue best at apex (nothing noted on echocardiogram), no change again today.  Atrial fibrillation converted to sinus rhythm with bundle branch block, no change   Pulmonary/Chest: No accessory muscle usage (On full vent support). He is intubated. No respiratory distress. He has decreased breath sounds (Mild and diffuse). He has no wheezes. He has no rhonchi. He has rales (Scattered crackles bilaterally, mainly at bases) in the right lower field and the left lower field.   Abdominal: Soft. Bowel sounds are normal. He exhibits no distension. There is no hepatosplenomegaly. There is no tenderness. There is no rigidity, no rebound, no guarding, no CVA tenderness and negative Pitts's sign.   Tolerating enteral tube feedings   Musculoskeletal: He exhibits no tenderness or deformity. Edema: Slight better.   No clubbing or cyanosis   Neurological: He is unresponsive. No cranial nerve deficit or sensory deficit. He exhibits normal muscle tone. GCS eye subscore is 4. GCS verbal subscore is 1. GCS motor subscore is 5.   Sedated.  Arouses but does not follow for me.  Grimaces and appears uncomfortable.  Moves all 4 extremities spontaneously and has spontaneous eye opening.,  Localizes and withdraws to tactile stimuli.  No change  again.   Skin: Skin is warm and dry. No rash noted. He is not diaphoretic. No cyanosis or erythema. Nails show no clubbing.   Psychiatric: He is noncommunicative.       Medications  Current Facility-Administered Medications   Medication Dose Route Frequency Provider Last Rate Last Dose   • furosemide (LASIX) injection 40 mg  40 mg Intravenous Q DAY SHANELLE McraeOShelley   40 mg at 10/13/18 0614   • propofol (DIPRIVAN) injection  0-80 mcg/kg/min Intravenous Continuous Brandon Paulino M.D. 3.3 mL/hr at 10/13/18 0745 5 mcg/kg/min at 10/13/18 0745   • metoprolol (LOPRESSOR) tablet 12.5 mg  12.5 mg Oral TWICE DAILY Dre Robbins M.D.   Stopped at 10/13/18 0600   • potassium bicarbonate (KLYTE) effervescent tablet 25 mEq  25 mEq Feeding Tube BID Brandon Paulino M.D.   25 mEq at 10/13/18 0614   • amiodarone (CORDARONE) tablet 400 mg  400 mg Per NG Tube TWICE DAILY Brandon Paulino M.D.   400 mg at 10/13/18 0614   • ondansetron (ZOFRAN ODT) dispertab 4 mg  4 mg Oral Q4HRS PRN Brandon Paulino M.D.       • famotidine (PEPCID) tablet 20 mg  20 mg Feeding Tube Q12HRS Brandon Paulino M.D.   20 mg at 10/13/18 0614   • insulin regular human (HUMULIN/NOVOLIN R) 62.5 Units in  mL infusion per protocol  0-29 Units/hr Intravenous Continuous Brandon Paulino M.D. 6 mL/hr at 10/13/18 0812 1.5 Units/hr at 10/13/18 0812   • dextrose 50% (D50W) injection 25-50 mL  12.5-25 g Intravenous PRN Brandon Paulino M.D.       • Respiratory Care per Protocol   Nebulization Continuous RT Walter Carrion M.D.       • ondansetron (ZOFRAN) syringe/vial injection 4 mg  4 mg Intravenous Q4HRS PRN Walter Carrion M.D.       • MD Alert...ICU Electrolyte Replacement per Pharmacy   Other pharmacy to dose Jeremy M Gonda, M.D.       • fentaNYL (SUBLIMAZE) injection 25 mcg  25 mcg Intravenous Q HOUR PRN Jeremy M Gonda, M.D.   25 mcg at 10/05/18 2059    Or   • fentaNYL (SUBLIMAZE) injection 50 mcg   50 mcg Intravenous Q HOUR PRN Jeremy M Gonda, M.D.   50 mcg at 10/10/18 0604    Or   • fentaNYL (SUBLIMAZE) injection 100 mcg  100 mcg Intravenous Q HOUR PRN Jeremy M Gonda, M.D.   100 mcg at 10/13/18 0108   • ipratropium-albuterol (DUONEB) nebulizer solution  3 mL Nebulization Q2HRS PRN (RT) Jeremy M Gonda, M.D.   3 mL at 10/11/18 0700   • Pharmacy Consult: Enteral tube feeding - review meds/change route/product selection   Other PRN Jeremy M Gonda, M.D.       • senna-docusate (PERICOLACE or SENOKOT S) 8.6-50 MG per tablet 2 Tab  2 Tab Feeding Tube BID Jeremy M Gonda, M.D.   2 Tab at 10/13/18 0614    And   • polyethylene glycol/lytes (MIRALAX) PACKET 1 Packet  1 Packet Feeding Tube QDAY PRN Jeremy M Gonda, M.D.   1 Packet at 10/06/18 1222    And   • magnesium hydroxide (MILK OF MAGNESIA) suspension 30 mL  30 mL Feeding Tube QDAY PRN Jeremy M Gonda, M.D.   30 mL at 10/02/18 1322    And   • bisacodyl (DULCOLAX) suppository 10 mg  10 mg Rectal QDAY PRN Jeremy M Gonda, M.D.   10 mg at 10/06/18 1222   • acetaminophen (TYLENOL) tablet 650 mg  650 mg Feeding Tube Q6HRS PRN Jeremy M Gonda, M.D.   650 mg at 10/06/18 1153   • atorvastatin (LIPITOR) tablet 40 mg  40 mg Per NG Tube Q EVENING Jeremy M Gonda, M.D.   40 mg at 10/12/18 1754   • aspirin (ASA) chewable tab 81 mg  81 mg Per NG Tube DAILY Jeremy M Gonda, M.D.   81 mg at 10/13/18 0614       Fluids    Intake/Output Summary (Last 24 hours) at 10/13/18 0935  Last data filed at 10/13/18 0800   Gross per 24 hour   Intake             1568 ml   Output             2384 ml   Net             -816 ml       Laboratory  Recent Results (from the past 48 hour(s))   ACCU-CHEK GLUCOSE    Collection Time: 10/11/18 10:41 AM   Result Value Ref Range    Glucose - Accu-Ck 159 (H) 65 - 99 mg/dL   ACCU-CHEK GLUCOSE    Collection Time: 10/11/18  3:07 PM   Result Value Ref Range    Glucose - Accu-Ck 167 (H) 65 - 99 mg/dL   ACCU-CHEK GLUCOSE    Collection Time: 10/11/18  5:25 PM   Result Value  Ref Range    Glucose - Accu-Ck 171 (H) 65 - 99 mg/dL   ACCU-CHEK GLUCOSE    Collection Time: 10/11/18  7:37 PM   Result Value Ref Range    Glucose - Accu-Ck 164 (H) 65 - 99 mg/dL   ACCU-CHEK GLUCOSE    Collection Time: 10/11/18  9:34 PM   Result Value Ref Range    Glucose - Accu-Ck 167 (H) 65 - 99 mg/dL   ACCU-CHEK GLUCOSE    Collection Time: 10/11/18 11:23 PM   Result Value Ref Range    Glucose - Accu-Ck 163 (H) 65 - 99 mg/dL   ACCU-CHEK GLUCOSE    Collection Time: 10/12/18  1:50 AM   Result Value Ref Range    Glucose - Accu-Ck 146 (H) 65 - 99 mg/dL   ISTAT ARTERIAL BLOOD GAS    Collection Time: 10/12/18  4:24 AM   Result Value Ref Range    Ph 7.466 7.400 - 7.500    Pco2 41.6 (H) 26.0 - 37.0 mmHg    Po2 65 64 - 87 mmHg    Tco2 31 20 - 33 mmol/L    S02 94 93 - 99 %    Hco3 30.0 (H) 17.0 - 25.0 mmol/L    BE 6 (H) -4 - 3 mmol/L    Body Temp 37.2 C degrees    O2 Therapy 30 %    iPF Ratio 217     Ph Temp Cesar 7.463 7.400 - 7.500    Pco2 Temp Co 42.0 (H) 26.0 - 37.0 mmHg    Po2 Temp Cor 66 64 - 87 mmHg    Specimen Arterial     Action Range Triggered NO     Inst. Qualified Patient YES    ACCU-CHEK GLUCOSE    Collection Time: 10/12/18  5:23 AM   Result Value Ref Range    Glucose - Accu-Ck 168 (H) 65 - 99 mg/dL   CBC with Differential    Collection Time: 10/12/18  5:35 AM   Result Value Ref Range    WBC 16.1 (H) 4.8 - 10.8 K/uL    RBC 3.51 (L) 4.70 - 6.10 M/uL    Hemoglobin 10.9 (L) 14.0 - 18.0 g/dL    Hematocrit 34.6 (L) 42.0 - 52.0 %    MCV 98.6 (H) 81.4 - 97.8 fL    MCH 31.1 27.0 - 33.0 pg    MCHC 31.5 (L) 33.7 - 35.3 g/dL    RDW 51.8 (H) 35.9 - 50.0 fL    Platelet Count 193 164 - 446 K/uL    MPV 10.1 9.0 - 12.9 fL    Neutrophils-Polys 85.10 (H) 44.00 - 72.00 %    Lymphocytes 5.80 (L) 22.00 - 41.00 %    Monocytes 6.80 0.00 - 13.40 %    Eosinophils 1.40 0.00 - 6.90 %    Basophils 0.20 0.00 - 1.80 %    Immature Granulocytes 0.70 0.00 - 0.90 %    Nucleated RBC 0.00 /100 WBC    Neutrophils (Absolute) 13.66 (H) 1.82 - 7.42  K/uL    Lymphs (Absolute) 0.93 (L) 1.00 - 4.80 K/uL    Monos (Absolute) 1.10 (H) 0.00 - 0.85 K/uL    Eos (Absolute) 0.23 0.00 - 0.51 K/uL    Baso (Absolute) 0.03 0.00 - 0.12 K/uL    Immature Granulocytes (abs) 0.12 (H) 0.00 - 0.11 K/uL    NRBC (Absolute) 0.00 K/uL   MAGNESIUM    Collection Time: 10/12/18  5:35 AM   Result Value Ref Range    Magnesium 2.8 (H) 1.5 - 2.5 mg/dL   PHOSPHORUS    Collection Time: 10/12/18  5:35 AM   Result Value Ref Range    Phosphorus 4.1 2.5 - 4.5 mg/dL   COMP METABOLIC PANEL    Collection Time: 10/12/18  5:35 AM   Result Value Ref Range    Sodium 153 (H) 135 - 145 mmol/L    Potassium 4.1 3.6 - 5.5 mmol/L    Chloride 116 (H) 96 - 112 mmol/L    Co2 33 20 - 33 mmol/L    Anion Gap 4.0 0.0 - 11.9    Glucose 178 (H) 65 - 99 mg/dL    Bun 77 (HH) 8 - 22 mg/dL    Creatinine 1.14 0.50 - 1.40 mg/dL    Calcium 8.6 8.5 - 10.5 mg/dL    AST(SGOT) 15 12 - 45 U/L    ALT(SGPT) 19 2 - 50 U/L    Alkaline Phosphatase 99 30 - 99 U/L    Total Bilirubin 1.0 0.1 - 1.5 mg/dL    Albumin 3.2 3.2 - 4.9 g/dL    Total Protein 4.9 (L) 6.0 - 8.2 g/dL    Globulin 1.7 (L) 1.9 - 3.5 g/dL    A-G Ratio 1.9 g/dL   ESTIMATED GFR    Collection Time: 10/12/18  5:35 AM   Result Value Ref Range    GFR If African American >60 >60 mL/min/1.73 m 2    GFR If Non African American >60 >60 mL/min/1.73 m 2   ACCU-CHEK GLUCOSE    Collection Time: 10/12/18  7:39 AM   Result Value Ref Range    Glucose - Accu-Ck 144 (H) 65 - 99 mg/dL   ACCU-CHEK GLUCOSE    Collection Time: 10/12/18  9:32 AM   Result Value Ref Range    Glucose - Accu-Ck 124 (H) 65 - 99 mg/dL   ACCU-CHEK GLUCOSE    Collection Time: 10/12/18 11:19 AM   Result Value Ref Range    Glucose - Accu-Ck 136 (H) 65 - 99 mg/dL   ACCU-CHEK GLUCOSE    Collection Time: 10/12/18 12:36 PM   Result Value Ref Range    Glucose - Accu-Ck 114 (H) 65 - 99 mg/dL   ACCU-CHEK GLUCOSE    Collection Time: 10/12/18  1:37 PM   Result Value Ref Range    Glucose - Accu-Ck 126 (H) 65 - 99 mg/dL   ACCU-CHEK  GLUCOSE    Collection Time: 10/12/18  2:44 PM   Result Value Ref Range    Glucose - Accu-Ck 131 (H) 65 - 99 mg/dL   ACCU-CHEK GLUCOSE    Collection Time: 10/12/18  3:52 PM   Result Value Ref Range    Glucose - Accu-Ck 174 (H) 65 - 99 mg/dL   ACCU-CHEK GLUCOSE    Collection Time: 10/12/18  4:58 PM   Result Value Ref Range    Glucose - Accu-Ck 145 (H) 65 - 99 mg/dL   ACCU-CHEK GLUCOSE    Collection Time: 10/12/18  6:05 PM   Result Value Ref Range    Glucose - Accu-Ck 149 (H) 65 - 99 mg/dL   ACCU-CHEK GLUCOSE    Collection Time: 10/12/18  7:52 PM   Result Value Ref Range    Glucose - Accu-Ck 170 (H) 65 - 99 mg/dL   ACCU-CHEK GLUCOSE    Collection Time: 10/12/18 10:11 PM   Result Value Ref Range    Glucose - Accu-Ck 167 (H) 65 - 99 mg/dL   ACCU-CHEK GLUCOSE    Collection Time: 10/12/18 11:48 PM   Result Value Ref Range    Glucose - Accu-Ck 160 (H) 65 - 99 mg/dL   ACCU-CHEK GLUCOSE    Collection Time: 10/13/18  2:29 AM   Result Value Ref Range    Glucose - Accu-Ck 159 (H) 65 - 99 mg/dL   ISTAT ARTERIAL BLOOD GAS    Collection Time: 10/13/18  4:20 AM   Result Value Ref Range    Ph 7.577 (H) 7.400 - 7.500    Pco2 32.6 26.0 - 37.0 mmHg    Po2 59 (L) 64 - 87 mmHg    Tco2 31 20 - 33 mmol/L    S02 94 93 - 99 %    Hco3 30.4 (H) 17.0 - 25.0 mmol/L    BE 8 (H) -4 - 3 mmol/L    Body Temp 37.9 C degrees    O2 Therapy 30 %    iPF Ratio 197     Ph Temp Cesar 7.563 (H) 7.400 - 7.500    Pco2 Temp Co 34.0 26.0 - 37.0 mmHg    Po2 Temp Cor 63 (L) 64 - 87 mmHg    Specimen Arterial     Action Range Triggered NO     Inst. Qualified Patient YES    CBC with Differential    Collection Time: 10/13/18  4:24 AM   Result Value Ref Range    WBC 19.5 (H) 4.8 - 10.8 K/uL    RBC 3.53 (L) 4.70 - 6.10 M/uL    Hemoglobin 11.0 (L) 14.0 - 18.0 g/dL    Hematocrit 35.0 (L) 42.0 - 52.0 %    MCV 99.2 (H) 81.4 - 97.8 fL    MCH 31.2 27.0 - 33.0 pg    MCHC 31.4 (L) 33.7 - 35.3 g/dL    RDW 52.9 (H) 35.9 - 50.0 fL    Platelet Count 211 164 - 446 K/uL    MPV 10.2 9.0  - 12.9 fL    Neutrophils-Polys 86.30 (H) 44.00 - 72.00 %    Lymphocytes 5.00 (L) 22.00 - 41.00 %    Monocytes 7.20 0.00 - 13.40 %    Eosinophils 0.70 0.00 - 6.90 %    Basophils 0.20 0.00 - 1.80 %    Immature Granulocytes 0.60 0.00 - 0.90 %    Nucleated RBC 0.00 /100 WBC    Neutrophils (Absolute) 16.82 (H) 1.82 - 7.42 K/uL    Lymphs (Absolute) 0.98 (L) 1.00 - 4.80 K/uL    Monos (Absolute) 1.41 (H) 0.00 - 0.85 K/uL    Eos (Absolute) 0.14 0.00 - 0.51 K/uL    Baso (Absolute) 0.03 0.00 - 0.12 K/uL    Immature Granulocytes (abs) 0.12 (H) 0.00 - 0.11 K/uL    NRBC (Absolute) 0.00 K/uL   MAGNESIUM    Collection Time: 10/13/18  4:24 AM   Result Value Ref Range    Magnesium 2.9 (H) 1.5 - 2.5 mg/dL   PHOSPHORUS    Collection Time: 10/13/18  4:24 AM   Result Value Ref Range    Phosphorus 3.6 2.5 - 4.5 mg/dL   COMP METABOLIC PANEL    Collection Time: 10/13/18  4:24 AM   Result Value Ref Range    Sodium 150 (H) 135 - 145 mmol/L    Potassium 4.1 3.6 - 5.5 mmol/L    Chloride 114 (H) 96 - 112 mmol/L    Co2 30 20 - 33 mmol/L    Anion Gap 6.0 0.0 - 11.9    Glucose 192 (H) 65 - 99 mg/dL    Bun 72 (HH) 8 - 22 mg/dL    Creatinine 1.14 0.50 - 1.40 mg/dL    Calcium 8.4 (L) 8.5 - 10.5 mg/dL    AST(SGOT) 16 12 - 45 U/L    ALT(SGPT) 15 2 - 50 U/L    Alkaline Phosphatase 90 30 - 99 U/L    Total Bilirubin 1.3 0.1 - 1.5 mg/dL    Albumin 3.0 (L) 3.2 - 4.9 g/dL    Total Protein 4.9 (L) 6.0 - 8.2 g/dL    Globulin 1.9 1.9 - 3.5 g/dL    A-G Ratio 1.6 g/dL   ESTIMATED GFR    Collection Time: 10/13/18  4:24 AM   Result Value Ref Range    GFR If African American >60 >60 mL/min/1.73 m 2    GFR If Non African American >60 >60 mL/min/1.73 m 2   ACCU-CHEK GLUCOSE    Collection Time: 10/13/18  6:21 AM   Result Value Ref Range    Glucose - Accu-Ck 167 (H) 65 - 99 mg/dL       Imaging  X-Ray:  I have personally reviewed the images and compared with prior images.   Interpretation noted above and films are reviewed with Team on rounds again  today      Assessment/Plan  * Acute respiratory failure with hypoxia (HCC)- (present on admission)   Assessment & Plan    Intubated 9/29  RT protocols  Continue vent support, not appropriate for liberation at this time  All appropriate ventilator bundles have been initiated  Serial ABG, chest x-ray and ventilator mechanics review  Ventilator adjusted daily or more frequently as needed  Oxygenation better, chest x-ray better, trial reduce PEEP to 8  Wife remains adamant no tracheostomy at this time, probably no LTACH as well, reaffirmed  Patient plateaus and does not clinically improve consider follow-up with palliative care team          Acute pulmonary edema (HCC)   Assessment & Plan    ECHO with normal LV systolic function  Significant pulmonary hypertension causing LV diastolic dysfunction  Volume overloaded clinically with worsening pulmonary edema radiographically and by exam  Reduce Lasix, 40 mg IV daily  Monitor I's and O's and BMP  Add Diamox for contraction alkalosis?  Continue current regimen  Monitor          CAP (community acquired pneumonia)- (present on admission)   Assessment & Plan    Influenza negative  Completed 5 days of Unasyn and azithromycin on 10/4  Observe off antibiotics  Update vaccines prior to discharge  Monitor for HCAP, patient may need diagnostic and therapeutic bronchoscopy  No new signs of pneumonia, continue to monitor  No change        Ileus (HCC)   Assessment & Plan    Secondary to above  Trickle tube feeds at 20 cc/h, continue to increase slowly as tolerated  Avoid lactulose, seem to make things worse for this patient  Bowel care protocols  Relistor therapy to begin as needed  Mobilize as tolerated  Slow increase tube feeds          Hemoptysis   Assessment & Plan    Bronchoscopy revealed friable and nodular mucosa in the left mainstem bronchus, no obvious malignancy  Hold all anticoagulation for now  Bronchoscopy with biopsy and brushing performed on 10/7, cytology and pathology  negative for malignancy  Continue to hold VTE prophylaxis with subcu heparin as the patient still having intermittent hemoptysis          Atelectasis   Assessment & Plan    Aggressive pulmonary toilet  Serial chest x-ray, therapeutic bronchoscopy as needed  Wean off ventilator mobilize/IS/PEP        Paroxysmal atrial fibrillation/atrial flutter (HCC)   Assessment & Plan    Continue loading with amiodarone, 400 mg twice daily  Continue additional IV doses of amiodarone to supplement/accelerate loading as needed  No anticoagulation due to hemoptysis, monitor - last episode a.m. 10/12  Optimize magnesium and potassium aggressively  Rate control with CCB or BB as Bp allows, alternatively digoxin therapy as needed  Monitor        Pulmonary hypertension (HCC)   Assessment & Plan    RVSP 65 mm Hg  CTA without evidence of pulmonary embolism  Force diuresis with Lasix ongoing, dose adjusted daily as needed  ROS for BEBA when extubated  Suspect chronic process given the severity of elevation  Continue current regimen  Monitor        Acute metabolic encephalopathy   Assessment & Plan    Etiology is metabolic as well as due to sepsis  Limit sedatives, they are contributing  Improved but persisting, following with very prolonged sedation vacations        Elevated troponin- (present on admission)   Assessment & Plan    Continue aspirin and statin  Monitor        Azotemia   Assessment & Plan    EZRA worse again  Sodium, BUN and creatinine worse  Clinically volume overloaded by exam systemically and pulmonary wise, oxygenation actually better  Ongoing diuresis with good results!  However he needs some free water now we will start with 200 cc every 6 hours via feeding tube  However I believe the need to reduce furosemide today 10/11  Monitor BMP and  I/Os        Hypernatremia   Assessment & Plan    Continue free water, 200 mL every 8 hours as tolerated  Serial BMP  monitor        Fever   Assessment & Plan    This was due to  dexmedetomidine  Fever has resolved after dexmedetomidine discontinued  UA unremarkable  Blood cultures negative from 10/5  Bronchial washings from 10/3 with no growth  Avoid dexmedetomidine, recent fever with trial        Hyperglycemia- (present on admission)   Assessment & Plan    Titrating insulin drip for glucose control  Daily reviewed insulin dosing with clinical pharmacist and at bedtime SSI/long-acting insulin as clinically appropriate  Hypoglycemia protocols ongoing  Monitor        Dyslipidemia- (present on admission)   Assessment & Plan    Continue statin/diet             VTE:  Contraindicated  Ulcer: H2 Antagonist  Lines: Central Line  Ongoing indication addressed    I have performed a physical exam and reviewed and updated ROS and Plan today (10/13/2018). In review of yesterday's note (10/12/2018), there are no changes except as documented above.     The patient remains on high ventilator support.  He is not tolerating any SBT/weaning efforts.  He is completed a course of antibiotic therapy.  Low threshold for repeating bronchoscopy given increasing WBC and high vent support.  He is critically ill.  Wife at bedside confirms no tracheostomy but continued supportive care for the next 48 hours.    Discussed patient condition and risk of morbidity and/or mortality with Hospitalist, Family, RN, RT, Pharmacy, , Code status disscussed and Charge nurse / hot rounds     The patient remains critically ill.  Critical care time = 34 minutes in directly providing and coordinating critical care and extensive data review.  No time overlap and excludes procedures.    High risk of deterioration and worsening vital organ dysfunction and death without the above critical care interventions.    Pulmonary and Critical Care Medicine

## 2018-10-13 NOTE — PROGRESS NOTES
[facility] Hospitalist Progress Note    Date of Service: 10/13/2018    Chief Complaint    78 y.o. male admitted 2018 with cough and shortness of breath.  Patient was admitted to the hospital and shortly thereafter had significant increase in shortness of breath.  CTA neg for PE but did show CAP.  Initially admitted to the floor however worsened requiring intubation     PMHx: HLD    Interval Problem Update  ROS unobtainable  Tachy AFib to 140s overnight  Prop 5  Not following this am  Sinus  SBP 90-100s  Low grade temp  TFs 20  Insulin gtts 1.5  UOP 900ml/12hrs on 40 BID lasix  Neg fluid balance on lasix      Consultants/Specialty  Cardiology    Disposition  COnt in ICU    Discussed again with wife at bedside.  After review of all issues she would like to support pt for a further 48hrs.  If no evidence of significant improvement and certainly if he worsens will withdraw support    Review of Systems   Unable to perform ROS: Intubated      Physical Exam  Laboratory/Imaging   Hemodynamics  Temp (24hrs), Av.6 °C (99.6 °F), Min:37.1 °C (98.8 °F), Max:37.9 °C (100.2 °F)   Temperature: 37.9 °C (100.2 °F), Monitored Temp: 37.9 °C (100.2 °F)  Pulse  Av.3  Min: 60  Max: 142 Heart Rate (Monitored): (!) 132  NIBP: 102/64     Respiratory  Smith Vent Mode: APVCMV, Rate (breaths/min): 16, PEEP/CPAP: 8, PEEP/CPAP: 8, FiO2: 30, P Peak (PIP): 16, P MEAN: 11   Respiration: (!) 22, Pulse Oximetry: 95 %     Work Of Breathing / Effort: Vented  RUL Breath Sounds: Crackles, RML Breath Sounds: Crackles, RLL Breath Sounds: Diminished, BRITTANY Breath Sounds: Crackles, LLL Breath Sounds: Diminished    Fluids    Intake/Output Summary (Last 24 hours) at 10/13/18 0567  Last data filed at 10/13/18 0400   Gross per 24 hour   Intake           1773.3 ml   Output             2295 ml   Net           -521.7 ml       Nutrition  Orders Placed This Encounter   Procedures   • Diet NPO     Standing Status:   Standing     Number of Occurrences:    1     Order Specific Question:   Type:     Answer:   Now [1]     Order Specific Question:   Restrict to:     Answer:   Strict [1]     Physical Exam   Constitutional: He appears well-developed and well-nourished. No distress.   HENT:   Head: Normocephalic and atraumatic.   Eyes: Conjunctivae are normal.   Neck: Neck supple. No JVD present.   Cardiovascular: Normal rate.  Exam reveals no gallop.    Murmur heard.  Pulmonary/Chest: No stridor. No respiratory distress. He has no wheezes. He has rales.   Abdominal: Soft. There is no tenderness. There is no rebound and no guarding.   Musculoskeletal: He exhibits edema.   Neurological:   Eyes open and moves all 4 however not attending, does not follow, not purposeful   Skin: Skin is warm and dry. No rash noted. He is not diaphoretic.   Nursing note and vitals reviewed.      Recent Labs      10/11/18   0530  10/12/18   0535  10/13/18   0424   WBC  15.7*  16.1*  19.5*   RBC  3.39*  3.51*  3.53*   HEMOGLOBIN  10.9*  10.9*  11.0*   HEMATOCRIT  33.6*  34.6*  35.0*   MCV  99.1*  98.6*  99.2*   MCH  32.2  31.1  31.2   MCHC  32.4*  31.5*  31.4*   RDW  51.5*  51.8*  52.9*   PLATELETCT  216  193  211   MPV  10.5  10.1  10.2     Recent Labs      10/11/18   0530  10/12/18   0535  10/13/18   0424   SODIUM  151*  153*  150*   POTASSIUM  4.3  4.1  4.1   CHLORIDE  114*  116*  114*   CO2  31  33  30   GLUCOSE  172*  178*  192*   BUN  80*  77*  72*   CREATININE  1.24  1.14  1.14   CALCIUM  8.6  8.6  8.4*             Recent Labs      10/11/18   0530   TRIGLYCERIDE  51          Assessment/Plan     * Acute respiratory failure with hypoxia (HCC)- (present on admission)   Assessment & Plan    CTA on 9-29 neg  for PE  Echo with normal EF and pulmonary hypertension  Neg fluid balance on lasix  Vent management per critical care discussed with Dr. Sierra              Acute pulmonary edema (HCC)   Assessment & Plan    Neg fluid balance on lasix   Cont to follow I&Os, daily BMP, BP        CAP  (community acquired pneumonia)- (present on admission)   Assessment & Plan    Completed course of antibiotics   Cultures neg        Ileus (HCC)   Assessment & Plan    Clinically improved  Remains on Reglan  We will advance tube feeding and continue close monitoring        Hemoptysis   Assessment & Plan    Lovenox discontinued    Continue clinical monitoring and follow-up on lung biopsy        Paroxysmal atrial fibrillation/atrial flutter (HCC)   Assessment & Plan    Continue amiodarone and metoprolol  Now in sinux  Lovenox discontinued given hemoptysis  Reverted to sinus rhythm continue telemetry monitoring        Pulmonary hypertension (HCC)   Assessment & Plan    9/29/18 echocardiogram with RVSP of 65 mmHg  CT was negative for pulmonary embolism    Continue IV Lasix              Acute metabolic encephalopathy   Assessment & Plan    Non focal exam  Not showing any improvement  EEG consistent with difuse metabolic encephalopathy.  Neg for non convulsive status  Cont to address metabolic and infectious issues  Try to minimize sedation and CNS active mdications        Elevated troponin- (present on admission)   Assessment & Plan    Likely demand ischemia        Hypernatremia   Assessment & Plan    Stable on Free H2O          Hyperglycemia- (present on admission)   Assessment & Plan    HbA1c 6.5 on 7/18  On insulin drip continue close monitoring        Dyslipidemia- (present on admission)   Assessment & Plan    Continue atorvastatin            Quality-Core Measures   Reviewed items::  EKG reviewed, Labs reviewed, Medications reviewed and Radiology images reviewed  Garces catheter::  Unconscious / Sedated Patient on a Ventilator  DVT prophylaxis - mechanical:  SCDs  Ulcer Prophylaxis::  Yes  Antibiotics:  Treating active infection/contamination beyond 24 hours perioperative coverage

## 2018-10-13 NOTE — CARE PLAN
Problem: Safety  Goal: Will remain free from injury  Outcome: PROGRESSING AS EXPECTED  Bed in locked and in low position, lower bed rails raised, bed alarm in use, call light within reach, treaded slipper socks on patient and patient room near nursing station.   Patient communicates and demonstrates understanding that a nurse must be present during ambulation while patient in the ICU and how to use the call light when staff assistance is required.       Problem: Skin Integrity  Goal: Risk for impaired skin integrity will decrease  Outcome: PROGRESSING AS EXPECTED  Assess patient's skin at the beginning of the shift. Turn patient every two hours and monitor under all medical devices throughout entire shift. Maintain a clean, dry linen environment. Float heals and elbows. Provide appropriate would prevention interventions as they apply. Implement pertinent wound protocols and document them. Please see wound flow sheet for further details.

## 2018-10-13 NOTE — CARE PLAN
Problem: Ventilation Defect:  Goal: Ability to achieve and maintain unassisted ventilation or tolerate decreased levels of ventilator support  Outcome: PROGRESSING SLOWER THAN EXPECTED    Intervention: Support and monitor invasive and noninvasive mechanical ventilation  Adult Ventilation Update    Total Vent Days: 14  APVcmv: 16/440/+8/30% FIO2    Patient Lines/Drains/Airways Status    Active Airway     Name: Placement date: Placement time: Site: Days:    Airway ETT Oral 8.0 09/29/18   1714   Oral   14              Events/Summary/Plan: no vent changes at this time. pt remains stable on vent (10/12/18 1502)    Intervention: Monitor ventilator weaning response  No vent weaning at this time  Intervention: Perform ventilator associated pneumonia prevention interventions  See VAP flowsheet

## 2018-10-13 NOTE — CARE PLAN
Problem: Ventilation Defect:  Goal: Ability to achieve and maintain unassisted ventilation or tolerate decreased levels of ventilator support  Outcome: PROGRESSING AS EXPECTED  Adult Ventilation Update    Total Vent Days: 15    Patient Lines/Drains/Airways Status    Active Airway     Name: Placement date: Placement time: Site: Days:    Airway ETT Oral 8.0 09/29/18 1714   Oral   13              #FVC / Vital Capacity (liters) :  (pt not following commands) (10/08/18 1640)  NIF (cm H2O) :  (pt not following commands) (10/08/18 1640)  Rapid Shallow Breathing Index (RR/VT): 115 (10/13/18 1433)  Plateau Pressure (Q Shift): 22 (10/11/18 0701)  Static Compliance (ml / cm H2O): 79 (10/13/18 1433)    Patient failed trials because of Barriers to Wean: No Order (10/13/18 0648)  Barriers to SBT Weaning Trial Stopped due to:: RSBI >105 (Rapid Shallow Breathing Index) (10/13/18 1433)  Length of Weaning Trial Length of Weaning Trial (Hours): 2 minutes (10/13/18 0914)    Cough: Non Productive (10/13/18 1430)  Sputum Amount: Unable to Evaluate (10/13/18 1430)  Sputum Color: Unable to Evaluate (10/13/18 1430)  Sputum Consistency: Unable to Evaluate (10/13/18 1430)    Mobility  Level of Mobility: Level III (10/12/18 1600)  Activity Performed: Edge of bed (10/13/18 0400)  Time Activity Tolerated: 2 min (10/13/18 0400)  Distance Per Occurrence (ft.): 0 feet (10/08/18 2000)  # of Times Distance was Traveled: 1 (10/09/18 1500)  Assistance / Tolerance: Assistance of Two or More;Does Not Tolerate (10/13/18 0400)  Pt Calls for Assistance: No (10/13/18 0400)  Staff Present for Mobilization: RN (10/13/18 0400)  Gait: Unable to Ambulate (10/10/18 1600)  Assistive Devices: Hand held assist (10/09/18 1500)  Reason Not Mobilized: Unstable condition (10/13/18 0400)  Mobilization Comments: -150 when attempted.  (10/13/18 0400)    Events/Summary/Plan: attempted SBT, RSBI 115 (10/13/18 4311)

## 2018-10-13 NOTE — CARE PLAN
Problem: Bowel/Gastric:  Goal: Normal bowel function is maintained or improved  Outcome: PROGRESSING AS EXPECTED  Patient medicated with extra stool softener, tube feed increased slowly per MD order, monitoring for bowel movement.

## 2018-10-13 NOTE — CARE PLAN
Problem: Respiratory:  Goal: Respiratory status will improve  Outcome: PROGRESSING SLOWER THAN EXPECTED  Attempted SBT trials, patient not following off sedation, oral care provided q2.

## 2018-10-13 NOTE — CARE PLAN
Problem: Safety  Goal: Will remain free from injury  Outcome: PROGRESSING AS EXPECTED  Patient is restrained for safety, bed alarm is on, hourly rounding in place.     Problem: Skin Integrity  Goal: Risk for impaired skin integrity will decrease  Outcome: PROGRESSING AS EXPECTED  Skin assessed during change of shift, q2 hour turns, heel float boots used, non-adhesive foam to wrist area to protect from restraints.

## 2018-10-14 NOTE — CARE PLAN
Problem: Ventilation Defect:  Goal: Ability to achieve and maintain unassisted ventilation or tolerate decreased levels of ventilator support    Intervention: Support and monitor invasive and noninvasive mechanical ventilation  Adult Ventilation Update    Total Vent Days: 15    Patient Lines/Drains/Airways Status    Active Airway     Name: Placement date: Placement time: Site: Days:    Airway ETT Oral 8.0 @ 24 09/29/18   1714   Oral   15              Pt on APV: 16, 440, +8, 30%    No changes overnight, will continue to monitor.

## 2018-10-14 NOTE — RESPIRATORY CARE
Attempted SBT this morning, pt's RSBI 216 and desaturared to 88% and RR in the 40's. Pt now resting on APVCMV vent settings.

## 2018-10-14 NOTE — PROGRESS NOTES
Tube feed titrated back down to trickle feed rate of 20 cc/hr, patient appears to be gagging, small amount of emesis noted.

## 2018-10-14 NOTE — WOUND TEAM
[facility]  Inpatient Services  Wound and Skin Care Progress Note    Admission Date: 9/29/2018     Consult Date: 10/05/2018 @ 0709   HPI, PMH, SH: Reviewed    Unit where seen by Wound Team: [id number]     WOUND CONSULT RELATED TO:  Recheck of Right Ear    SUBJECTIVE:  Pt intubated and restrained.       Self Report / Pain Level:  No signs or symptoms of pain or discomfort.       OBJECTIVE:  Pt lying in bed intubated and restrained.     WOUND TYPE, LOCATION, CHARACTERISTICS (Pressure Injuries: location, stage, POA or date identified)    Location and type of wound: Right Ear Pinna Anterior and posterior outer area non-blanching tissue of unknown etiology  NOW HEALED     Site Assessment Small blanching red area   Marisol-wound Assessment Dry;Clean;Intact;Pink   Margins defined edges   MEASUREMENTS OBTAINED 10/14/2018   Wound Length (cm) 0.6 cm   Wound Width (cm) 0.2 cm   Wound Depth (cm) 0 cm   Drainage Amount None   Treatments Cleansed;Site care, Open to air   Cleansing Normal Saline Irrigation   Dressing Options NONE   Dressing Cleansing/Solutions Not Applicable   Dressing Changed NA   Dressing Status Clean;Dry;Intact   Dressing Change Frequency NA   Odor None   Exposed Structures None       Vascular:    Dorsal Pedal pulses:  NA  Posterior tib pulses:   NA    OK:      NA    Lab Values:    WBC:       WBC   Date/Time Value Ref Range Status   10/14/2018 05:00 AM 18.6 (H) 4.8 - 10.8 K/uL Final     AIC:      Lab Results   Component Value Date/Time    HBA1C 6.5 (H) 07/27/2018 09:04 AM         Culture:   NA    INTERVENTIONS BY WOUND TEAM:  Wound RN in to re-assess patient. Pts right ear wound now appears to have a kendall brown/red scab overlying the area that fell off as soon as this RN touched the ear. The ear was cleansed with gauze and NS. There is a small area to the lower lateral edge of the pinna that is red but is currently blanching. Discussed with wife at bedside. Area is now healed and was left open to  "air. Mepilex lite is visible under the anchorfast ties. No additional needs at this time, wound team to sign off.     Dressing selection:  Mepilex lite         Interdisciplinary consultation: Patient, Bedside RN    EVALUATION: Pt is a critically ill older gentleman who is currently intubated.  Pt was a fairly active gentleman prior to admit and pts wife reports that he swam 4x a week and worked out 6x a week and that they live independently in [place]. Pt was admitted with Pneumonia requiring intubation. Per pts wife pt always has \"rough skin\" to the pinna of his ear and they treat it with moisturizer or polysporin if any part of it opens up. Pt had a large wound to the area that was both on the anterior and posterior surface with majority of the wound anterior. The wound is now fully healed. There is a small area that is red but blanching. Anchorfast in use as well as a right triple lumen IJ. Etiology of wound in unknown and unstageable pressure injury can not be excluded. Mepilex lite placed over the area to prevent further breakdown. Wound team will continue to monitor wound evolution.    Factors affecting wound healing: Age, Critically ill, A. Fib, Hyperglycemia  Goals: Steady decrease in wound area and depth weekly.    NURSING PLAN OF CARE ORDERS (X):    Dressing changes: See Dressing Maintenance orders: X  Skin care: See Skin Care orders: X  Rectal tube care: See Rectal Tube Care orders:   Other orders:    RSKIN: CURRENT (X) ORDERED (O):   Q shift Gilbert:  X  Q shift pressure point assessments:  X  Pressure redistribution mattress X           ALEX          Bariatric ALEX         Bariatric foam           Heel float boots O         Float Heels off Bed with Pillows               Barrier wipes         Barrier Cream         Barrier paste O        Sacral silicone dressing         Silicone O2 tubing         Anchorfast  X       Cannula fixation Device (Tender )          Gray Foam Ear protectors           Trach with " Optifoam split foam                 Waffle cushion        Waffle Overlay O        Rectal tube or BMS         Antifungal tx      Interdry          Turn q 2 hours X       Up to chair        Ambulate      PT/OT        Dietician        Diabetes Education      PO     TF X - Impact Peptide    TPN     NPO   # days   Other        WOUND TEAM PLAN OF CARE (X):   NPWT change 3 x week:        Dressing changes by wound team:       Follow up as needed: X, Wound healed! Wound team to sign off, no further follow up unless consulted.   Other (explain):     Anticipated discharge plans (X):   SNF:           Home Care:           Outpatient Wound Center:            Self Care:            Other: X, TBD, pt lives at independently with wife in [place].

## 2018-10-14 NOTE — PROGRESS NOTES
Critical Care Progress Note    Date of admission  9/29/2018    Chief Complaint  78 y.o. male admitted 9/29/2018 with cough and shortness of breath.    Hospital Course  This gentleman was admitted with severe sepsis, respiratory failure and pneumonia.    Interval Problem Update  Reviewed last 24 hour events:  Opens eyes, not following, +c/g/c, moves all  Prop 5  SR, spont converted out of AF yesterday  Loose BMs  TF at 10, advance held for emesis  anthony EOB mobility  Vent day #16  16, 440, 8, 30; not anthony SBT  Resume P dose lovneox  Insulin gtt 1.5  Na 148  Bronch today  Completed abx 10/4; WBC up; ? Abx, PCT    Yesterday:  AF,  - 150  Opens eyes, tracks, moves all 4, not following  TF at 20, no further emesis  Insulin gtt 1.5 u/hr  Good UOP  CXR bilat inf's, unchanged  Vent day#15, not anthony any SBT  Ongoing hemoptysis/brown  Completed Unasyn/azithro   Tm 100, WBC up to 19; ? Repeat bronch  One-way extubation Monday    Review of Systems  Review of Systems   Unable to perform ROS: Intubated   Sedated with propofol    Vital Signs for last 24 hours   Temp:  [37.9 °C (100.2 °F)-38.1 °C (100.6 °F)] 37.9 °C (100.2 °F)  Pulse:  [] 73  Resp:  [12-32] 12    Hemodynamic parameters for last 24 hours       Vent Settings for last 24 hours  [place] Vent Mode: APVCMV  Rate (breaths/min):  [16] 16  PEEP/CPAP:  [8] 8  FiO2:  [30] 30  P Peak (PIP):  [14-23] 23  P MEAN:  [11] 11    Ventilator mechanics and waveforms are reviewed at the bedside with RT    Physical Exam   Physical Exam   Constitutional:  Non-toxic appearance. He does not have a sickly appearance. He appears ill (Appears acute and chronically ill). No distress. He is sedated and intubated.   Sedated on ventilator, looks acute and chronically ill as well as uncomfortable   HENT:   Head: Normocephalic.   Right Ear: External ear normal.   Left Ear: External ear normal.   Mouth/Throat: Oropharynx is clear and moist. No oropharyngeal exudate.   Eyes: Pupils are  equal, round, and reactive to light. Conjunctivae are normal. Right eye exhibits no discharge. Left eye exhibits no discharge. No scleral icterus.   Neck: Neck supple. No JVD present. No tracheal deviation present.   CVC site appears okay   Cardiovascular: Normal rate, regular rhythm and intact distal pulses.   No extrasystoles are present. Exam reveals no gallop and no friction rub.    Murmur: 2/6 flow murmur?  [place] best at apex (nothing noted on echocardiogram), no change again today.  Atrial fibrillation converted to sinus rhythm with bundle branch block, no change   Pulmonary/Chest: No accessory muscle usage (On full vent support). He is intubated. No respiratory distress. He has decreased breath sounds (Mild and diffuse). He has no wheezes. He has no rhonchi. He has rales (Scattered crackles bilaterally, mainly at bases) in the right lower field and the left lower field.   Abdominal: Soft. Bowel sounds are normal. He exhibits no distension. There is no hepatosplenomegaly. There is no tenderness. There is no rigidity, no rebound, no guarding, no CVA tenderness and negative Pitts's sign.   Tolerating enteral tube feedings   Musculoskeletal: He exhibits no tenderness or deformity. Edema: Slight better.   No clubbing or cyanosis   Neurological: He is unresponsive. No cranial nerve deficit or sensory deficit. He exhibits normal muscle tone. GCS eye subscore is 4. GCS verbal subscore is 1. GCS motor subscore is 5.   Sedated.  Arouses but does not follow for me.  Grimaces and appears uncomfortable.  Moves all 4 extremities spontaneously and has spontaneous eye opening.,  Localizes and withdraws to tactile stimuli.  No change again.   Skin: Skin is warm and dry. No rash noted. He is not diaphoretic. No cyanosis or erythema. Nails show no clubbing.   Psychiatric: He is noncommunicative.       Medications  Current Facility-Administered Medications   Medication Dose Route Frequency Provider Last Rate Last Dose   •  furosemide (LASIX) injection 40 mg  40 mg Intravenous Q DAY Gonzalo Rose D.O.   40 mg at 10/14/18 0523   • propofol (DIPRIVAN) injection  0-80 mcg/kg/min Intravenous Continuous Brandon Paulino M.D. 11.2 mL/hr at 10/14/18 0523 17 mcg/kg/min at 10/14/18 0523   • metoprolol (LOPRESSOR) tablet 12.5 mg  12.5 mg Oral TWICE DAILY Dre Robbins M.D.   12.5 mg at 10/14/18 0522   • potassium bicarbonate (KLYTE) effervescent tablet 25 mEq  25 mEq Feeding Tube BID Brandon Paulino M.D.   25 mEq at 10/14/18 0523   • amiodarone (CORDARONE) tablet 400 mg  400 mg Per NG Tube TWICE DAILY Brandon Paulino M.D.   400 mg at 10/14/18 0522   • ondansetron (ZOFRAN ODT) dispertab 4 mg  4 mg Oral Q4HRS PRN Brandon Paulino M.D.       • famotidine (PEPCID) tablet 20 mg  20 mg Feeding Tube Q12HRS Brandon Paulino M.D.   20 mg at 10/14/18 0522   • insulin regular human (HUMULIN/NOVOLIN R) 62.5 Units in  mL infusion per protocol  0-29 Units/hr Intravenous Continuous Brandon Paulino M.D. 6 mL/hr at 10/14/18 0700 1.5 Units/hr at 10/14/18 0700   • dextrose 50% (D50W) injection 25-50 mL  12.5-25 g Intravenous PRN Brandon Paulino M.D.       • Respiratory Care per Protocol   Nebulization Continuous RT Walter Carrion M.D.       • ondansetron (ZOFRAN) syringe/vial injection 4 mg  4 mg Intravenous Q4HRS PRN Walter Carrion M.D.       • MD Alert...ICU Electrolyte Replacement per Pharmacy   Other pharmacy to dose Jeremy M Gonda, M.D.       • fentaNYL (SUBLIMAZE) injection 25 mcg  25 mcg Intravenous Q HOUR PRN Jeremy M Gonda, M.D.   25 mcg at 10/05/18 2059    Or   • fentaNYL (SUBLIMAZE) injection 50 mcg  50 mcg Intravenous Q HOUR PRN Jeremy M Gonda, M.D.   50 mcg at 10/13/18 1226    Or   • fentaNYL (SUBLIMAZE) injection 100 mcg  100 mcg Intravenous Q HOUR PRN Jeremy M Gonda, M.D.   100 mcg at 10/13/18 0108   • ipratropium-albuterol (DUONEB) nebulizer solution  3 mL Nebulization Q2HRS PRN  (RT) Jeremy M Gonda, M.D.   3 mL at 10/11/18 0700   • Pharmacy Consult: Enteral tube feeding - review meds/change route/product selection   Other PRN Jeremy M Gonda, M.D.       • senna-docusate (PERICOLACE or SENOKOT S) 8.6-50 MG per tablet 2 Tab  2 Tab Feeding Tube BID Jeremy M Gonda, M.D.   2 Tab at 10/14/18 0522    And   • polyethylene glycol/lytes (MIRALAX) PACKET 1 Packet  1 Packet Feeding Tube QDAY PRN Jeremy M Gonda, M.D.   1 Packet at 10/13/18 1228    And   • magnesium hydroxide (MILK OF MAGNESIA) suspension 30 mL  30 mL Feeding Tube QDAY PRN Jeremy M Gonda, M.D.   30 mL at 10/02/18 1322    And   • bisacodyl (DULCOLAX) suppository 10 mg  10 mg Rectal QDAY PRN Jeremy M Gonda, M.D.   10 mg at 10/06/18 1222   • acetaminophen (TYLENOL) tablet 650 mg  650 mg Feeding Tube Q6HRS PRN Jeremy M Gonda, M.D.   650 mg at 10/13/18 1739   • atorvastatin (LIPITOR) tablet 40 mg  40 mg Per NG Tube Q EVENING Jeremy M Gonda, M.D.   40 mg at 10/13/18 1739   • aspirin (ASA) chewable tab 81 mg  81 mg Per NG Tube DAILY Jeremy M Gonda, M.D.   81 mg at 10/14/18 0522       Fluids    Intake/Output Summary (Last 24 hours) at 10/14/18 0805  Last data filed at 10/14/18 0600   Gross per 24 hour   Intake          1503.96 ml   Output             2125 ml   Net          -621.04 ml       Laboratory  Recent Results (from the past 48 hour(s))   ACCU-CHEK GLUCOSE    Collection Time: 10/12/18  9:32 AM   Result Value Ref Range    Glucose - Accu-Ck 124 (H) 65 - 99 mg/dL   ACCU-CHEK GLUCOSE    Collection Time: 10/12/18 11:19 AM   Result Value Ref Range    Glucose - Accu-Ck 136 (H) 65 - 99 mg/dL   ACCU-CHEK GLUCOSE    Collection Time: 10/12/18 12:36 PM   Result Value Ref Range    Glucose - Accu-Ck 114 (H) 65 - 99 mg/dL   ACCU-CHEK GLUCOSE    Collection Time: 10/12/18  1:37 PM   Result Value Ref Range    Glucose - Accu-Ck 126 (H) 65 - 99 mg/dL   ACCU-CHEK GLUCOSE    Collection Time: 10/12/18  2:44 PM   Result Value Ref Range    Glucose - Accu-Ck 131 (H)  65 - 99 mg/dL   ACCU-CHEK GLUCOSE    Collection Time: 10/12/18  3:52 PM   Result Value Ref Range    Glucose - Accu-Ck 174 (H) 65 - 99 mg/dL   ACCU-CHEK GLUCOSE    Collection Time: 10/12/18  4:58 PM   Result Value Ref Range    Glucose - Accu-Ck 145 (H) 65 - 99 mg/dL   ACCU-CHEK GLUCOSE    Collection Time: 10/12/18  6:05 PM   Result Value Ref Range    Glucose - Accu-Ck 149 (H) 65 - 99 mg/dL   ACCU-CHEK GLUCOSE    Collection Time: 10/12/18  7:52 PM   Result Value Ref Range    Glucose - Accu-Ck 170 (H) 65 - 99 mg/dL   ACCU-CHEK GLUCOSE    Collection Time: 10/12/18 10:11 PM   Result Value Ref Range    Glucose - Accu-Ck 167 (H) 65 - 99 mg/dL   ACCU-CHEK GLUCOSE    Collection Time: 10/12/18 11:48 PM   Result Value Ref Range    Glucose - Accu-Ck 160 (H) 65 - 99 mg/dL   ACCU-CHEK GLUCOSE    Collection Time: 10/13/18  2:29 AM   Result Value Ref Range    Glucose - Accu-Ck 159 (H) 65 - 99 mg/dL   ACCU-CHEK GLUCOSE    Collection Time: 10/13/18  4:09 AM   Result Value Ref Range    Glucose - Accu-Ck 166 (H) 65 - 99 mg/dL   ISTAT ARTERIAL BLOOD GAS    Collection Time: 10/13/18  4:20 AM   Result Value Ref Range    Ph 7.577 (H) 7.400 - 7.500    Pco2 32.6 26.0 - 37.0 mmHg    Po2 59 (L) 64 - 87 mmHg    Tco2 31 20 - 33 mmol/L    S02 94 93 - 99 %    Hco3 30.4 (H) 17.0 - 25.0 mmol/L    BE 8 (H) -4 - 3 mmol/L    Body Temp 37.9 C degrees    O2 Therapy 30 %    iPF Ratio 197     Ph Temp Cesar 7.563 (H) 7.400 - 7.500    Pco2 Temp Co 34.0 26.0 - 37.0 mmHg    Po2 Temp Cor 63 (L) 64 - 87 mmHg    Specimen Arterial     Action Range Triggered NO     Inst. Qualified Patient YES    CBC with Differential    Collection Time: 10/13/18  4:24 AM   Result Value Ref Range    WBC 19.5 (H) 4.8 - 10.8 K/uL    RBC 3.53 (L) 4.70 - 6.10 M/uL    Hemoglobin 11.0 (L) 14.0 - 18.0 g/dL    Hematocrit 35.0 (L) 42.0 - 52.0 %    MCV 99.2 (H) 81.4 - 97.8 fL    MCH 31.2 27.0 - 33.0 pg    MCHC 31.4 (L) 33.7 - 35.3 g/dL    RDW 52.9 (H) 35.9 - 50.0 fL    Platelet Count 211 164 -  446 K/uL    MPV 10.2 9.0 - 12.9 fL    Neutrophils-Polys 86.30 (H) 44.00 - 72.00 %    Lymphocytes 5.00 (L) 22.00 - 41.00 %    Monocytes 7.20 0.00 - 13.40 %    Eosinophils 0.70 0.00 - 6.90 %    Basophils 0.20 0.00 - 1.80 %    Immature Granulocytes 0.60 0.00 - 0.90 %    Nucleated RBC 0.00 /100 WBC    Neutrophils (Absolute) 16.82 (H) 1.82 - 7.42 K/uL    Lymphs (Absolute) 0.98 (L) 1.00 - 4.80 K/uL    Monos (Absolute) 1.41 (H) 0.00 - 0.85 K/uL    Eos (Absolute) 0.14 0.00 - 0.51 K/uL    Baso (Absolute) 0.03 0.00 - 0.12 K/uL    Immature Granulocytes (abs) 0.12 (H) 0.00 - 0.11 K/uL    NRBC (Absolute) 0.00 K/uL   MAGNESIUM    Collection Time: 10/13/18  4:24 AM   Result Value Ref Range    Magnesium 2.9 (H) 1.5 - 2.5 mg/dL   PHOSPHORUS    Collection Time: 10/13/18  4:24 AM   Result Value Ref Range    Phosphorus 3.6 2.5 - 4.5 mg/dL   COMP METABOLIC PANEL    Collection Time: 10/13/18  4:24 AM   Result Value Ref Range    Sodium 150 (H) 135 - 145 mmol/L    Potassium 4.1 3.6 - 5.5 mmol/L    Chloride 114 (H) 96 - 112 mmol/L    Co2 30 20 - 33 mmol/L    Anion Gap 6.0 0.0 - 11.9    Glucose 192 (H) 65 - 99 mg/dL    Bun 72 (HH) 8 - 22 mg/dL    Creatinine 1.14 0.50 - 1.40 mg/dL    Calcium 8.4 (L) 8.5 - 10.5 mg/dL    AST(SGOT) 16 12 - 45 U/L    ALT(SGPT) 15 2 - 50 U/L    Alkaline Phosphatase 90 30 - 99 U/L    Total Bilirubin 1.3 0.1 - 1.5 mg/dL    Albumin 3.0 (L) 3.2 - 4.9 g/dL    Total Protein 4.9 (L) 6.0 - 8.2 g/dL    Globulin 1.9 1.9 - 3.5 g/dL    A-G Ratio 1.6 g/dL   ESTIMATED GFR    Collection Time: 10/13/18  4:24 AM   Result Value Ref Range    GFR If African American >60 >60 mL/min/1.73 m 2    GFR If Non African American >60 >60 mL/min/1.73 m 2   ACCU-CHEK GLUCOSE    Collection Time: 10/13/18  6:21 AM   Result Value Ref Range    Glucose - Accu-Ck 167 (H) 65 - 99 mg/dL   ACCU-CHEK GLUCOSE    Collection Time: 10/13/18  8:00 AM   Result Value Ref Range    Glucose - Accu-Ck 175 (H) 65 - 99 mg/dL   ACCU-CHEK GLUCOSE    Collection Time:  10/13/18 10:06 AM   Result Value Ref Range    Glucose - Accu-Ck 182 (H) 65 - 99 mg/dL   ACCU-CHEK GLUCOSE    Collection Time: 10/13/18 11:02 AM   Result Value Ref Range    Glucose - Accu-Ck 170 (H) 65 - 99 mg/dL   ACCU-CHEK GLUCOSE    Collection Time: 10/13/18 12:14 PM   Result Value Ref Range    Glucose - Accu-Ck 154 (H) 65 - 99 mg/dL   ACCU-CHEK GLUCOSE    Collection Time: 10/13/18  1:04 PM   Result Value Ref Range    Glucose - Accu-Ck 179 (H) 65 - 99 mg/dL   ACCU-CHEK GLUCOSE    Collection Time: 10/13/18  2:03 PM   Result Value Ref Range    Glucose - Accu-Ck 155 (H) 65 - 99 mg/dL   ACCU-CHEK GLUCOSE    Collection Time: 10/13/18  3:57 PM   Result Value Ref Range    Glucose - Accu-Ck 121 (H) 65 - 99 mg/dL   ACCU-CHEK GLUCOSE    Collection Time: 10/13/18  4:59 PM   Result Value Ref Range    Glucose - Accu-Ck 146 (H) 65 - 99 mg/dL   ACCU-CHEK GLUCOSE    Collection Time: 10/13/18  6:13 PM   Result Value Ref Range    Glucose - Accu-Ck 138 (H) 65 - 99 mg/dL   ACCU-CHEK GLUCOSE    Collection Time: 10/13/18  6:58 PM   Result Value Ref Range    Glucose - Accu-Ck 141 (H) 65 - 99 mg/dL   ACCU-CHEK GLUCOSE    Collection Time: 10/13/18  7:55 PM   Result Value Ref Range    Glucose - Accu-Ck 131 (H) 65 - 99 mg/dL   ACCU-CHEK GLUCOSE    Collection Time: 10/13/18  8:48 PM   Result Value Ref Range    Glucose - Accu-Ck 145 (H) 65 - 99 mg/dL   ACCU-CHEK GLUCOSE    Collection Time: 10/13/18  9:40 PM   Result Value Ref Range    Glucose - Accu-Ck 152 (H) 65 - 99 mg/dL   ACCU-CHEK GLUCOSE    Collection Time: 10/13/18 10:42 PM   Result Value Ref Range    Glucose - Accu-Ck 146 (H) 65 - 99 mg/dL   ACCU-CHEK GLUCOSE    Collection Time: 10/14/18  4:54 AM   Result Value Ref Range    Glucose - Accu-Ck 154 (H) 65 - 99 mg/dL   COMP METABOLIC PANEL    Collection Time: 10/14/18  5:00 AM   Result Value Ref Range    Sodium 148 (H) 135 - 145 mmol/L    Potassium 4.1 3.6 - 5.5 mmol/L    Chloride 114 (H) 96 - 112 mmol/L    Co2 31 20 - 33 mmol/L    Anion  Gap 3.0 0.0 - 11.9    Glucose 175 (H) 65 - 99 mg/dL    Bun 68 (H) 8 - 22 mg/dL    Creatinine 1.14 0.50 - 1.40 mg/dL    Calcium 8.1 (L) 8.5 - 10.5 mg/dL    AST(SGOT) 16 12 - 45 U/L    ALT(SGPT) 15 2 - 50 U/L    Alkaline Phosphatase 86 30 - 99 U/L    Total Bilirubin 1.2 0.1 - 1.5 mg/dL    Albumin 2.8 (L) 3.2 - 4.9 g/dL    Total Protein 4.5 (L) 6.0 - 8.2 g/dL    Globulin 1.7 (L) 1.9 - 3.5 g/dL    A-G Ratio 1.6 g/dL   PHOSPHORUS    Collection Time: 10/14/18  5:00 AM   Result Value Ref Range    Phosphorus 4.1 2.5 - 4.5 mg/dL   CBC with Differential    Collection Time: 10/14/18  5:00 AM   Result Value Ref Range    WBC 18.6 (H) 4.8 - 10.8 K/uL    RBC 3.23 (L) 4.70 - 6.10 M/uL    Hemoglobin 10.2 (L) 14.0 - 18.0 g/dL    Hematocrit 32.3 (L) 42.0 - 52.0 %    .0 (H) 81.4 - 97.8 fL    MCH 31.6 27.0 - 33.0 pg    MCHC 31.6 (L) 33.7 - 35.3 g/dL    RDW 55.3 (H) 35.9 - 50.0 fL    Platelet Count 161 (L) 164 - 446 K/uL    MPV 10.4 9.0 - 12.9 fL    Neutrophils-Polys 86.90 (H) 44.00 - 72.00 %    Lymphocytes 5.40 (L) 22.00 - 41.00 %    Monocytes 6.00 0.00 - 13.40 %    Eosinophils 0.80 0.00 - 6.90 %    Basophils 0.10 0.00 - 1.80 %    Immature Granulocytes 0.80 0.00 - 0.90 %    Nucleated RBC 0.00 /100 WBC    Neutrophils (Absolute) 16.16 (H) 1.82 - 7.42 K/uL    Lymphs (Absolute) 1.00 1.00 - 4.80 K/uL    Monos (Absolute) 1.12 (H) 0.00 - 0.85 K/uL    Eos (Absolute) 0.14 0.00 - 0.51 K/uL    Baso (Absolute) 0.02 0.00 - 0.12 K/uL    Immature Granulocytes (abs) 0.15 (H) 0.00 - 0.11 K/uL    NRBC (Absolute) 0.00 K/uL   MAGNESIUM    Collection Time: 10/14/18  5:00 AM   Result Value Ref Range    Magnesium 2.8 (H) 1.5 - 2.5 mg/dL   TRIGLYCERIDE    Collection Time: 10/14/18  5:00 AM   Result Value Ref Range    Triglycerides 41 0 - 149 mg/dL   ESTIMATED GFR    Collection Time: 10/14/18  5:00 AM   Result Value Ref Range    GFR If African American >60 >60 mL/min/1.73 m 2    GFR If Non African American >60 >60 mL/min/1.73 m 2   ISTAT ARTERIAL BLOOD  GAS    Collection Time: 10/14/18  5:02 AM   Result Value Ref Range    Ph 7.559 (H) 7.400 - 7.500    Pco2 34.9 26.0 - 37.0 mmHg    Po2 77 64 - 87 mmHg    Tco2 32 20 - 33 mmol/L    S02 97 93 - 99 %    Hco3 31.2 (H) 17.0 - 25.0 mmol/L    BE 8 (H) -4 - 3 mmol/L    Body Temp 37.6 C degrees    O2 Therapy 30 %    iPF Ratio 257     Ph Temp Cesar 7.550 (H) 7.400 - 7.500    Pco2 Temp Co 35.8 26.0 - 37.0 mmHg    Po2 Temp Cor 80 64 - 87 mmHg    Specimen Arterial     Action Range Triggered NO     Inst. Qualified Patient YES        Imaging  X-Ray:  I have personally reviewed the images and compared with prior images.   Interpretation noted above and films are reviewed with Team on rounds again today      Assessment/Plan  * Acute respiratory failure with hypoxia (HCC)- (present on admission)   Assessment & Plan    Intubated 9/29  RT protocols  Continue vent support, not appropriate for liberation at this time  All appropriate ventilator bundles have been initiated  Serial ABG, chest x-ray and ventilator mechanics review  Ventilator adjusted daily or more frequently as needed  Oxygenation better, chest x-ray better, trial reduce PEEP to 8  Wife remains adamant no tracheostomy at this time, probably no LTACH as well, reaffirmed  Patient plateaus and does not clinically improve consider follow-up with palliative care team          Acute pulmonary edema (HCC)   Assessment & Plan    ECHO with normal LV systolic function  Significant pulmonary hypertension causing LV diastolic dysfunction  Volume overloaded clinically with worsening pulmonary edema radiographically and by exam  Reduce Lasix, 40 mg IV daily  Monitor I's and O's and BMP  Add Diamox for contraction alkalosis?  Continue current regimen  Monitor          CAP (community acquired pneumonia)- (present on admission)   Assessment & Plan    Influenza negative  Completed 5 days of Unasyn and azithromycin on 10/4  Observe off antibiotics  Update vaccines prior to discharge  Monitor  for HCAP, patient may need diagnostic and therapeutic bronchoscopy  No new signs of pneumonia, continue to monitor  No change        Ileus (HCC)   Assessment & Plan    Secondary to above  Trickle tube feeds at 20 cc/h, continue to increase slowly as tolerated  Avoid lactulose, seem to make things worse for this patient  Bowel care protocols  Relistor therapy to begin as needed  Mobilize as tolerated  Slow increase tube feeds          Hemoptysis   Assessment & Plan    Bronchoscopy revealed friable and nodular mucosa in the left mainstem bronchus, no obvious malignancy  Hold all anticoagulation for now  Bronchoscopy with biopsy and brushing performed on 10/7, cytology and pathology negative for malignancy  Continue to hold VTE prophylaxis with subcu heparin as the patient still having intermittent hemoptysis          Atelectasis   Assessment & Plan    Aggressive pulmonary toilet  Serial chest x-ray, therapeutic bronchoscopy as needed  Wean off ventilator mobilize/IS/PEP        Paroxysmal atrial fibrillation/atrial flutter (HCC)   Assessment & Plan    Continue loading with amiodarone, 400 mg twice daily  Continue additional IV doses of amiodarone to supplement/accelerate loading as needed  No anticoagulation due to hemoptysis, monitor - last episode a.m. 10/12  Optimize magnesium and potassium aggressively  Rate control with CCB or BB as Bp allows, alternatively digoxin therapy as needed  Monitor        Pulmonary hypertension (HCC)   Assessment & Plan    RVSP 65 mm Hg  CTA without evidence of pulmonary embolism  Force diuresis with Lasix ongoing, dose adjusted daily as needed  ROS for BEBA when extubated  Suspect chronic process given the severity of elevation  Continue current regimen  Monitor        Acute metabolic encephalopathy   Assessment & Plan    Etiology is metabolic as well as due to sepsis  Limit sedatives, they are contributing  Improved but persisting, following with very prolonged sedation vacations         Elevated troponin- (present on admission)   Assessment & Plan    Continue aspirin and statin  Monitor        Azotemia   Assessment & Plan    EZRA worse again  Sodium, BUN and creatinine worse  Clinically volume overloaded by exam systemically and pulmonary wise, oxygenation actually better  Ongoing diuresis with good results!  However he needs some free water now we will start with 200 cc every 6 hours via feeding tube  However I believe the need to reduce furosemide today 10/11  Monitor BMP and  I/Os        Hypernatremia   Assessment & Plan    Continue free water, 200 mL every 8 hours as tolerated  Serial BMP  monitor        Fever   Assessment & Plan    This was due to dexmedetomidine  Fever has resolved after dexmedetomidine discontinued  UA unremarkable  Blood cultures negative from 10/5  Bronchial washings from 10/3 with no growth  Avoid dexmedetomidine, recent fever with trial        Hyperglycemia- (present on admission)   Assessment & Plan    Titrating insulin drip for glucose control  Daily reviewed insulin dosing with clinical pharmacist and at bedtime SSI/long-acting insulin as clinically appropriate  Hypoglycemia protocols ongoing  Monitor        Dyslipidemia- (present on admission)   Assessment & Plan    Continue statin/diet             VTE:  Contraindicated  Ulcer: H2 Antagonist  Lines: Central Line  Ongoing indication addressed    I have performed a physical exam and reviewed and updated ROS and Plan today (10/14/2018). In review of yesterday's note (10/13/2018), there are no changes except as documented above.     The patient remains on high ventilator support.  He is not tolerating any SBT/weaning efforts.  Long d/w wife at bedside today; she refuses bronchoscopy to evaluate for new pneumonia and is considering goals of care; plan as above    Discussed patient condition and risk of morbidity and/or mortality with Hospitalist, Family, RN, RT, Pharmacy, , Code status disscussed and Charge  nurse / hot rounds     The patient remains critically ill.  Critical care time = 38 minutes in directly providing and coordinating critical care and extensive data review.  No time overlap and excludes procedures.    High risk of deterioration and worsening vital organ dysfunction and death without the above critical care interventions.    Pulmonary and Critical Care Medicine

## 2018-10-14 NOTE — PALLIATIVE CARE
PALLIATIVE CARE FOLLOW-UP:    Discussed with ICU RN Rosie - wife to be in at 1330 today.       Plan:  Meet with wife at 1330.    Thank you for allowing Palliative Care to support this pt and his family.  Contact [phone] for additional assistance, questions or concerns.

## 2018-10-14 NOTE — CARE PLAN
Problem: Fluid Volume:  Goal: Will maintain balanced intake and output  Outcome: PROGRESSING AS EXPECTED  Q2 I/Os documented. Pt voiding adequately through [place]. Tube feed and drips running. Q6 free water flushes.    Problem: Skin Integrity  Goal: Risk for impaired skin integrity will decrease  Outcome: PROGRESSING SLOWER THAN EXPECTED  Biotain and mepilex in use as preventatives on wrists, left ear. Skin red/blanching. Wound on right ear open to air and healing. Q2 turns and heel float boots in place.

## 2018-10-14 NOTE — PROGRESS NOTES
[facility] Hospitalist Progress Note    Date of Service: 10/14/2018    Chief Complaint    78 y.o. male admitted 2018 with cough and shortness of breath.  Patient was admitted to the hospital and shortly thereafter had significant increase in shortness of breath.  CTA neg for PE but did show CAP.  Initially admitted to the floor however worsened requiring intubation     PMHx: HLD    Interval Problem Update  ROS unobtainable  Prop 5  Not following this am  Sinus 70-80  SBP 90-120s  Low grade temp  TFs 20; emesis when advanced  Insulin gtts 1.5  UOP 650ml/12hrs on 40 BID lasix  Neg fluid balance on lasix      Consultants/Specialty  Cardiology    Disposition  Plan to extubated and move to comfort care tomorrow when wife is ready    Review of Systems   Unable to perform ROS: Intubated      Physical Exam  Laboratory/Imaging   Hemodynamics  Temp (24hrs), Av.9 °C (100.3 °F), Min:37.9 °C (100.2 °F), Max:38.1 °C (100.6 °F)   Temperature: 37.9 °C (100.2 °F), Monitored Temp: 37.6 °C (99.7 °F)  Pulse  Av.6  Min: 60  Max: 142 Heart Rate (Monitored): 81  NIBP: 119/67     Respiratory  Smith Vent Mode: APVCMV, Rate (breaths/min): 16, PEEP/CPAP: 8, PEEP/CPAP: 8, FiO2: 30, P Peak (PIP): 22, P MEAN: 11   Respiration: 20, Pulse Oximetry: 98 %     Work Of Breathing / Effort: Vented  RUL Breath Sounds: Crackles, RML Breath Sounds: Diminished, RLL Breath Sounds: Diminished, BRITTANY Breath Sounds: Crackles, LLL Breath Sounds: Diminished    Fluids    Intake/Output Summary (Last 24 hours) at 10/14/18 0554  Last data filed at 10/14/18 0400   Gross per 24 hour   Intake          1764.01 ml   Output             2680 ml   Net          -915.99 ml       Nutrition  Orders Placed This Encounter   Procedures   • Diet NPO     Standing Status:   Standing     Number of Occurrences:   1     Order Specific Question:   Type:     Answer:   Now [1]     Order Specific Question:   Restrict to:     Answer:   Strict [1]     Physical Exam    Constitutional: He appears well-developed and well-nourished. No distress.   HENT:   Head: Normocephalic and atraumatic.   Eyes: Conjunctivae are normal.   Neck: Neck supple. No JVD present.   Cardiovascular: Normal rate.  Exam reveals no gallop.    Murmur heard.  Pulmonary/Chest: No stridor. No respiratory distress. He has no wheezes. He has rales.   Abdominal: Soft. There is no tenderness. There is no rebound and no guarding.   Musculoskeletal: He exhibits edema.   Neurological:   Eyes open and moves all 4 however not attending, does not follow, not purposeful   Skin: Skin is warm and dry. No rash noted. He is not diaphoretic. There is pallor.   Nursing note and vitals reviewed.      Recent Labs      10/12/18   0535  10/13/18   0424  10/14/18   0500   WBC  16.1*  19.5*  18.6*   RBC  3.51*  3.53*  3.23*   HEMOGLOBIN  10.9*  11.0*  10.2*   HEMATOCRIT  34.6*  35.0*  32.3*   MCV  98.6*  99.2*  100.0*   MCH  31.1  31.2  31.6   MCHC  31.5*  31.4*  31.6*   RDW  51.8*  52.9*  55.3*   PLATELETCT  193  211  161*   MPV  10.1  10.2  10.4     Recent Labs      10/12/18   0535  10/13/18   0424  10/14/18   0500   SODIUM  153*  150*  148*   POTASSIUM  4.1  4.1  4.1   CHLORIDE  116*  114*  114*   CO2  33  30  31   GLUCOSE  178*  192*  175*   BUN  77*  72*  68*   CREATININE  1.14  1.14  1.14   CALCIUM  8.6  8.4*  8.1*             Recent Labs      10/14/18   0500   TRIGLYCERIDE  41          Assessment/Plan     * Acute respiratory failure with hypoxia (HCC)- (present on admission)   Assessment & Plan    CTA on 9-29 neg  for PE  Echo with normal EF and pulmonary hypertension  Neg fluid balance on lasix  Vent management per critical care discussed with Dr. Sierra              Acute pulmonary edema (HCC)   Assessment & Plan    Neg fluid balance on lasix   Cont to follow I&Os, daily BMP, BP        CAP (community acquired pneumonia)- (present on admission)   Assessment & Plan    Completed course of antibiotics   Cultures neg  ?VAE as  CXR worsening, F, inceased WBC's  After discussion with wife will move to comfort care tomorrow  Will not initiate Abx's at this time        Ileus (HCC)   Assessment & Plan    Clinically improved  Remains on Reglan  We will advance tube feeding and continue close monitoring        Hemoptysis   Assessment & Plan    Lovenox discontinued    Continue clinical monitoring and follow-up on lung biopsy        Paroxysmal atrial fibrillation/atrial flutter (HCC)   Assessment & Plan    Continue amiodarone and metoprolol  Now in sinux  Lovenox discontinued given hemoptysis  Reverted to sinus rhythm continue telemetry monitoring        Pulmonary hypertension (HCC)   Assessment & Plan    9/29/18 echocardiogram with RVSP of 65 mmHg  CT was negative for pulmonary embolism    Continue IV Lasix              Acute metabolic encephalopathy   Assessment & Plan    Non focal exam  Not showing any improvement  EEG consistent with difuse metabolic encephalopathy.  Neg for non convulsive status  Cont to address metabolic and infectious issues  Try to minimize sedation and CNS active mdications        Elevated troponin- (present on admission)   Assessment & Plan    Likely demand ischemia        Hypernatremia   Assessment & Plan    Stable on Free H2O          Hyperglycemia- (present on admission)   Assessment & Plan    HbA1c 6.5 on 7/18  On insulin drip continue close monitoring        Dyslipidemia- (present on admission)   Assessment & Plan    Continue atorvastatin            Quality-Core Measures   Reviewed items::  EKG reviewed, Labs reviewed, Medications reviewed and Radiology images reviewed  Garces catheter::  Unconscious / Sedated Patient on a Ventilator  DVT prophylaxis - mechanical:  SCDs  Ulcer Prophylaxis::  Yes  Antibiotics:  Treating active infection/contamination beyond 24 hours perioperative coverage

## 2018-10-15 NOTE — CARE PLAN
Problem: Safety  Goal: Will remain free from falls  Outcome: PROGRESSING AS EXPECTED    Intervention: Assess risk factors for falls  Done.   Intervention: Implement fall precautions  Bed in locked and in low position, lower bed rails raised, bed alarm in use, call light within reach, treaded slipper socks on patient and patient room near nursing station.     Hourly rounding in place.         Problem: Skin Integrity  Goal: Risk for impaired skin integrity will decrease  Outcome: PROGRESSING AS EXPECTED  Assess patient's skin at the beginning of the shift. Turn patient every two hours and monitor under all medical devices throughout entire shift. Maintain a clean, dry linen environment. Float heals and elbows. Provide appropriate wound prevention interventions as they apply. Implement pertinent wound protocols and document them. Please see wound flow sheet for further details.   Intervention: Assess risk factors for impaired skin integrity and/or pressure ulcers  Done, see epic flow sheet.   Intervention: Implement precautions to protect skin integrity in collaboration with the interdisciplinary team  Implemented, see epic flow sheet.    Intervention: Assess and monitor skin integrity, appearance and/or temperature  Done, see epic flow sheet.

## 2018-10-15 NOTE — PROGRESS NOTES
Monitor Summary:     Rhythm: Sinus Rhythm   Ectopy: Occasional PVCs and PACs  Rate: 60-80s  Measurements: 0.16/0.12/0.38

## 2018-10-15 NOTE — PALLIATIVE CARE
PALLIATIVE CARE FOLLOW-UP:    Met pt's wife Oanh at bedside.  Provided time for reminiscing of their nearly 55 years of marriage, practical and emotional support, and community resources. Answered questions about process of transitioning.  Oanh has not yet selected facility for cremation services - she plans to make a few calls for pricing purposes.    Discussed with/Updated:  Dr. [facility]        Plan:  Pt's wife ready for extubation and transition to comfort care.    Thank you for allowing Palliative Care to support this pt and his wife.  Contact [phone] for additional assistance, questions or concerns.

## 2018-10-15 NOTE — DIETARY
Nutrition Services: Weekly TF Update  Patient is now s/p extubation with transition to comfort care.  TF to stop; therefore, RD signing off at this time.  Please re-consult RD prn.

## 2018-10-15 NOTE — PROGRESS NOTES
Dr. Gamboa notified of patient's low urine output over the last four hrs, okay per MD to flush gil once and continue to monitor.

## 2018-10-15 NOTE — PROGRESS NOTES
[facility] Hospitalist Progress Note    Date of Service: 10/15/2018    Chief Complaint    78 y.o. male admitted 2018 with cough and shortness of breath.  Patient was admitted to the hospital and shortly thereafter had significant increase in shortness of breath.  CTA neg for PE but did show CAP.  Initially admitted to the floor however worsened requiring intubation     PMHx: HLD    Interval Problem Update  ROS unobtainable  Prop 5  Not following this am  Sinus 70-80  SBP 90-120s  Low grade temp  TFs 20; emesis when advanced  Insulin gtts 1.5  UOP 600ml/12hrs on 40 BID lasix  Neg fluid balance on lasix      Consultants/Specialty  Cardiology    Disposition  Plan to extubated and move to comfort care tomorrow when wife is ready    Review of Systems   Unable to perform ROS: Intubated      Physical Exam  Laboratory/Imaging   Hemodynamics  Temp (24hrs), Av.6 °C (99.7 °F), Min:37.4 °C (99.3 °F), Max:37.8 °C (100 °F)   Temperature: 37.8 °C (100 °F), Monitored Temp: 37.5 °C (99.5 °F)  Pulse  Av  Min: 60  Max: 142 Heart Rate (Monitored): 76  Blood Pressure : 118/59, NIBP: 127/65     Respiratory  Smith Vent Mode: APVCMV, Rate (breaths/min): 16, PEEP/CPAP: 8, PEEP/CPAP: 8, FiO2: 40, P Peak (PIP): 21, P MEAN: 11   Respiration: (!) 27, Pulse Oximetry: 97 %     Work Of Breathing / Effort: Vented  RUL Breath Sounds: Rhonchi, RML Breath Sounds: Diminished, RLL Breath Sounds: Diminished, BRITTANY Breath Sounds: Rhonchi, LLL Breath Sounds: Diminished    Fluids    Intake/Output Summary (Last 24 hours) at 10/15/18 0548  Last data filed at 10/15/18 0400   Gross per 24 hour   Intake             1387 ml   Output             1190 ml   Net              197 ml       Nutrition  Orders Placed This Encounter   Procedures   • Diet NPO     Standing Status:   Standing     Number of Occurrences:   1     Order Specific Question:   Type:     Answer:   Now [1]     Order Specific Question:   Restrict to:     Answer:   Strict [1]     Physical  Exam   Constitutional: He appears well-developed and well-nourished. No distress.   HENT:   Head: Normocephalic and atraumatic.   Eyes: Conjunctivae are normal.   Neck: Neck supple. No JVD present.   Cardiovascular: Normal rate.  Exam reveals no gallop.    Murmur heard.  Pulmonary/Chest: No stridor. No respiratory distress. He has no wheezes. He has rales.   Abdominal: Soft. There is no tenderness. There is no rebound and no guarding.   Musculoskeletal: He exhibits edema.   Neurological:   Eyes open and moves all 4 however not attending, does not follow, not purposeful   Skin: Skin is warm and dry. No rash noted. He is not diaphoretic. There is pallor.   Nursing note and vitals reviewed.      Recent Labs      10/13/18   0424  10/14/18   0500  10/15/18   0514   WBC  19.5*  18.6*  17.6*   RBC  3.53*  3.23*  3.26*   HEMOGLOBIN  11.0*  10.2*  10.3*   HEMATOCRIT  35.0*  32.3*  32.8*   MCV  99.2*  100.0*  100.6*   MCH  31.2  31.6  31.6   MCHC  31.4*  31.6*  31.4*   RDW  52.9*  55.3*  55.8*   PLATELETCT  211  161*  176   MPV  10.2  10.4  10.6     Recent Labs      10/13/18   0424  10/14/18   0500   SODIUM  150*  148*   POTASSIUM  4.1  4.1   CHLORIDE  114*  114*   CO2  30  31   GLUCOSE  192*  175*   BUN  72*  68*   CREATININE  1.14  1.14   CALCIUM  8.4*  8.1*             Recent Labs      10/14/18   0500   TRIGLYCERIDE  41          Assessment/Plan     * Acute respiratory failure with hypoxia (HCC)- (present on admission)   Assessment & Plan    CTA on 9-29 neg  for PE  Echo with normal EF and pulmonary hypertension  Neg fluid balance on lasix  Vent management per critical care discussed with Dr. Sierra              Acute pulmonary edema (HCC)   Assessment & Plan    Neg fluid balance on lasix   Cont to follow I&Os, daily BMP, BP        CAP (community acquired pneumonia)- (present on admission)   Assessment & Plan    Completed course of antibiotics   Cultures neg  ?VAE as CXR worsening, F, inceased WBC's  After discussion with  wife will move to comfort care tomorrow  Will not initiate Abx's at this time        Ileus (HCC)   Assessment & Plan    Clinically improved  Remains on Reglan  We will advance tube feeding and continue close monitoring        Hemoptysis   Assessment & Plan    Lovenox discontinued    Continue clinical monitoring and follow-up on lung biopsy        Paroxysmal atrial fibrillation/atrial flutter (HCC)   Assessment & Plan    Continue amiodarone and metoprolol  Now in sinux  Lovenox discontinued given hemoptysis  Reverted to sinus rhythm continue telemetry monitoring        Pulmonary hypertension (HCC)   Assessment & Plan    9/29/18 echocardiogram with RVSP of 65 mmHg  CT was negative for pulmonary embolism    Continue IV Lasix              Acute metabolic encephalopathy   Assessment & Plan    Non focal exam  Not showing any improvement  EEG consistent with difuse metabolic encephalopathy.  Neg for non convulsive status  Cont to address metabolic and infectious issues  Try to minimize sedation and CNS active mdications        Elevated troponin- (present on admission)   Assessment & Plan    Likely demand ischemia        Hypernatremia   Assessment & Plan    Stable on Free H2O          Hyperglycemia- (present on admission)   Assessment & Plan    HbA1c 6.5 on 7/18  On insulin drip continue close monitoring        Dyslipidemia- (present on admission)   Assessment & Plan    Continue atorvastatin            Quality-Core Measures   Reviewed items::  EKG reviewed, Labs reviewed, Medications reviewed and Radiology images reviewed  Garces catheter::  Unconscious / Sedated Patient on a Ventilator  DVT prophylaxis - mechanical:  SCDs  Ulcer Prophylaxis::  Yes  Antibiotics:  Treating active infection/contamination beyond 24 hours perioperative coverage

## 2018-10-15 NOTE — CARE PLAN
Problem: Ventilation Defect:  Goal: Ability to achieve and maintain unassisted ventilation or tolerate decreased levels of ventilator support    Intervention: Support and monitor invasive and noninvasive mechanical ventilation  Adult Ventilation Update    Total Vent Days: 16    Patient Lines/Drains/Airways Status    Active Airway     Name: Placement date: Placement time: Site: Days:    Airway ETT Oral 8.0 @ 24 09/29/18   1714   Oral   16              Pt on APV: 16, 440, +8, 40%  Thick, tan/bloody secretions overnight.   No changes made, will continue to monitor.

## 2018-10-15 NOTE — CARE PLAN
Problem: Ventilation Defect:  Goal: Ability to achieve and maintain unassisted ventilation or tolerate decreased levels of ventilator support  Vent day 15  ETT-8.0  24 @teeth    APVCMV  RR-16  Vt-440  +8  30%    SBT trial this morning only tolerated 3 minutes RSBI 216,RR-40's Sp02-88%

## 2018-10-15 NOTE — PROGRESS NOTES
Dr. Church at bedside performed lavage and deep suctioning. Copious secretions projected from lungs and patient vent pressures improved.

## 2018-10-15 NOTE — PROGRESS NOTES
Patient extubated at 1111. Meds given see MAR. Wife at bedside. Educated wife on plan for comfort care. Wife verbalizes understanding.

## 2018-10-15 NOTE — PROGRESS NOTES
12 hour chart check complete.    2 RN skin check complete.   · Devices in place: invasive and noninvasive respiratory devices, gil catheter, SCDs, wrist restraints, blood pressure cuff, pulse oximeter, and cardiac monitor.  · Skin assessed under devices: yes.  · Redness to ears, bilateral wrists, and sacrum. Charted in flow sheet.   · The following interventions in place: Q2 hour turning, barrier wipes, waffle cushion, heal float boots, padding elbows with pillows, foam dressing to wrists and ears.

## 2018-10-16 NOTE — DISCHARGE SUMMARY
DEATH NOTE/DISCHARGE SUMMARY    DATE OF ADMISSION:  09/29/2018    DATE OF PATIENT'S PASSING:  10/15/2018    DISCHARGE DIAGNOSES:  1.  Acute hypoxic respiratory failure.  2.  Community-acquired pneumonia.  3.  Acute metabolic encephalopathy.  4.  Pulmonary edema.  5.  Ileus.  6.  Hemoptysis.  7.  Paroxysmal atrial fibrillation.  8.  Pulmonary hypertension.  9.  Dyslipidemia.    CONSULTANTS:  1.  Dr. Jeremy Gonda, critical care medicine.  2.  Dr. Luis Alberto Tinajero, cardiology.    HOSPITAL COURSE:  Briefly, this is a 78-year-old gentleman who originally   presented for evaluation on 09/29/2018.  He had been experiencing cough and   shortness of breath for about a week.  He had been seen in the urgent care and   noted to be hypoxic with a sat of 89% on room air.  There was concern that he   might have pneumonia.  He was sent to the ED where he was found to have   leukocytosis and a positive chest x-ray.  A CT scan was done which was   negative for PE.  He required intubation for his respiratory failure and he   was admitted to the ICU.    The patient had a stormy course in the hospital.  Unfortunately, he was one of   general worsening.  He was treated with full course of antibiotics; however,   his respiratory status did not improve significantly.  In the last 72 hours   prior to his passing, his chest x-ray in fact worsened.    Myself and several other attending physician entertained several conferences   with the patient's wife, Felipe.  She was very clear that her  would   never want a trach.  He would never want to be in a skilled nursing facility   or prolonged stay in a hospital.  On the afternoon of 15th consistent with the   patient's wife's wishes and his expressed wishes to her, he was extubated and   went on to pass quietly at 12:08 in the afternoon.    Cause of death was acute hypoxic respiratory failure secondary to   community-acquired pneumonia.    DISCHARGE TIME:  35 minutes.        ____________________________________     DO ANDREIA Morelos / ARISTIDES    DD:  10/15/2018 16:36:12  DT:  10/16/2018 00:01:48    D#:  [id number]  Job#:  [id number]    cc: Jh Acosta MD

## 2018-10-19 NOTE — DOCUMENTATION QUERY
DOCUMENTATION QUERY    PROVIDERS: Please select “Cosign w/ note”to reply to query.    To better represent the severity of illness of your patient, please review the following information and exercise your independent professional judgment in responding to this query.     Severe sepsis with shock,associated with respiratory failure and encephalopathy is documented until 10/13.No further mention of it including DC Summary. Based upon the clinical findings, risk factors, and treatment, was  Sepsis ruled in or ruled out?      • Sepsis ruled in  • Sepsis ruled out  • Other explanation of clinical findings (Please document)  • Unable to determine          The medical record reflects the following:   Clinical Findings  admitted with Severe Sepsis secondary to pneumonia.   Septic shock documented 10/4/2018.  Sepsis is not documented in the discharge summary.  WBC 14,900   /70   Pulse (!) 106   Temp 36.8 °C (98.2 °F)   Resp (!) 29       Treatment  IV antibiotics  Labs  Vasopressors  Ventilator  BAL   Risk Factors  Acute respiratory failure  RBBB  Hyperglycemia  Acute metabolic encephalopathy   Location within medical record  History and Physical, Progress notes  9/29 - 10/11/2018       Thank you,   Adri Isaac LPN,[facility]  Inpatient   [email or web address]

## 2018-10-31 LAB
FUNGUS SPEC CULT: NORMAL
FUNGUS SPEC CULT: NORMAL
SIGNIFICANT IND 70042: NORMAL
SIGNIFICANT IND 70042: NORMAL
SITE SITE: NORMAL
SITE SITE: NORMAL
SOURCE SOURCE: NORMAL
SOURCE SOURCE: NORMAL

## 2018-11-07 LAB
FUNGUS SPEC CULT: NORMAL
SIGNIFICANT IND 70042: NORMAL
SITE SITE: NORMAL
SOURCE SOURCE: NORMAL

## 2018-11-28 LAB
MYCOBACTERIUM SPEC CULT: NORMAL
RHODAMINE-AURAMINE STN SPEC: NORMAL
SIGNIFICANT IND 70042: NORMAL
SITE SITE: NORMAL
SOURCE SOURCE: NORMAL

## 2021-11-05 NOTE — CARE PLAN
Number Of Group Ii Special Stains Used ([id number]): None Problem: Respiratory:  Goal: Respiratory status will improve  Outcome: PROGRESSING SLOWER THAN EXPECTED  Noted occ desaturations with plugging. RT notified. Suctioned bloody thick secretions from ET tube. Per RT, down on FIO2  Intervention: Assess and monitor pulmonary status  Rhonchi noted throughout.   Intervention: Administer and titrate oxygen therapy  See RT flow sheet.       Problem: Mobility  Goal: Risk for activity intolerance will decrease  Outcome: PROGRESSING SLOWER THAN EXPECTED  Unable to mobilize due to PEEP and FIO2 needs. Pt at times -4 to -5 RASS.
